# Patient Record
Sex: MALE | Race: WHITE | NOT HISPANIC OR LATINO | Employment: FULL TIME | URBAN - METROPOLITAN AREA
[De-identification: names, ages, dates, MRNs, and addresses within clinical notes are randomized per-mention and may not be internally consistent; named-entity substitution may affect disease eponyms.]

---

## 2017-01-10 DIAGNOSIS — I48.19 PERSISTENT ATRIAL FIBRILLATION: Primary | ICD-10-CM

## 2017-01-10 RX ORDER — FEXOFENADINE HCL AND PSEUDOEPHEDRINE HCI 180; 240 MG/1; MG/1
TABLET, EXTENDED RELEASE ORAL
Status: ON HOLD | COMMUNITY
Start: 2016-11-11 | End: 2017-03-23

## 2017-01-10 NOTE — TELEPHONE ENCOUNTER
----- Message from Romaine Howard MD sent at 1/10/2017 11:35 AM CST -----  Contact: patient wife called  Yes that would be fine. Eliquis 5 mg bid MB  ----- Message -----     From: Charlotte Marie RN     Sent: 1/10/2017  10:36 AM       To: Romaine Howard MD     Pt's wife reports she checked pt's insurance and would like to change pt's Pradaxa. She said you suggested Eliquis. Let me know if this is okay, and I'll send ERX.  ----- Message -----     From: Ale Ponce MA     Sent: 1/10/2017  10:03 AM       To: Charlotte Marie RN    Please call the patient wife at home she need to talk to you about his medication Pradaxa 150 mg she would like to change his medication. Thank you.

## 2017-01-31 ENCOUNTER — OFFICE VISIT (OUTPATIENT)
Dept: OPHTHALMOLOGY | Facility: CLINIC | Age: 67
End: 2017-01-31
Attending: OPHTHALMOLOGY
Payer: MEDICARE

## 2017-01-31 ENCOUNTER — TELEPHONE (OUTPATIENT)
Dept: OPHTHALMOLOGY | Facility: CLINIC | Age: 67
End: 2017-01-31

## 2017-01-31 DIAGNOSIS — Z94.7 STATUS POST CORNEAL TRANSPLANT: ICD-10-CM

## 2017-01-31 DIAGNOSIS — T86.8419 CORNEAL TRANSPLANT FAILURE: ICD-10-CM

## 2017-01-31 DIAGNOSIS — H18.519 FUCHS' CORNEAL DYSTROPHY: Primary | ICD-10-CM

## 2017-01-31 DIAGNOSIS — H25.11 NUCLEAR SCLEROSIS, RIGHT: ICD-10-CM

## 2017-01-31 PROCEDURE — 99999 PR PBB SHADOW E&M-EST. PATIENT-LVL II: CPT | Mod: PBBFAC,,, | Performed by: OPHTHALMOLOGY

## 2017-01-31 PROCEDURE — 92014 COMPRE OPH EXAM EST PT 1/>: CPT | Mod: S$GLB,,, | Performed by: OPHTHALMOLOGY

## 2017-01-31 RX ORDER — MOXIFLOXACIN 5 MG/ML
1 SOLUTION/ DROPS OPHTHALMIC 4 TIMES DAILY
Qty: 3 ML | Refills: 3 | Status: SHIPPED | OUTPATIENT
Start: 2017-01-31 | End: 2017-03-24 | Stop reason: CLARIF

## 2017-01-31 RX ORDER — LIDOCAINE HYDROCHLORIDE 10 MG/ML
1 INJECTION, SOLUTION EPIDURAL; INFILTRATION; INTRACAUDAL; PERINEURAL ONCE
Status: CANCELLED | OUTPATIENT
Start: 2017-01-31 | End: 2017-01-31

## 2017-01-31 RX ORDER — TETRACAINE HYDROCHLORIDE 5 MG/ML
1 SOLUTION OPHTHALMIC
Status: CANCELLED | OUTPATIENT
Start: 2017-01-31

## 2017-01-31 RX ORDER — PREDNISOLONE ACETATE 10 MG/ML
1 SUSPENSION/ DROPS OPHTHALMIC 4 TIMES DAILY
Qty: 10 ML | Refills: 4 | Status: SHIPPED | OUTPATIENT
Start: 2017-01-31 | End: 2017-12-03 | Stop reason: SDUPTHER

## 2017-01-31 RX ORDER — MOXIFLOXACIN 5 MG/ML
1 SOLUTION/ DROPS OPHTHALMIC
Status: CANCELLED | OUTPATIENT
Start: 2017-01-31

## 2017-01-31 NOTE — PROGRESS NOTES
HPI     DLS: 01/26/2016    S/P PC IOL OS 1/11/10 Dr Jay  PKP OS 1997     Pt states his vision has been stable since his last visit.     Eye meds:  FML  BID OS   Timolol  BID OS           Last edited by Nicolette Kinney on 1/31/2017 10:06 AM.         Assessment /Plan     For exam results, see Encounter Report.    Fuchs' corneal dystrophy    Nuclear sclerosis, right    Status post corneal transplant    Corneal transplant failure      PKP OS 20 yrs old and failing. Plan PKP OS      OD with fuchs and NSC, will need phaco DMEK OD soon.(pcboo 22.5)

## 2017-01-31 NOTE — MR AVS SNAPSHOT
[facility]  [address]  [place] [address]  Phone: [phone]  Fax: [phone]                  Pavel Graff   2017 10:00 AM   Office Visit    Description:  Male : 1950   Provider:  Chantell Jay MD   Department:  [facility]           Reason for Visit     Fuch's Dystrophy           Diagnoses this Visit        Comments    Fuchs' corneal dystrophy    -  Primary     Nuclear sclerosis, right         Status post corneal transplant         Corneal transplant failure                To Do List           Goals (5 Years of Data)     None      [facility]     [facility] Care [facility] -  Assistance  Registered nurses in the [facility] Call Center provide clinical advisement, health education, appointment booking, and other advisory services.  Call for this free service at [phone].             Medications           Message regarding Medications     Verify the changes and/or additions to your medication regime listed below are the same as discussed with your clinician today.  If any of these changes or additions are incorrect, please notify your healthcare provider.             Verify that the below list of medications is an accurate representation of the medications you are currently taking.  If none reported, the list may be blank. If incorrect, please contact your healthcare provider. Carry this list with you in case of emergency.           Current Medications     apixaban 5 mg Tab Take 1 tablet (5 mg total) by mouth 2 (two) times daily.    aspirin 81 MG Chew Take 81 mg by mouth once daily.    fexofenadine-pseudoephedrine (ALLEGRA-D 24) 180-240 mg per 24 hr tablet     fish oil-omega-3 fatty acids 300-1,000 mg capsule Take 1 g by mouth once daily.    fluorometholone 0.1% (FML) 0.1 % DrpS     hydrochlorothiazide (HYDRODIURIL) 25 MG tablet TAKE 1 TABLET (25 MG TOTAL) BY MOUTH ONCE DAILY.    losartan (COZAAR) 50 MG tablet Take 1 tablet (50 mg  total) by mouth once daily.    metoprolol succinate (TOPROL-XL) 50 MG 24 hr tablet TAKE 1 TABLET (50 MG TOTAL) BY MOUTH ONCE DAILY.    pantoprazole (PROTONIX) 40 MG tablet Take 1 tablet (40 mg total) by mouth once daily.    pravastatin (PRAVACHOL) 20 MG tablet Take 1 tablet (20 mg total) by mouth once daily.    timolol maleate 0.5% (TIMOPTIC) 0.5 % Drop INSTILL 1 DROP INTO LEFT EYE 2 TIMES A DAY           Clinical Reference Information           Allergies as of 1/31/2017     No Known Allergies      Immunizations Administered on Date of Encounter - 1/31/2017     None

## 2017-01-31 NOTE — TELEPHONE ENCOUNTER
----- Message from Gricelda Abdullahi sent at 1/31/2017 11:47 AM CST -----  Contact: Mrs. Freedmantis   Pt calling to inform that the Moxeza is not covered by her insurance.  She states that the Vigamox is comparable but it is a Tier 4 medication and she would like to know if we can call in a substitute to that medication that is more cost effective.  She can be reached at [phone]

## 2017-02-02 ENCOUNTER — TELEPHONE (OUTPATIENT)
Dept: OPHTHALMOLOGY | Facility: CLINIC | Age: 67
End: 2017-02-02

## 2017-02-02 NOTE — TELEPHONE ENCOUNTER
----- Message from Diana Chua sent at 2/1/2017  2:45 PM CST -----  Contact: Kiana Graff (Spouse)  Pt would like to schedule for his surgery with Dr. Jay on March 23rd, please call [phone]    THANKS!  Spoke with wife. Surgery scheduled 3/23/17 with Dr. Jay. [facility]

## 2017-03-09 ENCOUNTER — TELEPHONE (OUTPATIENT)
Dept: OPHTHALMOLOGY | Facility: CLINIC | Age: 67
End: 2017-03-09

## 2017-03-09 DIAGNOSIS — T86.8419 CORNEAL TRANSPLANT FAILURE: Primary | ICD-10-CM

## 2017-03-15 RX ORDER — FLUOROMETHOLONE 1 MG/ML
SUSPENSION/ DROPS OPHTHALMIC
Qty: 10 ML | Refills: 3 | Status: SHIPPED | OUTPATIENT
Start: 2017-03-15 | End: 2017-04-07 | Stop reason: ALTCHOICE

## 2017-03-21 ENCOUNTER — TELEPHONE (OUTPATIENT)
Dept: OPHTHALMOLOGY | Facility: CLINIC | Age: 67
End: 2017-03-21

## 2017-03-23 ENCOUNTER — ANESTHESIA EVENT (OUTPATIENT)
Dept: SURGERY | Facility: OTHER | Age: 67
End: 2017-03-23
Payer: MEDICARE

## 2017-03-23 ENCOUNTER — ANESTHESIA (OUTPATIENT)
Dept: SURGERY | Facility: OTHER | Age: 67
End: 2017-03-23
Payer: MEDICARE

## 2017-03-23 ENCOUNTER — HOSPITAL ENCOUNTER (OUTPATIENT)
Facility: OTHER | Age: 67
Discharge: HOME | End: 2017-03-23
Attending: OPHTHALMOLOGY | Admitting: OPHTHALMOLOGY
Payer: MEDICARE

## 2017-03-23 VITALS
HEIGHT: 72 IN | SYSTOLIC BLOOD PRESSURE: 125 MMHG | HEART RATE: 69 BPM | TEMPERATURE: 98 F | RESPIRATION RATE: 18 BRPM | WEIGHT: 260 LBS | OXYGEN SATURATION: 93 % | BODY MASS INDEX: 35.21 KG/M2 | DIASTOLIC BLOOD PRESSURE: 85 MMHG

## 2017-03-23 DIAGNOSIS — H18.519 FUCHS' CORNEAL DYSTROPHY: ICD-10-CM

## 2017-03-23 DIAGNOSIS — T86.8419 CORNEAL TRANSPLANT FAILURE: ICD-10-CM

## 2017-03-23 PROCEDURE — 65730 CORNEAL TRANSPLANT: CPT | Mod: LT,,, | Performed by: OPHTHALMOLOGY

## 2017-03-23 PROCEDURE — 36000707: Performed by: OPHTHALMOLOGY

## 2017-03-23 PROCEDURE — 36000706: Performed by: OPHTHALMOLOGY

## 2017-03-23 PROCEDURE — 63600175 PHARM REV CODE 636 W HCPCS: Performed by: OPHTHALMOLOGY

## 2017-03-23 PROCEDURE — 27201423 OPTIME MED/SURG SUP & DEVICES STERILE SUPPLY: Performed by: OPHTHALMOLOGY

## 2017-03-23 PROCEDURE — V2785 CORNEAL TISSUE PROCESSING: HCPCS | Performed by: OPHTHALMOLOGY

## 2017-03-23 PROCEDURE — 37000009 HC ANESTHESIA EA ADD 15 MINS: Performed by: OPHTHALMOLOGY

## 2017-03-23 PROCEDURE — 71000015 HC POSTOP RECOV 1ST HR: Performed by: OPHTHALMOLOGY

## 2017-03-23 PROCEDURE — 37000008 HC ANESTHESIA 1ST 15 MINUTES: Performed by: OPHTHALMOLOGY

## 2017-03-23 PROCEDURE — 25000003 PHARM REV CODE 250: Performed by: NURSE ANESTHETIST, CERTIFIED REGISTERED

## 2017-03-23 PROCEDURE — 25000003 PHARM REV CODE 250: Performed by: OPHTHALMOLOGY

## 2017-03-23 PROCEDURE — 63600175 PHARM REV CODE 636 W HCPCS: Performed by: NURSE ANESTHETIST, CERTIFIED REGISTERED

## 2017-03-23 RX ORDER — HYDROCODONE BITARTRATE AND ACETAMINOPHEN 5; 325 MG/1; MG/1
1 TABLET ORAL EVERY 4 HOURS PRN
Status: DISCONTINUED | OUTPATIENT
Start: 2017-03-23 | End: 2017-03-23 | Stop reason: HOSPADM

## 2017-03-23 RX ORDER — BUPIVACAINE HYDROCHLORIDE 7.5 MG/ML
INJECTION, SOLUTION EPIDURAL; RETROBULBAR
Status: DISCONTINUED | OUTPATIENT
Start: 2017-03-23 | End: 2017-03-23 | Stop reason: HOSPADM

## 2017-03-23 RX ORDER — SODIUM CHLORIDE, SODIUM LACTATE, POTASSIUM CHLORIDE, CALCIUM CHLORIDE 600; 310; 30; 20 MG/100ML; MG/100ML; MG/100ML; MG/100ML
INJECTION, SOLUTION INTRAVENOUS CONTINUOUS PRN
Status: DISCONTINUED | OUTPATIENT
Start: 2017-03-23 | End: 2017-03-23

## 2017-03-23 RX ORDER — MIDAZOLAM HYDROCHLORIDE 1 MG/ML
INJECTION INTRAMUSCULAR; INTRAVENOUS
Status: DISCONTINUED | OUTPATIENT
Start: 2017-03-23 | End: 2017-03-23

## 2017-03-23 RX ORDER — FENTANYL CITRATE 50 UG/ML
INJECTION, SOLUTION INTRAMUSCULAR; INTRAVENOUS
Status: DISCONTINUED | OUTPATIENT
Start: 2017-03-23 | End: 2017-03-23

## 2017-03-23 RX ORDER — LIDOCAINE HYDROCHLORIDE 10 MG/ML
1 INJECTION, SOLUTION EPIDURAL; INFILTRATION; INTRACAUDAL; PERINEURAL ONCE
Status: DISCONTINUED | OUTPATIENT
Start: 2017-03-23 | End: 2017-03-23 | Stop reason: HOSPADM

## 2017-03-23 RX ORDER — MOXIFLOXACIN 5 MG/ML
1 SOLUTION/ DROPS OPHTHALMIC
Status: COMPLETED | OUTPATIENT
Start: 2017-03-23 | End: 2017-03-23

## 2017-03-23 RX ORDER — ACETAMINOPHEN 325 MG/1
650 TABLET ORAL EVERY 4 HOURS PRN
Status: DISCONTINUED | OUTPATIENT
Start: 2017-03-23 | End: 2017-03-23 | Stop reason: HOSPADM

## 2017-03-23 RX ORDER — DEXAMETHASONE SODIUM PHOSPHATE 4 MG/ML
INJECTION, SOLUTION INTRA-ARTICULAR; INTRALESIONAL; INTRAMUSCULAR; INTRAVENOUS; SOFT TISSUE
Status: DISCONTINUED | OUTPATIENT
Start: 2017-03-23 | End: 2017-03-23 | Stop reason: HOSPADM

## 2017-03-23 RX ORDER — LIDOCAINE HYDROCHLORIDE 20 MG/ML
INJECTION, SOLUTION INFILTRATION; PERINEURAL
Status: DISCONTINUED | OUTPATIENT
Start: 2017-03-23 | End: 2017-03-23 | Stop reason: HOSPADM

## 2017-03-23 RX ORDER — CEFAZOLIN SODIUM 1 G/3ML
INJECTION, POWDER, FOR SOLUTION INTRAMUSCULAR; INTRAVENOUS
Status: DISCONTINUED | OUTPATIENT
Start: 2017-03-23 | End: 2017-03-23 | Stop reason: HOSPADM

## 2017-03-23 RX ORDER — TETRACAINE HYDROCHLORIDE 5 MG/ML
1 SOLUTION OPHTHALMIC
Status: COMPLETED | OUTPATIENT
Start: 2017-03-23 | End: 2017-03-23

## 2017-03-23 RX ADMIN — MIDAZOLAM HYDROCHLORIDE 2 MG: 1 INJECTION, SOLUTION INTRAMUSCULAR; INTRAVENOUS at 09:03

## 2017-03-23 RX ADMIN — MOXIFLOXACIN HYDROCHLORIDE 1 DROP: 5 SOLUTION/ DROPS OPHTHALMIC at 09:03

## 2017-03-23 RX ADMIN — SODIUM CHLORIDE, SODIUM LACTATE, POTASSIUM CHLORIDE, AND CALCIUM CHLORIDE: 600; 310; 30; 20 INJECTION, SOLUTION INTRAVENOUS at 09:03

## 2017-03-23 RX ADMIN — TETRACAINE HYDROCHLORIDE 1 DROP: 5 SOLUTION OPHTHALMIC at 09:03

## 2017-03-23 RX ADMIN — MIDAZOLAM HYDROCHLORIDE 1 MG: 1 INJECTION, SOLUTION INTRAMUSCULAR; INTRAVENOUS at 10:03

## 2017-03-23 RX ADMIN — FENTANYL CITRATE 100 MCG: 50 INJECTION, SOLUTION INTRAMUSCULAR; INTRAVENOUS at 09:03

## 2017-03-23 NOTE — PLAN OF CARE
Pavel Graff has met all discharge criteria from Phase II. Vital Signs are stable, ambulating  without difficulty. Discharge instructions given, patient verbalized understanding. Discharged from facility via wheelchair in stable condition.

## 2017-03-23 NOTE — OP NOTE
SURGEON:  Chantell Jay M.D.    PREOPERATIVE DIAGNOSIS:    1) Fuchs Dystrophy  2) Failed Corneal Transplant OS    POSTOPERATIVE DIAGNOSIS:     1) Fuchs Dystrophy  2) Failed Corneal Transplant OS    PROCEDURE:    1. Penetrating keratoplasty, OS eye  2.     ANESTHESIA:  RBB      DATE OF SURGERY:  03/23/2017      GRAFT SIZE:  8.75 mm donor button into a   8.5 mm host trephination      COMPLICATIONS:  None.    BLOOD LOSS: Less than 5 cc    SPECIMENS:   1) Cornea     INDICATIONS FOR PROCEDURE :       After a thorough discussion of risks, benefits and alternatives, including, but not limited to, infection, severe hemorrhage, graft failure, need for repeat transplantation, loss of vision, and loss of the eye,  the patient voices understanding and desires to proceed with corneal transplantation.    PROCEDURE IN DETAIL:    The patient was brought to the operating room in supine position where anesthesia was achieved without complication.  Next, the eye was prepped and draped in standard sterile fashion with 5% Betadine and a lid speculum placed in the eye.  The procedure was begun by marking the center of the cornea and sizers  used to determine the necessary size of the grafts.    Attention was then directed to the side table where a donor punch was used to cut the donor tissue.  Attention was then redirected to the patient's eye where a  trephine and an Carlos PKP cezar blade were used to excise the patient's cornea.  The donor button was then sewn into place with 10-0 nylon using 8 interrupted sutures and a 16 x running.  All remaining viscoelastics were removed from the anterior chamber.  The eye was reformed with BSS and wound integrity verified.  Subconjunctival injections of antibiotic and steroid were administered and a patch placed over the eye. The patient will follow up in the eye clinic tomorrow with Dr. Jay.

## 2017-03-23 NOTE — IP AVS SNAPSHOT
[facility] Location ([facility])  [address] [place] [address]  Phone: [phone]           Patient Discharge Instructions     Our goal is to set you up for success. This packet includes information on your condition, medications, and your home care. It will help you to care for yourself so you don't get sicker and need to go back to the hospital.     Please ask your nurse if you have any questions.        There are many details to remember when preparing to leave the hospital. Here is what you will need to do:    1. Take your medicine. If you are prescribed medications, review your Medication List in the following pages. You may have new medications to  at the pharmacy and others that you'll need to stop taking. Review the instructions for how and when to take your medications. Talk with your doctor or nurses if you are unsure of what to do.     2. Go to your follow-up appointments. Specific follow-up information is listed in the following pages. Your may be contacted by a transition nurse or clinical provider about future appointments. Be sure we have all of the phone numbers to reach you, if needed. Please contact your provider's office if you are unable to make an appointment.     3. Watch for warning signs. Your doctor or nurse will give you detailed warning signs to watch for and when to call for assistance. These instructions may also include educational information about your condition. If you experience any of warning signs to your health, call your doctor.               ** Verify the list of medication(s) below is accurate and up to date. Carry this with you in case of emergency. If your medications have changed, please notify your healthcare provider.             Medication List      CONTINUE taking these medications        Additional Info                      apixaban 5 mg Tab   Quantity:  60 tablet   Refills:  11   Dose:  5 mg   Comments:  Please discontinue Pradaxa     Instructions:  Take 1 tablet (5 mg total) by mouth 2 (two) times daily.     Begin Date    AM    Noon    PM    Bedtime       aspirin 81 MG Chew   Refills:  0   Dose:  81 mg    Instructions:  Take 81 mg by mouth once daily.     Begin Date    AM    Noon    PM    Bedtime       fish oil-omega-3 fatty acids 300-1,000 mg capsule   Refills:  0   Dose:  1 g    Instructions:  Take 1 g by mouth once daily.     Begin Date    AM    Noon    PM    Bedtime       * fluorometholone 0.1% 0.1 % Drps   Commonly known as:  FML   Refills:  0      Begin Date    AM    Noon    PM    Bedtime       * fluorometholone 0.1% 0.1 % Drps   Commonly known as:  FML   Quantity:  10 mL   Refills:  3    Instructions:  PLACE 1 DROP INTO BOTH EYES 3 (THREE) TIMES DAILY.     Begin Date    AM    Noon    PM    Bedtime       hydrochlorothiazide 25 MG tablet   Commonly known as:  HYDRODIURIL   Quantity:  90 tablet   Refills:  3    Instructions:  TAKE 1 TABLET (25 MG TOTAL) BY MOUTH ONCE DAILY.     Begin Date    AM    Noon    PM    Bedtime       losartan 50 MG tablet   Commonly known as:  COZAAR   Quantity:  90 tablet   Refills:  1   Dose:  50 mg    Instructions:  Take 1 tablet (50 mg total) by mouth once daily.     Begin Date    AM    Noon    PM    Bedtime       metoprolol succinate 50 MG 24 hr tablet   Commonly known as:  TOPROL-XL   Quantity:  90 tablet   Refills:  3    Instructions:  TAKE 1 TABLET (50 MG TOTAL) BY MOUTH ONCE DAILY.     Begin Date    AM    Noon    PM    Bedtime       moxifloxacin 0.5 % ophthalmic solution   Commonly known as:  VIGAMOX   Quantity:  3 mL   Refills:  3   Dose:  1 drop    Last time this was given:  1 drop on 3/23/2017  9:14 AM   Instructions:  Place 1 drop into the left eye 4 (four) times daily.     Begin Date    AM    Noon    PM    Bedtime       pantoprazole 40 MG tablet   Commonly known as:  PROTONIX   Quantity:  30 tablet   Refills:  11   Dose:  40 mg    Instructions:  Take 1 tablet (40 mg total) by mouth once daily.     Begin  Date    AM    Noon    PM    Bedtime       pravastatin 20 MG tablet   Commonly known as:  PRAVACHOL   Quantity:  90 tablet   Refills:  3   Dose:  20 mg    Instructions:  Take 1 tablet (20 mg total) by mouth once daily.     Begin Date    AM    Noon    PM    Bedtime       prednisoLONE acetate 1 % Drps   Commonly known as:  PRED FORTE   Quantity:  10 mL   Refills:  4   Dose:  1 drop    Instructions:  Place 1 drop into the left eye 4 (four) times daily.     Begin Date    AM    Noon    PM    Bedtime       timolol maleate 0.5% 0.5 % Drop   Commonly known as:  TIMOPTIC   Quantity:  10 mL   Refills:  4    Instructions:  INSTILL 1 DROP INTO LEFT EYE 2 TIMES A DAY     Begin Date    AM    Noon    PM    Bedtime       * Notice:  This list has 2 medication(s) that are the same as other medications prescribed for you. Read the directions carefully, and ask your doctor or other care provider to review them with you.      ASK your doctor about these medications        Additional Info                      fexofenadine-pseudoephedrine 180-240 mg per 24 hr tablet   Commonly known as:  ALLEGRA-D 24   Refills:  0      Begin Date    AM    Noon    PM    Bedtime                  Please bring to all follow up appointments:    1. A copy of your discharge instructions.  2. All medicines you are currently taking in their original bottles.  3. Identification and insurance card.    Please arrive 15 minutes ahead of scheduled appointment time.    Please call 24 hours in advance if you must reschedule your appointment and/or time.        Your Scheduled Appointments     Mar 27, 2017  8:30 AM CDT   Physical with Joe Peterson MD   [facility] [address] ([place]    [address]  [address]  [facility] [address]   [phone]            Apr 07, 2017  9:20 AM CDT   Established Patient Visit with Loretta Srinivasan NP   [facility] ([facility])    [address]  [facility] [address]    [phone]              Follow-up Information     Follow up with Chantell Jay MD.    Specialty:  Ophthalmology    Contact information:    [address]  [address]  [facility] [address] [phone]          Follow up with Chantell Jay MD. Go in 1 day.    Specialty:  Ophthalmology    Contact information:    [address]  [facility] [id number] [phone]          Discharge Instructions     Future Orders    Diet general     Questions:    Total calories:      Fat restriction, if any:      Protein restriction, if any:      Na restriction, if any:      Fluid restriction:      Additional restrictions:          Discharge Instructions         Anesthesia: Monitored Anesthesia Care (MAC)    Anesthesia Safety  · Have an adult family member or friend drive you home after the procedure.  · For the first 24 hours after your surgery:  ¨ Do not drive or use heavy equipment.  ¨ Do not make important decisions or sign documents.  ¨ Avoid alcohol.  ¨ Have someone stay with you, if possible. They can watch for problems and help keep you safe.          Primary Diagnosis     Your primary diagnosis was:  Not on File      Admission Information     Date & Time Provider Department CSN    3/23/2017  8:36 AM Chantell Jay MD [facility] [id number]      Care Providers     Provider Role Specialty Primary office phone    Chantell Jay MD Attending Provider Ophthalmology [phone]    Chantell Jay MD Surgeon  Ophthalmology [phone]      Your Vitals Were     BP Pulse Temp Resp Height Weight    123/83 (BP Location: Left arm, Patient Position: Lying, BP Method: Automatic) 70 98 °F (36.7 °C) (Oral) 18 6' (1.829 m) 117.9 kg (260 lb)    SpO2 BMI             93% 35.26 kg/m2         Recent Lab Values        6/29/2012 4/7/2015                        5:45 AM  8:15 AM          A1C 5.2 5.0                     Allergies as of 3/23/2017     No Known Allergies      [facility] On [facility]     [facility] On Call Nurse Care Line -  24/7 Assistance  Unless otherwise directed by your provider, please contact [facility], our nurse care line that is available for 24/7 assistance.     Registered nurses in the [facility] Center provide clinical advisement, health education, appointment booking, and other advisory services.  Call for this free service at [phone].        Advance Directives     An advance directive is a document which, in the event you are no longer able to make decisions for yourself, tells your healthcare team what kind of treatment you do or do not want to receive, or who you would like to make those decisions for you.  If you do not currently have an advance directive, [facility] encourages you to create one.  For more information call:  [phone]), [phone] ([phone]),  or log on to [email or web address].        Language Assistance Services     ATTENTION: Language assistance services are available, free of charge. Please call [phone].      ATENCIÓN: Si habla español, tiene a ordaz disposición servicios gratuitos de asistencia lingüística. Llame al [phone].     ALDA Ý: N?u b?n nói Ti?ng Vi?t, có các d?ch v? h? tr? ngôn ng? mi?n phí dành cho b?n. G?i s? [phone].        Eliquis Informaiton            [facility] complies with applicable Federal civil rights laws and does not discriminate on the basis of race, color, national origin, age, disability, or sex.

## 2017-03-23 NOTE — ANESTHESIA POSTPROCEDURE EVALUATION
Anesthesia Post Evaluation    Patient: Pavel Graff    Procedure(s) Performed: Procedure(s) (LRB):  TRANSPLANT-CORNEA/PKP (Left)    Final Anesthesia Type: MAC  Patient location during evaluation: [facility]  Patient participation: Yes- Able to Participate  Level of consciousness: awake and alert  Post-procedure vital signs: reviewed and stable  Pain management: adequate  Airway patency: patent  PONV status at discharge: No PONV  Anesthetic complications: no      Cardiovascular status: blood pressure returned to baseline  Respiratory status: unassisted, spontaneous ventilation and room air  Hydration status: euvolemic  Follow-up not needed.        Visit Vitals    BP (!) 131/93 (BP Location: Right arm, Patient Position: Sitting, BP Method: Automatic)    Pulse 71    Temp 37 °C (98.6 °F) (Oral)    Resp 16    Ht 6' (1.829 m)    Wt 117.9 kg (260 lb)    BMI 35.26 kg/m2       Pain/Mark Score: Pain Assessment Performed: Yes (3/23/2017  9:00 AM)  Presence of Pain: denies (3/23/2017  9:00 AM)

## 2017-03-23 NOTE — DISCHARGE SUMMARY
Outcome: Successful outpatient ophthalmic surgical procedure  Preprinted Instructions given to patient.  Regular diet.  Activity: No restrictions  Meds: see Med Rec  Condition: stable  Follow up: 1 day with Dr Jay  Disposition: Home  Diagnosis: s/p eye surgery

## 2017-03-23 NOTE — ANESTHESIA PREPROCEDURE EVALUATION
03/23/2017  Pavel Graff is a 67 y.o., male.    [facility] Anesthesia Evaluation    I have reviewed the Patient Summary Reports.    I have reviewed the Nursing Notes.   I have reviewed the Medications.     Review of Systems  Anesthesia Hx:  No problems with previous Anesthesia  History of prior surgery of interest to airway management or planning: Previous anesthesia: General 7/16 Ablation with general anesthesia.  Airway issues documented on chart review include mask, easy, GETA, easy direct laryngoscopy , view on direct laryngoscopy Grade II  Denies Family Hx of Anesthesia complications.   Denies Personal Hx of Anesthesia complications.   Social:  Non-Smoker    Hematology/Oncology:  Hematology Normal   Oncology Normal     EENT/Dental:EENT/Dental Normal   Cardiovascular:   Hypertension Dysrhythmias (SR s/p ablation) atrial fibrillation    Pulmonary:   Denies Shortness of breath.    Renal/:  Renal/ Normal     Hepatic/GI:  Hepatic/GI Normal    Musculoskeletal:  Musculoskeletal Normal    Neurological:   TIA,    Endocrine:  Endocrine Normal    Dermatological:  Skin Normal    Psych:  Psychiatric Normal           Physical Exam  General:  Obesity    Airway/Jaw/Neck:  Airway Findings: Mouth Opening: Normal Tongue: Normal  Mallampati: II  Improves to II with phonation.  TM Distance: Normal, at least 6 cm      Dental:  Dental Findings: upper partial dentures        Mental Status:  Mental Status Findings:  Cooperative         Anesthesia Plan  Type of Anesthesia, risks & benefits discussed:  Anesthesia Type:  MAC  Patient's Preference:   Intra-op Monitoring Plan: standard ASA monitors  Intra-op Monitoring Plan Comments:   Post Op Pain Control Plan:   Post Op Pain Control Plan Comments:   Induction:    Beta Blocker:         Informed Consent: Patient understands risks and agrees with Anesthesia plan.  Questions answered.  Anesthesia consent signed with patient.  ASA Score: 3     Day of Surgery Review of History & Physical:    H&P update referred to the surgeon.         Ready For Surgery From Anesthesia Perspective.

## 2017-03-23 NOTE — H&P
Pre-op Eye Surgery H&P    CC: Painless, progressive loss of vision  Present Illness :Corneal edema/opacity  Allergies/Current Meds: see meds  Mental Status: A&O x3  Pertinent Medical History: n/a    Physical Exam  General: NAD  HEENT: Eye white/quiet  Lungs: Adequate respirations  Heart: + pulses  Abdomen: soft  Rectal/GI/: deferred    Impression: Corneal Edema/opacity  Plan:Corneal Transplant

## 2017-03-24 ENCOUNTER — OFFICE VISIT (OUTPATIENT)
Dept: OPHTHALMOLOGY | Facility: CLINIC | Age: 67
End: 2017-03-24
Payer: MEDICARE

## 2017-03-24 ENCOUNTER — TELEPHONE (OUTPATIENT)
Dept: OPHTHALMOLOGY | Facility: CLINIC | Age: 67
End: 2017-03-24

## 2017-03-24 DIAGNOSIS — Z98.890 POST-OPERATIVE STATE: Primary | ICD-10-CM

## 2017-03-24 DIAGNOSIS — T86.8419 CORNEAL TRANSPLANT FAILURE: ICD-10-CM

## 2017-03-24 PROCEDURE — 99024 POSTOP FOLLOW-UP VISIT: CPT | Mod: S$GLB,,, | Performed by: OPHTHALMOLOGY

## 2017-03-24 PROCEDURE — 99999 PR PBB SHADOW E&M-EST. PATIENT-LVL II: CPT | Mod: PBBFAC,,, | Performed by: OPHTHALMOLOGY

## 2017-03-24 RX ORDER — OFLOXACIN 3 MG/ML
1 SOLUTION/ DROPS OPHTHALMIC 4 TIMES DAILY
Refills: 0 | COMMUNITY
Start: 2017-01-31 | End: 2017-04-07 | Stop reason: ALTCHOICE

## 2017-03-24 NOTE — PROGRESS NOTES
HPI     Post-op Evaluation    Additional comments: 1 day check.            Comments   POD 1 Repeat PKP OS 03/23/2017    Pt states doing well, no pain or discomfort OU.   Questions he should continue Timolol?    Patch Removed at 8:52am.            Last edited by Karli Mcwilliams on 3/24/2017  9:11 AM. (History)            Assessment /Plan     For exam results, see Encounter Report.    Post-operative state    Corneal transplant failure      POD1 PKP: Wound stable. Graft with normal edema, folds. Post operative precautions reviewed.

## 2017-03-24 NOTE — TELEPHONE ENCOUNTER
----- Message from Jared Dewitt sent at 3/24/2017 10:44 AM CDT -----  Contact: Pavel Graff [[id number]]  Pt lost drops and needs Ocuflox and prednisolone called into [facility] [phone] if any questions for pt please call [phone],thanks

## 2017-03-24 NOTE — MR AVS SNAPSHOT
[facility] - Ophthalmology  [address]  [facility] [address]  Phone: [phone]  Fax: [phone]                  Pavel Graff   3/24/2017 8:15 AM   Office Visit    Description:  Male : 1950   Provider:  Chantell Jay MD   Department:  [facility]           Reason for Visit     Post-op Evaluation           Diagnoses this Visit        Comments    Post-operative state    -  Primary     Corneal transplant failure                To Do List           Future Appointments        Provider Department Dept Phone    3/27/2017 8:30 AM Joe Peterson MD [place] [address] [phone]    3/31/2017 10:45 AM Chantell Jay MD [facility] - Ophthalmology [phone]    2017 9:20 AM Loretta Srinivasan NP [facility] [phone]      Goals (5 Years of Data)     None      [facility] On Call     [facility] On Call Nurse [facility] -  Assistance  Registered nurses in the [facility] On Call Center provide clinical advisement, health education, appointment booking, and other advisory services.  Call for this free service at [phone].             Medications           Message regarding Medications     Verify the changes and/or additions to your medication regime listed below are the same as discussed with your clinician today.  If any of these changes or additions are incorrect, please notify your healthcare provider.        STOP taking these medications     moxifloxacin (VIGAMOX) 0.5 % ophthalmic solution Place 1 drop into the left eye 4 (four) times daily.           Verify that the below list of medications is an accurate representation of the medications you are currently taking.  If none reported, the list may be blank. If incorrect, please contact your healthcare provider. Carry this list with you in case of emergency.           Current Medications     apixaban 5 mg Tab Take 1 tablet (5 mg total) by mouth 2 (two) times daily.    aspirin 81 MG Chew Take 81 mg by  mouth once daily.    fish oil-omega-3 fatty acids 300-1,000 mg capsule Take 1 g by mouth once daily.    fluorometholone 0.1% (FML) 0.1 % DrpS     fluorometholone 0.1% (FML) 0.1 % DrpS PLACE 1 DROP INTO BOTH EYES 3 (THREE) TIMES DAILY.    hydrochlorothiazide (HYDRODIURIL) 25 MG tablet TAKE 1 TABLET (25 MG TOTAL) BY MOUTH ONCE DAILY.    losartan (COZAAR) 50 MG tablet Take 1 tablet (50 mg total) by mouth once daily.    metoprolol succinate (TOPROL-XL) 50 MG 24 hr tablet TAKE 1 TABLET (50 MG TOTAL) BY MOUTH ONCE DAILY.    ofloxacin (OCUFLOX) 0.3 % ophthalmic solution Place 1 drop into the left eye 4 (four) times daily.    pantoprazole (PROTONIX) 40 MG tablet Take 1 tablet (40 mg total) by mouth once daily.    pravastatin (PRAVACHOL) 20 MG tablet Take 1 tablet (20 mg total) by mouth once daily.    prednisoLONE acetate (PRED FORTE) 1 % DrpS Place 1 drop into the left eye 4 (four) times daily.    timolol maleate 0.5% (TIMOPTIC) 0.5 % Drop INSTILL 1 DROP INTO LEFT EYE 2 TIMES A DAY           Clinical Reference Information           Allergies as of 3/24/2017     No Known Allergies      Immunizations Administered on Date of Encounter - 3/24/2017     None      Language Assistance Services     ATTENTION: Language assistance services are available, free of charge. Please call [phone].      ATENCIÓN: Si habla laith, tiene a ordaz disposición servicios gratuitos de asistencia lingüística. Llame al [phone].     ALDA Ý: N?u b?n nói Ti?ng Vi?t, có các d?ch v? h? tr? ngôn ng? mi?n phí dành cho b?n. G?i s? [phone].         [facility] complies with applicable Federal civil rights laws and does not discriminate on the basis of race, color, national origin, age, disability, or sex.

## 2017-03-24 NOTE — TELEPHONE ENCOUNTER
I spoke to wife.  They have refills on Prednisolone but they do not have refills on Ocuflox.  I then called in Ocuflox qid OS, to [facility] on file.

## 2017-03-27 ENCOUNTER — LAB VISIT (OUTPATIENT)
Dept: LAB | Facility: HOSPITAL | Age: 67
End: 2017-03-27
Attending: INTERNAL MEDICINE
Payer: MEDICARE

## 2017-03-27 ENCOUNTER — OFFICE VISIT (OUTPATIENT)
Dept: INTERNAL MEDICINE | Facility: CLINIC | Age: 67
End: 2017-03-27
Payer: MEDICARE

## 2017-03-27 VITALS
WEIGHT: 257.69 LBS | HEIGHT: 72 IN | BODY MASS INDEX: 34.9 KG/M2 | HEART RATE: 80 BPM | DIASTOLIC BLOOD PRESSURE: 84 MMHG | SYSTOLIC BLOOD PRESSURE: 138 MMHG

## 2017-03-27 DIAGNOSIS — N40.0 BENIGN NON-NODULAR PROSTATIC HYPERPLASIA WITHOUT LOWER URINARY TRACT SYMPTOMS: ICD-10-CM

## 2017-03-27 DIAGNOSIS — G47.33 OBSTRUCTIVE SLEEP APNEA: ICD-10-CM

## 2017-03-27 DIAGNOSIS — Z12.5 ENCOUNTER FOR SCREENING FOR MALIGNANT NEOPLASM OF PROSTATE: ICD-10-CM

## 2017-03-27 DIAGNOSIS — E78.5 HYPERLIPIDEMIA, UNSPECIFIED HYPERLIPIDEMIA TYPE: ICD-10-CM

## 2017-03-27 DIAGNOSIS — I48.19 PERSISTENT ATRIAL FIBRILLATION: ICD-10-CM

## 2017-03-27 DIAGNOSIS — G45.9 TRANSIENT CEREBRAL ISCHEMIA, UNSPECIFIED TYPE: ICD-10-CM

## 2017-03-27 DIAGNOSIS — H18.519 FUCHS' CORNEAL DYSTROPHY: ICD-10-CM

## 2017-03-27 DIAGNOSIS — Z79.01 CURRENT USE OF LONG TERM ANTICOAGULATION: ICD-10-CM

## 2017-03-27 DIAGNOSIS — Z00.00 ANNUAL PHYSICAL EXAM: ICD-10-CM

## 2017-03-27 DIAGNOSIS — I10 ESSENTIAL HYPERTENSION: ICD-10-CM

## 2017-03-27 DIAGNOSIS — Z00.00 ANNUAL PHYSICAL EXAM: Primary | ICD-10-CM

## 2017-03-27 LAB
ALBUMIN SERPL BCP-MCNC: 4.1 G/DL
ALP SERPL-CCNC: 71 U/L
ALT SERPL W/O P-5'-P-CCNC: 25 U/L
ANION GAP SERPL CALC-SCNC: 7 MMOL/L
AST SERPL-CCNC: 28 U/L
BILIRUB SERPL-MCNC: 0.9 MG/DL
BUN SERPL-MCNC: 12 MG/DL
CALCIUM SERPL-MCNC: 10.2 MG/DL
CHLORIDE SERPL-SCNC: 103 MMOL/L
CHOLEST/HDLC SERPL: 4.8 {RATIO}
CO2 SERPL-SCNC: 29 MMOL/L
COMPLEXED PSA SERPL-MCNC: 0.76 NG/ML
CREAT SERPL-MCNC: 1 MG/DL
EST. GFR  (AFRICAN AMERICAN): >60 ML/MIN/1.73 M^2
EST. GFR  (NON AFRICAN AMERICAN): >60 ML/MIN/1.73 M^2
GLUCOSE SERPL-MCNC: 103 MG/DL
HDL/CHOLESTEROL RATIO: 20.8 %
HDLC SERPL-MCNC: 183 MG/DL
HDLC SERPL-MCNC: 38 MG/DL
LDLC SERPL CALC-MCNC: 118.6 MG/DL
NONHDLC SERPL-MCNC: 145 MG/DL
POTASSIUM SERPL-SCNC: 5.2 MMOL/L
PROT SERPL-MCNC: 7.2 G/DL
SODIUM SERPL-SCNC: 139 MMOL/L
TRIGL SERPL-MCNC: 132 MG/DL
TSH SERPL DL<=0.005 MIU/L-ACNC: 1.18 UIU/ML

## 2017-03-27 PROCEDURE — 99397 PER PM REEVAL EST PAT 65+ YR: CPT | Mod: S$GLB,,, | Performed by: INTERNAL MEDICINE

## 2017-03-27 PROCEDURE — 80053 COMPREHEN METABOLIC PANEL: CPT

## 2017-03-27 PROCEDURE — 99499 UNLISTED E&M SERVICE: CPT | Mod: S$GLB,,, | Performed by: INTERNAL MEDICINE

## 2017-03-27 PROCEDURE — 3075F SYST BP GE 130 - 139MM HG: CPT | Mod: S$GLB,,, | Performed by: INTERNAL MEDICINE

## 2017-03-27 PROCEDURE — 84443 ASSAY THYROID STIM HORMONE: CPT

## 2017-03-27 PROCEDURE — 3079F DIAST BP 80-89 MM HG: CPT | Mod: S$GLB,,, | Performed by: INTERNAL MEDICINE

## 2017-03-27 PROCEDURE — 99999 PR PBB SHADOW E&M-EST. PATIENT-LVL III: CPT | Mod: PBBFAC,,, | Performed by: INTERNAL MEDICINE

## 2017-03-27 PROCEDURE — 36415 COLL VENOUS BLD VENIPUNCTURE: CPT | Mod: PO

## 2017-03-27 PROCEDURE — 80061 LIPID PANEL: CPT

## 2017-03-27 PROCEDURE — 84153 ASSAY OF PSA TOTAL: CPT

## 2017-03-27 NOTE — MR AVS SNAPSHOT
[facility] [address]  [address]  [address]  [facility] [address]  Phone: [phone]                  Pavel Graff   3/27/2017 8:30 AM   Office Visit    Description:  Male : 1950   Provider:  Joe Peterson MD   Department:  [facility] [address]           Reason for Visit     Annual Exam           Diagnoses this Visit        Comments    Annual physical exam    -  Primary     Essential hypertension         Hyperlipidemia, unspecified hyperlipidemia type         Persistent atrial fibrillation         Transient cerebral ischemia, unspecified type         Obstructive sleep apnea         Fuchs' corneal dystrophy         Current use of long term anticoagulation         Benign non-nodular prostatic hyperplasia without lower urinary tract symptoms         Encounter for screening for malignant neoplasm of prostate                To Do List           Future Appointments        Provider Department Dept Phone    3/31/2017 10:45 AM Chantell Jay MD [facility] Op[facility] [phone]    2017 9:20 AM Loretta Srinivasan NP [facility] [phone]      Goals (5 Years of Data)     None      [facility]     [facility] On Call Nurse [facility] -  Assistance  Registered nurses in the [facility] On Call Center provide clinical advisement, health education, appointment booking, and other advisory services.  Call for this free service at [phone].             Medications           Message regarding Medications     Verify the changes and/or additions to your medication regime listed below are the same as discussed with your clinician today.  If any of these changes or additions are incorrect, please notify your healthcare provider.             Verify that the below list of medications is an accurate representation of the medications you are currently taking.  If none reported, the list may be blank. If incorrect, please contact your healthcare  provider. Carry this list with you in case of emergency.           Current Medications     apixaban 5 mg Tab Take 1 tablet (5 mg total) by mouth 2 (two) times daily.    aspirin 81 MG Chew Take 81 mg by mouth once daily.    fish oil-omega-3 fatty acids 300-1,000 mg capsule Take 1 g by mouth once daily.    fluorometholone 0.1% (FML) 0.1 % DrpS     fluorometholone 0.1% (FML) 0.1 % DrpS PLACE 1 DROP INTO BOTH EYES 3 (THREE) TIMES DAILY.    hydrochlorothiazide (HYDRODIURIL) 25 MG tablet TAKE 1 TABLET (25 MG TOTAL) BY MOUTH ONCE DAILY.    losartan (COZAAR) 50 MG tablet Take 1 tablet (50 mg total) by mouth once daily.    metoprolol succinate (TOPROL-XL) 50 MG 24 hr tablet TAKE 1 TABLET (50 MG TOTAL) BY MOUTH ONCE DAILY.    ofloxacin (OCUFLOX) 0.3 % ophthalmic solution Place 1 drop into the left eye 4 (four) times daily.    pantoprazole (PROTONIX) 40 MG tablet Take 1 tablet (40 mg total) by mouth once daily.    pravastatin (PRAVACHOL) 20 MG tablet Take 1 tablet (20 mg total) by mouth once daily.    prednisoLONE acetate (PRED FORTE) 1 % DrpS Place 1 drop into the left eye 4 (four) times daily.    timolol maleate 0.5% (TIMOPTIC) 0.5 % Drop INSTILL 1 DROP INTO LEFT EYE 2 TIMES A DAY           Clinical Reference Information           Your Vitals Were     BP Pulse Height Weight BMI    138/84 80 6' (1.829 m) 116.9 kg (257 lb 11.2 oz) 34.95 kg/m2      Blood Pressure          Most Recent Value    BP  138/84      Allergies as of 3/27/2017     No Known Allergies      Immunizations Administered on Date of Encounter - 3/27/2017     Name Date Dose VIS Date Route    Pneumococcal Conjugate - 13 Valent  Incomplete 0.5 mL 11/5/2015 Intramuscular    Pneumococcal Conjugate - 13 Valent  Incomplete 0.5 mL 11/5/2015 Intramuscular      Orders Placed During Today's Visit      Normal Orders This Visit    Pneumococcal Conjugate Vaccine (13 Valent) (IM)     Pneumococcal Conjugate Vaccine (13 Valent) (IM)     Future Labs/Procedures Expected by Expires     Comprehensive metabolic panel  3/27/2017 3/27/2018    Lipid panel  3/27/2017 3/27/2018    PSA, Screening  3/27/2017 3/27/2018    TSH  3/27/2017 3/27/2018      Language Assistance Services     ATTENTION: Language assistance services are available, free of charge. Please call [phone].      ATENCIÓN: Si habla español, tiene a ordaz disposición servicios gratuitos de asistencia lingüística. Llame al [phone].     CHÚ Ý: N?u b?n nói Ti?ng Vi?t, có các d?ch v? h? tr? ngôn ng? mi?n phí dành cho b?n. G?i s? [phone].         [facility] [address] complies with applicable Federal civil rights laws and does not discriminate on the basis of race, color, national origin, age, disability, or sex.

## 2017-03-27 NOTE — PROGRESS NOTES
REASON FOR VISIT:  This is a 67-year-old male who comes in for a regular routine   visit.  Overall in general, he has been feeling well.  In 2016, he   underwent pulmonary vein isolation for treatment of his atrial fibrillation and   has done well.  Since the procedure, he does not feel as tired or as fatigued or   wiped out.  He does have an upcoming appointment with [facility] regarding   his sleep apnea machine.  He has not been using it for the past three months   only because he feels a pressure is still high and the mask is uncomfortable.    He says even his wife can feel the air flow from the high pressure.  Last week   he underwent a corneal transplant regarding his left eye and did well.    PAST MEDICAL HISTORY:  Chronic atrial fibrillation with status post pulmonary vein isolation.  Obstructive sleep apnea with the use of CPAP.  Hyperlipidemia.  Hypertension.  Previous TIA.  Right hip arthroplasty.  Bilateral inguinal hernia repair and a previous separate right inguinal hernia   repair.  Right rotator cuff tear.  Cataracts with corneal transplant.    SOCIAL HISTORY:  Tobacco and alcohol use -- none.  , has three children.    Works at [employer].  No formal exercise routine.    FAMILY HISTORY:  Father is , heart disease, colon cancer.  Mother is   alive, diabetes and hypertension, hip fracture.  He has one brother,   hypertension and heart disease.  Three sisters: one with Crohn's disease and two   sisters with hyperlipidemia.    SCREENING:  Colonoscopy on  revealed a benign polyp.    MEDICATIONS:  Eliquis 5 mg twice a day.  Aspirin 81 mg a day.  Hydrochlorothiazide 25 mg a day.  Losartan 50 mg a day.  Metoprolol succinate 50 mg a day.  Pantoprazole 40 mg a day.  Pravastatin 20 mg a day.  Eye drops outlined in the med sheet.    REVIEW OF SYMPTOMS:  No pains in the chest, palpitations, shortness of breath,   or abdominal pain.  Bowel function is regular.   Urination can be frequent,   nocturia x1-2.  No arthralgias or headaches.    PHYSICAL EXAMINATION:  VITAL SIGNS:  His weight is 257 pounds, pulse rate is 80, blood pressure reading   122/82.  HEENT:  Tympanic membranes normal.  Nasal mucosa is clear.  Oropharynx, no   abnormal findings.  NECK:  No thyromegaly.  LUNGS:  Clear.  HEART:  Regular rate and rhythm.  No murmurs.  ABDOMEN:  Active bowel sounds, soft, nontender.  No hepatosplenomegaly or   abdominal masses.  PULSES:  2+ carotid, 2+ pedal pulses.  No bruits.  EXTREMITIES:  No edema.  LYMPH GLAND:  No palpable adenopathy.  GENITALIA:  No scrotal masses.  I did not feel a hernia.  RECTAL:  Stool is brown, heme-negative.  Prostate is mildly enlarged.    IMPRESSION:  1.  General exam.  2.  Hypertension.  3.  Hyperlipidemia.  4.  Atrial fibrillation treated with pulmonary vein isolation.  5.  Sleep apnea.    PLAN:    Routine labs today.    Regarding use of pantoprazole, he can try substituting that for Zantac 150 mg   twice a day and once a day to see if he has any heartburn or indigestion.  Follow up with his upcoming visits.    The importance of weight loss and physical activity discussed.        /chelsea [id number] review        ROSELINE/CAIT  dd: 03/27/2017 09:13:39 (CDT)  td: 03/27/2017 14:50:47 (CDT)  Doc ID   #[id number]  Job ID #[id number]    CC:

## 2017-03-31 ENCOUNTER — OFFICE VISIT (OUTPATIENT)
Dept: OPHTHALMOLOGY | Facility: CLINIC | Age: 67
End: 2017-03-31
Payer: MEDICARE

## 2017-03-31 DIAGNOSIS — Z94.7 STATUS POST CORNEAL TRANSPLANT: Primary | ICD-10-CM

## 2017-03-31 PROCEDURE — 99999 PR PBB SHADOW E&M-EST. PATIENT-LVL II: CPT | Mod: PBBFAC,,, | Performed by: OPHTHALMOLOGY

## 2017-03-31 PROCEDURE — 99024 POSTOP FOLLOW-UP VISIT: CPT | Mod: S$GLB,,, | Performed by: OPHTHALMOLOGY

## 2017-03-31 NOTE — MR AVS SNAPSHOT
[facility]  [address]  [facility] [address]  Phone: [phone]  Fax: [phone]                  Pavel Graff   3/31/2017 10:45 AM   Office Visit    Description:  Male : 1950   Provider:  Chantell Jay MD   Department:  [facility]           Reason for Visit     Post-op Evaluation           Diagnoses this Visit        Comments    Status post corneal transplant    -  Primary            To Do List           Future Appointments        Provider Department Dept Phone    2017 9:20 AM Loretta Srinivasan NP [facility] [phone]      Goals (5 Years of Data)     None      [facility] On [facility]     [facility] On Call Nurse Care Line -  Assistance  Unless otherwise directed by your provider, please contact [facility], our nurse care line that is available for  assistance.     Registered nurses in the [facility] On Call Center provide: appointment scheduling, clinical advisement, health education, and other advisory services.  Call: [phone] (toll free)               Medications           Message regarding Medications     Verify the changes and/or additions to your medication regime listed below are the same as discussed with your clinician today.  If any of these changes or additions are incorrect, please notify your healthcare provider.             Verify that the below list of medications is an accurate representation of the medications you are currently taking.  If none reported, the list may be blank. If incorrect, please contact your healthcare provider. Carry this list with you in case of emergency.           Current Medications     apixaban 5 mg Tab Take 1 tablet (5 mg total) by mouth 2 (two) times daily.    aspirin 81 MG Chew Take 81 mg by mouth once daily.    fish oil-omega-3 fatty acids 300-1,000 mg capsule Take 1 g by mouth once daily.    fluorometholone 0.1% (FML) 0.1 % DrpS     fluorometholone 0.1% (FML) 0.1 % DrpS PLACE 1 DROP  INTO BOTH EYES 3 (THREE) TIMES DAILY.    hydrochlorothiazide (HYDRODIURIL) 25 MG tablet TAKE 1 TABLET (25 MG TOTAL) BY MOUTH ONCE DAILY.    losartan (COZAAR) 50 MG tablet Take 1 tablet (50 mg total) by mouth once daily.    metoprolol succinate (TOPROL-XL) 50 MG 24 hr tablet TAKE 1 TABLET (50 MG TOTAL) BY MOUTH ONCE DAILY.    ofloxacin (OCUFLOX) 0.3 % ophthalmic solution Place 1 drop into the left eye 4 (four) times daily.    pantoprazole (PROTONIX) 40 MG tablet Take 1 tablet (40 mg total) by mouth once daily.    prednisoLONE acetate (PRED FORTE) 1 % DrpS Place 1 drop into the left eye 4 (four) times daily.    timolol maleate 0.5% (TIMOPTIC) 0.5 % Drop INSTILL 1 DROP INTO LEFT EYE 2 TIMES A DAY    pravastatin (PRAVACHOL) 20 MG tablet Take 1 tablet (20 mg total) by mouth once daily.           Clinical Reference Information           Allergies as of 3/31/2017     No Known Allergies      Immunizations Administered on Date of Encounter - 3/31/2017     None      Language Assistance Services     ATTENTION: Language assistance services are available, free of charge. Please call [phone].      ATENCIÓN: Si abran laith, tiene a ordaz disposición servicios gratuitos de asistencia lingüística. Llame al [phone].     ALDA Ý: N?u b?n nói Ti?ng Vi?t, có các d?ch v? h? tr? ngôn ng? mi?n phí dành cho b?n. G?i s? [phone].         [facility] complies with applicable Federal civil rights laws and does not discriminate on the basis of race, color, national origin, age, disability, or sex.

## 2017-03-31 NOTE — PROGRESS NOTES
HPI     POW 1 Repeat PKP OS 03/23/2017    Pt states no pain but his VA has not cleared as expected.  VA OD  Is   cloudy and gets a double image. Sensitive to touch.     Eye meds:  PF and Ocuflox QID                      Timolol BID                                                  Last edited by Betty Kaur on 3/31/2017 10:59 AM.         Assessment /Plan     For exam results, see Encounter Report.    Status post corneal transplant      PKP stable and clearing with few folds.POW1  Signs and symptoms of graft rejection reviewed.  MARISOL qid

## 2017-04-07 ENCOUNTER — OFFICE VISIT (OUTPATIENT)
Dept: SLEEP MEDICINE | Facility: CLINIC | Age: 67
End: 2017-04-07
Payer: MEDICARE

## 2017-04-07 VITALS
BODY MASS INDEX: 34.43 KG/M2 | SYSTOLIC BLOOD PRESSURE: 121 MMHG | WEIGHT: 254.19 LBS | DIASTOLIC BLOOD PRESSURE: 80 MMHG | HEIGHT: 72 IN | HEART RATE: 52 BPM

## 2017-04-07 DIAGNOSIS — G47.33 OBSTRUCTIVE SLEEP APNEA: Primary | ICD-10-CM

## 2017-04-07 PROCEDURE — 99999 PR PBB SHADOW E&M-EST. PATIENT-LVL III: CPT | Mod: PBBFAC,,, | Performed by: NURSE PRACTITIONER

## 2017-04-07 PROCEDURE — 1160F RVW MEDS BY RX/DR IN RCRD: CPT | Mod: S$GLB,,, | Performed by: NURSE PRACTITIONER

## 2017-04-07 PROCEDURE — 3079F DIAST BP 80-89 MM HG: CPT | Mod: S$GLB,,, | Performed by: NURSE PRACTITIONER

## 2017-04-07 PROCEDURE — 99213 OFFICE O/P EST LOW 20 MIN: CPT | Mod: S$GLB,,, | Performed by: NURSE PRACTITIONER

## 2017-04-07 PROCEDURE — 1159F MED LIST DOCD IN RCRD: CPT | Mod: S$GLB,,, | Performed by: NURSE PRACTITIONER

## 2017-04-07 PROCEDURE — 1157F ADVNC CARE PLAN IN RCRD: CPT | Mod: S$GLB,,, | Performed by: NURSE PRACTITIONER

## 2017-04-07 PROCEDURE — 1126F AMNT PAIN NOTED NONE PRSNT: CPT | Mod: S$GLB,,, | Performed by: NURSE PRACTITIONER

## 2017-04-07 PROCEDURE — 3074F SYST BP LT 130 MM HG: CPT | Mod: S$GLB,,, | Performed by: NURSE PRACTITIONER

## 2017-04-07 NOTE — PROGRESS NOTES
Pavel Graff a 67 y.o. male returns today regarding the management of obstructive sleep apnea.  Annual visit. Continues to use APAP, was adjusted from 10-14cm to 12-20cm after titration study spring '16. Reports wife bothered by air blowing on her but he is not sure if it was from mask leaks or air blowing when he leaves machine to use bathroom. Wants to consider using nasal mask. Feels less tired since having RFA for A Fib. OS corneal transplant recently affecting last month use. Ready to resume cpap use. Previous 8h/night. No snoring with mask on. ESS=5    Interrogation- 30d 0.      +better overall sleeping. Sometimes hard to fall asleep    BASELINE PSG: (251#) AHI was 42.3 with an oxygen aníbal of 83.0%. The supine AHI was 69.0 and the REM AHI was 5.6. The patient did not qualify for a split night study due to an insufficient number of events in the first half of the study   Titration study 4/25/16- Good control of sleep disordered breathing was seen during side position NREM and REM stages of sleep at pressures of 12-17 cm of water. An effective pressure was not determined for supine positio    nterrogation: Good machine condition 90% pressure 10.8-11.4cm. 30d avg 8h/night. 28/30days>4hrs. Heat at 3. Simplus mask L. 0% air leak. Periodic breathing 3-5 (4-6)%. AHI  2.7    REVIEW OF SYSTEMS: Sleep related symptoms as per HPI. 5# loss.  Frequent nocturia, daytime sleepiness, snoring--remains improved with PAP. Denies significant sinus congestion, am headaches, or dyspnea. Otherwise a balance review of 10-systems is negative.       PHYSICAL EXAM:   /80  Pulse (!) 52  Ht 6' (1.829 m)  Wt 115.3 kg (254 lb 3.1 oz)  BMI 34.47 kg/m2  GENERAL: Obese body habitus, well groomed     ASSESSMENT:   1. Obstructive sleep apnea, severe significant, remains adherent with CPAP, having improvement of symptoms.   Medical comorbidities include: obesity, HTN , atrial fibrillation s/p RFA, TIA    PLAN:   1. Continue apap  12-20cm today. Resume excellent adherence.THS DME  New mask ?N10. Have DME fax me 30d and 90d compliance from May- Aug 2016.  Attention to avoid air leaks and proper fit of mask  2. Advised to abstain from driving should he feel sleepy or drowsy.   3. Encouraged weight loss efforts for potential improvement of SIMON and overall health benefits. F/u with PCP/cardiologist re:HTN/AFib as advised.   4. RTC otherwise 12-mos, sooner if needed

## 2017-04-18 RX ORDER — PRAVASTATIN SODIUM 20 MG/1
20 TABLET ORAL DAILY
Qty: 90 TABLET | Refills: 3 | Status: SHIPPED | OUTPATIENT
Start: 2017-04-18 | End: 2018-04-09 | Stop reason: SDUPTHER

## 2017-04-28 ENCOUNTER — OFFICE VISIT (OUTPATIENT)
Dept: OPHTHALMOLOGY | Facility: CLINIC | Age: 67
End: 2017-04-28
Payer: MEDICARE

## 2017-04-28 DIAGNOSIS — Z94.7 STATUS POST CORNEAL TRANSPLANT: ICD-10-CM

## 2017-04-28 DIAGNOSIS — Z98.890 POST-OPERATIVE STATE: Primary | ICD-10-CM

## 2017-04-28 PROCEDURE — 99999 PR PBB SHADOW E&M-EST. PATIENT-LVL II: CPT | Mod: PBBFAC,,, | Performed by: OPHTHALMOLOGY

## 2017-04-28 PROCEDURE — 99024 POSTOP FOLLOW-UP VISIT: CPT | Mod: S$GLB,,, | Performed by: OPHTHALMOLOGY

## 2017-04-28 NOTE — PROGRESS NOTES
HPI     S/P 1 Repeat PKP OS 03/23/2017    Pt states  VA has cleared denies pain.     Eye meds:  PF QID                      Timolol BID                                                  Last edited by Betty Kaur on 4/28/2017 10:30 AM.         Assessment /Plan     For exam results, see Encounter Report.    Post-operative state    Status post corneal transplant      OS PKP stable and clearing with 1+ folds at 1 month. Signs and symptoms of graft rejection reviewed.  PF qid.  Get MRx in 1-2 mos, then plan phaco DMEK OD

## 2017-04-28 NOTE — MR AVS SNAPSHOT
[facility]  [address]  [facility] [address]  Phone: [phone]  Fax: [phone]                  Pavel Graff   2017 10:15 AM   Office Visit    Description:  Male : 1950   Provider:  Chantell Jay MD   Department:  [facility]           Reason for Visit     Post-op Evaluation           Diagnoses this Visit        Comments    Post-operative state    -  Primary     Status post corneal transplant                To Do List           Goals (5 Years of Data)     None      [facility] On Call     [facility] On Call Nurse Care Line -  Assistance  Unless otherwise directed by your provider, please contact [facility], our nurse care line that is available for  assistance.     Registered nurses in the [facility] On Call Center provide: appointment scheduling, clinical advisement, health education, and other advisory services.  Call: [phone] (toll free)               Medications           Message regarding Medications     Verify the changes and/or additions to your medication regime listed below are the same as discussed with your clinician today.  If any of these changes or additions are incorrect, please notify your healthcare provider.             Verify that the below list of medications is an accurate representation of the medications you are currently taking.  If none reported, the list may be blank. If incorrect, please contact your healthcare provider. Carry this list with you in case of emergency.           Current Medications     apixaban 5 mg Tab Take 1 tablet (5 mg total) by mouth 2 (two) times daily.    aspirin 81 MG Chew Take 81 mg by mouth once daily.    fish oil-omega-3 fatty acids 300-1,000 mg capsule Take 1 g by mouth once daily.    hydrochlorothiazide (HYDRODIURIL) 25 MG tablet TAKE 1 TABLET (25 MG TOTAL) BY MOUTH ONCE DAILY.    losartan (COZAAR) 50 MG tablet Take 1 tablet (50 mg total) by mouth once daily.    metoprolol succinate  (TOPROL-XL) 50 MG 24 hr tablet TAKE 1 TABLET (50 MG TOTAL) BY MOUTH ONCE DAILY.    pantoprazole (PROTONIX) 40 MG tablet Take 1 tablet (40 mg total) by mouth once daily.    pravastatin (PRAVACHOL) 20 MG tablet Take 1 tablet (20 mg total) by mouth once daily.    prednisoLONE acetate (PRED FORTE) 1 % DrpS Place 1 drop into the left eye 4 (four) times daily.    timolol maleate 0.5% (TIMOPTIC) 0.5 % Drop INSTILL 1 DROP INTO LEFT EYE 2 TIMES A DAY           Clinical Reference Information           Allergies as of 4/28/2017     No Known Allergies      Immunizations Administered on Date of Encounter - 4/28/2017     None      Language Assistance Services     ATTENTION: Language assistance services are available, free of charge. Please call [phone].      ATENCIÓN: Si abran laith, tiene a ordaz disposición servicios gratuitos de asistencia lingüística. Llame al [phone].     ALDA Ý: N?u b?n nói Ti?ng Vi?t, có các d?ch v? h? tr? ngôn ng? mi?n phí dành cho b?n. G?i s? [phone].         [facility] complies with applicable Federal civil rights laws and does not discriminate on the basis of race, color, national origin, age, disability, or sex.

## 2017-05-25 ENCOUNTER — OFFICE VISIT (OUTPATIENT)
Dept: OPTOMETRY | Facility: CLINIC | Age: 67
End: 2017-05-25
Payer: MEDICARE

## 2017-05-25 DIAGNOSIS — Z94.7 STATUS POST CORNEAL TRANSPLANT: Primary | ICD-10-CM

## 2017-05-25 PROCEDURE — 99999 PR PBB SHADOW E&M-EST. PATIENT-LVL II: CPT | Mod: PBBFAC,,, | Performed by: OPTOMETRIST

## 2017-05-25 PROCEDURE — 99024 POSTOP FOLLOW-UP VISIT: CPT | Mod: S$GLB,,, | Performed by: OPTOMETRIST

## 2017-05-25 NOTE — PROGRESS NOTES
HPI     Post-op Evaluation    Additional comments: 2 month PKP OS/MR ck           Comments   Last eye exam was 4/28/17 with Dr. Jay.  Patient here for glasses rx until has cataract sx OD. Vision is good since   K transplant OS.    Timolol BID OS  PF QID OS  PKP OS 1997 and 3/23/17  Phaco OS 1/11/10 Dr Jay         Last edited by Monse Rodriguez on 5/25/2017  8:16 AM. (History)        ROS     Negative for: Constitutional, Gastrointestinal, Neurological, Skin,   Genitourinary, Musculoskeletal, HENT, Endocrine, Cardiovascular, Eyes,   Respiratory, Psychiatric, Allergic/Imm, Heme/Lymph    Last edited by Fabiana Thomas, OD on 5/25/2017  9:03 AM. (History)        Assessment /Plan     For exam results, see Encounter Report.    Status post corneal transplant            1.  Distance rx given.  Continue scheduled follow-up with Dr. Jay.

## 2017-06-09 ENCOUNTER — OFFICE VISIT (OUTPATIENT)
Dept: OPHTHALMOLOGY | Facility: CLINIC | Age: 67
End: 2017-06-09
Payer: MEDICARE

## 2017-06-09 DIAGNOSIS — H25.13 NUCLEAR SCLEROSIS, BILATERAL: ICD-10-CM

## 2017-06-09 DIAGNOSIS — H18.519 FUCHS' CORNEAL DYSTROPHY: Primary | ICD-10-CM

## 2017-06-09 DIAGNOSIS — Z94.7 STATUS POST CORNEAL TRANSPLANT: ICD-10-CM

## 2017-06-09 PROCEDURE — 92014 COMPRE OPH EXAM EST PT 1/>: CPT | Mod: 24,S$GLB,, | Performed by: OPHTHALMOLOGY

## 2017-06-09 PROCEDURE — 99999 PR PBB SHADOW E&M-EST. PATIENT-LVL II: CPT | Mod: PBBFAC,,, | Performed by: OPHTHALMOLOGY

## 2017-06-09 PROCEDURE — 92136 OPHTHALMIC BIOMETRY: CPT | Mod: 26,RT,S$GLB, | Performed by: OPHTHALMOLOGY

## 2017-06-09 RX ORDER — MOXIFLOXACIN 5 MG/ML
1 SOLUTION/ DROPS OPHTHALMIC
Status: CANCELLED | OUTPATIENT
Start: 2017-06-09

## 2017-06-09 RX ORDER — LIDOCAINE HYDROCHLORIDE 10 MG/ML
1 INJECTION, SOLUTION EPIDURAL; INFILTRATION; INTRACAUDAL; PERINEURAL ONCE
Status: CANCELLED | OUTPATIENT
Start: 2017-06-09 | End: 2017-06-09

## 2017-06-09 RX ORDER — PHENYLEPHRINE HYDROCHLORIDE 25 MG/ML
1 SOLUTION/ DROPS OPHTHALMIC
Status: CANCELLED | OUTPATIENT
Start: 2017-06-09

## 2017-06-09 RX ORDER — TROPICAMIDE 10 MG/ML
1 SOLUTION/ DROPS OPHTHALMIC
Status: CANCELLED | OUTPATIENT
Start: 2017-06-09

## 2017-06-09 RX ORDER — TETRACAINE HYDROCHLORIDE 5 MG/ML
1 SOLUTION OPHTHALMIC
Status: CANCELLED | OUTPATIENT
Start: 2017-06-09

## 2017-06-09 NOTE — PROGRESS NOTES
HPI     PKP OS 1997 and 3/23/17   Phaco OS 1/11/10 Dr Jay  Repeat PKP OS 03/23/2017    Patient states he is here to see about cataract surgery and a corneal   transplant in the right eye. Dr. Thomas was going to give patient glasses   but thought it may  Be better to see Dr. Jay first due to so much   astigmatism.    Eye meds:  Timolol BID OS   PF QID OS       Last edited by Nicolette Kinney on 6/9/2017  8:25 AM. (History)            Assessment /Plan     For exam results, see Encounter Report.    Fuchs' corneal dystrophy    Nuclear sclerosis, bilateral  -     IOL Master - OU - Both Eyes    Status post corneal transplant      2 sutures removed OS.    Plan Phaco DMEK OD  Clinically significant corneal edema is present, which affects vision and activities of daily living. Concurrent visually significant nuclear sclerosis also present. Risks, benefits, and alternatives to surgery were discussed and patient voices understanding.    Phaco/DMEK right eye, IOL: pcboo 22.5 (has 4D cyl native cornea)

## 2017-06-19 DIAGNOSIS — I48.91 ATRIAL FIBRILLATION, UNSPECIFIED TYPE: Primary | ICD-10-CM

## 2017-06-20 ENCOUNTER — HOSPITAL ENCOUNTER (OUTPATIENT)
Dept: CARDIOLOGY | Facility: CLINIC | Age: 67
Discharge: HOME | End: 2017-06-20
Payer: MEDICARE

## 2017-06-20 ENCOUNTER — OFFICE VISIT (OUTPATIENT)
Dept: ELECTROPHYSIOLOGY | Facility: CLINIC | Age: 67
End: 2017-06-20
Payer: MEDICARE

## 2017-06-20 VITALS
WEIGHT: 252.44 LBS | SYSTOLIC BLOOD PRESSURE: 140 MMHG | HEIGHT: 72 IN | HEART RATE: 80 BPM | DIASTOLIC BLOOD PRESSURE: 76 MMHG | BODY MASS INDEX: 34.19 KG/M2

## 2017-06-20 DIAGNOSIS — G45.9 TRANSIENT CEREBRAL ISCHEMIA, UNSPECIFIED TYPE: ICD-10-CM

## 2017-06-20 DIAGNOSIS — G47.33 OBSTRUCTIVE SLEEP APNEA: ICD-10-CM

## 2017-06-20 DIAGNOSIS — I48.91 ATRIAL FIBRILLATION, UNSPECIFIED TYPE: ICD-10-CM

## 2017-06-20 DIAGNOSIS — I48.19 PERSISTENT ATRIAL FIBRILLATION: Primary | ICD-10-CM

## 2017-06-20 DIAGNOSIS — I10 ESSENTIAL HYPERTENSION: ICD-10-CM

## 2017-06-20 PROCEDURE — 1157F ADVNC CARE PLAN IN RCRD: CPT | Mod: S$GLB,,, | Performed by: INTERNAL MEDICINE

## 2017-06-20 PROCEDURE — 99499 UNLISTED E&M SERVICE: CPT | Mod: S$GLB,,, | Performed by: INTERNAL MEDICINE

## 2017-06-20 PROCEDURE — 99999 PR PBB SHADOW E&M-EST. PATIENT-LVL III: CPT | Mod: PBBFAC,,, | Performed by: INTERNAL MEDICINE

## 2017-06-20 PROCEDURE — 1159F MED LIST DOCD IN RCRD: CPT | Mod: S$GLB,,, | Performed by: INTERNAL MEDICINE

## 2017-06-20 PROCEDURE — 99214 OFFICE O/P EST MOD 30 MIN: CPT | Mod: S$GLB,,, | Performed by: INTERNAL MEDICINE

## 2017-06-20 PROCEDURE — 1126F AMNT PAIN NOTED NONE PRSNT: CPT | Mod: S$GLB,,, | Performed by: INTERNAL MEDICINE

## 2017-06-20 PROCEDURE — 93000 ELECTROCARDIOGRAM COMPLETE: CPT | Mod: S$GLB,,, | Performed by: INTERNAL MEDICINE

## 2017-06-20 NOTE — PROGRESS NOTES
Subjective:    Patient ID:  Pavel Graff is a 67 y.o. male who presents for follow-up of Atrial Fibrillation      67 year old male with history of HTN, TIA, SIMON, HLD and persistent AF since July 2012.  DCCV was attempted with early recurrence of AF.  He was rate controlled and did not note significant symptoms at the time - so a rate control strategy was pursued.  He notes though, that ever since he has been in AF - he has been much more easily fatigued and tired, not himself.  He denies overt palpitation or SOB.  He denies chest pain.    Mr. Graff underwent a PVI (07/21/16) with successful pulmonary vein antral isolation. Since the procedure, Mr. Graff reports feeling well with only two 3-second episodes of palpitations; he denies further recurrence.  denies chest pain, SOB/BLANCO, dizziness, or syncope. Mr. Graff has noted improvement in his energy level since the procedure.     12/16: Continues improved symptoms. 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 Remains on pradaxa and metoprolol.     Interval history: No recurrence of AF since PVI. Feels well. No active complaints.     2D echo 4/16    1 - Eccentric hypertrophy.     2 - Normal left ventricular systolic function (EF 60-65%).     3 - Right ventricular enlargement with normal systolic function.     4 - Biatrial enlargement.     5 - Mild aortic regurgitation.     6 - Mild mitral regurgitation.     7 - Mild tricuspid regurgitation.     8 - The estimated PA systolic pressure is 33 mmHg.     9 - Mildly elevated central venous pressure.     PET stress 5/16  1. There is no evidence of a discrete hemodynamically significant coronary stenosis.   2. Although no clinically significant stress defect is seen, there is resting flow heterogeneity that improves after Dipyridamole. These results suggest mild endothelial dysfunction due to elevated cholesterol, high blood pressure and diabetes without clinically significant,      localized perfusion  defects.   3. Resting wall motion is physiologic. Stress wall motion is physiologic.   4. LV function is normal at rest and stress.  (normal is >= 51%)  5. The ventricular volumes are normal at rest and stress.   6. The extracardiac distribution of radioactivity is normal.   7. There was no previous study available to compare.    Past Medical History:  No date: *Atrial fibrillation  No date: Anticoagulant long-term use  No date: Atrial fibrillation  No date: Hyperlipidemia  No date: Hypertension  No date: Sleep apnea  No date: Stroke      Comment: TIA  No date: TIA (transient ischemic attack)    Past Surgical History:  1/11/10: CATARACT EXTRACTION W/  INTRAOCULAR LENS IMPLA* Left  1997: CORNEAL TRANSPLANT Left      Comment: PKP OS  03/23/2017: CORNEAL TRANSPLANT Left  No date: HERNIA REPAIR  No date: HIP ARTHROPLASTY  No date: ROTATOR CUFF REPAIR    Social History    Marital status:              Spouse name:                       Years of education:                 Number of children: 3             Occupational History  Occupation          Employer            Comment                                   [employer]*     Social History Main Topics    Smoking status: Never Smoker                                                                   Smokeless tobacco: Not on file                       Alcohol use: No              Drug use: No              Sexual activity: Yes               Partners with: Female    Other Topics            Concern    None on file    Social History Narrative    None on file    Review of patient's family history indicates:    Diabetes                       Mother                    Heart disease                  Mother                    Hyperlipidemia                 Mother                    Heart disease                  Father                    Colon cancer                   Father                      Comment: colon    Heart disease                  Brother                    Crohn's disease                Sister                    Hyperlipidemia                 Sister                    Hypertension                   Sister                    Hyperlipidemia                 Sister                    Sudden death                   Neg Hx                    Amblyopia                      Neg Hx                    Blindness                      Neg Hx                    Cancer                         Neg Hx                    Cataracts                      Neg Hx                    Glaucoma                       Neg Hx                    Macular degeneration           Neg Hx                    Retinal detachment             Neg Hx                    Strabismus                     Neg Hx                    Stroke                         Neg Hx                    Thyroid disease                Neg Hx                          Review of Systems   Constitution: Negative for decreased appetite, diaphoresis, weakness and malaise/fatigue.   HENT: Negative for congestion, headaches and nosebleeds.    Eyes: Negative for blurred vision, double vision and pain.   Cardiovascular: Negative for chest pain, claudication, cyanosis, dyspnea on exertion, irregular heartbeat, leg swelling, near-syncope, orthopnea, palpitations, paroxysmal nocturnal dyspnea and syncope.   Respiratory: Negative for cough, hemoptysis, shortness of breath, sputum production and wheezing.    Endocrine: Negative for cold intolerance and heat intolerance.   Hematologic/Lymphatic: Negative for bleeding problem. Does not bruise/bleed easily.   Skin: Negative.  Negative for dry skin and flushing.   Musculoskeletal: Negative.  Negative for arthritis and falls.   Gastrointestinal: Negative for abdominal pain, hematemesis, hematochezia, nausea and vomiting.   Genitourinary: Negative for dysuria and flank pain.   Neurological: Negative for dizziness and light-headedness.   Psychiatric/Behavioral: Negative for altered mental status. The  patient is not nervous/anxious.         Objective:    Physical Exam   Constitutional: He is oriented to person, place, and time. He appears well-developed and well-nourished.   HENT:   Head: Normocephalic and atraumatic.   Eyes: Conjunctivae and EOM are normal. Pupils are equal, round, and reactive to light.   Neck: Normal range of motion. Neck supple. No JVD present. No thyromegaly present.   Cardiovascular: Normal rate, regular rhythm, normal heart sounds and intact distal pulses.    No murmur heard.  Pulmonary/Chest: Effort normal and breath sounds normal. No respiratory distress. He has no wheezes.   Abdominal: Soft. Bowel sounds are normal. He exhibits no distension. There is no tenderness.   Musculoskeletal: Normal range of motion. He exhibits no edema.   Neurological: He is alert and oriented to person, place, and time. No cranial nerve deficit.   Skin: Skin is warm and dry. No rash noted. No erythema.   Psychiatric: He has a normal mood and affect. His behavior is normal. Judgment and thought content normal.   Vitals reviewed.    ECG: NSR nl ND, QRS, QTc        Assessment:       1. Persistent atrial fibrillation    2. Essential hypertension    3. Obstructive sleep apnea    4. Transient cerebral ischemia, unspecified type         Plan:       67 yoM persistent AF s/p PVI here for long term follow up. No recurrence, no AAD therapy. Continues on apixaban for TIA history. RTC 1y or as needed

## 2017-06-29 RX ORDER — PANTOPRAZOLE SODIUM 40 MG/1
40 TABLET, DELAYED RELEASE ORAL DAILY
Qty: 30 TABLET | Refills: 11 | Status: SHIPPED | OUTPATIENT
Start: 2017-06-29 | End: 2018-02-27

## 2017-07-24 RX ORDER — LOSARTAN POTASSIUM 50 MG/1
50 TABLET ORAL DAILY
Qty: 90 TABLET | Refills: 1 | Status: SHIPPED | OUTPATIENT
Start: 2017-07-24 | End: 2018-01-05 | Stop reason: SDUPTHER

## 2017-09-07 ENCOUNTER — TELEPHONE (OUTPATIENT)
Dept: INTERNAL MEDICINE | Facility: CLINIC | Age: 67
End: 2017-09-07

## 2017-09-07 NOTE — TELEPHONE ENCOUNTER
MD Talita Ruiz MA             [facility]     Patient place on  5 month hold list.  Prior labs schedule mail out to home address on file.

## 2017-10-16 RX ORDER — METOPROLOL SUCCINATE 50 MG/1
TABLET, EXTENDED RELEASE ORAL
Qty: 90 TABLET | Refills: 3 | Status: SHIPPED | OUTPATIENT
Start: 2017-10-16 | End: 2018-10-31 | Stop reason: SDUPTHER

## 2017-10-16 RX ORDER — HYDROCHLOROTHIAZIDE 25 MG/1
TABLET ORAL
Qty: 90 TABLET | Refills: 3 | Status: SHIPPED | OUTPATIENT
Start: 2017-10-16 | End: 2018-11-13 | Stop reason: SDUPTHER

## 2017-10-17 ENCOUNTER — TELEPHONE (OUTPATIENT)
Dept: OPHTHALMOLOGY | Facility: CLINIC | Age: 67
End: 2017-10-17

## 2017-10-17 DIAGNOSIS — H18.519 FUCHS' ENDOTHELIAL DYSTROPHY: ICD-10-CM

## 2017-10-17 DIAGNOSIS — H25.11 NUCLEAR SCLEROTIC CATARACT OF RIGHT EYE: Primary | ICD-10-CM

## 2017-12-04 ENCOUNTER — TELEPHONE (OUTPATIENT)
Dept: OPHTHALMOLOGY | Facility: CLINIC | Age: 67
End: 2017-12-04

## 2017-12-08 RX ORDER — PREDNISOLONE ACETATE 10 MG/ML
1 SUSPENSION/ DROPS OPHTHALMIC 4 TIMES DAILY
Qty: 10 ML | Refills: 4 | Status: SHIPPED | OUTPATIENT
Start: 2017-12-08 | End: 2018-12-29 | Stop reason: SDUPTHER

## 2017-12-14 ENCOUNTER — ANESTHESIA EVENT (OUTPATIENT)
Dept: SURGERY | Facility: OTHER | Age: 67
End: 2017-12-14
Payer: MEDICARE

## 2017-12-14 ENCOUNTER — ANESTHESIA (OUTPATIENT)
Dept: SURGERY | Facility: OTHER | Age: 67
End: 2017-12-14
Payer: MEDICARE

## 2017-12-14 ENCOUNTER — HOSPITAL ENCOUNTER (OUTPATIENT)
Facility: OTHER | Age: 67
Discharge: HOME | End: 2017-12-14
Attending: OPHTHALMOLOGY | Admitting: OPHTHALMOLOGY
Payer: MEDICARE

## 2017-12-14 ENCOUNTER — SURGERY (OUTPATIENT)
Age: 67
End: 2017-12-14

## 2017-12-14 VITALS
HEART RATE: 82 BPM | TEMPERATURE: 98 F | OXYGEN SATURATION: 98 % | BODY MASS INDEX: 33.18 KG/M2 | DIASTOLIC BLOOD PRESSURE: 84 MMHG | RESPIRATION RATE: 16 BRPM | WEIGHT: 245 LBS | SYSTOLIC BLOOD PRESSURE: 151 MMHG | HEIGHT: 72 IN

## 2017-12-14 DIAGNOSIS — H18.519 FUCHS' ENDOTHELIAL DYSTROPHY: Primary | ICD-10-CM

## 2017-12-14 PROCEDURE — S0020 INJECTION, BUPIVICAINE HYDRO: HCPCS | Performed by: OPHTHALMOLOGY

## 2017-12-14 PROCEDURE — V2785 CORNEAL TISSUE PROCESSING: HCPCS | Performed by: OPHTHALMOLOGY

## 2017-12-14 PROCEDURE — 63600175 PHARM REV CODE 636 W HCPCS: Performed by: NURSE ANESTHETIST, CERTIFIED REGISTERED

## 2017-12-14 PROCEDURE — 63600175 PHARM REV CODE 636 W HCPCS: Performed by: OPHTHALMOLOGY

## 2017-12-14 PROCEDURE — 37000009 HC ANESTHESIA EA ADD 15 MINS: Performed by: OPHTHALMOLOGY

## 2017-12-14 PROCEDURE — 65757 PREP CORNEAL ENDO ALLOGRAFT: CPT | Mod: ,,, | Performed by: OPHTHALMOLOGY

## 2017-12-14 PROCEDURE — 37000008 HC ANESTHESIA 1ST 15 MINUTES: Performed by: OPHTHALMOLOGY

## 2017-12-14 PROCEDURE — V2632 POST CHMBR INTRAOCULAR LENS: HCPCS | Performed by: OPHTHALMOLOGY

## 2017-12-14 PROCEDURE — 71000016 HC POSTOP RECOV ADDL HR: Performed by: OPHTHALMOLOGY

## 2017-12-14 PROCEDURE — 71000015 HC POSTOP RECOV 1ST HR: Performed by: OPHTHALMOLOGY

## 2017-12-14 PROCEDURE — 65756 CORNEAL TRNSPL ENDOTHELIAL: CPT | Mod: RT,,, | Performed by: OPHTHALMOLOGY

## 2017-12-14 PROCEDURE — 36000706: Performed by: OPHTHALMOLOGY

## 2017-12-14 PROCEDURE — 36000707: Performed by: OPHTHALMOLOGY

## 2017-12-14 PROCEDURE — 25000003 PHARM REV CODE 250: Performed by: NURSE ANESTHETIST, CERTIFIED REGISTERED

## 2017-12-14 PROCEDURE — 25000003 PHARM REV CODE 250: Performed by: OPHTHALMOLOGY

## 2017-12-14 PROCEDURE — A4216 STERILE WATER/SALINE, 10 ML: HCPCS | Performed by: NURSE ANESTHETIST, CERTIFIED REGISTERED

## 2017-12-14 PROCEDURE — 66984 XCAPSL CTRC RMVL W/O ECP: CPT | Mod: 51,RT,, | Performed by: OPHTHALMOLOGY

## 2017-12-14 DEVICE — LENS IOL ITEC PRELOAD 22.5D: Type: IMPLANTABLE DEVICE | Site: EYE | Status: FUNCTIONAL

## 2017-12-14 RX ORDER — PHENYLEPHRINE HYDROCHLORIDE 25 MG/ML
1 SOLUTION/ DROPS OPHTHALMIC
Status: DISCONTINUED | OUTPATIENT
Start: 2017-12-14 | End: 2017-12-14 | Stop reason: HOSPADM

## 2017-12-14 RX ORDER — LIDOCAINE HYDROCHLORIDE 20 MG/ML
INJECTION, SOLUTION INFILTRATION; PERINEURAL
Status: DISCONTINUED | OUTPATIENT
Start: 2017-12-14 | End: 2017-12-14 | Stop reason: HOSPADM

## 2017-12-14 RX ORDER — TROPICAMIDE 10 MG/ML
1 SOLUTION/ DROPS OPHTHALMIC
Status: DISCONTINUED | OUTPATIENT
Start: 2017-12-14 | End: 2017-12-14 | Stop reason: HOSPADM

## 2017-12-14 RX ORDER — SODIUM CHLORIDE 0.9 % (FLUSH) 0.9 %
SYRINGE (ML) INJECTION
Status: DISCONTINUED | OUTPATIENT
Start: 2017-12-14 | End: 2017-12-14

## 2017-12-14 RX ORDER — DEXAMETHASONE SODIUM PHOSPHATE 4 MG/ML
INJECTION, SOLUTION INTRA-ARTICULAR; INTRALESIONAL; INTRAMUSCULAR; INTRAVENOUS; SOFT TISSUE
Status: DISCONTINUED | OUTPATIENT
Start: 2017-12-14 | End: 2017-12-14 | Stop reason: HOSPADM

## 2017-12-14 RX ORDER — MOXIFLOXACIN 5 MG/ML
1 SOLUTION/ DROPS OPHTHALMIC
Status: DISCONTINUED | OUTPATIENT
Start: 2017-12-14 | End: 2017-12-14 | Stop reason: HOSPADM

## 2017-12-14 RX ORDER — MIDAZOLAM HYDROCHLORIDE 1 MG/ML
INJECTION INTRAMUSCULAR; INTRAVENOUS
Status: DISCONTINUED | OUTPATIENT
Start: 2017-12-14 | End: 2017-12-14

## 2017-12-14 RX ORDER — LIDOCAINE HYDROCHLORIDE 10 MG/ML
1 INJECTION, SOLUTION EPIDURAL; INFILTRATION; INTRACAUDAL; PERINEURAL ONCE
Status: DISCONTINUED | OUTPATIENT
Start: 2017-12-14 | End: 2017-12-14 | Stop reason: HOSPADM

## 2017-12-14 RX ORDER — BUPIVACAINE HYDROCHLORIDE 7.5 MG/ML
INJECTION, SOLUTION EPIDURAL; RETROBULBAR
Status: DISCONTINUED | OUTPATIENT
Start: 2017-12-14 | End: 2017-12-14 | Stop reason: HOSPADM

## 2017-12-14 RX ORDER — CEFAZOLIN SODIUM 1 G/3ML
INJECTION, POWDER, FOR SOLUTION INTRAMUSCULAR; INTRAVENOUS
Status: DISCONTINUED | OUTPATIENT
Start: 2017-12-14 | End: 2017-12-14 | Stop reason: HOSPADM

## 2017-12-14 RX ORDER — HYDROCODONE BITARTRATE AND ACETAMINOPHEN 5; 325 MG/1; MG/1
1 TABLET ORAL EVERY 4 HOURS PRN
Status: DISCONTINUED | OUTPATIENT
Start: 2017-12-14 | End: 2017-12-14 | Stop reason: HOSPADM

## 2017-12-14 RX ORDER — TETRACAINE HYDROCHLORIDE 5 MG/ML
1 SOLUTION OPHTHALMIC
Status: DISCONTINUED | OUTPATIENT
Start: 2017-12-14 | End: 2017-12-14 | Stop reason: HOSPADM

## 2017-12-14 RX ORDER — ACETAMINOPHEN 325 MG/1
650 TABLET ORAL EVERY 4 HOURS PRN
Status: DISCONTINUED | OUTPATIENT
Start: 2017-12-14 | End: 2017-12-14 | Stop reason: HOSPADM

## 2017-12-14 RX ORDER — FENTANYL CITRATE 50 UG/ML
INJECTION, SOLUTION INTRAMUSCULAR; INTRAVENOUS
Status: DISCONTINUED | OUTPATIENT
Start: 2017-12-14 | End: 2017-12-14

## 2017-12-14 RX ADMIN — MOXIFLOXACIN HYDROCHLORIDE 1 DROP: 5 SOLUTION/ DROPS OPHTHALMIC at 09:12

## 2017-12-14 RX ADMIN — MIDAZOLAM HYDROCHLORIDE 2 MG: 1 INJECTION, SOLUTION INTRAMUSCULAR; INTRAVENOUS at 10:12

## 2017-12-14 RX ADMIN — PHENYLEPHRINE HYDROCHLORIDE 1 DROP: 25 SOLUTION/ DROPS OPHTHALMIC at 09:12

## 2017-12-14 RX ADMIN — TROPICAMIDE 1 DROP: 10 SOLUTION/ DROPS OPHTHALMIC at 09:12

## 2017-12-14 RX ADMIN — LIDOCAINE HYDROCHLORIDE 3 ML: 20 INJECTION, SOLUTION INFILTRATION; PERINEURAL at 10:12

## 2017-12-14 RX ADMIN — BALANCED SALT SOLUTION ENRICHED WITH BICARBONATE, DEXTROSE, AND GLUTATHIONE 515 ML: KIT at 10:12

## 2017-12-14 RX ADMIN — BUPIVACAINE HYDROCHLORIDE 3 ML: 7.5 INJECTION, SOLUTION EPIDURAL; RETROBULBAR at 10:12

## 2017-12-14 RX ADMIN — ACETYLCHOLINE CHLORIDE 1 ML: KIT at 10:12

## 2017-12-14 RX ADMIN — TETRACAINE HYDROCHLORIDE 1 DROP: 5 SOLUTION OPHTHALMIC at 09:12

## 2017-12-14 RX ADMIN — CEFAZOLIN 25 MG: 330 INJECTION, POWDER, FOR SOLUTION INTRAMUSCULAR; INTRAVENOUS at 10:12

## 2017-12-14 RX ADMIN — SODIUM CHONDROITIN SULFATE / SODIUM HYALURONATE 2 ML: 0.55-0.5 INJECTION INTRAOCULAR at 11:12

## 2017-12-14 RX ADMIN — FENTANYL CITRATE 100 MCG: 50 INJECTION, SOLUTION INTRAMUSCULAR; INTRAVENOUS at 10:12

## 2017-12-14 RX ADMIN — TRYPAN BLUE 2 ML: 0.3 INJECTION, SOLUTION INTRAOCULAR; OPHTHALMIC at 11:12

## 2017-12-14 RX ADMIN — SODIUM CHLORIDE 10 ML: 9 INJECTION, SOLUTION INTRAMUSCULAR; INTRAVENOUS; SUBCUTANEOUS at 10:12

## 2017-12-14 RX ADMIN — DEXAMETHASONE SODIUM PHOSPHATE 2 MG: 4 INJECTION, SOLUTION INTRAMUSCULAR; INTRAVENOUS at 11:12

## 2017-12-14 NOTE — OP NOTE
SURGEON:  Chantell Jay M.D.    PREOPERATIVE DIAGNOSES:  Fuchs endothelial corneal dystrophy  Nuclear Sclerotic Cataract    POSTOPERATIVE DIAGNOSES:    Fuchs endothelial corneal dystrophy  Nuclear Sclerotic Cataract    PROCEDURES PERFORMED:  1) Descement's Membrane Endothelial Keratoplasty  right eye (DMEK) (26566)  2) Phaco/IOL right eye (86069)    Date of Procedure 12/14/2017    ANESTHESIA:  MAC with retrobulbar block.    GRAFT SIZE:  8.0 mm    IMPLANT: pcboo 22.5    COMPLICATIONS:  None.    INDICATIONS:    The patient has a history poor vision secondary to corneal edema.  After a thorough discussion of the risks, benefits and alternatives to corneal transplantation using the DMEK technique, the patient voices understanding of the risks and benefits and wishes to proceed with surgery.    PROCEDURE IN DETAIL:    The patient was brought to the operating room in the supine position, where the eye was prepped and draped in standard sterile fashion, after having received a retrobulbar block consisting of a 50/50 mixture of lidocaine and bupivacaine under conscious sedation.  The procedure was begun by the creation of a paracentesis incision, through which viscoelastic was used to fill the anterior chamber. Next, a 3mm temporal limbal tunnel incision was constructed and a cystotome and Utrata forceps used to fashion a continuous curvilinear capsulorrhexis.  Hydrodissection was carried out using the Rodriguez hydrodissection cannula and the nucleus was found to be mobile.  Phacoemulsification of the nucleus was carried out using a quick chop technique, and all remaining epinuclear and cortical material was removed.  The eye was then reformed with Viscoelastic and the  intraocular lens was implanted into the capsular bag.       An 8 mm nadia was made in the center of the cornea and then reverse Jose M used to score and remove Descemet's membrane.  An inferior surgical PI was created with 30G needle and all remaining  viscoelastics were removed from the eye.    Attention was then directed to the side table, where the previously stripped donor tissue was trephined with a [place] trephine, and stained with trypan blue. A modified injector system was used to implant this descemet's membrane tissue into the anterior chamber, and a 10-0 nylon suture placed in the wound. The graft was unfurled with a tapping technique, and 20% SF6 gas was used to fill the AC to the diameter of graft. The anterior chamber was reformed with BSS to a normal pressure.  The patient will remain supine in the postoperative recovery area for one hour to allow better adhesion of the graft to the posterior aspect of the cornea.  The patient  will be seen tomorrow in the eye clinic.

## 2017-12-14 NOTE — ANESTHESIA POSTPROCEDURE EVALUATION
Anesthesia Post Evaluation    Patient: Pavel Graff    Procedure(s) Performed: Procedure(s) (LRB):  PHACOEMULSIFICATION-ASPIRATION-CATARACT (Right)  INSERTION-INTRAOCULAR LENS (IOL) (Right)  TRANSPLANT-CORNEA/DMEK (Right)    Final Anesthesia Type: MAC  Patient location during evaluation: [facility]  Patient participation: Yes- Able to Participate  Level of consciousness: awake and alert  Post-procedure vital signs: reviewed and stable (B/P-161/89, HR-81, RR-14, O2%-97)  Pain management: adequate  Airway patency: patent  PONV status at discharge: No PONV  Anesthetic complications: no      Cardiovascular status: hemodynamically stable  Respiratory status: unassisted, spontaneous ventilation and room air  Hydration status: euvolemic  Follow-up not needed.        Visit Vitals  BP (!) 151/96   Pulse 75   Ht 6' (1.829 m)   Wt 111.1 kg (245 lb)   BMI 33.23 kg/m²       Pain/Mark Score: Pain Assessment Performed: Yes (12/14/2017  9:20 AM)  Presence of Pain: denies (12/14/2017  9:20 AM)

## 2017-12-14 NOTE — ANESTHESIA PREPROCEDURE EVALUATION
12/14/2017  Pavel Graff is a 67 y.o., male.    Pre-op Assessment    I have reviewed the Patient Summary Reports.     I have reviewed the Nursing Notes.   I have reviewed the Medications.     Review of Systems  Anesthesia Hx:  No problems with previous Anesthesia  History of prior surgery of interest to airway management or planning: Previous anesthesia: General 7/16 Ablation with general anesthesia.  Airway issues documented on chart review include mask, easy, GETA, easy direct laryngoscopy , view on direct laryngoscopy Grade II  Denies Family Hx of Anesthesia complications.   Denies Personal Hx of Anesthesia complications.   Social:  Non-Smoker    Hematology/Oncology:  Hematology Normal   Oncology Normal     EENT/Dental:EENT/Dental Normal   Cardiovascular:   Hypertension Dysrhythmias (SR s/p ablation) atrial fibrillation Successful ablation 2016   Pulmonary:   Denies Shortness of breath.    Renal/:  Renal/ Normal     Hepatic/GI:  Hepatic/GI Normal    Musculoskeletal:  Musculoskeletal Normal    Neurological:   TIA, CVA, no residual symptoms    Endocrine:  Endocrine Normal    Dermatological:  Skin Normal    Psych:  Psychiatric Normal           Physical Exam  General:  Obesity    Airway/Jaw/Neck:  Airway Findings: Mouth Opening: Normal Tongue: Normal  Mallampati: II  Improves to II with phonation.  TM Distance: Normal, at least 6 cm      Dental:  Dental Findings: upper partial dentures        Mental Status:  Mental Status Findings:  Cooperative         Anesthesia Plan  Type of Anesthesia, risks & benefits discussed:  Anesthesia Type:  MAC  Patient's Preference:   Intra-op Monitoring Plan: standard ASA monitors  Intra-op Monitoring Plan Comments:   Post Op Pain Control Plan:   Post Op Pain Control Plan Comments:   Induction:    Beta Blocker:         Informed Consent: Patient understands risks and agrees  with Anesthesia plan.  Questions answered. Anesthesia consent signed with patient.  ASA Score: 3     Day of Surgery Review of History & Physical:    H&P update referred to the surgeon.         Ready For Surgery From Anesthesia Perspective.

## 2017-12-14 NOTE — DISCHARGE INSTRUCTIONS
Chantell Jay MD  [facility]  Department of Ophthalmology    DSEK CORNEAL TRANSPLANT Post-Operative Instructions:  Remain lying on your back , facing the ceiling for the first 48 hours after surgery, except for necessary breaks such as eating meals and restroom breaks.  AVOID watching TV or any sudden jerking movements of your head  If you experience pain or severe headache behind the eye with nausea, call Dr. Jay or the on-call doctor IMMEDIATELY @ [phone].    Plan to see Dr. Jay tomorrow at the eye clinic:   [address] [facility],10th floor     STARTING Tomorrow following your post-operative appointment with Dr. Jay:   Place 1 (one) drop of each of the medications into the operative eye as directed, wait 2 (two) minutes between drops.   Remove the shield covering from your eye temporarily to instill the drops.     SHAKE BOTTLES WELL BEFORE USE:    OCUFLOX/ VIGAMOX:       4 (four) times a day  FOR 1 (one) WEEK.                PRED ACETATE:        4 (four) times a day for 1 (one)  month                  Precautions:  DO NOT rub your eye.  Remain on your back for the next 24 hours.   Do NOT exert yourself ( no heavy lifting, running, or swimming)  for 1 (One) Month.  You may shower, but do not allow water into your eye for 2 (two) weeks.  Wear protective sunglasses during the day and a shield at night for 1(one) week.      Anesthesia: Monitored Anesthesia Care (MAC)    Anesthesia Safety  · Have an adult family member or friend drive you home after the procedure.  · For the first 24 hours after your surgery:  ¨ Do not drive or use heavy equipment.  ¨ Do not make important decisions or sign documents.  ¨ Avoid alcohol.  ¨ Have someone stay with you, if possible. They can watch for problems and help keep you safe.

## 2017-12-15 ENCOUNTER — OFFICE VISIT (OUTPATIENT)
Dept: OPHTHALMOLOGY | Facility: CLINIC | Age: 67
End: 2017-12-15
Payer: MEDICARE

## 2017-12-15 DIAGNOSIS — Z98.890 POST-OPERATIVE STATE: Primary | ICD-10-CM

## 2017-12-15 DIAGNOSIS — Z94.7 STATUS POST CORNEAL TRANSPLANT: ICD-10-CM

## 2017-12-15 DIAGNOSIS — H25.11 NUCLEAR SCLEROTIC CATARACT OF RIGHT EYE: ICD-10-CM

## 2017-12-15 PROCEDURE — 99999 PR PBB SHADOW E&M-EST. PATIENT-LVL II: CPT | Mod: PBBFAC,,, | Performed by: OPHTHALMOLOGY

## 2017-12-15 PROCEDURE — 99024 POSTOP FOLLOW-UP VISIT: CPT | Mod: S$GLB,,, | Performed by: OPHTHALMOLOGY

## 2017-12-15 NOTE — PROGRESS NOTES
HPI     POD 1 Phaco/ DMEK OD December 14, 2017    Patient presented to clinic with OD patched.  Patched removed at 9:29 AM.    Patch removal tolerated well.    No eye meds at this time; but patient has medications and is ready to   start regimen      Last edited by Nicolette Kinney on 12/15/2017  9:29 AM. (History)            Assessment /Plan     For exam results, see Encounter Report.    Post-operative state    Nuclear sclerotic cataract of right eye    Status post corneal transplant      DMEK POD1: DMEK Graft attached. 2+ MCE. Wound stable. 70% bubble with good PI.  Supine 4-5hrs daily till Monday.

## 2017-12-21 ENCOUNTER — OFFICE VISIT (OUTPATIENT)
Dept: OPHTHALMOLOGY | Facility: CLINIC | Age: 67
End: 2017-12-21
Payer: MEDICARE

## 2017-12-21 DIAGNOSIS — Z98.890 POST-OPERATIVE STATE: Primary | ICD-10-CM

## 2017-12-21 DIAGNOSIS — Z94.7 STATUS POST CORNEAL TRANSPLANT: ICD-10-CM

## 2017-12-21 DIAGNOSIS — H25.11 NUCLEAR SCLEROTIC CATARACT OF RIGHT EYE: ICD-10-CM

## 2017-12-21 PROCEDURE — 99999 PR PBB SHADOW E&M-EST. PATIENT-LVL II: CPT | Mod: PBBFAC,,, | Performed by: OPHTHALMOLOGY

## 2017-12-21 PROCEDURE — 99024 POSTOP FOLLOW-UP VISIT: CPT | Mod: S$GLB,,, | Performed by: OPHTHALMOLOGY

## 2017-12-21 NOTE — PROGRESS NOTES
HPI     POW 1 Phaco/ DMEK OD 12/14/2017    Patient states he is doing much better since his last visit.     Eye meds:  PF QID OD  Vigamox QID OD       Last edited by Nicolette Kinney on 12/21/2017  8:45 AM. (History)            Assessment /Plan     For exam results, see Encounter Report.    Post-operative state    Nuclear sclerotic cataract of right eye    Status post corneal transplant      DMEK graft attached and clear. Signs and symptoms of graft rejection reviewed.  Inferior graft tear, but otherwise fully attached.

## 2018-01-05 RX ORDER — LOSARTAN POTASSIUM 50 MG/1
50 TABLET ORAL DAILY
Qty: 90 TABLET | Refills: 1 | Status: SHIPPED | OUTPATIENT
Start: 2018-01-05 | End: 2018-07-03 | Stop reason: SDUPTHER

## 2018-01-11 DIAGNOSIS — I48.19 PERSISTENT ATRIAL FIBRILLATION: ICD-10-CM

## 2018-01-26 ENCOUNTER — OFFICE VISIT (OUTPATIENT)
Dept: OPHTHALMOLOGY | Facility: CLINIC | Age: 68
End: 2018-01-26
Payer: MEDICARE

## 2018-01-26 DIAGNOSIS — Z94.7 STATUS POST CORNEAL TRANSPLANT: ICD-10-CM

## 2018-01-26 DIAGNOSIS — Z98.890 POST-OPERATIVE STATE: Primary | ICD-10-CM

## 2018-01-26 PROCEDURE — 99024 POSTOP FOLLOW-UP VISIT: CPT | Mod: S$GLB,,, | Performed by: OPHTHALMOLOGY

## 2018-01-26 PROCEDURE — 99999 PR PBB SHADOW E&M-EST. PATIENT-LVL II: CPT | Mod: PBBFAC,,, | Performed by: OPHTHALMOLOGY

## 2018-01-26 NOTE — PROGRESS NOTES
HPI     Post-op Evaluation    Additional comments: Right Eye            Comments   S/p Phaco/ DMEK OD 12/14/2017  S/p Repeat PKP OS 03/23/2017    Pt states doing well, questions if he should start Timolol OD?    Pred qid OD  Pred bid OS  Timolol bid OS        Last edited by Karli Mcwilliams on 1/26/2018  2:00 PM. (History)            Assessment /Plan     For exam results, see Encounter Report.    Post-operative state    Status post corneal transplant      OD DMEK graft attached and clear. Signs and symptoms of graft rejection reviewed. 1 mos po  OS PKP stable and clear. Signs and symptoms of graft rejection reviewed.    Continue current treatment/medications

## 2018-02-07 ENCOUNTER — LAB VISIT (OUTPATIENT)
Dept: LAB | Facility: HOSPITAL | Age: 68
End: 2018-02-07
Attending: INTERNAL MEDICINE
Payer: MEDICARE

## 2018-02-07 DIAGNOSIS — I48.19 PERSISTENT ATRIAL FIBRILLATION: ICD-10-CM

## 2018-02-07 DIAGNOSIS — Z00.00 ANNUAL PHYSICAL EXAM: ICD-10-CM

## 2018-02-07 DIAGNOSIS — G45.9 TRANSIENT CEREBRAL ISCHEMIA, UNSPECIFIED TYPE: ICD-10-CM

## 2018-02-07 DIAGNOSIS — I10 ESSENTIAL HYPERTENSION: ICD-10-CM

## 2018-02-07 DIAGNOSIS — E78.5 HYPERLIPIDEMIA, UNSPECIFIED HYPERLIPIDEMIA TYPE: ICD-10-CM

## 2018-02-07 DIAGNOSIS — N40.0 BENIGN NON-NODULAR PROSTATIC HYPERPLASIA WITHOUT LOWER URINARY TRACT SYMPTOMS: ICD-10-CM

## 2018-02-07 DIAGNOSIS — Z12.5 ENCOUNTER FOR SCREENING FOR MALIGNANT NEOPLASM OF PROSTATE: ICD-10-CM

## 2018-02-07 DIAGNOSIS — G47.33 OBSTRUCTIVE SLEEP APNEA: ICD-10-CM

## 2018-02-07 LAB
ALBUMIN SERPL BCP-MCNC: 4 G/DL
ALP SERPL-CCNC: 83 U/L
ALT SERPL W/O P-5'-P-CCNC: 24 U/L
ANION GAP SERPL CALC-SCNC: 8 MMOL/L
AST SERPL-CCNC: 27 U/L
BASOPHILS # BLD AUTO: 0.04 K/UL
BASOPHILS NFR BLD: 0.7 %
BILIRUB SERPL-MCNC: 1.5 MG/DL
BUN SERPL-MCNC: 16 MG/DL
CALCIUM SERPL-MCNC: 10.1 MG/DL
CHLORIDE SERPL-SCNC: 104 MMOL/L
CHOLEST SERPL-MCNC: 179 MG/DL
CHOLEST/HDLC SERPL: 4.6 {RATIO}
CO2 SERPL-SCNC: 26 MMOL/L
COMPLEXED PSA SERPL-MCNC: 1 NG/ML
CREAT SERPL-MCNC: 1 MG/DL
DIFFERENTIAL METHOD: ABNORMAL
EOSINOPHIL # BLD AUTO: 0.1 K/UL
EOSINOPHIL NFR BLD: 1.5 %
ERYTHROCYTE [DISTWIDTH] IN BLOOD BY AUTOMATED COUNT: 12.2 %
EST. GFR  (AFRICAN AMERICAN): >60 ML/MIN/1.73 M^2
EST. GFR  (NON AFRICAN AMERICAN): >60 ML/MIN/1.73 M^2
GLUCOSE SERPL-MCNC: 104 MG/DL
HCT VFR BLD AUTO: 44.7 %
HDLC SERPL-MCNC: 39 MG/DL
HDLC SERPL: 21.8 %
HGB BLD-MCNC: 16 G/DL
LDLC SERPL CALC-MCNC: 122.8 MG/DL
LYMPHOCYTES # BLD AUTO: 0.8 K/UL
LYMPHOCYTES NFR BLD: 13.9 %
MCH RBC QN AUTO: 31.7 PG
MCHC RBC AUTO-ENTMCNC: 35.8 G/DL
MCV RBC AUTO: 89 FL
MONOCYTES # BLD AUTO: 0.7 K/UL
MONOCYTES NFR BLD: 11.2 %
NEUTROPHILS # BLD AUTO: 4.3 K/UL
NEUTROPHILS NFR BLD: 71.7 %
NONHDLC SERPL-MCNC: 140 MG/DL
NRBC BLD-RTO: 0 /100 WBC
PLATELET # BLD AUTO: 294 K/UL
PMV BLD AUTO: 10.6 FL
POTASSIUM SERPL-SCNC: 4.5 MMOL/L
PROT SERPL-MCNC: 7.3 G/DL
RBC # BLD AUTO: 5.05 M/UL
SODIUM SERPL-SCNC: 138 MMOL/L
TRIGL SERPL-MCNC: 86 MG/DL
TSH SERPL DL<=0.005 MIU/L-ACNC: 1.21 UIU/ML
WBC # BLD AUTO: 5.96 K/UL

## 2018-02-07 PROCEDURE — 80053 COMPREHEN METABOLIC PANEL: CPT

## 2018-02-07 PROCEDURE — 80061 LIPID PANEL: CPT

## 2018-02-07 PROCEDURE — 36415 COLL VENOUS BLD VENIPUNCTURE: CPT | Mod: PO

## 2018-02-07 PROCEDURE — 85025 COMPLETE CBC W/AUTO DIFF WBC: CPT

## 2018-02-07 PROCEDURE — 84443 ASSAY THYROID STIM HORMONE: CPT

## 2018-02-07 PROCEDURE — 84153 ASSAY OF PSA TOTAL: CPT

## 2018-02-09 ENCOUNTER — OFFICE VISIT (OUTPATIENT)
Dept: INTERNAL MEDICINE | Facility: CLINIC | Age: 68
End: 2018-02-09
Payer: MEDICARE

## 2018-02-09 ENCOUNTER — TELEPHONE (OUTPATIENT)
Dept: ELECTROPHYSIOLOGY | Facility: CLINIC | Age: 68
End: 2018-02-09

## 2018-02-09 VITALS
HEIGHT: 72 IN | BODY MASS INDEX: 33.53 KG/M2 | HEART RATE: 60 BPM | WEIGHT: 247.56 LBS | SYSTOLIC BLOOD PRESSURE: 136 MMHG | DIASTOLIC BLOOD PRESSURE: 80 MMHG

## 2018-02-09 DIAGNOSIS — Z00.00 ANNUAL PHYSICAL EXAM: Primary | ICD-10-CM

## 2018-02-09 DIAGNOSIS — I48.19 PERSISTENT ATRIAL FIBRILLATION: ICD-10-CM

## 2018-02-09 DIAGNOSIS — I10 ESSENTIAL HYPERTENSION: ICD-10-CM

## 2018-02-09 DIAGNOSIS — E78.5 HYPERLIPIDEMIA, UNSPECIFIED HYPERLIPIDEMIA TYPE: ICD-10-CM

## 2018-02-09 DIAGNOSIS — G45.9 TRANSIENT CEREBRAL ISCHEMIA, UNSPECIFIED TYPE: ICD-10-CM

## 2018-02-09 PROCEDURE — 99499 UNLISTED E&M SERVICE: CPT | Mod: S$GLB,,, | Performed by: INTERNAL MEDICINE

## 2018-02-09 PROCEDURE — 93010 ELECTROCARDIOGRAM REPORT: CPT | Mod: S$GLB,,, | Performed by: INTERNAL MEDICINE

## 2018-02-09 PROCEDURE — 99213 OFFICE O/P EST LOW 20 MIN: CPT | Mod: PO | Performed by: INTERNAL MEDICINE

## 2018-02-09 PROCEDURE — 99999 PR PBB SHADOW E&M-EST. PATIENT-LVL III: CPT | Mod: PBBFAC,,, | Performed by: INTERNAL MEDICINE

## 2018-02-09 PROCEDURE — 99397 PER PM REEVAL EST PAT 65+ YR: CPT | Mod: S$GLB,,, | Performed by: INTERNAL MEDICINE

## 2018-02-09 PROCEDURE — 93005 ELECTROCARDIOGRAM TRACING: CPT | Mod: S$GLB,,, | Performed by: INTERNAL MEDICINE

## 2018-02-09 NOTE — TELEPHONE ENCOUNTER
----- Message from Sharon Noguera sent at 2/9/2018 10:08 AM CST -----  Contact: patient  Please call pt at [phone] ask for Ovidio. Patient has a irregular heart beat this morning with no symptoms but PCP advised him to call his cardiologist. Dr Howard    Thank you

## 2018-02-09 NOTE — PROGRESS NOTES
REASON FOR VISIT:  This is a 68-year-old male who comes in for his annual   routine visit.  Overall in general, he has felt well.    PAST MEDICAL, SURGICAL & FAMILY HISTORY:  Outlined below.& FAMILY HISTORY:    Outlined below.    CURRENT MEDICATIONS:  Eliquis 5 mg twice a day.  Aspirin 81 mg a day.  Hydrochlorothiazide 25 mg a day.  Losartan 50 mg a day.  Metoprolol XL 50 mg a day.  Pantoprazole 40 mg a day.  Pravastatin 20 mg a day.  Eye drop.    RECENT LABS:  Chemistry, CBC, TSH, PSA normal.  Total cholesterol is 179 with   .    REVIEW OF SYMPTOMS:  No chest pain, palpitations, shortness of breath, or   abdominal pain.  Regular bowel function, no difficulty urinating.  Nocturia x1   or 2, but he hydrates a lot more in the evening.  No arthralgias or headaches.    Also in the interim, he has had bilateral cataract extraction with corneal   transplant.    PHYSICAL EXAMINATION:  VITAL SIGNS:  His weight is 247, pulse 60 and regular, blood pressure 136/80.  HEENT:  Tympanic membranes normal.  Nasal mucosa is clear.  Oropharynx, no   abnormal findings.  NECK:  No thyromegaly.  No masses.  LUNGS:  Clear breath sounds.  HEART:  Irregular.  No murmurs.  ABDOMEN:  Active bowel sounds, soft, nontender.  No hepatosplenomegaly or   abdominal masses.  PULSES:  2+ carotid, 2+ pedal pulses.  EXTREMITIES:  No edema.  LYMPH GLAND:  No palpable adenopathy.  RECTAL:  Stool is brown, heme negative.  Prostate is mildly enlarged.    Electrocardiogram in the office did reveal atrial fibrillation with incomplete   bundle-branch block, back in June it was a normal sinus rhythm.    IMPRESSION:  1.  General examination.  2.  Persistent atrial fibrillation.  3.  Hypertension.  4.  Hyperlipidemia.  5.  History of transient ischemic attack.    PLAN:  Refer back to Cardiology.  He is going to find out if he has had a   pneumococcal vaccine.  Continue with attention to proper diet and physical   activity.      KELVIN  dd: 02/09/2018  09:10:03 (CST)  td: 02/10/2018 00:07:15 (YULIET)  Doc ID   #[id number]  Job ID #[id number]    CC:

## 2018-02-09 NOTE — TELEPHONE ENCOUNTER
Pt saw PCP this am and was fond to be in AF upon exam. EKG was done to verify. Dr. Howard notified, and pt given appt on 2/27/18.

## 2018-02-23 ENCOUNTER — OFFICE VISIT (OUTPATIENT)
Dept: OPTOMETRY | Facility: CLINIC | Age: 68
End: 2018-02-23
Payer: MEDICARE

## 2018-02-23 DIAGNOSIS — Z94.7 STATUS POST CORNEAL TRANSPLANT: Primary | ICD-10-CM

## 2018-02-23 DIAGNOSIS — Z98.41 STATUS POST RIGHT CATARACT EXTRACTION: ICD-10-CM

## 2018-02-23 PROCEDURE — 99024 POSTOP FOLLOW-UP VISIT: CPT | Mod: S$GLB,,, | Performed by: OPTOMETRIST

## 2018-02-23 PROCEDURE — 99999 PR PBB SHADOW E&M-EST. PATIENT-LVL II: CPT | Mod: PBBFAC,,, | Performed by: OPTOMETRIST

## 2018-02-23 RX ORDER — OFLOXACIN 3 MG/ML
SOLUTION/ DROPS OPHTHALMIC
COMMUNITY
Start: 2017-12-04 | End: 2018-03-21 | Stop reason: ALTCHOICE

## 2018-02-23 NOTE — PROGRESS NOTES
HPI     Patient's last eye exam was: 1/26/18 W/ Dr. Jay    Pt here for 1 month post op for  DMEK and S/P PCIOL OD 12/14/2017. States   he is doing well since his sx. Patient denies flashes, floaters, pain and   double vision. C/O occasional  white mucus OD. Would like a glasses rx   today. No other ocular complaints     Pt Gtts:  Pred qid OD  Pred bid OS  Timolol bid OS      Pt Hx:   S/p Phaco/ DMEK OD 12/14/2017  S/p Repeat PKP OS 03/23/2017    Last edited by Terri Lind, PCT on 2/23/2018  8:31 AM.   (History)            Assessment /Plan     For exam results, see Encounter Report.    Status post corneal transplant  -     OCT- Retina    Status post right cataract extraction  -     OCT- Retina            1-2.  Pt doing well.  Bifocal rx given.  Eye health normal OU.  Follow-up with Dr. Jay 2 months.

## 2018-02-27 ENCOUNTER — HOSPITAL ENCOUNTER (OUTPATIENT)
Dept: CARDIOLOGY | Facility: CLINIC | Age: 68
Discharge: HOME | End: 2018-02-27
Payer: MEDICARE

## 2018-02-27 ENCOUNTER — OFFICE VISIT (OUTPATIENT)
Dept: ELECTROPHYSIOLOGY | Facility: CLINIC | Age: 68
End: 2018-02-27
Payer: MEDICARE

## 2018-02-27 VITALS
SYSTOLIC BLOOD PRESSURE: 138 MMHG | HEIGHT: 72 IN | WEIGHT: 246.25 LBS | BODY MASS INDEX: 33.35 KG/M2 | DIASTOLIC BLOOD PRESSURE: 80 MMHG | HEART RATE: 76 BPM

## 2018-02-27 DIAGNOSIS — G47.33 OBSTRUCTIVE SLEEP APNEA: ICD-10-CM

## 2018-02-27 DIAGNOSIS — Z98.890 STATUS POST CATHETER ABLATION OF ATRIAL FIBRILLATION: ICD-10-CM

## 2018-02-27 DIAGNOSIS — I10 ESSENTIAL HYPERTENSION: ICD-10-CM

## 2018-02-27 DIAGNOSIS — I48.19 PERSISTENT ATRIAL FIBRILLATION: Primary | ICD-10-CM

## 2018-02-27 DIAGNOSIS — I48.91 ATRIAL FIBRILLATION, UNSPECIFIED TYPE: ICD-10-CM

## 2018-02-27 DIAGNOSIS — G45.9 TRANSIENT CEREBRAL ISCHEMIA, UNSPECIFIED TYPE: ICD-10-CM

## 2018-02-27 PROCEDURE — 99214 OFFICE O/P EST MOD 30 MIN: CPT | Mod: S$GLB,,, | Performed by: INTERNAL MEDICINE

## 2018-02-27 PROCEDURE — 3008F BODY MASS INDEX DOCD: CPT | Mod: S$GLB,,, | Performed by: INTERNAL MEDICINE

## 2018-02-27 PROCEDURE — 1159F MED LIST DOCD IN RCRD: CPT | Mod: S$GLB,,, | Performed by: INTERNAL MEDICINE

## 2018-02-27 PROCEDURE — 93000 ELECTROCARDIOGRAM COMPLETE: CPT | Mod: S$GLB,,, | Performed by: INTERNAL MEDICINE

## 2018-02-27 PROCEDURE — 99499 UNLISTED E&M SERVICE: CPT | Mod: S$GLB,,, | Performed by: INTERNAL MEDICINE

## 2018-02-27 PROCEDURE — 1126F AMNT PAIN NOTED NONE PRSNT: CPT | Mod: S$GLB,,, | Performed by: INTERNAL MEDICINE

## 2018-02-27 PROCEDURE — 99999 PR PBB SHADOW E&M-EST. PATIENT-LVL III: CPT | Mod: PBBFAC,,, | Performed by: INTERNAL MEDICINE

## 2018-02-27 NOTE — PROGRESS NOTES
Subjective:    Patient ID:  Pavel Graff is a 68 y.o. male who presents for follow-up of Atrial Fibrillation      68 year old male with history of HTN, TIA, SIMON, HLD and persistent AF since July 2012.  DCCV was attempted with early recurrence of AF.  He was rate controlled and did not note significant symptoms at the time - so a rate control strategy was pursued.  He notes though, that ever since he has been in AF - he has been much more easily fatigued and tired, not himself.  He denies overt palpitation or SOB.  He denies chest pain.    Mr. Graff underwent a PVI (07/21/16) with successful pulmonary vein antral isolation. Since the procedure, Mr. Graff reports feeling well with only two 3-second episodes of palpitations; he denies further recurrence.  denies chest pain, SOB/BLANCO, dizziness, or syncope. Mr. Graff has noted improvement in his energy level since the procedure.     12/16: Continues improved symptoms. 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 Remains on pradaxa and metoprolol.     6/17: No recurrence of AF since PVI. Feels well. No active complaints.     Interval history: AF recurrence without symptoms. Remains on apixaban. The patient denies interval chest pain, sob, palpitations, syncope, near syncope.     2D echo 4/16    1 - Eccentric hypertrophy.     2 - Normal left ventricular systolic function (EF 60-65%).     3 - Right ventricular enlargement with normal systolic function.     4 - Biatrial enlargement.     5 - Mild aortic regurgitation.     6 - Mild mitral regurgitation.     7 - Mild tricuspid regurgitation.     8 - The estimated PA systolic pressure is 33 mmHg.     9 - Mildly elevated central venous pressure.     PET stress 5/16  1. There is no evidence of a discrete hemodynamically significant coronary stenosis.   2. Although no clinically significant stress defect is seen, there is resting flow heterogeneity that improves after Dipyridamole. These results suggest mild  endothelial dysfunction due to elevated cholesterol, high blood pressure and diabetes without clinically significant,      localized perfusion defects.   3. Resting wall motion is physiologic. Stress wall motion is physiologic.   4. LV function is normal at rest and stress.  (normal is >= 51%)  5. The ventricular volumes are normal at rest and stress.   6. The extracardiac distribution of radioactivity is normal.   7. There was no previous study available to compare.    Past Medical History:  No date: *Atrial fibrillation  No date: Anticoagulant long-term use  No date: Atrial fibrillation  No date: Hyperlipidemia  No date: Hypertension  No date: Sleep apnea  No date: Stroke      Comment: TIA  No date: TIA (transient ischemic attack)    Past Surgical History:  1/11/10: CATARACT EXTRACTION W/  INTRAOCULAR LENS IMPLA* Left  12/14/2017: CATARACT EXTRACTION W/  INTRAOCULAR LENS IMPLA* Right      Comment: [facility]/ Dr. Jay  1997: CORNEAL TRANSPLANT Left      Comment: PKP OS  03/23/2017: CORNEAL TRANSPLANT Left  12/14/2017: CORNEAL TRANSPLANT Right      Comment: [facility]/ [facility]; Dr. Jay  No date: HERNIA REPAIR  No date: HIP ARTHROPLASTY  No date: ROTATOR CUFF REPAIR    Social History    Marital status:              Spouse name:                       Years of education:                 Number of children: 3             Occupational History  Occupation          Employer            Comment                                   [employer]*     Social History Main Topics    Smoking status: Never Smoker                                                                Smokeless tobacco: Never Used                        Alcohol use: No              Drug use: No              Sexual activity: Yes               Partners with: Female    Other Topics            Concern    None on file    Social History Narrative    None on file    Review of patient's family history indicates:    Diabetes                       Mother                     Heart disease                  Mother                    Hyperlipidemia                 Mother                    Heart disease                  Father                    Colon cancer                   Father                      Comment: colon    Heart disease                  Brother                   Crohn's disease                Sister                    Hyperlipidemia                 Sister                    Hypertension                   Sister                    Hyperlipidemia                 Sister                    Sudden death                   Neg Hx                    Amblyopia                      Neg Hx                    Blindness                      Neg Hx                    Cancer                         Neg Hx                    Cataracts                      Neg Hx                    Glaucoma                       Neg Hx                    Macular degeneration           Neg Hx                    Retinal detachment             Neg Hx                    Strabismus                     Neg Hx                    Stroke                         Neg Hx                    Thyroid disease                Neg Hx                          Review of Systems   Constitution: Negative for decreased appetite, diaphoresis, weakness and malaise/fatigue.   HENT: Negative for congestion and nosebleeds.    Eyes: Negative for blurred vision, double vision and pain.   Cardiovascular: Negative for chest pain, claudication, cyanosis, dyspnea on exertion, irregular heartbeat, leg swelling, near-syncope, orthopnea, palpitations, paroxysmal nocturnal dyspnea and syncope.   Respiratory: Negative for cough, hemoptysis, shortness of breath, sputum production and wheezing.    Endocrine: Negative for cold intolerance and heat intolerance.   Hematologic/Lymphatic: Negative for bleeding problem. Does not bruise/bleed easily.   Skin: Negative.  Negative for dry skin and flushing.   Musculoskeletal: Negative.  Negative for  arthritis and falls.   Gastrointestinal: Negative for abdominal pain, hematemesis, hematochezia, nausea and vomiting.   Genitourinary: Negative for dysuria and flank pain.   Neurological: Negative for dizziness, headaches and light-headedness.   Psychiatric/Behavioral: Negative for altered mental status. The patient is not nervous/anxious.         Objective:    Physical Exam   Constitutional: He is oriented to person, place, and time. He appears well-developed and well-nourished. No distress.   HENT:   Head: Normocephalic and atraumatic.   Eyes: Conjunctivae and EOM are normal. Pupils are equal, round, and reactive to light.   Neck: Normal range of motion. Neck supple. No JVD present. No thyromegaly present.   Cardiovascular: Normal rate, normal heart sounds and intact distal pulses.  An irregularly irregular rhythm present.   No murmur heard.  Pulmonary/Chest: Effort normal and breath sounds normal. No respiratory distress. He has no wheezes.   Abdominal: Soft. Bowel sounds are normal. He exhibits no distension. There is no tenderness.   Musculoskeletal: Normal range of motion. He exhibits no edema.   Neurological: He is alert and oriented to person, place, and time. No cranial nerve deficit.   Skin: Skin is warm and dry. No rash noted. He is not diaphoretic. No erythema.   Psychiatric: He has a normal mood and affect. His behavior is normal. Judgment and thought content normal.   Vitals reviewed.    ECG: AF with controlled V rate, nl QRS        Assessment:       1. Persistent atrial fibrillation    2. Obstructive sleep apnea    3. Status post catheter ablation of atrial fibrillation    4. Transient cerebral ischemia, unspecified type    5. Essential hypertension         Plan:       68 yoM persistent AF, SIMON, HTN here for AF management. He has asymptomatic recurrence of AF that is rate controlled. Will plan on a rate control strategy with metoprolol 50 mg qd and CVA prophylaxis with apixaban 5 mg bid. RTC 6m

## 2018-03-21 ENCOUNTER — OFFICE VISIT (OUTPATIENT)
Dept: OPHTHALMOLOGY | Facility: CLINIC | Age: 68
End: 2018-03-21
Payer: MEDICARE

## 2018-03-21 DIAGNOSIS — Z94.7 STATUS POST CORNEAL TRANSPLANT: ICD-10-CM

## 2018-03-21 DIAGNOSIS — H18.519 FUCHS' ENDOTHELIAL DYSTROPHY: Primary | ICD-10-CM

## 2018-03-21 PROCEDURE — 99999 PR PBB SHADOW E&M-EST. PATIENT-LVL II: CPT | Mod: PBBFAC,,, | Performed by: OPHTHALMOLOGY

## 2018-03-21 PROCEDURE — 92014 COMPRE OPH EXAM EST PT 1/>: CPT | Mod: S$GLB,,, | Performed by: OPHTHALMOLOGY

## 2018-03-21 NOTE — PROGRESS NOTES
HPI     Eye Problem    Additional comments: Recheck s/p Corneal Transplant OU.            Comments   S/p [facility]/ [facility]ME[facility] OD 12/14/2017   S/p Repeat PKP OS 03/23/2017    Pt here for recheck s/p DMEK OD/PKP OS.  Pt states he is doing well, no   complaints.  Had Mrx done with Dr Thomas recently and is very pleased with   outcome  Pred qid OD  Pred bid OS  Timolol bid OS       Last edited by Karli Mcwilliams on 3/21/2018 10:33 AM. (History)            Assessment /Plan     For exam results, see Encounter Report.    Fuchs' endothelial dystrophy    Status post corneal transplant      DMEK graft attached and clear 4 mos. Signs and symptoms of graft rejection reviewed.  1 year PKP stable and clear. Signs and symptoms of graft rejection reviewed.  PF bid, TImolol BID OS

## 2018-04-13 RX ORDER — PRAVASTATIN SODIUM 20 MG/1
20 TABLET ORAL DAILY
Qty: 90 TABLET | Refills: 3 | Status: SHIPPED | OUTPATIENT
Start: 2018-04-13 | End: 2019-04-10 | Stop reason: SDUPTHER

## 2018-04-23 ENCOUNTER — PES CALL (OUTPATIENT)
Dept: ADMINISTRATIVE | Facility: CLINIC | Age: 68
End: 2018-04-23

## 2018-07-03 RX ORDER — LOSARTAN POTASSIUM 50 MG/1
50 TABLET ORAL DAILY
Qty: 90 TABLET | Refills: 1 | Status: SHIPPED | OUTPATIENT
Start: 2018-07-03 | End: 2019-01-05 | Stop reason: SDUPTHER

## 2018-07-13 ENCOUNTER — PES CALL (OUTPATIENT)
Dept: ADMINISTRATIVE | Facility: CLINIC | Age: 68
End: 2018-07-13

## 2018-07-24 ENCOUNTER — OFFICE VISIT (OUTPATIENT)
Dept: OPHTHALMOLOGY | Facility: CLINIC | Age: 68
End: 2018-07-24
Attending: OPHTHALMOLOGY
Payer: MEDICARE

## 2018-07-24 DIAGNOSIS — Z94.7 STATUS POST CORNEAL TRANSPLANT: ICD-10-CM

## 2018-07-24 DIAGNOSIS — H18.519 FUCHS' ENDOTHELIAL DYSTROPHY: Primary | ICD-10-CM

## 2018-07-24 PROCEDURE — 99999 PR PBB SHADOW E&M-EST. PATIENT-LVL II: CPT | Mod: PBBFAC,,, | Performed by: OPHTHALMOLOGY

## 2018-07-24 PROCEDURE — 99024 POSTOP FOLLOW-UP VISIT: CPT | Mod: S$GLB,,, | Performed by: OPHTHALMOLOGY

## 2018-08-02 ENCOUNTER — TELEPHONE (OUTPATIENT)
Dept: OPHTHALMOLOGY | Facility: CLINIC | Age: 68
End: 2018-08-02

## 2018-08-07 ENCOUNTER — OFFICE VISIT (OUTPATIENT)
Dept: ELECTROPHYSIOLOGY | Facility: CLINIC | Age: 68
End: 2018-08-07
Payer: MEDICARE

## 2018-08-07 ENCOUNTER — HOSPITAL ENCOUNTER (OUTPATIENT)
Dept: CARDIOLOGY | Facility: CLINIC | Age: 68
Discharge: HOME | End: 2018-08-07
Payer: MEDICARE

## 2018-08-07 VITALS
HEIGHT: 72 IN | HEART RATE: 93 BPM | DIASTOLIC BLOOD PRESSURE: 68 MMHG | BODY MASS INDEX: 33.74 KG/M2 | WEIGHT: 249.13 LBS | SYSTOLIC BLOOD PRESSURE: 110 MMHG

## 2018-08-07 DIAGNOSIS — I48.19 PERSISTENT ATRIAL FIBRILLATION: ICD-10-CM

## 2018-08-07 DIAGNOSIS — I48.19 PERSISTENT ATRIAL FIBRILLATION: Primary | ICD-10-CM

## 2018-08-07 DIAGNOSIS — Z98.890 STATUS POST CATHETER ABLATION OF ATRIAL FIBRILLATION: ICD-10-CM

## 2018-08-07 DIAGNOSIS — G45.9 TRANSIENT CEREBRAL ISCHEMIA, UNSPECIFIED TYPE: ICD-10-CM

## 2018-08-07 PROCEDURE — 93000 ELECTROCARDIOGRAM COMPLETE: CPT | Mod: S$GLB,,, | Performed by: INTERNAL MEDICINE

## 2018-08-07 PROCEDURE — 99214 OFFICE O/P EST MOD 30 MIN: CPT | Mod: S$GLB,,, | Performed by: INTERNAL MEDICINE

## 2018-08-07 PROCEDURE — 3078F DIAST BP <80 MM HG: CPT | Mod: CPTII,S$GLB,, | Performed by: INTERNAL MEDICINE

## 2018-08-07 PROCEDURE — 99999 PR PBB SHADOW E&M-EST. PATIENT-LVL III: CPT | Mod: PBBFAC,,, | Performed by: INTERNAL MEDICINE

## 2018-08-07 PROCEDURE — 3074F SYST BP LT 130 MM HG: CPT | Mod: CPTII,S$GLB,, | Performed by: INTERNAL MEDICINE

## 2018-08-07 NOTE — PROGRESS NOTES
Subjective:    Patient ID:  Pavel Graff is a 68 y.o. male who presents for evaluation of Atrial Fibrillation      68 year old male with history of HTN, TIA, SIMON, HLD and persistent AF since July 2012.  DCCV was attempted with early recurrence of AF.  He was rate controlled and did not note significant symptoms at the time - so a rate control strategy was pursued.  He notes though, that ever since he has been in AF - he has been much more easily fatigued and tired, not himself.  He denies overt palpitation or SOB.  He denies chest pain.    Mr. Graff underwent a PVI (07/21/16) with successful pulmonary vein antral isolation. Since the procedure, Mr. Graff reports feeling well with only two 3-second episodes of palpitations; he denies further recurrence.  denies chest pain, SOB/BLANCO, dizziness, or syncope. Mr. Graff has noted improvement in his energy level since the procedure.     12/16: Continues improved symptoms. 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 Remains on pradaxa and metoprolol.     6/17: No recurrence of AF since PVI. Feels well. No active complaints.     2/18: AF recurrence without symptoms. Remains on apixaban. The patient denies interval chest pain, sob, palpitations, syncope, near syncope.     Interval history: Stable symptoms with slightly more fatigue. He asks about repeat ablation at the end of the season.     2D echo 4/16    1 - Eccentric hypertrophy.     2 - Normal left ventricular systolic function (EF 60-65%).     3 - Right ventricular enlargement with normal systolic function.     4 - Biatrial enlargement.     5 - Mild aortic regurgitation.     6 - Mild mitral regurgitation.     7 - Mild tricuspid regurgitation.     8 - The estimated PA systolic pressure is 33 mmHg.     9 - Mildly elevated central venous pressure.     PET stress 5/16  1. There is no evidence of a discrete hemodynamically significant coronary stenosis.   2. Although no clinically significant stress  defect is seen, there is resting flow heterogeneity that improves after Dipyridamole. These results suggest mild endothelial dysfunction due to elevated cholesterol, high blood pressure and diabetes without clinically significant,      localized perfusion defects.   3. Resting wall motion is physiologic. Stress wall motion is physiologic.   4. LV function is normal at rest and stress.  (normal is >= 51%)  5. The ventricular volumes are normal at rest and stress.   6. The extracardiac distribution of radioactivity is normal.   7. There was no previous study available to compare.    Past Medical History:  No date: *Atrial fibrillation  No date: Anticoagulant long-term use  No date: Atrial fibrillation  No date: Hyperlipidemia  No date: Hypertension  No date: Sleep apnea  No date: Stroke      Comment: TIA  No date: TIA (transient ischemic attack)    Past Surgical History:  1/11/10: CATARACT EXTRACTION W/  INTRAOCULAR LENS IMPLA* Left  12/14/2017: CATARACT EXTRACTION W/  INTRAOCULAR LENS IMPLA* Right      Comment: [facility]/ Dr. Jay  1997: CORNEAL TRANSPLANT Left      Comment: PKP OS  03/23/2017: CORNEAL TRANSPLANT Left  12/14/2017: CORNEAL TRANSPLANT Right      Comment: [facility]ME[facility]/ [facility]; Dr. Jay  No date: HERNIA REPAIR  No date: HIP ARTHROPLASTY  No date: ROTATOR CUFF REPAIR    Social History    Marital status:              Spouse name:                       Years of education:                 Number of children: 3             Occupational History  Occupation          Employer            Comment                                   [employer]*     Social History Main Topics    Smoking status: Never Smoker                                                                Smokeless tobacco: Never Used                        Alcohol use: No              Drug use: No              Sexual activity: Yes               Partners with: Female    Other Topics            Concern    None on file    Social History Narrative    None  on file        Review of patient's family history indicates:  Problem: Diabetes      Relation: Mother       Age of Onset: (Not Specified)   Problem: Heart disease      Relation: Mother       Age of Onset: (Not Specified)   Problem: Hyperlipidemia      Relation: Mother       Age of Onset: (Not Specified)   Problem: Heart disease      Relation: Father       Age of Onset: (Not Specified)   Problem: Colon cancer      Relation: Father       Age of Onset: (Not Specified)       Comment: colon  Problem: Heart disease      Relation: Brother       Age of Onset: (Not Specified)   Problem: Crohn's disease      Relation: Sister       Age of Onset: (Not Specified)   Problem: Hyperlipidemia      Relation: Sister       Age of Onset: (Not Specified)   Problem: Hypertension      Relation: Sister       Age of Onset: (Not Specified)   Problem: Hyperlipidemia      Relation: Sister       Age of Onset: (Not Specified)   Problem: Sudden death      Relation: Neg Hx       Age of Onset: (Not Specified)   Problem: Amblyopia      Relation: Neg Hx       Age of Onset: (Not Specified)   Problem: Blindness      Relation: Neg Hx       Age of Onset: (Not Specified)   Problem: Cancer      Relation: Neg Hx       Age of Onset: (Not Specified)   Problem: Cataracts      Relation: Neg Hx       Age of Onset: (Not Specified)   Problem: Glaucoma      Relation: Neg Hx       Age of Onset: (Not Specified)   Problem: Macular degeneration      Relation: Neg Hx       Age of Onset: (Not Specified)   Problem: Retinal detachment      Relation: Neg Hx       Age of Onset: (Not Specified)   Problem: Strabismus      Relation: Neg Hx       Age of Onset: (Not Specified)   Problem: Stroke      Relation: Neg Hx       Age of Onset: (Not Specified)   Problem: Thyroid disease      Relation: Neg Hx       Age of Onset: (Not Specified)           Review of Systems   Constitution: Positive for malaise/fatigue. Negative for decreased appetite, diaphoresis and weakness.   HENT:  Negative for congestion and nosebleeds.    Eyes: Negative for blurred vision, double vision and pain.   Cardiovascular: Negative for chest pain, claudication, cyanosis, dyspnea on exertion, irregular heartbeat, leg swelling, near-syncope, orthopnea, palpitations, paroxysmal nocturnal dyspnea and syncope.   Respiratory: Negative for cough, hemoptysis, shortness of breath, sputum production and wheezing.    Endocrine: Negative for cold intolerance and heat intolerance.   Hematologic/Lymphatic: Negative for bleeding problem. Does not bruise/bleed easily.   Skin: Negative.  Negative for dry skin and flushing.   Musculoskeletal: Negative.  Negative for arthritis and falls.   Gastrointestinal: Negative for abdominal pain, hematemesis, hematochezia, nausea and vomiting.   Genitourinary: Negative for dysuria and flank pain.   Neurological: Negative for dizziness, headaches and light-headedness.   Psychiatric/Behavioral: Negative for altered mental status. The patient is not nervous/anxious.         Objective:    Physical Exam   Constitutional: He is oriented to person, place, and time. He appears well-developed and well-nourished. No distress.   HENT:   Head: Normocephalic and atraumatic.   Eyes: Conjunctivae and EOM are normal. Pupils are equal, round, and reactive to light.   Neck: Normal range of motion. Neck supple. No JVD present. No thyromegaly present.   Cardiovascular: Normal rate, normal heart sounds and intact distal pulses.  An irregularly irregular rhythm present.   No murmur heard.  Pulmonary/Chest: Effort normal and breath sounds normal. No respiratory distress. He has no wheezes.   Abdominal: Soft. Bowel sounds are normal. He exhibits no distension. There is no tenderness.   Musculoskeletal: Normal range of motion. He exhibits no edema.   Neurological: He is alert and oriented to person, place, and time. No cranial nerve deficit.   Skin: Skin is warm and dry. No rash noted. He is not diaphoretic. No erythema.    Psychiatric: He has a normal mood and affect. His behavior is normal. Judgment and thought content normal.   Vitals reviewed.    ECG: AF with controlled V rate        Assessment:       1. Persistent atrial fibrillation    2. Status post catheter ablation of atrial fibrillation    3. Transient cerebral ischemia, unspecified type         Plan:       68 yoM persistent AF here for follow up. Recurrent AF with mild symptoms. He asks about repeat ablation. He wishes to discuss a repeat procedure mid December. Will have him return in  to formalize these plans.

## 2018-08-08 DIAGNOSIS — I48.19 PERSISTENT ATRIAL FIBRILLATION: Primary | ICD-10-CM

## 2018-08-15 ENCOUNTER — TELEPHONE (OUTPATIENT)
Dept: ELECTROPHYSIOLOGY | Facility: CLINIC | Age: 68
End: 2018-08-15

## 2018-08-15 NOTE — TELEPHONE ENCOUNTER
----- Message from Romaine Howard MD sent at 8/15/2018  8:13 AM CDT -----  Contact: Patient's wife   RF PVI  Anesthesia  KAM prior  MAME  Hold OAC the morning of the procedure    MB  ----- Message -----  From: Betty Dewitt RN  Sent: 8/13/2018   4:39 PM  To: Romaine Howard MD    Patient's wife thinks she has convinced him to have ablation prior to December. Can you please send me case details, or do you need to see him again to formalize plans?  ----- Message -----  From: Vicki Schneider MA  Sent: 8/13/2018   4:26 PM  To: Betty Dewitt RN        ----- Message -----  From: Morenita Farias  Sent: 8/13/2018   4:20 PM  To: Lee Gavin Staff    The Pt's wife is calling to schedule his ablation. Please call her back @ [phone]. Thanks, Morenita

## 2018-08-17 ENCOUNTER — TELEPHONE (OUTPATIENT)
Dept: ELECTROPHYSIOLOGY | Facility: CLINIC | Age: 68
End: 2018-08-17

## 2018-08-17 DIAGNOSIS — I48.19 PERSISTENT ATRIAL FIBRILLATION: Primary | ICD-10-CM

## 2018-09-04 ENCOUNTER — OFFICE VISIT (OUTPATIENT)
Dept: INTERNAL MEDICINE | Facility: CLINIC | Age: 68
End: 2018-09-04
Payer: MEDICARE

## 2018-09-04 ENCOUNTER — PATIENT MESSAGE (OUTPATIENT)
Dept: INTERNAL MEDICINE | Facility: CLINIC | Age: 68
End: 2018-09-04

## 2018-09-04 VITALS
HEIGHT: 72 IN | WEIGHT: 247.56 LBS | BODY MASS INDEX: 33.53 KG/M2 | SYSTOLIC BLOOD PRESSURE: 112 MMHG | HEART RATE: 96 BPM | DIASTOLIC BLOOD PRESSURE: 68 MMHG

## 2018-09-04 DIAGNOSIS — H18.519 FUCHS' CORNEAL DYSTROPHY: ICD-10-CM

## 2018-09-04 DIAGNOSIS — Z86.010 PERSONAL HISTORY OF COLONIC POLYPS: ICD-10-CM

## 2018-09-04 DIAGNOSIS — Z86.73 HISTORY OF TIA (TRANSIENT ISCHEMIC ATTACK): ICD-10-CM

## 2018-09-04 DIAGNOSIS — T86.8419 CORNEAL TRANSPLANT FAILURE: ICD-10-CM

## 2018-09-04 DIAGNOSIS — N40.0 BENIGN PROSTATIC HYPERPLASIA WITHOUT LOWER URINARY TRACT SYMPTOMS: ICD-10-CM

## 2018-09-04 DIAGNOSIS — G47.33 OBSTRUCTIVE SLEEP APNEA: ICD-10-CM

## 2018-09-04 DIAGNOSIS — Z79.899 ON AMIODARONE THERAPY: ICD-10-CM

## 2018-09-04 DIAGNOSIS — Z12.11 COLON CANCER SCREENING: ICD-10-CM

## 2018-09-04 DIAGNOSIS — Z98.890 STATUS POST CATHETER ABLATION OF ATRIAL FIBRILLATION: ICD-10-CM

## 2018-09-04 DIAGNOSIS — R06.09 DOE (DYSPNEA ON EXERTION): ICD-10-CM

## 2018-09-04 DIAGNOSIS — I10 ESSENTIAL HYPERTENSION: ICD-10-CM

## 2018-09-04 DIAGNOSIS — Z79.01 CURRENT USE OF LONG TERM ANTICOAGULATION: ICD-10-CM

## 2018-09-04 DIAGNOSIS — K40.91 UNILATERAL RECURRENT INGUINAL HERNIA WITHOUT OBSTRUCTION OR GANGRENE: ICD-10-CM

## 2018-09-04 DIAGNOSIS — Z00.00 ENCOUNTER FOR PREVENTIVE HEALTH EXAMINATION: Primary | ICD-10-CM

## 2018-09-04 DIAGNOSIS — Z11.59 NEED FOR HEPATITIS C SCREENING TEST: ICD-10-CM

## 2018-09-04 DIAGNOSIS — I47.29 NSVT (NONSUSTAINED VENTRICULAR TACHYCARDIA): ICD-10-CM

## 2018-09-04 DIAGNOSIS — I48.19 PERSISTENT ATRIAL FIBRILLATION: ICD-10-CM

## 2018-09-04 DIAGNOSIS — E78.5 HYPERLIPIDEMIA, UNSPECIFIED HYPERLIPIDEMIA TYPE: ICD-10-CM

## 2018-09-04 PROCEDURE — 99213 OFFICE O/P EST LOW 20 MIN: CPT | Mod: PBBFAC,PO | Performed by: NURSE PRACTITIONER

## 2018-09-04 PROCEDURE — 3074F SYST BP LT 130 MM HG: CPT | Mod: CPTII,,, | Performed by: NURSE PRACTITIONER

## 2018-09-04 PROCEDURE — 99999 PR PBB SHADOW E&M-EST. PATIENT-LVL III: CPT | Mod: PBBFAC,,, | Performed by: NURSE PRACTITIONER

## 2018-09-04 PROCEDURE — 3078F DIAST BP <80 MM HG: CPT | Mod: CPTII,,, | Performed by: NURSE PRACTITIONER

## 2018-09-04 PROCEDURE — G0439 PPPS, SUBSEQ VISIT: HCPCS | Mod: ,,, | Performed by: NURSE PRACTITIONER

## 2018-09-04 NOTE — TELEPHONE ENCOUNTER
Mr Zhao,  Thank you for coming to your health assessment visit today. After reviewing your record, it appears that you are due to have a colonoscopy. Your last colonoscopy was 9/2013. You were advised to repeat in 5 years due to polyps. I have ordered this test for you. Please schedule when it is appropriate. You may call [phone] to schedule.  Take care.  Tabitha

## 2018-09-04 NOTE — PROGRESS NOTES
Pavel Graff presented for a  Medicare AWV and comprehensive Health Risk Assessment today. The following components were reviewed and updated:    · Medical history  · Family History  · Social history  · Allergies and Current Medications  · Health Risk Assessment  · Health Maintenance  · Care Team     ** See Completed Assessments for Annual Wellness Visit within the encounter summary.**       The following assessments were completed:  · Living Situation  · CAGE  · Depression Screening  · Timed Get Up and Go  · Whisper Test  · Cognitive Function Screening  ·   ·   · Nutrition Screening  · ADL Screening  · PAQ Screening    Vitals:    09/04/18 0805   BP: 112/68   Weight: 112.3 kg (247 lb 9.2 oz)   Height: 6' (1.829 m)     Body mass index is 33.58 kg/m².  Physical Exam   Constitutional: He is oriented to person, place, and time. He appears well-developed and well-nourished.   HENT:   Head: Normocephalic and atraumatic.   Nose: Nose normal.   Eyes: Conjunctivae and EOM are normal.   Cardiovascular: Normal rate, normal heart sounds and intact distal pulses. A regularly irregular rhythm present.   Pulmonary/Chest: Effort normal and breath sounds normal.   Abdominal: Bowel sounds are normal.   Musculoskeletal: Normal range of motion.   Neurological: He is alert and oriented to person, place, and time.   Skin: Skin is warm and dry.   Psychiatric: He has a normal mood and affect. His behavior is normal. Judgment and thought content normal.   Nursing note and vitals reviewed.        Diagnoses and health risks identified today and associated recommendations/orders:    1. Encounter for preventive health examination  Assessment performed. Health maintenance updated. Chart review completed.    2. Persistent atrial fibrillation  Chronic. Continue current regimen as instructed by Cardiology.    3. On amiodarone therapy  Chronic. Continue current regimen as instructed by Cardiology.    4. NSVT (nonsustained ventricular  tachycardia)  Chronic. Continue current regimen as instructed by Cardiology.    5. Status post catheter ablation of atrial fibrillation  Chronic. Continue current regimen as instructed by Cardiology.    6. Hyperlipidemia, unspecified hyperlipidemia type  Chronic. Stable on current regimen. Followed by PCP.    7. Essential hypertension  Chronic. Stable on current regimen. Followed by PCP.    8. Benign prostatic hyperplasia without lower urinary tract symptoms  Chronic. Stable. Followed by PCP.  Seen by Urology in 2012.    9. Current use of long term anticoagulation  Chronic. Continue current regimen as instructed by Cardiology.     10. Unilateral recurrent inguinal hernia without obstruction or gangrene  Stable. Followed by PCP.    11. Personal history of colonic polyps  Chronic. Stable.  Last colonoscopy 9/9/2013.  Repeat due in 5 years.    12. BLANCO (dyspnea on exertion)  Chronic. Continue current regimen as instructed by Cardiology.     13. Obstructive sleep apnea  Stable. Followed by PCP.    14. Corneal transplant failure  Chronic. Continue current regimen. Followed by Ophthalmology.    15. Fuchs' corneal dystrophy  Chronic. Continue current regimen. Followed by Ophthalmology.    16. History of TIA (transient ischemic attack)  Chronic. Stable on current regimen. Followed by PCP.    17. Need for Hepatitis C screening test  Ordered and linked to lab work previously scheduled.    18. Colon cancer screening  Colonoscopy ordered. Previous history of polyps. Last colonoscopy 2013.      Provided Pavel with a 5-10 year written screening schedule and personal prevention plan. Recommendations were developed using the USPSTF age appropriate recommendations. Education, counseling, and referrals were provided as needed. After Visit Summary printed and given to patient which includes a list of additional screenings\tests needed.    Follow-up for follow up with Primary Care Provider as instructed, ;sooner if problems, HRA in 1  year.    ROSMERY LundyP

## 2018-09-04 NOTE — PATIENT INSTRUCTIONS
Counseling and Referral of Other Preventative  (Italic type indicates deductible and co-insurance are waived)    Patient Name: Pavel Graff  Today's Date: 9/4/2018    Health Maintenance       Date Due Completion Date    Hepatitis C Screening 1950 ---    Zoster Vaccine 01/13/2010 ---    Pneumococcal (65+) (1 of 2 - PCV13) 01/13/2015 ---    Influenza Vaccine 08/01/2018 9/29/2014    Colonoscopy 09/09/2018 9/9/2013    PROSTATE-SPECIFIC ANTIGEN 02/07/2019 2/7/2018    Lipid Panel 02/07/2019 2/7/2018    High Dose Statin 08/07/2019 8/7/2018    TETANUS VACCINE 09/29/2024 9/29/2014        No orders of the defined types were placed in this encounter.    The following information is provided to all patients.  This information is to help you find resources for any of the problems found today that may be affecting your health:                Living healthy guide: www.[facility].[place].[facility]      Understanding Diabetes: www.diabetes.org      Eating healthy: www.cdc.gov/healthyweight      CDC home safety checklist: www.cdc.gov/steadi/patient.html      Agency on Aging: www.goea.[place]      Alcoholics anonymous (AA): www.aa.org      Physical Activity: www.ja.nih.gov/nm6iqvx      Tobacco use: www.quitwithusla.org

## 2018-09-12 RX ORDER — TIMOLOL MALEATE 5 MG/ML
SOLUTION/ DROPS OPHTHALMIC
Qty: 15 ML | Refills: 1 | Status: SHIPPED | OUTPATIENT
Start: 2018-09-12 | End: 2019-11-02 | Stop reason: SDUPTHER

## 2018-09-14 NOTE — PROGRESS NOTES
ABLATION EDUCATION CHECKLIST    PRE-PROCEDURE TESTIN/25/18 @ 8 AM  Pre-Procedure labs have been ordered for you at:  [facility]   · Be sure to arrive at your scheduled time. YOU DO NOT HAVE TO FAST FOR THIS LAB WORK!    DAY OF PROCEDURE:    2018 @ 5:30 AM  Report to Cardiology Waiting Room on 3rd floor of the Hospital    · Do not eat or drink anything after: 12 mn on the night before your procedure    Medications:   · HOLD Eliquis on morning of procedure only  · You may take your other usual morning medications with a sip of water      Directions to the Cardiology Waiting Room  If you park in the Parking Garage:  Take elevators to the 2nd floor  Walk up ramp and turn right by Gold Elevators  Take elevator to the 3rd floor  Upon exiting the elevator, turn away from the clinic areas  Walk long adame around to front of hospital to area with windows overlooking [facility]  Check in at Reception Desk  OR  If family is dropping you off:  Have them drop you off at the front of the Hospital  (Near the ER, where all the flags are hung).  Take the E elevators to the 3rd floor.  Check in at the Reception Desk in the waiting room.    · You will be spending the night after your procedure.  · You will need someone to drive you home the day after your procedure.  · Your pain during your procedure will be managed by the anesthesia team.     Any need to reschedule or cancel procedures, or any questions regarding your procedures should be addressed directly with the Arrhythmia Department Nurses at the following phone number: [phone]

## 2018-09-25 ENCOUNTER — LAB VISIT (OUTPATIENT)
Dept: LAB | Facility: HOSPITAL | Age: 68
End: 2018-09-25
Attending: INTERNAL MEDICINE
Payer: MEDICARE

## 2018-09-25 DIAGNOSIS — Z11.59 NEED FOR HEPATITIS C SCREENING TEST: ICD-10-CM

## 2018-09-25 DIAGNOSIS — I48.19 PERSISTENT ATRIAL FIBRILLATION: ICD-10-CM

## 2018-09-25 LAB
ANION GAP SERPL CALC-SCNC: 8 MMOL/L
APTT BLDCRRT: 24.9 SEC
BASOPHILS # BLD AUTO: 0.04 K/UL
BASOPHILS NFR BLD: 0.8 %
BUN SERPL-MCNC: 15 MG/DL
CALCIUM SERPL-MCNC: 10 MG/DL
CHLORIDE SERPL-SCNC: 103 MMOL/L
CO2 SERPL-SCNC: 28 MMOL/L
CREAT SERPL-MCNC: 1.1 MG/DL
DIFFERENTIAL METHOD: ABNORMAL
EOSINOPHIL # BLD AUTO: 0.1 K/UL
EOSINOPHIL NFR BLD: 2.1 %
ERYTHROCYTE [DISTWIDTH] IN BLOOD BY AUTOMATED COUNT: 12.1 %
EST. GFR  (AFRICAN AMERICAN): >60 ML/MIN/1.73 M^2
EST. GFR  (NON AFRICAN AMERICAN): >60 ML/MIN/1.73 M^2
GLUCOSE SERPL-MCNC: 101 MG/DL
HCT VFR BLD AUTO: 42.4 %
HGB BLD-MCNC: 14.6 G/DL
IMM GRANULOCYTES # BLD AUTO: 0.03 K/UL
IMM GRANULOCYTES NFR BLD AUTO: 0.6 %
INR PPP: 1
LYMPHOCYTES # BLD AUTO: 0.9 K/UL
LYMPHOCYTES NFR BLD: 17.5 %
MCH RBC QN AUTO: 32.2 PG
MCHC RBC AUTO-ENTMCNC: 34.4 G/DL
MCV RBC AUTO: 94 FL
MONOCYTES # BLD AUTO: 0.6 K/UL
MONOCYTES NFR BLD: 11.6 %
NEUTROPHILS # BLD AUTO: 3.5 K/UL
NEUTROPHILS NFR BLD: 67.4 %
NRBC BLD-RTO: 0 /100 WBC
PLATELET # BLD AUTO: 251 K/UL
PMV BLD AUTO: 10.7 FL
POTASSIUM SERPL-SCNC: 3.7 MMOL/L
PROTHROMBIN TIME: 10.7 SEC
RBC # BLD AUTO: 4.53 M/UL
SODIUM SERPL-SCNC: 139 MMOL/L
WBC # BLD AUTO: 5.25 K/UL

## 2018-09-25 PROCEDURE — 80048 BASIC METABOLIC PNL TOTAL CA: CPT

## 2018-09-25 PROCEDURE — 85730 THROMBOPLASTIN TIME PARTIAL: CPT

## 2018-09-25 PROCEDURE — 36415 COLL VENOUS BLD VENIPUNCTURE: CPT | Mod: PO

## 2018-09-25 PROCEDURE — 85025 COMPLETE CBC W/AUTO DIFF WBC: CPT

## 2018-09-25 PROCEDURE — 85610 PROTHROMBIN TIME: CPT

## 2018-09-25 PROCEDURE — 86803 HEPATITIS C AB TEST: CPT

## 2018-09-26 LAB — HCV AB SERPL QL IA: NEGATIVE

## 2018-09-27 ENCOUNTER — ANESTHESIA EVENT (OUTPATIENT)
Dept: MEDSURG UNIT | Facility: HOSPITAL | Age: 68
End: 2018-09-27
Payer: MEDICARE

## 2018-09-27 ENCOUNTER — TELEPHONE (OUTPATIENT)
Dept: ELECTROPHYSIOLOGY | Facility: CLINIC | Age: 68
End: 2018-09-27

## 2018-09-27 NOTE — TELEPHONE ENCOUNTER
Spoke to patient's wife.      CONFIRMED procedure arrival time of 5:30am, 3rd floor SSCU  Reiterated instructions including:  -Directions to check in desk  -NPO after midnight night prior to procedure  -High importance of HOLDING Eliquis in am        Pt verbalizes understanding of above and appreciates call.

## 2018-09-28 ENCOUNTER — HOSPITAL ENCOUNTER (OUTPATIENT)
Facility: HOSPITAL | Age: 68
Discharge: HOME | End: 2018-09-29
Attending: INTERNAL MEDICINE | Admitting: INTERNAL MEDICINE
Payer: MEDICARE

## 2018-09-28 ENCOUNTER — HOSPITAL ENCOUNTER (OUTPATIENT)
Dept: CARDIOLOGY | Facility: CLINIC | Age: 68
Discharge: HOME | End: 2018-09-28
Attending: INTERNAL MEDICINE | Admitting: INTERNAL MEDICINE
Payer: MEDICARE

## 2018-09-28 ENCOUNTER — ANESTHESIA (OUTPATIENT)
Dept: MEDSURG UNIT | Facility: HOSPITAL | Age: 68
End: 2018-09-28
Payer: MEDICARE

## 2018-09-28 DIAGNOSIS — I48.91 ATRIAL FIBRILLATION: ICD-10-CM

## 2018-09-28 DIAGNOSIS — I48.19 PERSISTENT ATRIAL FIBRILLATION: Primary | ICD-10-CM

## 2018-09-28 DIAGNOSIS — I10 ESSENTIAL (PRIMARY) HYPERTENSION: ICD-10-CM

## 2018-09-28 LAB
ABO + RH BLD: NORMAL
AORTIC VALVE REGURGITATION: NORMAL
BLD GP AB SCN CELLS X3 SERPL QL: NORMAL
MITRAL VALVE MOBILITY: NORMAL
MITRAL VALVE REGURGITATION: NORMAL
POC ACTIVATED CLOTTING TIME K: 263 SEC (ref 74–137)
POC ACTIVATED CLOTTING TIME K: 301 SEC (ref 74–137)
POC ACTIVATED CLOTTING TIME K: 329 SEC (ref 74–137)
POC ACTIVATED CLOTTING TIME K: 340 SEC (ref 74–137)
POC ACTIVATED CLOTTING TIME K: 345 SEC (ref 74–137)
POC ACTIVATED CLOTTING TIME K: 345 SEC (ref 74–137)
POC ACTIVATED CLOTTING TIME K: 400 SEC (ref 74–137)
POC ACTIVATED CLOTTING TIME K: 400 SEC (ref 74–137)
SAMPLE: ABNORMAL
TRICUSPID VALVE REGURGITATION: NORMAL

## 2018-09-28 PROCEDURE — C1732 CATH, EP, DIAG/ABL, 3D/VECT: HCPCS

## 2018-09-28 PROCEDURE — 86901 BLOOD TYPING SEROLOGIC RH(D): CPT

## 2018-09-28 PROCEDURE — 93662 INTRACARDIAC ECG (ICE): CPT | Mod: 26,,, | Performed by: INTERNAL MEDICINE

## 2018-09-28 PROCEDURE — 63600175 PHARM REV CODE 636 W HCPCS: Performed by: ANESTHESIOLOGY

## 2018-09-28 PROCEDURE — 27201037 HC PRESSURE MONITORING SET UP

## 2018-09-28 PROCEDURE — C8925 2D TEE W OR W/O FOL W/CON,IN: HCPCS

## 2018-09-28 PROCEDURE — 93355 ECHO TRANSESOPHAGEAL (TEE): CPT | Mod: ,,, | Performed by: INTERNAL MEDICINE

## 2018-09-28 PROCEDURE — 93656 COMPRE EP EVAL ABLTJ ATR FIB: CPT | Mod: ,,, | Performed by: INTERNAL MEDICINE

## 2018-09-28 PROCEDURE — 25000003 PHARM REV CODE 250: Performed by: NURSE PRACTITIONER

## 2018-09-28 PROCEDURE — 63600175 PHARM REV CODE 636 W HCPCS: Performed by: NURSE ANESTHETIST, CERTIFIED REGISTERED

## 2018-09-28 PROCEDURE — 37000009 HC ANESTHESIA EA ADD 15 MINS: Performed by: INTERNAL MEDICINE

## 2018-09-28 PROCEDURE — 25000242 PHARM REV CODE 250 ALT 637 W/ HCPCS: Performed by: NURSE ANESTHETIST, CERTIFIED REGISTERED

## 2018-09-28 PROCEDURE — 93010 ELECTROCARDIOGRAM REPORT: CPT | Mod: ,,, | Performed by: INTERNAL MEDICINE

## 2018-09-28 PROCEDURE — 93005 ELECTROCARDIOGRAM TRACING: CPT | Mod: 59

## 2018-09-28 PROCEDURE — C1731 CATH, EP, 20 OR MORE ELEC: HCPCS

## 2018-09-28 PROCEDURE — 63600175 PHARM REV CODE 636 W HCPCS

## 2018-09-28 PROCEDURE — 25500020 PHARM REV CODE 255

## 2018-09-28 PROCEDURE — 25000003 PHARM REV CODE 250

## 2018-09-28 PROCEDURE — 37000008 HC ANESTHESIA 1ST 15 MINUTES: Performed by: INTERNAL MEDICINE

## 2018-09-28 PROCEDURE — 25000003 PHARM REV CODE 250: Performed by: INTERNAL MEDICINE

## 2018-09-28 PROCEDURE — 25000003 PHARM REV CODE 250: Performed by: NURSE ANESTHETIST, CERTIFIED REGISTERED

## 2018-09-28 PROCEDURE — D9220A PRA ANESTHESIA: Mod: ,,, | Performed by: ANESTHESIOLOGY

## 2018-09-28 PROCEDURE — 36620 INSERTION CATHETER ARTERY: CPT | Mod: 59,,, | Performed by: ANESTHESIOLOGY

## 2018-09-28 PROCEDURE — 93613 INTRACARDIAC EPHYS 3D MAPG: CPT | Mod: ,,, | Performed by: INTERNAL MEDICINE

## 2018-09-28 RX ORDER — KETAMINE HCL IN 0.9 % NACL 50 MG/5 ML
SYRINGE (ML) INTRAVENOUS
Status: DISCONTINUED | OUTPATIENT
Start: 2018-09-28 | End: 2018-09-28

## 2018-09-28 RX ORDER — FENTANYL CITRATE 50 UG/ML
INJECTION, SOLUTION INTRAMUSCULAR; INTRAVENOUS
Status: DISCONTINUED | OUTPATIENT
Start: 2018-09-28 | End: 2018-09-28

## 2018-09-28 RX ORDER — HYDROCHLOROTHIAZIDE 25 MG/1
25 TABLET ORAL DAILY
Status: DISCONTINUED | OUTPATIENT
Start: 2018-09-28 | End: 2018-09-29 | Stop reason: HOSPADM

## 2018-09-28 RX ORDER — PROPOFOL 10 MG/ML
VIAL (ML) INTRAVENOUS
Status: DISCONTINUED | OUTPATIENT
Start: 2018-09-28 | End: 2018-09-28

## 2018-09-28 RX ORDER — PANTOPRAZOLE SODIUM 40 MG/1
40 TABLET, DELAYED RELEASE ORAL DAILY
Qty: 60 TABLET | Refills: 0 | Status: SHIPPED | OUTPATIENT
Start: 2018-09-29 | End: 2019-02-12 | Stop reason: CLARIF

## 2018-09-28 RX ORDER — PROTAMINE SULFATE 10 MG/ML
INJECTION, SOLUTION INTRAVENOUS
Status: DISCONTINUED | OUTPATIENT
Start: 2018-09-28 | End: 2018-09-28

## 2018-09-28 RX ORDER — METOPROLOL SUCCINATE 50 MG/1
50 TABLET, EXTENDED RELEASE ORAL DAILY
Status: DISCONTINUED | OUTPATIENT
Start: 2018-09-28 | End: 2018-09-29 | Stop reason: HOSPADM

## 2018-09-28 RX ORDER — FENTANYL CITRATE 50 UG/ML
25 INJECTION, SOLUTION INTRAMUSCULAR; INTRAVENOUS EVERY 5 MIN PRN
Status: DISCONTINUED | OUTPATIENT
Start: 2018-09-28 | End: 2018-09-29 | Stop reason: HOSPADM

## 2018-09-28 RX ORDER — PREDNISOLONE ACETATE 10 MG/ML
1 SUSPENSION/ DROPS OPHTHALMIC 4 TIMES DAILY PRN
Status: DISCONTINUED | OUTPATIENT
Start: 2018-09-28 | End: 2018-09-29 | Stop reason: HOSPADM

## 2018-09-28 RX ORDER — IBUPROFEN 400 MG/1
400 TABLET ORAL EVERY 6 HOURS PRN
Status: DISCONTINUED | OUTPATIENT
Start: 2018-09-28 | End: 2018-09-29 | Stop reason: HOSPADM

## 2018-09-28 RX ORDER — EPHEDRINE SULFATE 50 MG/ML
INJECTION, SOLUTION INTRAVENOUS
Status: DISCONTINUED | OUTPATIENT
Start: 2018-09-28 | End: 2018-09-28

## 2018-09-28 RX ORDER — ALBUTEROL SULFATE 90 UG/1
AEROSOL, METERED RESPIRATORY (INHALATION)
Status: DISCONTINUED | OUTPATIENT
Start: 2018-09-28 | End: 2018-09-28

## 2018-09-28 RX ORDER — HEPARIN SODIUM 1000 [USP'U]/ML
INJECTION, SOLUTION INTRAVENOUS; SUBCUTANEOUS
Status: DISCONTINUED | OUTPATIENT
Start: 2018-09-28 | End: 2018-09-28

## 2018-09-28 RX ORDER — MIDAZOLAM HYDROCHLORIDE 1 MG/ML
INJECTION, SOLUTION INTRAMUSCULAR; INTRAVENOUS
Status: DISCONTINUED | OUTPATIENT
Start: 2018-09-28 | End: 2018-09-28

## 2018-09-28 RX ORDER — FUROSEMIDE 10 MG/ML
INJECTION INTRAMUSCULAR; INTRAVENOUS
Status: DISCONTINUED | OUTPATIENT
Start: 2018-09-28 | End: 2018-09-28

## 2018-09-28 RX ORDER — NAPROXEN SODIUM 220 MG/1
81 TABLET, FILM COATED ORAL DAILY
Status: DISCONTINUED | OUTPATIENT
Start: 2018-09-28 | End: 2018-09-29 | Stop reason: HOSPADM

## 2018-09-28 RX ORDER — TIMOLOL MALEATE 5 MG/ML
1 SOLUTION/ DROPS OPHTHALMIC DAILY PRN
Status: DISCONTINUED | OUTPATIENT
Start: 2018-09-28 | End: 2018-09-29 | Stop reason: HOSPADM

## 2018-09-28 RX ORDER — ACETAMINOPHEN 325 MG/1
650 TABLET ORAL EVERY 6 HOURS PRN
Status: DISCONTINUED | OUTPATIENT
Start: 2018-09-28 | End: 2018-09-29 | Stop reason: HOSPADM

## 2018-09-28 RX ORDER — SUCCINYLCHOLINE CHLORIDE 20 MG/ML
INJECTION INTRAMUSCULAR; INTRAVENOUS
Status: DISCONTINUED | OUTPATIENT
Start: 2018-09-28 | End: 2018-09-28

## 2018-09-28 RX ORDER — IBUPROFEN 400 MG/1
400 TABLET ORAL EVERY 6 HOURS PRN
Qty: 10 TABLET | Refills: 0 | Status: SHIPPED | OUTPATIENT
Start: 2018-09-28 | End: 2019-05-21

## 2018-09-28 RX ORDER — ROCURONIUM BROMIDE 10 MG/ML
INJECTION, SOLUTION INTRAVENOUS
Status: DISCONTINUED | OUTPATIENT
Start: 2018-09-28 | End: 2018-09-28

## 2018-09-28 RX ORDER — PANTOPRAZOLE SODIUM 40 MG/1
40 TABLET, DELAYED RELEASE ORAL DAILY
Status: DISCONTINUED | OUTPATIENT
Start: 2018-09-28 | End: 2018-09-29 | Stop reason: HOSPADM

## 2018-09-28 RX ORDER — SODIUM CHLORIDE 9 MG/ML
INJECTION, SOLUTION INTRAVENOUS CONTINUOUS
Status: ACTIVE | OUTPATIENT
Start: 2018-09-28

## 2018-09-28 RX ORDER — PHENYLEPHRINE HYDROCHLORIDE 10 MG/ML
INJECTION INTRAVENOUS
Status: DISCONTINUED | OUTPATIENT
Start: 2018-09-28 | End: 2018-09-28

## 2018-09-28 RX ORDER — DIPHENHYDRAMINE HYDROCHLORIDE 50 MG/ML
25 INJECTION INTRAMUSCULAR; INTRAVENOUS EVERY 6 HOURS PRN
Status: DISCONTINUED | OUTPATIENT
Start: 2018-09-28 | End: 2018-09-29 | Stop reason: HOSPADM

## 2018-09-28 RX ADMIN — ROCURONIUM BROMIDE 5 MG: 10 INJECTION, SOLUTION INTRAVENOUS at 07:09

## 2018-09-28 RX ADMIN — PHENYLEPHRINE HYDROCHLORIDE 0.25 MCG/KG/MIN: 10 INJECTION INTRAVENOUS at 08:09

## 2018-09-28 RX ADMIN — ALBUTEROL SULFATE 2 PUFF: 90 AEROSOL, METERED RESPIRATORY (INHALATION) at 11:09

## 2018-09-28 RX ADMIN — SODIUM CHLORIDE, SODIUM GLUCONATE, SODIUM ACETATE, POTASSIUM CHLORIDE, MAGNESIUM CHLORIDE, SODIUM PHOSPHATE, DIBASIC, AND POTASSIUM PHOSPHATE: .53; .5; .37; .037; .03; .012; .00082 INJECTION, SOLUTION INTRAVENOUS at 11:09

## 2018-09-28 RX ADMIN — SUCCINYLCHOLINE CHLORIDE 120 MG: 20 INJECTION, SOLUTION INTRAMUSCULAR; INTRAVENOUS at 07:09

## 2018-09-28 RX ADMIN — FENTANYL CITRATE 100 MCG: 50 INJECTION, SOLUTION INTRAMUSCULAR; INTRAVENOUS at 08:09

## 2018-09-28 RX ADMIN — PROPOFOL 100 MG: 10 INJECTION, EMULSION INTRAVENOUS at 07:09

## 2018-09-28 RX ADMIN — PROPOFOL 50 MG: 10 INJECTION, EMULSION INTRAVENOUS at 10:09

## 2018-09-28 RX ADMIN — EPHEDRINE SULFATE 10 MG: 50 INJECTION, SOLUTION INTRAMUSCULAR; INTRAVENOUS; SUBCUTANEOUS at 08:09

## 2018-09-28 RX ADMIN — PROPOFOL 50 MG: 10 INJECTION, EMULSION INTRAVENOUS at 08:09

## 2018-09-28 RX ADMIN — SODIUM CHLORIDE, SODIUM GLUCONATE, SODIUM ACETATE, POTASSIUM CHLORIDE, MAGNESIUM CHLORIDE, SODIUM PHOSPHATE, DIBASIC, AND POTASSIUM PHOSPHATE: .53; .5; .37; .037; .03; .012; .00082 INJECTION, SOLUTION INTRAVENOUS at 07:09

## 2018-09-28 RX ADMIN — HEPARIN SODIUM 1000 UNITS: 1000 INJECTION INTRAVENOUS; SUBCUTANEOUS at 11:09

## 2018-09-28 RX ADMIN — Medication 30 MG: at 07:09

## 2018-09-28 RX ADMIN — HEPARIN SODIUM 1000 UNITS: 1000 INJECTION INTRAVENOUS; SUBCUTANEOUS at 10:09

## 2018-09-28 RX ADMIN — EPHEDRINE SULFATE 10 MG: 50 INJECTION, SOLUTION INTRAMUSCULAR; INTRAVENOUS; SUBCUTANEOUS at 07:09

## 2018-09-28 RX ADMIN — FENTANYL CITRATE 25 MCG: 50 INJECTION INTRAMUSCULAR; INTRAVENOUS at 12:09

## 2018-09-28 RX ADMIN — Medication 20 MG: at 08:09

## 2018-09-28 RX ADMIN — PHENYLEPHRINE HYDROCHLORIDE 200 MCG: 10 INJECTION INTRAVENOUS at 09:09

## 2018-09-28 RX ADMIN — APIXABAN 5 MG: 5 TABLET, FILM COATED ORAL at 05:09

## 2018-09-28 RX ADMIN — PHENYLEPHRINE HYDROCHLORIDE 200 MCG: 10 INJECTION INTRAVENOUS at 08:09

## 2018-09-28 RX ADMIN — PANTOPRAZOLE SODIUM 40 MG: 40 TABLET, DELAYED RELEASE ORAL at 02:09

## 2018-09-28 RX ADMIN — HEPARIN SODIUM 8000 UNITS: 1000 INJECTION INTRAVENOUS; SUBCUTANEOUS at 09:09

## 2018-09-28 RX ADMIN — HEPARIN SODIUM 3000 UNITS: 1000 INJECTION INTRAVENOUS; SUBCUTANEOUS at 10:09

## 2018-09-28 RX ADMIN — PROTAMINE SULFATE 20 MG: 10 INJECTION, SOLUTION INTRAVENOUS at 11:09

## 2018-09-28 RX ADMIN — MIDAZOLAM 2 MG: 1 INJECTION INTRAMUSCULAR; INTRAVENOUS at 07:09

## 2018-09-28 RX ADMIN — FENTANYL CITRATE 100 MCG: 50 INJECTION, SOLUTION INTRAMUSCULAR; INTRAVENOUS at 07:09

## 2018-09-28 RX ADMIN — FUROSEMIDE 40 MG: 10 INJECTION, SOLUTION INTRAMUSCULAR; INTRAVENOUS at 11:09

## 2018-09-28 RX ADMIN — HEPARIN SODIUM 4000 UNITS: 1000 INJECTION INTRAVENOUS; SUBCUTANEOUS at 10:09

## 2018-09-28 RX ADMIN — SODIUM CHLORIDE: 0.9 INJECTION, SOLUTION INTRAVENOUS at 07:09

## 2018-09-28 RX ADMIN — HEPARIN SODIUM 2000 UNITS: 1000 INJECTION INTRAVENOUS; SUBCUTANEOUS at 09:09

## 2018-09-28 RX ADMIN — HEPARIN SODIUM 15000 UNITS: 1000 INJECTION INTRAVENOUS; SUBCUTANEOUS at 09:09

## 2018-09-28 RX ADMIN — PHENYLEPHRINE HYDROCHLORIDE 100 MCG: 10 INJECTION INTRAVENOUS at 07:09

## 2018-09-28 NOTE — H&P
Subjective:    Patient ID:  Pavel Graff is a 68 y.o. male who presents for evaluation of No chief complaint on file.      68 year old male with history of HTN, TIA, SIMON, HLD and persistent AF since July 2012.  DCCV was attempted with early recurrence of AF. He has been in AF - he has been much more easily fatigued and tired, not himself.  He denies overt palpitation or SOB.  He denies chest pain.    -PVI (07/21/16) with successful pulmonary vein antral isolation. -reports feeling well with only two 3-second episodes of palpitations;   -8/18: AF recurrence with fatigue as symptoms. Remains on apixaban.slightly more fatigue. He asks about repeat ablation at the end of the season.     ===========================================  2D echo 4/16    1 - Eccentric hypertrophy.     2 - Normal left ventricular systolic function (EF 60-65%).     3 - Right ventricular enlargement with normal systolic function.      8 - The estimated PA systolic pressure is 33 mmHg.        PET stress 5/16  1. There is no evidence of a discrete hemodynamically significant coronary stenosis.   2. Although no clinically significant stress defect is seen, there is resting flow heterogeneity that improves after Dipyridamole. These results suggest mild endothelial dysfunction due to elevated cholesterol, high blood pressure and diabetes without clinically significant,      localized perfusion defects.   3. Resting wall motion is physiologic. Stress wall motion is physiologic.   4. LV function is normal at rest and stress.  (normal is >= 51%)      Past Medical History:  No date: *Atrial fibrillation  No date: Anticoagulant long-term use  No date: Atrial fibrillation  No date: Hyperlipidemia  No date: Hypertension  No date: Sleep apnea  No date: Stroke    Comment: TIA      Past Surgical History:  1/11/10: CATARACT EXTRACTION W/  INTRAOCULAR LENS IMPLA* Left  12/14/2017: CATARACT EXTRACTION W/  INTRAOCULAR LENS IMPLA* Right      Comment: SHIRLEY/   [facility]  1997: CORNEAL TRANSPLANT Left      Comment: PKP OS  03/23/2017: CORNEAL TRANSPLANT Left  12/14/2017: CORNEAL TRANSPLANT Right      Comment: [facility]/ [employer]; Dr. Jay      Social History    Marital status:              Spouse name:                       Years of education:                 Number of children: 3             Social History Main Topics    Smoking status: Never Smoker                                                                        Review of Systems   Constitution: Positive for malaise/fatigue. Negative for decreased appetite, diaphoresis and weakness.   HENT: Negative for congestion and nosebleeds.    Eyes: Negative for blurred vision, double vision and pain.   Cardiovascular: Negative for chest pain, claudication, cyanosis, dyspnea on exertion, irregular heartbeat, leg swelling, near-syncope, orthopnea, palpitations, paroxysmal nocturnal dyspnea and syncope.   Respiratory: Negative for cough, hemoptysis, shortness of breath, sputum production and wheezing.    Endocrine: Negative for cold intolerance and heat intolerance.   Hematologic/Lymphatic: Negative for bleeding problem. Does not bruise/bleed easily.   Skin: Negative.  Negative for dry skin and flushing.   Musculoskeletal: Negative.  Negative for arthritis and falls.   Gastrointestinal: Negative for abdominal pain, hematemesis, hematochezia, nausea and vomiting.   Genitourinary: Negative for dysuria and flank pain.   Neurological: Negative for dizziness, headaches and light-headedness.   Psychiatric/Behavioral: Negative for altered mental status. The patient is not nervous/anxious.         Objective:    Physical Exam   Constitutional: He is oriented to person, place, and time. He appears well-developed and well-nourished. No distress.   HENT:   Head: Normocephalic and atraumatic.   Eyes: Conjunctivae and EOM are normal. Pupils are equal, round, and reactive to light.   Neck: Normal range of motion. Neck supple. No JVD present. No  thyromegaly present.   Cardiovascular: Normal rate, normal heart sounds and intact distal pulses. An irregularly irregular rhythm present.   No murmur heard.  Pulmonary/Chest: Effort normal and breath sounds normal. No respiratory distress. He has no wheezes.   Abdominal: Soft. Bowel sounds are normal. He exhibits no distension. There is no tenderness.   Musculoskeletal: Normal range of motion. He exhibits no edema.   Neurological: He is alert and oriented to person, place, and time. No cranial nerve deficit.   Skin: Skin is warm and dry. No rash noted. He is not diaphoretic. No erythema.   Psychiatric: He has a normal mood and affect. His behavior is normal. Judgment and thought content normal.   Vitals reviewed.    ECG: AF with controlled V rate        Assessment:       1. Persistent atrial fibrillation    2. Atrial fibrillation    3. Atrial fibrillation         Plan:       68 yoM persistent AF here for redo PVI because of worsening fatigue:  We discussed the procedural details, risks and benefits of ablation with the patient and family.     In addition to the procedural details, we also discussed that while unlikely, catheter ablation is associated with several potential complications including, but not limited to, infection, bleeding , vascular complications, AE fistula, phrenic nerve injury,  myocardial infarction, stroke, pericardial effusion/tamponade and, rarely, death.   Patient appeared to understand the whole discussion and verbalized that all questions were answered to satisfaction. After deliberating over the options, explanation, and risks/ benefits of the ablation procedure, patient agreed to proceed with the procedure.

## 2018-09-28 NOTE — CONSULTS
[facility]  Cardiology  Consult Note    Patient Name: Pavel Graff  MRN: [id number]  Admission Date: 2018  Hospital Length of Stay: 0 days  Code Status: No Order   Attending Provider: Romaine Howard MD   Consulting Provider: Elisabeth Collins MD  Primary Care Physician: Joe Peterson MD  Principal Problem:<principal problem not specified>    Patient information was obtained from patient, past medical records and ER records.     Consults    HPI:     TRANSESOPHAGEAL ECHOCARDIOGRAPHY   PRE-PROCEDURE NOTE    2018    HPI:     Pavel Graff is a 68 y.o. man with PMHx of HTN, prior TIA, SIMON, HLD, Persistent Afib with a CHADS-VASC score of 4 on Eliquis here for repeat PVI after PVI was done on 16. His Afib recurred in  and will undergo ablation with Dr. Howard later today. Notably the patient had held his eliquis as instructed. KAM requested to r/o KIRSTEN thrombus prior to PVI.      Dysphagia or odynophagia:  No  Liver Disease, esophageal disease, or known varices:  No  Upper GI Bleeding: No  Snoring:  Yes  Sleep Apnea:  Yes  Prior neck surgery or radiation:  No  History of anesthetic difficulties:  No  Family history of anesthetic difficulties:  No  Last oral intake:  12 hours ago  Able to move neck in all directions:  Yes    Mallampati: 3  ASA: 3    Imaging:  KAM on 16:    1 - Biatrial enlargement.     2 - Left atrial appendage is single lobed with spontaneous echo contrast with no visualized thrombus.     3 - Normal left ventricular systolic function (EF 60-65%).     4 - Trivial aortic regurgitation.     5 - Mild mitral regurgitation.     6 - Mildly depressed right ventricular function .     7 - Moderate to severe tricuspid regurgitation.     [id number]  Afib today      Past Medical History:   Diagnosis Date    *Atrial fibrillation     Anticoagulant long-term use     Atrial fibrillation     Hyperlipidemia     Hypertension     Sleep apnea     Stroke      TIA    TIA (transient ischemic attack)        Past Surgical History:   Procedure Laterality Date    ABLATION N/A 7/21/2016    Performed by [facility] CATH LAB    CARDIOVERSION N/A 6/14/2016    Performed by [facility] at [facility]ATH LAB    CATARACT EXTRACTION W/  INTRAOCULAR LENS IMPLANT Left 1/11/10    CATARACT EXTRACTION W/  INTRAOCULAR LENS IMPLANT Right 12/14/2017    [facility]/ Dr. Jay    COLONOSCOPY N/A 9/9/2013    Performed by Mauro Dye MD at [facility] ENDO (4TH FLR)    CORNEAL TRANSPLANT Left 1997    PKP OS    CORNEAL TRANSPLANT Left 03/23/2017    CORNEAL TRANSPLANT Right 12/14/2017    [facility]/ [employer]; Dr. Jay    HERNIA REPAIR      HIP ARTHROPLASTY      INSERTION-INTRAOCULAR LENS (IOL) Right 12/14/2017    [facility] at [facility] OR    PHACOEMULSIFICATION-ASPIRATION-CATARACT Right 12/14/2017    Performed by [facility]ell [facility] at [facility] OR    ROTATOR CUFF REPAIR      TRANSESOPHAGEAL ECHOCARDIOGRAM (KAM) N/A 7/20/2016    Performed by Tameka Jay MD at [facility] CATH LAB    TRANSESOPHAGEAL ECHOCARDIOGRAM (KAM) N/A 6/14/2016    Performed by Denzel Farmer MD at [facility] CATH LAB    TRANSESOPHAGEAL ECHOCARDIOGRAM (KAM) N/A 6/14/2016    Performed by [facility] Howard MD at [facility] CATH LAB    TRANSPLANT-CORNEA/[facility] Right 12/14/2017    Performed by [facility]ell [facility] at [facility] OR    TRANSPLANT-CORNEA/PKP Left 3/23/2017    Performed by [facility]ell Jay [facility] at [facility] OR       Review of patient's allergies indicates:  No Known Allergies    No current facility-administered medications on file prior to encounter.      Current Outpatient Medications on File Prior to Encounter   Medication Sig    apixaban 5 mg Tab Take 1 tablet (5 mg total) by mouth 2 (two) times daily.    aspirin 81 MG Chew Take 81 mg by mouth once daily.    fish oil-omega-3 fatty acids 300-1,000 mg capsule Take 1 g by mouth once daily.    hydrochlorothiazide (HYDRODIURIL) 25 MG tablet TAKE 1 TABLET (25 MG TOTAL) BY  MOUTH ONCE DAILY.    losartan (COZAAR) 50 MG tablet TAKE 1 TABLET (50 MG TOTAL) BY MOUTH ONCE DAILY. (Patient taking differently: Take 50 mg by mouth every evening. )    metoprolol succinate (TOPROL-XL) 50 MG 24 hr tablet TAKE 1 TABLET (50 MG TOTAL) BY MOUTH ONCE DAILY.    pravastatin (PRAVACHOL) 20 MG tablet TAKE 1 TABLET (20 MG TOTAL) BY MOUTH ONCE DAILY.    prednisoLONE acetate (PRED FORTE) 1 % DrpS PLACE 1 DROP INTO THE LEFT EYE 4 (FOUR) TIMES DAILY.    timolol maleate 0.5% (TIMOPTIC) 0.5 % Drop INSTILL 1 DROP INTO LEFT EYE 2 TIMES A DAY     Family History     Problem Relation (Age of Onset)    Colon cancer Father    Crohn's disease Sister    Diabetes Mother    Heart disease Mother, Father, Brother    Hyperlipidemia Mother, Sister, Sister    Hypertension Sister        Tobacco Use    Smoking status: Never Smoker    Smokeless tobacco: Never Used   Substance and Sexual Activity    Alcohol use: No    Drug use: No    Sexual activity: Yes     Partners: Female     Review of Systems   All other systems reviewed and are negative.    Objective:     Vital Signs (Most Recent):  Temp: 97.5 °F (36.4 °C) (09/28/18 0600)  Pulse: 69 (09/28/18 0600)  Resp: 20 (09/28/18 0600)  BP: (!) 135/92 (09/28/18 0612)  SpO2: 97 % (09/28/18 0600) Vital Signs (24h Range):  Temp:  [97.5 °F (36.4 °C)] 97.5 °F (36.4 °C)  Pulse:  [69] 69  Resp:  [20] 20  SpO2:  [97 %] 97 %  BP: (135)/(89-92) 135/92     Weight: 111.1 kg (245 lb)  Body mass index is 33.23 kg/m².    SpO2: 97 %  O2 Device (Oxygen Therapy): room air    No intake or output data in the 24 hours ending 09/28/18 0638    Lines/Drains/Airways     Arterial Line                 Arterial Line 07/21/16 0751 Right Radial 798 days          Peripheral Intravenous Line                 Peripheral IV - Single Lumen 09/28/18 0622 Right Antecubital less than 1 day         Peripheral IV - Single Lumen 09/28/18 0624 Left Hand less than 1 day                Physical Exam   Constitutional: He is  oriented to person, place, and time. He appears well-developed and well-nourished.   HENT:   Head: Normocephalic and atraumatic.   Eyes: EOM are normal. Pupils are equal, round, and reactive to light.   Neck: Normal range of motion. Neck supple.   Cardiovascular: Normal rate and normal heart sounds.   Afib   Pulmonary/Chest: Effort normal and breath sounds normal.   Abdominal: Soft. Bowel sounds are normal.   Musculoskeletal: Normal range of motion. He exhibits no edema.   Neurological: He is alert and oriented to person, place, and time. He has normal reflexes.   Skin: Skin is warm and dry.   Vitals reviewed.      Significant Labs: All pertinent lab results from the last 24 hours have been reviewed.    Significant Imaging: Echocardiogram:   2D echo with color flow doppler:   Results for orders placed or performed during the hospital encounter of 04/08/16   2D echo with color flow doppler   Result Value Ref Range    EF 60 55 - 65    Mitral Valve Regurgitation MILD     Diastolic Dysfunction No     Aortic Valve Regurgitation MILD     Est. PA Systolic Pressure 33     Mitral Valve Mobility NORMAL     Tricuspid Valve Regurgitation MILD     and X-Ray: CXR: X-Ray Chest 1 View (CXR): No results found for this visit on 09/28/18.    Assessment and Plan:     Persistent atrial fibrillation      PLAN:  1. KAM for evaluation of KIRSTEN for r/o thrombus prior to redo - PVI    -The risks, benefits & alternatives of the procedure were explained to the patient.    -The risks of transesophageal echo include but are not limited to:  Dental trauma, esophageal trauma/perforation, bleeding, laryngospasm/brochospasm, aspiration, sore throat/hoarseness, & dislodgement of the endotracheal tube/nasogastric tube (where applicable).    -The risks of moderate sedation include hypotension, respiratory depression, arrhythmias, bronchospasm, & death.    -Informed consent was obtained & the patient is agreeable to proceed with the procedure.    I will  discuss with the attending physician. Attending addendum is to follow.     Further recommendations per attending addendum    Elisabeth Collins MD  PGY-5 ([phone])  Cardiology Fellow                VTE Risk Mitigation (From admission, onward)    None          Elisabeth Collins MD  Cardiology   [facility]

## 2018-09-28 NOTE — BRIEF OP NOTE
Patient is s/p successful redo PVI with posterior wall box ablation + CTI ablation:  Patient will need the following:   Tolerated procedure well. No acute complication noted.  Post op care per protocol.  Will monitor in recovery on tele overnight  Motrin , PPI , lasix prn   Cont metoprolol  Resume apixiban 4 hrs post hemostasis.

## 2018-09-28 NOTE — NURSING
Removed sutures from right groin.  No bleeding or hematoma noted.  Covered with gauze and tegaderm.  Sutures removed from left groin.  Bleeding noted.  Manual pressure applied for 30 minutes.  Gauze and tegaderm applied.  Will continue to monitor for bleeding.

## 2018-09-28 NOTE — ANESTHESIA PROCEDURE NOTES
Arterial    Diagnosis: a fib    Patient location during procedure: done in OR  Procedure start time: 9/28/2018 8:32 AM  Procedure end time: 9/28/2018 8:33 AM  Staffing  Anesthesiologist: Mati Tirado MD  Performed: anesthesiologist   Anesthesiologist was present at the time of the procedure.  Preanesthetic Checklist  Completed: patient identified, site marked, surgical consent, pre-op evaluation, timeout performed, IV checked, risks and benefits discussed, monitors and equipment checked and anesthesia consent givenArterial  Skin Prep: chlorhexidine gluconate  Local Infiltration: none  Orientation: left  Location: radial  Catheter Size: 20 G  Catheter placement by Anatomical landmarks. Heme positive aspiration all ports.Insertion Attempts: 1  Assessment  Dressing: secured with tape and tegaderm

## 2018-09-28 NOTE — TRANSFER OF CARE
Anesthesia Transfer of Care Note    Patient: Pavel Graff    Procedure(s) Performed: Procedure(s) (LRB):  ABLATION (N/A)  ECHOCARDIOGRAM,TRANSESOPHAGEAL (N/A)    Patient location: Telemetry/Step Down Unit    Anesthesia Type: general    Transport from OR: Transported from OR on 6-10 L/min O2 by face mask with adequate spontaneous ventilation    Post pain: adequate analgesia    Post assessment: no apparent anesthetic complications    Post vital signs: stable    Level of consciousness: awake    Nausea/Vomiting: no nausea/vomiting    Complications: none    Transfer of care protocol was followed      Last vitals:   Visit Vitals  BP (!) 135/92 (BP Location: Left arm, Patient Position: Lying)   Pulse 69   Temp 36.4 °C (97.5 °F) (Oral)   Resp 20   Ht 6' (1.829 m)   Wt 111.1 kg (245 lb)   SpO2 97%   BMI 33.23 kg/m²

## 2018-09-28 NOTE — ANESTHESIA POSTPROCEDURE EVALUATION
Anesthesia Post Evaluation    Patient: Pavel Graff    Procedure(s) Performed: Procedure(s) (LRB):  ABLATION (N/A)  ECHOCARDIOGRAM,TRANSESOPHAGEAL (N/A)    Final Anesthesia Type: general  Patient location during evaluation: telemetry step down  Patient participation: Yes- Able to Participate  Level of consciousness: awake and alert  Post-procedure vital signs: reviewed and stable  Pain management: adequate  Airway patency: patent  PONV status at discharge: No PONV  Anesthetic complications: no      Cardiovascular status: hemodynamically stable  Respiratory status: unassisted  Hydration status: euvolemic  Follow-up not needed.        Visit Vitals  /76   Pulse 69   Temp 36.4 °C (97.5 °F) (Oral)   Resp 18   Ht 6' (1.829 m)   Wt 111.1 kg (245 lb)   SpO2 96%   BMI 33.23 kg/m²       Pain/Mark Score: Pain Assessment Performed: Yes (9/28/2018  3:00 PM)  Presence of Pain: denies (9/28/2018  3:00 PM)  Pain Rating Prior to Med Admin: 0 (9/28/2018 12:15 PM)  Mark Score: 10 (9/28/2018  1:32 PM)

## 2018-09-28 NOTE — NURSING TRANSFER
Nursing Transfer Note      9/28/2018     Transfer To: 316    Transfer via stretcher    Transfer with cardiac monitoring    Transported by richard    Medicines sent: none    Chart send with patient: Yes    Notified: nurse and family    Patient reassessed at: see epic (date, time)    Upon arrival to floor:to room no complaints no distress noted.

## 2018-09-28 NOTE — PLAN OF CARE
Problem: Patient Care Overview  Goal: Plan of Care Review  Outcome: Ongoing (interventions implemented as appropriate)  Pt oriented room and bedcontrols.  Pt alert and oriented x 4.  Post procedure protocol reviewed with patient.  Pt states understanding.  Bilateral groin sites remain CDI, no bleeding or hematoma noted.  Will cont to monitor.

## 2018-09-28 NOTE — NURSING TRANSFER
Nursing Transfer Note      9/28/2018     Transfer To: 315    Transfer via stretcher    Transfer with cardiac monitoring    Transported by nurse    Medicines sent: none    Chart send with patient: Yes    Notified: spouse    Patient reassessed at: 1410    Upon arrival to floor: cardiac monitor applied, patient oriented to room, call bell in reach and bed in lowest position

## 2018-09-28 NOTE — ASSESSMENT & PLAN NOTE
PLAN:  1. KAM for evaluation of KIRSTEN for r/o thrombus prior to redo - PVI    -The risks, benefits & alternatives of the procedure were explained to the patient.    -The risks of transesophageal echo include but are not limited to:  Dental trauma, esophageal trauma/perforation, bleeding, laryngospasm/brochospasm, aspiration, sore throat/hoarseness, & dislodgement of the endotracheal tube/nasogastric tube (where applicable).    -The risks of moderate sedation include hypotension, respiratory depression, arrhythmias, bronchospasm, & death.    -Informed consent was obtained & the patient is agreeable to proceed with the procedure.    I will discuss with the attending physician. Attending addendum is to follow.     Further recommendations per attending addendum    Elisabeth Collins MD  PGY-5 [phone])  Cardiology Fellow

## 2018-09-28 NOTE — HPI
TRANSESOPHAGEAL ECHOCARDIOGRAPHY   PRE-PROCEDURE NOTE    2018    HPI:     Pavel Graff is a 68 y.o. man with PMHx of HTN, prior TIA, SIMON, HLD, Persistent Afib with a CHADS-VASC score of 4 on Eliquis here for repeat PVI after PVI was done on 16. His Afib recurred in  and will undergo ablation with Dr. Howard later today. Notably the patient had held his eliquis as instructed. KAM requested to r/o KIRSTEN thrombus prior to PVI.      Dysphagia or odynophagia:  No  Liver Disease, esophageal disease, or known varices:  No  Upper GI Bleeding: No  Snoring:  Yes  Sleep Apnea:  Yes  Prior neck surgery or radiation:  No  History of anesthetic difficulties:  No  Family history of anesthetic difficulties:  No  Last oral intake:  12 hours ago  Able to move neck in all directions:  Yes    Mallampati: 3  ASA: 3    Imaging:  KAM on 16:    1 - Biatrial enlargement.     2 - Left atrial appendage is single lobed with spontaneous echo contrast with no visualized thrombus.     3 - Normal left ventricular systolic function (EF 60-65%).     4 - Trivial aortic regurgitation.     5 - Mild mitral regurgitation.     6 - Mildly depressed right ventricular function .     7 - Moderate to severe tricuspid regurgitation.     [id number]  Afib today          Assessment & Plan:

## 2018-09-28 NOTE — PROGRESS NOTES
Patient admitted to recovery see [facility] for complete assessment pacu bcg's maintained safety measures verified patient instructed on pain scale and patient verbalized understanding.  Called for ekg and it was done and placed in patient's chart. Patient sedated and waking from sedation called dr. Tirado and dr. Gonzalez to bedside to evaulate due to some slight slurred on some words will continue to monitor. Called  For patient's wife went to voice mail and then called waiting room and they were not there.

## 2018-09-28 NOTE — SUBJECTIVE & OBJECTIVE
Past Medical History:   Diagnosis Date    *Atrial fibrillation     Anticoagulant long-term use     Atrial fibrillation     Hyperlipidemia     Hypertension     Sleep apnea     Stroke     TIA    TIA (transient ischemic attack)        Past Surgical History:   Procedure Laterality Date    ABLATION N/A 7/21/2016    Performed by Romaine Howard MD at [facility] CATH LAB    CARDIOVERSION N/A 6/14/2016    Performed by Romaine Howard MD at [facility] CATH LAB    CATARACT EXTRACTION W/  INTRAOCULAR LENS IMPLANT Left 1/11/10    CATARACT EXTRACTION W/  INTRAOCULAR LENS IMPLANT Right 12/14/2017    DMEK/ Dr. Jay    COLONOSCOPY N/A 9/9/2013    Performed by Mauro Dye MD at [facility] ENDO (4TH FLR)    CORNEAL TRANSPLANT Left 1997    PKP OS    CORNEAL TRANSPLANT Left 03/23/2017    CORNEAL TRANSPLANT Right 12/14/2017    DMEK/ [facility]; Dr. Jay    HERNIA REPAIR      HIP ARTHROPLASTY      INSERTION-INTRAOCULAR LENS (IOL) Right 12/14/2017    Performed by [facility]ell [facility] at [facility] OR    PHACOEMULSIFICATION-ASPIRATION-CATARACT Right 12/14/2017    Performed by [facility]ell [facility] at [facility] OR    ROTATOR CUFF REPAIR      TRANSESOPHAGEAL ECHOCARDIOGRAM (KAM) N/A 7/20/2016    Performed by Tameka Jay MD at [facility] CATH LAB    TRANSESOPHAGEAL ECHOCARDIOGRAM (KAM) N/A 6/14/2016    Performed by Denzel Farmer MD at [facility] CATH LAB    TRANSESOPHAGEAL ECHOCARDIOGRAM (KAM) N/A 6/14/2016    Performed by Romaine Howard MD at [facility] CATH LAB    TRANSPLANT-CORNEA/DMEK Right 12/14/2017    Performed by [facility]ell Jay MD at [facility] OR    TRANSPLANT-CORNEA/PKP Left 3/23/2017    Performed by Chantell Jay MD at [facility] OR       Review of patient's allergies indicates:  No Known Allergies    No current facility-administered medications on file prior to encounter.      Current Outpatient Medications on File Prior to Encounter   Medication Sig    apixaban 5 mg Tab Take 1 tablet (5 mg total) by mouth 2 (two) times daily.    aspirin 81 MG Chew Take  81 mg by mouth once daily.    fish oil-omega-3 fatty acids 300-1,000 mg capsule Take 1 g by mouth once daily.    hydrochlorothiazide (HYDRODIURIL) 25 MG tablet TAKE 1 TABLET (25 MG TOTAL) BY MOUTH ONCE DAILY.    losartan (COZAAR) 50 MG tablet TAKE 1 TABLET (50 MG TOTAL) BY MOUTH ONCE DAILY. (Patient taking differently: Take 50 mg by mouth every evening. )    metoprolol succinate (TOPROL-XL) 50 MG 24 hr tablet TAKE 1 TABLET (50 MG TOTAL) BY MOUTH ONCE DAILY.    pravastatin (PRAVACHOL) 20 MG tablet TAKE 1 TABLET (20 MG TOTAL) BY MOUTH ONCE DAILY.    prednisoLONE acetate (PRED FORTE) 1 % DrpS PLACE 1 DROP INTO THE LEFT EYE 4 (FOUR) TIMES DAILY.    timolol maleate 0.5% (TIMOPTIC) 0.5 % Drop INSTILL 1 DROP INTO LEFT EYE 2 TIMES A DAY     Family History     Problem Relation (Age of Onset)    Colon cancer Father    Crohn's disease Sister    Diabetes Mother    Heart disease Mother, Father, Brother    Hyperlipidemia Mother, Sister, Sister    Hypertension Sister        Tobacco Use    Smoking status: Never Smoker    Smokeless tobacco: Never Used   Substance and Sexual Activity    Alcohol use: No    Drug use: No    Sexual activity: Yes     Partners: Female     Review of Systems   All other systems reviewed and are negative.    Objective:     Vital Signs (Most Recent):  Temp: 97.5 °F (36.4 °C) (09/28/18 0600)  Pulse: 69 (09/28/18 0600)  Resp: 20 (09/28/18 0600)  BP: (!) 135/92 (09/28/18 0612)  SpO2: 97 % (09/28/18 0600) Vital Signs (24h Range):  Temp:  [97.5 °F (36.4 °C)] 97.5 °F (36.4 °C)  Pulse:  [69] 69  Resp:  [20] 20  SpO2:  [97 %] 97 %  BP: (135)/(89-92) 135/92     Weight: 111.1 kg (245 lb)  Body mass index is 33.23 kg/m².    SpO2: 97 %  O2 Device (Oxygen Therapy): room air    No intake or output data in the 24 hours ending 09/28/18 0638    Lines/Drains/Airways     Arterial Line                 Arterial Line 07/21/16 0751 Right Radial 798 days          Peripheral Intravenous Line                 Peripheral IV -  Single Lumen 09/28/18 0622 Right Antecubital less than 1 day         Peripheral IV - Single Lumen 09/28/18 0624 Left Hand less than 1 day                Physical Exam   Constitutional: He is oriented to person, place, and time. He appears well-developed and well-nourished.   HENT:   Head: Normocephalic and atraumatic.   Eyes: EOM are normal. Pupils are equal, round, and reactive to light.   Neck: Normal range of motion. Neck supple.   Cardiovascular: Normal rate and normal heart sounds.   Afib   Pulmonary/Chest: Effort normal and breath sounds normal.   Abdominal: Soft. Bowel sounds are normal.   Musculoskeletal: Normal range of motion. He exhibits no edema.   Neurological: He is alert and oriented to person, place, and time. He has normal reflexes.   Skin: Skin is warm and dry.   Vitals reviewed.      Significant Labs: All pertinent lab results from the last 24 hours have been reviewed.    Significant Imaging: Echocardiogram:   2D echo with color flow doppler:   Results for orders placed or performed during the hospital encounter of 04/08/16   2D echo with color flow doppler   Result Value Ref Range    EF 60 55 - 65    Mitral Valve Regurgitation MILD     Diastolic Dysfunction No     Aortic Valve Regurgitation MILD     Est. PA Systolic Pressure 33     Mitral Valve Mobility NORMAL     Tricuspid Valve Regurgitation MILD     and X-Ray: CXR: X-Ray Chest 1 View (CXR): No results found for this visit on 09/28/18.

## 2018-09-28 NOTE — ANESTHESIA PREPROCEDURE EVALUATION
09/28/2018  Pavel Graff is a 68 y.o., male.  Patient Active Problem List   Diagnosis    Hypertension    Hyperlipidemia    Transient cerebral ischemia    History of TIA (transient ischemic attack)    Benign prostatic hyperplasia    Inguinal hernia recurrent unilateral    Personal history of colonic polyps    Fuchs' corneal dystrophy    Keratoconus, unspecified    Obstructive sleep apnea    BLANCO (dyspnea on exertion)    Fatigue    NSVT (nonsustained ventricular tachycardia)    Persistent atrial fibrillation    Status post catheter ablation of atrial fibrillation    On amiodarone therapy    Current use of long term anticoagulation    Fuchs' endothelial dystrophy    Corneal transplant failure    Nuclear cataract         Anesthesia Evaluation         Review of Systems      Physical Exam  General:  Well nourished    Airway/Jaw/Neck:  Airway Findings: Mouth Opening: Normal Tongue: Normal  General Airway Assessment: Adult  Mallampati: II  Improves to II with phonation.  TM Distance: Normal, at least 6 cm      Dental:  Dental Findings: In tact   Chest/Lungs:  Chest/Lungs Findings: Clear to auscultation     Heart/Vascular:  Heart Findings: Rate: Normal  Rhythm: Irregularly Irregular  Sounds: Normal        Mental Status:  Mental Status Findings:  Cooperative, Alert and Oriented         Anesthesia Plan  Type of Anesthesia, risks & benefits discussed:  Anesthesia Type:  general  Patient's Preference: General  Intra-op Monitoring Plan: standard ASA monitors and arterial line  Intra-op Monitoring Plan Comments: Standard ASA monitors.   Post Op Pain Control Plan: per primary service following discharge from PACU  Post Op Pain Control Plan Comments: Per primary service.     Induction:   IV  Beta Blocker:  Patient is not currently on a Beta-Blocker (No further documentation required).       Informed  Consent: Patient understands risks and agrees with Anesthesia plan.  Questions answered. Anesthesia consent signed with patient.  ASA Score: 3     Day of Surgery Review of History & Physical:    H&P update referred to the surgeon.     Anesthesia Plan Notes: Chart reviewed, patient interviewed and examined.  The plan for general anesthesia was explained.  Questions were answered and the consent was signed.  Vasile PANTOJA         Ready For Surgery From Anesthesia Perspective.

## 2018-09-29 VITALS
HEART RATE: 84 BPM | BODY MASS INDEX: 33.18 KG/M2 | TEMPERATURE: 98 F | SYSTOLIC BLOOD PRESSURE: 169 MMHG | RESPIRATION RATE: 18 BRPM | DIASTOLIC BLOOD PRESSURE: 85 MMHG | WEIGHT: 245 LBS | HEIGHT: 72 IN | OXYGEN SATURATION: 95 %

## 2018-09-29 PROCEDURE — 25000003 PHARM REV CODE 250: Performed by: INTERNAL MEDICINE

## 2018-09-29 RX ADMIN — METOPROLOL SUCCINATE 50 MG: 50 TABLET, EXTENDED RELEASE ORAL at 07:09

## 2018-09-29 RX ADMIN — ASPIRIN 81 MG CHEWABLE TABLET 81 MG: 81 TABLET CHEWABLE at 07:09

## 2018-09-29 RX ADMIN — ACETAMINOPHEN 650 MG: 325 TABLET ORAL at 07:09

## 2018-09-29 RX ADMIN — PANTOPRAZOLE SODIUM 40 MG: 40 TABLET, DELAYED RELEASE ORAL at 07:09

## 2018-09-29 RX ADMIN — HYDROCHLOROTHIAZIDE 25 MG: 25 TABLET ORAL at 07:09

## 2018-09-29 RX ADMIN — APIXABAN 5 MG: 5 TABLET, FILM COATED ORAL at 07:09

## 2018-09-29 NOTE — HOSPITAL COURSE
He underwent successful PVI and CTI ablations without complication. See op report for details. Monitored overnight without issue. Groin bilaterally without hematoma, c/d/i. Remains in sinus rhythm this am. Discharged in good condition. Restarted on AC overnight. RTC 6 weeks.

## 2018-09-29 NOTE — PROGRESS NOTES
Patient with bleeding and small hematoma to left groin after walking post bedrest protocol. Manual pressure held for 15 minutes. Bedrest for additional 2 hours. Will monitor

## 2018-09-29 NOTE — DISCHARGE SUMMARY
[facility]  Cardiac Electrophysiology  Discharge Summary      Patient Name: Pavel Graff  MRN: [id number]  Admission Date: 9/28/2018  Hospital Length of Stay: 0 days  Discharge Date and Time:  09/29/2018 12:32 PM  Attending Physician: No att. providers found    Discharging Provider: Bradly Gonzalez MD  Primary Care Physician: Joe Peterson MD    HPI:   68 year old male with history of HTN, TIA, SIMON, HLD persistent AF since July 2012, s/p PVI 7/2016.  DCCV was attempted with early recurrence of AF. He has been in AF and continued to be syptomatic. He presents today for repeat PVI for symptomatic AF.          Procedure(s) (LRB):  ABLATION (N/A)  ECHOCARDIOGRAM,TRANSESOPHAGEAL (N/A)     Indwelling Lines/Drains at time of discharge:  Lines/Drains/Airways          None          Hospital Course:  He underwent successful PVI and CTI ablations without complication. See op report for details. Monitored overnight without issue. Groin bilaterally without hematoma, c/d/i. Remains in sinus rhythm this am. Discharged in good condition. Restarted on AC overnight. RTC 6 weeks.    Consults:     Significant Diagnostic Studies: Labs: BMP: No results for input(s): GLU, NA, K, CL, CO2, BUN, CREATININE, CALCIUM, MG in the last 48 hours., CMP No results for input(s): NA, K, CL, CO2, GLU, BUN, CREATININE, CALCIUM, PROT, ALBUMIN, BILITOT, ALKPHOS, AST, ALT, ANIONGAP, ESTGFRAFRICA, EGFRNONAA in the last 48 hours. and CBC No results for input(s): WBC, HGB, HCT, PLT in the last 48 hours.    Pending Diagnostic Studies:     None          Final Active Diagnoses:    Diagnosis Date Noted POA    Persistent atrial fibrillation [I48.1] 06/14/2016 Yes      Problems Resolved During this Admission:     No new Assessment & Plan notes have been filed under this hospital service since the last note was generated.  Service: Arrhythmia      Discharged Condition: good    Disposition: Home or Self Care    Follow Up:  Follow-up  Information     Rmoaine Howard MD In 6 weeks.    Specialties:  Electrophysiology, Cardiovascular Disease  Why:  post redo Afib  Contact information:  [address]  [facility] [address]  [phone]                 Patient Instructions:   No discharge procedures on file.  Medications:  Reconciled Home Medications:      Medication List      START taking these medications    ibuprofen 400 MG tablet  Commonly known as:  ADVIL,MOTRIN  Take 1 tablet (400 mg total) by mouth every 6 (six) hours as needed.     pantoprazole 40 MG tablet  Commonly known as:  PROTONIX  Take 1 tablet (40 mg total) by mouth once daily.        CHANGE how you take these medications    losartan 50 MG tablet  Commonly known as:  COZAAR  TAKE 1 TABLET (50 MG TOTAL) BY MOUTH ONCE DAILY.  What changed:  when to take this        CONTINUE taking these medications    apixaban 5 mg Tab  Commonly known as:  ELIQUIS  Take 1 tablet (5 mg total) by mouth 2 (two) times daily.     aspirin 81 MG Chew  Take 81 mg by mouth once daily.     fish oil-omega-3 fatty acids 300-1,000 mg capsule  Take 1 g by mouth once daily.     hydroCHLOROthiazide 25 MG tablet  Commonly known as:  HYDRODIURIL  TAKE 1 TABLET (25 MG TOTAL) BY MOUTH ONCE DAILY.     metoprolol succinate 50 MG 24 hr tablet  Commonly known as:  TOPROL-XL  TAKE 1 TABLET (50 MG TOTAL) BY MOUTH ONCE DAILY.     pravastatin 20 MG tablet  Commonly known as:  PRAVACHOL  TAKE 1 TABLET (20 MG TOTAL) BY MOUTH ONCE DAILY.     prednisoLONE acetate 1 % Drps  Commonly known as:  PRED FORTE  PLACE 1 DROP INTO THE LEFT EYE 4 (FOUR) TIMES DAILY.     timolol maleate 0.5% 0.5 % Drop  Commonly known as:  TIMOPTIC  INSTILL 1 DROP INTO LEFT EYE 2 TIMES A DAY            Time spent on the discharge of patient: 10 minutes    Bradly Gonzalez MD  Cardiac Electrophysiology  [facility]

## 2018-09-29 NOTE — PROGRESS NOTES
ambulsted patient in hallway without any complications. Villela catheter removed. Patient without complaints. surinder gono with dry gauze/tegaderm dressings intact. Vss. Will monitor

## 2018-09-29 NOTE — HPI
68 year old male with history of HTN, TIA, SIMON, HLD persistent AF since July 2012, s/p PVI 7/2016.  DCCV was attempted with early recurrence of AF. He has been in AF and continued to be syptomatic. He presents today for repeat PVI for symptomatic AF.

## 2018-10-01 LAB
POC ACTIVATED CLOTTING TIME K: 131 SEC (ref 74–137)
POC ACTIVATED CLOTTING TIME K: 368 SEC (ref 74–137)
SAMPLE: ABNORMAL
SAMPLE: NORMAL

## 2018-10-12 ENCOUNTER — OFFICE VISIT (OUTPATIENT)
Dept: INTERNAL MEDICINE | Facility: CLINIC | Age: 68
End: 2018-10-12
Payer: MEDICARE

## 2018-10-12 VITALS
OXYGEN SATURATION: 98 % | HEIGHT: 73 IN | TEMPERATURE: 99 F | BODY MASS INDEX: 32.2 KG/M2 | DIASTOLIC BLOOD PRESSURE: 79 MMHG | WEIGHT: 243 LBS | SYSTOLIC BLOOD PRESSURE: 129 MMHG | HEART RATE: 80 BPM

## 2018-10-12 DIAGNOSIS — R05.9 COUGH: ICD-10-CM

## 2018-10-12 DIAGNOSIS — N39.0 URINARY TRACT INFECTION WITHOUT HEMATURIA, SITE UNSPECIFIED: Primary | ICD-10-CM

## 2018-10-12 LAB
BILIRUB UR QL STRIP: NEGATIVE
CLARITY UR REFRACT.AUTO: CLEAR
COLOR UR AUTO: YELLOW
GLUCOSE UR QL STRIP: ABNORMAL
HGB UR QL STRIP: ABNORMAL
KETONES UR QL STRIP: NEGATIVE
LEUKOCYTE ESTERASE UR QL STRIP: ABNORMAL
MICROSCOPIC COMMENT: ABNORMAL
NITRITE UR QL STRIP: NEGATIVE
PH UR STRIP: 6 [PH] (ref 5–8)
PROT UR QL STRIP: NEGATIVE
RBC #/AREA URNS AUTO: 2 /HPF (ref 0–4)
SP GR UR STRIP: 1.01 (ref 1–1.03)
SQUAMOUS #/AREA URNS AUTO: 0 /HPF
URN SPEC COLLECT METH UR: ABNORMAL
UROBILINOGEN UR STRIP-ACNC: NEGATIVE EU/DL
WBC #/AREA URNS AUTO: 24 /HPF (ref 0–5)

## 2018-10-12 PROCEDURE — 1101F PT FALLS ASSESS-DOCD LE1/YR: CPT | Mod: CPTII,,, | Performed by: INTERNAL MEDICINE

## 2018-10-12 PROCEDURE — 81001 URINALYSIS AUTO W/SCOPE: CPT

## 2018-10-12 PROCEDURE — 3078F DIAST BP <80 MM HG: CPT | Mod: CPTII,,, | Performed by: INTERNAL MEDICINE

## 2018-10-12 PROCEDURE — 3074F SYST BP LT 130 MM HG: CPT | Mod: CPTII,,, | Performed by: INTERNAL MEDICINE

## 2018-10-12 PROCEDURE — 99214 OFFICE O/P EST MOD 30 MIN: CPT | Mod: S$PBB,,, | Performed by: INTERNAL MEDICINE

## 2018-10-12 PROCEDURE — 87186 SC STD MICRODIL/AGAR DIL: CPT

## 2018-10-12 PROCEDURE — 87077 CULTURE AEROBIC IDENTIFY: CPT

## 2018-10-12 PROCEDURE — 87088 URINE BACTERIA CULTURE: CPT

## 2018-10-12 PROCEDURE — 99999 PR PBB SHADOW E&M-EST. PATIENT-LVL III: CPT | Mod: PBBFAC,,, | Performed by: INTERNAL MEDICINE

## 2018-10-12 PROCEDURE — 99213 OFFICE O/P EST LOW 20 MIN: CPT | Mod: PBBFAC,PO | Performed by: INTERNAL MEDICINE

## 2018-10-12 PROCEDURE — 87086 URINE CULTURE/COLONY COUNT: CPT

## 2018-10-12 RX ORDER — SULFAMETHOXAZOLE AND TRIMETHOPRIM 800; 160 MG/1; MG/1
1 TABLET ORAL 2 TIMES DAILY
Qty: 28 TABLET | Refills: 0 | Status: SHIPPED | OUTPATIENT
Start: 2018-10-12 | End: 2018-10-26

## 2018-10-12 NOTE — PROGRESS NOTES
REASON FOR VISIT:  This is a 68-year-old male.  For the past days, he has been   having dysuria and he is also felt warm, has had a fever of 101.5.  Three days   ago, he was complaining of frontal headache with congestion feeling in his head   and face that has actually resolved.  He never had a sore throat or ear pain.    He was not blowing out any phlegm or mucus.  There has been no chest pain,   shortness of breath or abdominal pain.    Recent circumstances, he underwent another cardiac ablation on September 28 for   recurrent atrial fibrillation.  He did have a catheter in and also had a   transesophageal echo.    REVIEW OF SYMPTOMS:  His wife also feels that he has been having somewhat of a   cough after having that transesophageal echo.    PAST MEDICAL HISTORY:  Hypertension.  Atrial fibrillation.  Hyperlipidemia.  Transient ischemic attack.  Gastroesophageal reflux disease.    MEDICATIONS:  List per MedCard, which includes that of pantoprazole.    PHYSICAL EXAMINATION:  VITAL SIGNS:  His weight is 243 pounds, pulse 72, regular; blood pressure   122/70.  LUNGS:  Clear, but does cause a cough.  No wheezes or rales.   HEENT:  Oropharynx, no lesions.  HEART:  Regular rate and rhythm.  No murmurs.  ABDOMEN:  Active bowel sounds, soft.  He is nontender.    A urine dip reveals 1+ blood and 2+ leukocytes.    IMPRESSION:  1.  Urinary tract infection with possible prostatitis.  2.  Cough, possibly related to LPR.  3.  Atrial fibrillation, currently in sinus rhythm.    PLAN:  We will put on Bactrim DS to take twice a day for the next two weeks.    Also, during this time take over-the-counter Zantac at bedtime.  We will send a   urine off for analysis.          /chelsea [id number] review        JAM/CAIT  dd: 10/12/2018 12:04:53 (CDT)  td: 10/13/2018 00:10:38 (CDT)  Doc ID   #[id number]  Job ID #[id number]    CC:

## 2018-10-15 LAB — BACTERIA UR CULT: NORMAL

## 2018-10-29 ENCOUNTER — TELEPHONE (OUTPATIENT)
Dept: ELECTROPHYSIOLOGY | Facility: CLINIC | Age: 68
End: 2018-10-29

## 2018-10-29 DIAGNOSIS — I48.91 ATRIAL FIBRILLATION, UNSPECIFIED TYPE: Primary | ICD-10-CM

## 2018-10-31 RX ORDER — METOPROLOL SUCCINATE 50 MG/1
TABLET, EXTENDED RELEASE ORAL
Qty: 90 TABLET | Refills: 3 | Status: SHIPPED | OUTPATIENT
Start: 2018-10-31 | End: 2019-05-21

## 2018-11-06 ENCOUNTER — HOSPITAL ENCOUNTER (OUTPATIENT)
Dept: CARDIOLOGY | Facility: CLINIC | Age: 68
Discharge: HOME | End: 2018-11-06
Payer: MEDICARE

## 2018-11-06 ENCOUNTER — OFFICE VISIT (OUTPATIENT)
Dept: ELECTROPHYSIOLOGY | Facility: CLINIC | Age: 68
End: 2018-11-06
Payer: MEDICARE

## 2018-11-06 VITALS
DIASTOLIC BLOOD PRESSURE: 80 MMHG | HEIGHT: 72 IN | HEART RATE: 75 BPM | BODY MASS INDEX: 32.85 KG/M2 | SYSTOLIC BLOOD PRESSURE: 120 MMHG | WEIGHT: 242.5 LBS

## 2018-11-06 DIAGNOSIS — I10 ESSENTIAL HYPERTENSION: ICD-10-CM

## 2018-11-06 DIAGNOSIS — I48.91 ATRIAL FIBRILLATION, UNSPECIFIED TYPE: ICD-10-CM

## 2018-11-06 DIAGNOSIS — I48.19 PERSISTENT ATRIAL FIBRILLATION: ICD-10-CM

## 2018-11-06 DIAGNOSIS — Z98.890 STATUS POST CATHETER ABLATION OF ATRIAL FIBRILLATION: Primary | ICD-10-CM

## 2018-11-06 PROCEDURE — 1101F PT FALLS ASSESS-DOCD LE1/YR: CPT | Mod: CPTII,S$GLB,, | Performed by: INTERNAL MEDICINE

## 2018-11-06 PROCEDURE — 93010 ELECTROCARDIOGRAM REPORT: CPT | Mod: S$GLB,,, | Performed by: INTERNAL MEDICINE

## 2018-11-06 PROCEDURE — 3079F DIAST BP 80-89 MM HG: CPT | Mod: CPTII,S$GLB,, | Performed by: INTERNAL MEDICINE

## 2018-11-06 PROCEDURE — 99214 OFFICE O/P EST MOD 30 MIN: CPT | Mod: S$GLB,,, | Performed by: INTERNAL MEDICINE

## 2018-11-06 PROCEDURE — 93005 ELECTROCARDIOGRAM TRACING: CPT | Mod: S$GLB,,, | Performed by: INTERNAL MEDICINE

## 2018-11-06 PROCEDURE — 3074F SYST BP LT 130 MM HG: CPT | Mod: CPTII,S$GLB,, | Performed by: INTERNAL MEDICINE

## 2018-11-06 PROCEDURE — 99999 PR PBB SHADOW E&M-EST. PATIENT-LVL III: CPT | Mod: PBBFAC,,, | Performed by: INTERNAL MEDICINE

## 2018-11-06 NOTE — PROGRESS NOTES
Subjective:    Patient ID:  Pavel Graff is a 68 y.o. male who presents for follow-up of Atrial Fibrillation      68 year old male with history of HTN, TIA, SIMON, HLD and persistent AF since July 2012.  DCCV was attempted with early recurrence of AF.  He was rate controlled and did not note significant symptoms at the time - so a rate control strategy was pursued.  He notes though, that ever since he has been in AF - he has been much more easily fatigued and tired, not himself.  He denies overt palpitation or SOB.  He denies chest pain.    Mr. Graff underwent a PVI (07/21/16) with successful pulmonary vein antral isolation. Since the procedure, Mr. Graff reports feeling well with only two 3-second episodes of palpitations; he denies further recurrence.  denies chest pain, SOB/BLANCO, dizziness, or syncope. Mr. Graff has noted improvement in his energy level since the procedure.     12/16: Continues improved symptoms. 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 Remains on pradaxa and metoprolol.     6/17: No recurrence of AF since PVI. Feels well. No active complaints.     2/18: AF recurrence without symptoms. Remains on apixaban. The patient denies interval chest pain, sob, palpitations, syncope, near syncope.     8/18: Stable symptoms with slightly more fatigue. He asks about repeat ablation at the end of the season.     Interval history: Re-do PVI 9/28/18 with touch up PVI, posterior wall isolation and CTI line. Feels some what fatigued, no post procedure complications.     2D echo 4/16    1 - Eccentric hypertrophy.     2 - Normal left ventricular systolic function (EF 60-65%).     3 - Right ventricular enlargement with normal systolic function.     4 - Biatrial enlargement.     5 - Mild aortic regurgitation.     6 - Mild mitral regurgitation.     7 - Mild tricuspid regurgitation.     8 - The estimated PA systolic pressure is 33 mmHg.     9 - Mildly elevated central venous pressure.     PET  stress 5/16  1. There is no evidence of a discrete hemodynamically significant coronary stenosis.   2. Although no clinically significant stress defect is seen, there is resting flow heterogeneity that improves after Dipyridamole. These results suggest mild endothelial dysfunction due to elevated cholesterol, high blood pressure and diabetes without clinically significant,      localized perfusion defects.   3. Resting wall motion is physiologic. Stress wall motion is physiologic.   4. LV function is normal at rest and stress.  (normal is >= 51%)  5. The ventricular volumes are normal at rest and stress.   6. The extracardiac distribution of radioactivity is normal.   7. There was no previous study available to compare.    Past Medical History:  No date: *Atrial fibrillation  No date: Anticoagulant long-term use  No date: Atrial fibrillation  No date: Hyperlipidemia  No date: Hypertension  No date: Sleep apnea  No date: Stroke      Comment:  TIA  No date: TIA (transient ischemic attack)    Past Surgical History:  9/28/2018: ABLATION; N/A      Comment:  Procedure: ABLATION;  Surgeon: Romaine Howard MD;                 Location: [facility] CATH LAB;  Service: Cardiology;                 Laterality: N/A;  AF, KAM, PVI, RFA, MAME, Gen, MB, 3 Prep  9/28/2018: ABLATION; N/A      Comment:  Performed by Romaine Howard MD at [facility] CATH LAB  7/21/2016: ABLATION; N/A      Comment:  Performed by Romaine Howard MD at [facility] CATH LAB  6/14/2016: CARDIOVERSION; N/A      Comment:  Performed by Romaine Howard MD at [facility] CATH LAB  1/11/10: CATARACT EXTRACTION W/  INTRAOCULAR LENS IMPLANT; Left  12/14/2017: CATARACT EXTRACTION W/  INTRAOCULAR LENS IMPLANT; Right      Comment:  [facility]/ Dr. Jay  9/9/2013: COLONOSCOPY; N/A      Comment:  Performed by Mauro Dye MD at [facility] ENDO (4TH FLR)  1997: CORNEAL TRANSPLANT; Left      Comment:  PKP OS  03/23/2017: CORNEAL TRANSPLANT; Left  12/14/2017: CORNEAL TRANSPLANT; Right      Comment:   DMEK/ [place]; Dr. Jay  9/28/2018: ECHOCARDIOGRAM,TRANSESOPHAGEAL; N/A      Comment:  Performed by Romaine Howard MD at [facility] CATH LAB  No date: HERNIA REPAIR  No date: HIP ARTHROPLASTY  12/14/2017: INSERTION-INTRAOCULAR LENS (IOL); Right      Comment:  Performed by Chantell Jay MD at [facility] OR  12/14/2017: PHACOEMULSIFICATION-ASPIRATION-CATARACT; Right      Comment:  Performed by Chantell Jay MD at [facility] OR  No date: ROTATOR CUFF REPAIR  7/20/2016: TRANSESOPHAGEAL ECHOCARDIOGRAM (KAM); N/A      Comment:  Performed by Tameka Jay MD at [facility] CATH LAB  6/14/2016: TRANSESOPHAGEAL ECHOCARDIOGRAM (KAM); N/A      Comment:  Performed by Denzel Farmer MD at [facility] CATH LAB  6/14/2016: TRANSESOPHAGEAL ECHOCARDIOGRAM (KAM); N/A      Comment:  Performed by Romaine Howard MD at [facility] CATH LAB  12/14/2017: TRANSPLANT-CORNEA/DMEK; Right      Comment:  Performed by Chantell Jay MD at [facility] OR  3/23/2017: TRANSPLANT-CORNEA/PKP; Left      Comment:  Performed by Chantell Jay MD at [facility] OR    Social History    Socioeconomic History      Marital status:       Spouse name: Not on file      Number of children: 3      Years of education: Not on file      Highest education level: Not on file    Social Needs      Financial resource strain: Not on file      Food insecurity - worry: Not on file      Food insecurity - inability: Not on file      Transportation needs - medical: Not on file      Transportation needs - non-medical: Not on file    Occupational History        Employer: [employer]    Tobacco Use      Smoking status: Never Smoker      Smokeless tobacco: Never Used    Substance and Sexual Activity      Alcohol use: No      Drug use: No      Sexual activity: Yes        Partners: Female    Other Topics      Concerns:        Not on file    Social History Narrative      Not on file      Review of patient's family history indicates:  Problem: Diabetes      Relation: Mother          Age of Onset:  (Not Specified)  Problem: Heart disease      Relation: Mother          Age of Onset: (Not Specified)  Problem: Hyperlipidemia      Relation: Mother          Age of Onset: (Not Specified)  Problem: Heart disease      Relation: Father          Age of Onset: (Not Specified)  Problem: Colon cancer      Relation: Father          Age of Onset: (Not Specified)          Comment: colon  Problem: Heart disease      Relation: Brother          Age of Onset: (Not Specified)  Problem: Crohn's disease      Relation: Sister          Age of Onset: (Not Specified)  Problem: Hyperlipidemia      Relation: Sister          Age of Onset: (Not Specified)  Problem: Hypertension      Relation: Sister          Age of Onset: (Not Specified)  Problem: Hyperlipidemia      Relation: Sister          Age of Onset: (Not Specified)  Problem: Sudden death      Relation: Neg Hx          Age of Onset: (Not Specified)  Problem: Amblyopia      Relation: Neg Hx          Age of Onset: (Not Specified)  Problem: Blindness      Relation: Neg Hx          Age of Onset: (Not Specified)  Problem: Cancer      Relation: Neg Hx          Age of Onset: (Not Specified)  Problem: Cataracts      Relation: Neg Hx          Age of Onset: (Not Specified)  Problem: Glaucoma      Relation: Neg Hx          Age of Onset: (Not Specified)  Problem: Macular degeneration      Relation: Neg Hx          Age of Onset: (Not Specified)  Problem: Retinal detachment      Relation: Neg Hx          Age of Onset: (Not Specified)  Problem: Strabismus      Relation: Neg Hx          Age of Onset: (Not Specified)  Problem: Stroke      Relation: Neg Hx          Age of Onset: (Not Specified)  Problem: Thyroid disease      Relation: Neg Hx          Age of Onset: (Not Specified)            Review of Systems   Constitution: Negative for decreased appetite, diaphoresis, weakness and malaise/fatigue.   HENT: Negative for congestion and nosebleeds.    Eyes: Negative for blurred vision, double vision and  pain.   Cardiovascular: Negative for chest pain, claudication, cyanosis, dyspnea on exertion, irregular heartbeat, leg swelling, near-syncope, orthopnea, palpitations, paroxysmal nocturnal dyspnea and syncope.   Respiratory: Negative for cough, hemoptysis, shortness of breath, sputum production and wheezing.    Endocrine: Negative for cold intolerance and heat intolerance.   Hematologic/Lymphatic: Negative for bleeding problem. Does not bruise/bleed easily.   Skin: Negative.  Negative for dry skin and flushing.   Musculoskeletal: Negative.  Negative for arthritis and falls.   Gastrointestinal: Negative for abdominal pain, hematemesis, hematochezia, nausea and vomiting.   Genitourinary: Negative for dysuria and flank pain.   Neurological: Negative for dizziness, headaches and light-headedness.   Psychiatric/Behavioral: Negative for altered mental status. The patient is not nervous/anxious.         Objective:    Physical Exam   Constitutional: He is oriented to person, place, and time. He appears well-developed and well-nourished. No distress.   HENT:   Head: Normocephalic and atraumatic.   Eyes: Conjunctivae and EOM are normal. Pupils are equal, round, and reactive to light.   Neck: Normal range of motion. Neck supple. No JVD present. No thyromegaly present.   Cardiovascular: Normal rate, regular rhythm, normal heart sounds and intact distal pulses.   No murmur heard.  Pulmonary/Chest: Effort normal and breath sounds normal. No respiratory distress. He has no wheezes.   Abdominal: Soft. Bowel sounds are normal. He exhibits no distension. There is no tenderness.   Musculoskeletal: Normal range of motion. He exhibits no edema.   Neurological: He is alert and oriented to person, place, and time. No cranial nerve deficit.   Skin: Skin is warm and dry. No rash noted. He is not diaphoretic. No erythema.   Psychiatric: He has a normal mood and affect. His behavior is normal. Judgment and thought content normal.   Vitals  reviewed.    ECG: NSR nl RI, QRS, QTc        Assessment:       1. Status post catheter ablation of atrial fibrillation    2. Persistent atrial fibrillation    3. Essential hypertension         Plan:       68 yoM persistent AF s/p re-do PVI here for follow up. He has maintained NSR by ECG and by symptoms. Will drop metoprolol to 25 mg qd due to residual fatigue. RTC 6m

## 2018-11-13 RX ORDER — HYDROCHLOROTHIAZIDE 25 MG/1
TABLET ORAL
Qty: 90 TABLET | Refills: 3 | Status: SHIPPED | OUTPATIENT
Start: 2018-11-13 | End: 2019-11-17 | Stop reason: SDUPTHER

## 2018-11-15 DIAGNOSIS — I48.19 PERSISTENT ATRIAL FIBRILLATION: ICD-10-CM

## 2018-12-30 RX ORDER — PREDNISOLONE ACETATE 10 MG/ML
SUSPENSION/ DROPS OPHTHALMIC
Qty: 5 ML | Refills: 4 | Status: SHIPPED | OUTPATIENT
Start: 2018-12-30 | End: 2019-06-22 | Stop reason: SDUPTHER

## 2019-01-05 RX ORDER — LOSARTAN POTASSIUM 50 MG/1
TABLET ORAL
Qty: 90 TABLET | Refills: 1 | Status: SHIPPED | OUTPATIENT
Start: 2019-01-05 | End: 2019-07-06 | Stop reason: SDUPTHER

## 2019-02-12 ENCOUNTER — OFFICE VISIT (OUTPATIENT)
Dept: INTERNAL MEDICINE | Facility: CLINIC | Age: 69
End: 2019-02-12
Payer: MEDICARE

## 2019-02-12 ENCOUNTER — LAB VISIT (OUTPATIENT)
Dept: LAB | Facility: HOSPITAL | Age: 69
End: 2019-02-12
Attending: INTERNAL MEDICINE
Payer: MEDICARE

## 2019-02-12 VITALS
SYSTOLIC BLOOD PRESSURE: 126 MMHG | OXYGEN SATURATION: 99 % | BODY MASS INDEX: 33.05 KG/M2 | DIASTOLIC BLOOD PRESSURE: 76 MMHG | WEIGHT: 244 LBS | HEIGHT: 72 IN | HEART RATE: 80 BPM

## 2019-02-12 DIAGNOSIS — G47.33 OBSTRUCTIVE SLEEP APNEA: ICD-10-CM

## 2019-02-12 DIAGNOSIS — E78.5 HYPERLIPIDEMIA, UNSPECIFIED HYPERLIPIDEMIA TYPE: ICD-10-CM

## 2019-02-12 DIAGNOSIS — Z12.5 ENCOUNTER FOR SCREENING FOR MALIGNANT NEOPLASM OF PROSTATE: ICD-10-CM

## 2019-02-12 DIAGNOSIS — I10 ESSENTIAL HYPERTENSION: ICD-10-CM

## 2019-02-12 DIAGNOSIS — Z00.00 ANNUAL PHYSICAL EXAM: ICD-10-CM

## 2019-02-12 DIAGNOSIS — Z80.0 FAMILY HISTORY OF COLON CANCER IN FATHER: ICD-10-CM

## 2019-02-12 DIAGNOSIS — I48.19 PERSISTENT ATRIAL FIBRILLATION: ICD-10-CM

## 2019-02-12 DIAGNOSIS — Z12.11 SCREENING FOR COLON CANCER: ICD-10-CM

## 2019-02-12 DIAGNOSIS — Z00.00 ANNUAL PHYSICAL EXAM: Primary | ICD-10-CM

## 2019-02-12 LAB
ALBUMIN SERPL BCP-MCNC: 4.2 G/DL
ALP SERPL-CCNC: 80 U/L
ALT SERPL W/O P-5'-P-CCNC: 21 U/L
ANION GAP SERPL CALC-SCNC: 7 MMOL/L
AST SERPL-CCNC: 26 U/L
BASOPHILS # BLD AUTO: 0.04 K/UL
BASOPHILS NFR BLD: 0.7 %
BILIRUB SERPL-MCNC: 1 MG/DL
BUN SERPL-MCNC: 10 MG/DL
CALCIUM SERPL-MCNC: 9.8 MG/DL
CHLORIDE SERPL-SCNC: 101 MMOL/L
CHOLEST SERPL-MCNC: 166 MG/DL
CHOLEST/HDLC SERPL: 4.5 {RATIO}
CO2 SERPL-SCNC: 27 MMOL/L
COMPLEXED PSA SERPL-MCNC: 1 NG/ML
CREAT SERPL-MCNC: 0.9 MG/DL
DIFFERENTIAL METHOD: ABNORMAL
EOSINOPHIL # BLD AUTO: 0.1 K/UL
EOSINOPHIL NFR BLD: 2.3 %
ERYTHROCYTE [DISTWIDTH] IN BLOOD BY AUTOMATED COUNT: 11.8 %
EST. GFR  (AFRICAN AMERICAN): >60 ML/MIN/1.73 M^2
EST. GFR  (NON AFRICAN AMERICAN): >60 ML/MIN/1.73 M^2
GLUCOSE SERPL-MCNC: 107 MG/DL
HCT VFR BLD AUTO: 41.7 %
HDLC SERPL-MCNC: 37 MG/DL
HDLC SERPL: 22.3 %
HGB BLD-MCNC: 14.7 G/DL
IMM GRANULOCYTES # BLD AUTO: 0.07 K/UL
IMM GRANULOCYTES NFR BLD AUTO: 1.3 %
LDLC SERPL CALC-MCNC: 104 MG/DL
LYMPHOCYTES # BLD AUTO: 0.8 K/UL
LYMPHOCYTES NFR BLD: 15 %
MCH RBC QN AUTO: 32 PG
MCHC RBC AUTO-ENTMCNC: 35.3 G/DL
MCV RBC AUTO: 91 FL
MONOCYTES # BLD AUTO: 0.6 K/UL
MONOCYTES NFR BLD: 10.1 %
NEUTROPHILS # BLD AUTO: 3.9 K/UL
NEUTROPHILS NFR BLD: 70.6 %
NONHDLC SERPL-MCNC: 129 MG/DL
NRBC BLD-RTO: 0 /100 WBC
PLATELET # BLD AUTO: 287 K/UL
PMV BLD AUTO: 10.1 FL
POTASSIUM SERPL-SCNC: 4 MMOL/L
PROT SERPL-MCNC: 7.1 G/DL
RBC # BLD AUTO: 4.6 M/UL
SODIUM SERPL-SCNC: 135 MMOL/L
TRIGL SERPL-MCNC: 125 MG/DL
TSH SERPL DL<=0.005 MIU/L-ACNC: 1.34 UIU/ML
WBC # BLD AUTO: 5.55 K/UL

## 2019-02-12 PROCEDURE — 36415 COLL VENOUS BLD VENIPUNCTURE: CPT | Mod: HCNC,PO

## 2019-02-12 PROCEDURE — 3074F PR MOST RECENT SYSTOLIC BLOOD PRESSURE < 130 MM HG: ICD-10-PCS | Mod: HCNC,CPTII,S$GLB, | Performed by: INTERNAL MEDICINE

## 2019-02-12 PROCEDURE — 80053 COMPREHEN METABOLIC PANEL: CPT | Mod: HCNC

## 2019-02-12 PROCEDURE — 3074F SYST BP LT 130 MM HG: CPT | Mod: HCNC,CPTII,S$GLB, | Performed by: INTERNAL MEDICINE

## 2019-02-12 PROCEDURE — 80061 LIPID PANEL: CPT | Mod: HCNC

## 2019-02-12 PROCEDURE — 99397 PR PREVENTIVE VISIT,EST,65 & OVER: ICD-10-PCS | Mod: HCNC,S$GLB,, | Performed by: INTERNAL MEDICINE

## 2019-02-12 PROCEDURE — 85025 COMPLETE CBC W/AUTO DIFF WBC: CPT | Mod: HCNC

## 2019-02-12 PROCEDURE — 99499 RISK ADDL DX/OHS AUDIT: ICD-10-PCS | Mod: S$GLB,,, | Performed by: INTERNAL MEDICINE

## 2019-02-12 PROCEDURE — 3078F PR MOST RECENT DIASTOLIC BLOOD PRESSURE < 80 MM HG: ICD-10-PCS | Mod: HCNC,CPTII,S$GLB, | Performed by: INTERNAL MEDICINE

## 2019-02-12 PROCEDURE — 84443 ASSAY THYROID STIM HORMONE: CPT | Mod: HCNC

## 2019-02-12 PROCEDURE — 99397 PER PM REEVAL EST PAT 65+ YR: CPT | Mod: HCNC,S$GLB,, | Performed by: INTERNAL MEDICINE

## 2019-02-12 PROCEDURE — 99999 PR PBB SHADOW E&M-EST. PATIENT-LVL IV: CPT | Mod: PBBFAC,HCNC,, | Performed by: INTERNAL MEDICINE

## 2019-02-12 PROCEDURE — 99999 PR PBB SHADOW E&M-EST. PATIENT-LVL IV: ICD-10-PCS | Mod: PBBFAC,HCNC,, | Performed by: INTERNAL MEDICINE

## 2019-02-12 PROCEDURE — 84153 ASSAY OF PSA TOTAL: CPT | Mod: HCNC

## 2019-02-12 PROCEDURE — 99499 UNLISTED E&M SERVICE: CPT | Mod: S$GLB,,, | Performed by: INTERNAL MEDICINE

## 2019-02-12 PROCEDURE — 3078F DIAST BP <80 MM HG: CPT | Mod: HCNC,CPTII,S$GLB, | Performed by: INTERNAL MEDICINE

## 2019-02-12 NOTE — PROGRESS NOTES
PAST MEDICAL HISTORY:  Persistent atrial fibrillation with previous direct cardioversion and status post pulmonary vein isolation twice  Obstructive sleep apnea with the use of CPAP.  Hyperlipidemia.  Hypertension.  Previous TIA.  Right hip arthroplasty.  Bilateral inguinal hernia repair Right rotator cuff tear.  Cataract extraction with corneal transplant.     SOCIAL HISTORY:  Tobacco and alcohol use -- none.  , has three children.    Works at [employer].  No formal exercise routine.     FAMILY HISTORY:  Father is , heart disease, colon cancer.  Mother is   alive, diabetes and hypertension, hip fracture.  He has one brother,   hypertension and heart disease.  Three sisters: one with Crohn's disease and two   sisters with hyperlipidemia.     SCREENING:  Colonoscopy on 2013 revealed a benign polyp.     MEDICATIONS:  Eliquis 5 mg twice a day.  Aspirin 81 mg a day.  Hydrochlorothiazide 25 mg a day.  Losartan 50 mg a day.  Metoprolol succinate 20 mg a day.  Pravastatin 20 mg a day.  Eye drops outlined in the med sheet.            REASON FOR VISIT:  This is a 69-year-old male who is coming in for an annual   routine visit.  Overall in general, he has felt well.    In November, he underwent a redo pulmonary vein isolation and performed well.    He can tell that his energy level is much improved.    MEDICAL HISTORY AND MEDICATIONS:  Outlined above.    REVIEW OF SYSTEMS:  He endorses no chest pain, palpitation, shortness of breath,   or abdominal pain.  The patient's bowel function is regular.  No difficulty   urinating.  Nocturia x1.  However, most of his hydration is in the evening.    Presently, no arthralgias, headaches, and heartburn.    CURRENT MEDICINES:  He is not taking pantoprazole and does not feel that he   needs it.    PHYSICAL EXAMINATION:  VITAL SIGNS:  Weight 224 pounds, pulse rate 76, and blood pressure 128/72.  HEENT:  Tympanic membranes normal.  Nasal mucosa is clear.   Oropharynx, no   abnormal findings.  NECK:  No thyromegaly.  LUNGS:  Clear.  HEART:  Regular rate and rhythm.  No murmurs.  ABDOMEN:  Active bowel sounds, soft, nontender.  No hepatosplenomegaly or   abdominal masses.  PULSES:  2+ carotid pulses, 2+ pedal pulses.  EXTREMITIES:  No edema.  LYMPH GLAND:  No palpable adenopathy.  RECTAL:  Stool is brown.  Prostate minimally enlarged.    IMPRESSION:  1. General exam.  2. Hypertension.  3. Hyperlipidemia.  4. Persistent atrial fibrillation.  5. Obstructive sleep apnea.    PLAN:  Routine labs including PSA.  He also needs to be set up for screening   colonoscopy due to family history.  Last colonoscopy in September 2013 revealed   a benign polyp.  Physical activity was discussed and also discussed with him   about pursuing the pneumococcal pneumonia vaccine.        /chelsea [id number] review        JAM/HN  dd: 02/12/2019 09:00:30 (CST)  td: 02/12/2019 22:38:24 (CST)  Doc ID   #[id number]  Job ID #[id number]    CC:

## 2019-02-19 ENCOUNTER — TELEPHONE (OUTPATIENT)
Dept: ENDOSCOPY | Facility: HOSPITAL | Age: 69
End: 2019-02-19

## 2019-02-19 NOTE — TELEPHONE ENCOUNTER
Pt needs to be scheduled for a colonoscopy.  Pt is currently taking Eliquis.  Per Endoscopy protocol it should be held x 2 days prior.  Ok to hold?  Please advise.  Thanks

## 2019-02-20 ENCOUNTER — TELEPHONE (OUTPATIENT)
Dept: ENDOSCOPY | Facility: HOSPITAL | Age: 69
End: 2019-02-20

## 2019-02-20 DIAGNOSIS — Z12.11 SPECIAL SCREENING FOR MALIGNANT NEOPLASMS, COLON: Primary | ICD-10-CM

## 2019-02-20 RX ORDER — POLYETHYLENE GLYCOL 3350, SODIUM SULFATE ANHYDROUS, SODIUM BICARBONATE, SODIUM CHLORIDE, POTASSIUM CHLORIDE 236; 22.74; 6.74; 5.86; 2.97 G/4L; G/4L; G/4L; G/4L; G/4L
4 POWDER, FOR SOLUTION ORAL ONCE
Qty: 4000 ML | Refills: 0 | Status: SHIPPED | OUTPATIENT
Start: 2019-02-20 | End: 2019-02-20

## 2019-02-20 NOTE — TELEPHONE ENCOUNTER
Romaine Howard MD   to Me           2/19/19 5:19 PM    Yes fine with me MB              2/19/19 1:15 PM   You routed this conversation to Romaine Howard MD    Me          2/19/19 1:14 PM   Note      Pt needs to be scheduled for a colonoscopy.  Pt is currently taking Eliquis.  Per Endoscopy protocol it should be held x 2 days prior.  Ok to hold?  Please advise.  Thanks

## 2019-03-26 ENCOUNTER — ANESTHESIA EVENT (OUTPATIENT)
Dept: ENDOSCOPY | Facility: HOSPITAL | Age: 69
End: 2019-03-26
Payer: MEDICARE

## 2019-03-26 ENCOUNTER — ANESTHESIA (OUTPATIENT)
Dept: ENDOSCOPY | Facility: HOSPITAL | Age: 69
End: 2019-03-26
Payer: MEDICARE

## 2019-03-26 ENCOUNTER — HOSPITAL ENCOUNTER (OUTPATIENT)
Facility: HOSPITAL | Age: 69
Discharge: HOME | End: 2019-03-26
Attending: INTERNAL MEDICINE | Admitting: INTERNAL MEDICINE
Payer: MEDICARE

## 2019-03-26 VITALS
WEIGHT: 245 LBS | HEART RATE: 73 BPM | RESPIRATION RATE: 16 BRPM | DIASTOLIC BLOOD PRESSURE: 67 MMHG | BODY MASS INDEX: 33.18 KG/M2 | HEIGHT: 72 IN | OXYGEN SATURATION: 94 % | TEMPERATURE: 98 F | SYSTOLIC BLOOD PRESSURE: 116 MMHG

## 2019-03-26 DIAGNOSIS — Z12.11 COLON CANCER SCREENING: ICD-10-CM

## 2019-03-26 DIAGNOSIS — Z12.11 SPECIAL SCREENING FOR MALIGNANT NEOPLASMS, COLON: Primary | ICD-10-CM

## 2019-03-26 PROCEDURE — E9220 PRA ENDO ANESTHESIA: HCPCS | Mod: HCNC,,, | Performed by: NURSE ANESTHETIST, CERTIFIED REGISTERED

## 2019-03-26 PROCEDURE — 37000009 HC ANESTHESIA EA ADD 15 MINS: Performed by: INTERNAL MEDICINE

## 2019-03-26 PROCEDURE — 63600175 PHARM REV CODE 636 W HCPCS: Mod: HCNC | Performed by: NURSE ANESTHETIST, CERTIFIED REGISTERED

## 2019-03-26 PROCEDURE — G0105 COLORECTAL SCRN; HI RISK IND: ICD-10-PCS | Mod: ,,, | Performed by: INTERNAL MEDICINE

## 2019-03-26 PROCEDURE — E9220 PRA ENDO ANESTHESIA: ICD-10-PCS | Mod: HCNC,,, | Performed by: NURSE ANESTHETIST, CERTIFIED REGISTERED

## 2019-03-26 PROCEDURE — 25000003 PHARM REV CODE 250: Mod: HCNC | Performed by: INTERNAL MEDICINE

## 2019-03-26 PROCEDURE — 37000008 HC ANESTHESIA 1ST 15 MINUTES: Performed by: INTERNAL MEDICINE

## 2019-03-26 PROCEDURE — G0105 COLORECTAL SCRN; HI RISK IND: HCPCS | Performed by: INTERNAL MEDICINE

## 2019-03-26 PROCEDURE — G0105 COLORECTAL SCRN; HI RISK IND: HCPCS | Mod: ,,, | Performed by: INTERNAL MEDICINE

## 2019-03-26 RX ORDER — SODIUM CHLORIDE 9 MG/ML
INJECTION, SOLUTION INTRAVENOUS CONTINUOUS
Status: DISCONTINUED | OUTPATIENT
Start: 2019-03-26 | End: 2019-03-26 | Stop reason: HOSPADM

## 2019-03-26 RX ORDER — PROPOFOL 10 MG/ML
VIAL (ML) INTRAVENOUS CONTINUOUS PRN
Status: DISCONTINUED | OUTPATIENT
Start: 2019-03-26 | End: 2019-03-26

## 2019-03-26 RX ORDER — PROPOFOL 10 MG/ML
VIAL (ML) INTRAVENOUS
Status: DISCONTINUED | OUTPATIENT
Start: 2019-03-26 | End: 2019-03-26

## 2019-03-26 RX ORDER — LIDOCAINE HCL/PF 100 MG/5ML
SYRINGE (ML) INTRAVENOUS
Status: DISCONTINUED | OUTPATIENT
Start: 2019-03-26 | End: 2019-03-26

## 2019-03-26 RX ORDER — SODIUM CHLORIDE 0.9 % (FLUSH) 0.9 %
3 SYRINGE (ML) INJECTION
Status: DISCONTINUED | OUTPATIENT
Start: 2019-03-26 | End: 2019-03-26 | Stop reason: HOSPADM

## 2019-03-26 RX ADMIN — SODIUM CHLORIDE: 0.9 INJECTION, SOLUTION INTRAVENOUS at 07:03

## 2019-03-26 RX ADMIN — PROPOFOL 150 MCG/KG/MIN: 10 INJECTION, EMULSION INTRAVENOUS at 08:03

## 2019-03-26 RX ADMIN — LIDOCAINE HYDROCHLORIDE 50 MG: 20 INJECTION, SOLUTION INTRAVENOUS at 08:03

## 2019-03-26 RX ADMIN — PROPOFOL 60 MG: 10 INJECTION, EMULSION INTRAVENOUS at 08:03

## 2019-03-26 NOTE — [employer] PATIENT INSTRUCTIONS
Discharge Summary/Instructions after an Endoscopic Procedure  Patient Name: Pavel Graff  Patient MRN: [id number]  Patient YOB: 1950 Tuesday, March 26, 2019  Mauro Dye MD  RESTRICTIONS:  During your procedure today, you received medications for sedation.  These   medications may affect your judgment, balance and coordination.  Therefore,   for 24 hours, you have the following restrictions:   - DO NOT drive a car, operate machinery, make legal/financial decisions,   sign important papers or drink alcohol.    ACTIVITY:  Today: no heavy lifting, straining or running due to procedural   sedation/anesthesia.  The following day: return to full activity including work.  DIET:  Eat and drink normally unless instructed otherwise.     TREATMENT FOR COMMON SIDE EFFECTS:  - Mild abdominal pain, nausea, belching, bloating or excessive gas:  rest,   eat lightly and use a heating pad.  - Sore Throat: treat with throat lozenges and/or gargle with warm salt   water.  - Because air was used during the procedure, expelling large amounts of air   from your rectum or belching is normal.  - If a bowel prep was taken, you may not have a bowel movement for 1-3 days.    This is normal.  SYMPTOMS TO WATCH FOR AND REPORT TO YOUR PHYSICIAN:  1. Abdominal pain or bloating, other than gas cramps.  2. Chest pain.  3. Back pain.  4. Signs of infection such as: chills or fever occurring within 24 hours   after the procedure.  5. Rectal bleeding, which would show as bright red, maroon, or black stools.   (A tablespoon of blood from the rectum is not serious, especially if   hemorrhoids are present.)  6. Vomiting.  7. Weakness or dizziness.  GO DIRECTLY TO THE NEAREST EMERGENCY ROOM IF YOU HAVE ANY OF THE FOLLOWING:      Difficulty breathing              Chills and/or fever over 101 F   Persistent vomiting and/or vomiting blood   Severe abdominal pain   Severe chest pain   Black, tarry stools   Bleeding- more than one tablespoon   Any  other symptom or condition that you feel may need urgent attention  Your doctor recommends these additional instructions:  If any biopsies were taken, your doctors clinic will contact you in 1 to 2   weeks with any results.  - Patient has a contact number available for emergencies.  The signs and   symptoms of potential delayed complications were discussed with the   patient.  Return to normal activities tomorrow.  Written discharge   instructions were provided to the patient.   - Discharge patient to home.   - Resume Eliquis today.  - Repeat colonoscopy in 5 years for surveillance.   - The findings and recommendations were discussed with the designated   responsible adult.   For questions, problems or results please call your physician - Mauro Dye MD at Work:  [phone].  [facility], EMERGENCY ROOM PHONE NUMBER: [phone]  IF A COMPLICATION OR EMERGENCY SITUATION ARISES AND YOU ARE UNABLE TO REACH   YOUR PHYSICIAN - GO DIRECTLY TO THE EMERGENCY ROOM.  Mauro Dye MD  3/26/2019 8:40:54 AM  This report has been verified and signed electronically.  [employer]

## 2019-03-26 NOTE — DISCHARGE INSTRUCTIONS
Colonoscopy     A camera attached to a flexible tube with a viewing lens is used to take video pictures.     Colonoscopy is a test to view the inside of your lower digestive tract (colon and rectum). Sometimes it can show the last part of the small intestine (ileum). During the test, small pieces of tissue may be removed for testing. This is called a biopsy. Small growths, such as polyps, may also be removed.   Why is colonoscopy done?  The test is done to help look for colon cancer. And it can help find the source of abdominal pain, bleeding, and changes in bowel habits. It may be needed once a year, depending on factors such as your:  · Age  · Health history  · Family health history  · Symptoms  · Results from any prior colonoscopy  Risks and possible complications  These include:  · Bleeding               · A puncture or tear in the colon   · Risks of anesthesia  · A cancer lesion not being seen  Getting ready   To prepare for the test:  · Talk with your healthcare provider about the risks of the test (see below). Also ask your healthcare provider about alternatives to the test.  · Tell your healthcare provider about any medicines you take. Also tell him or her about any health conditions you may have.  · Make sure your rectum and colon are empty for the test. Follow the diet and bowel prep instructions exactly. If you dont, the test may need to be rescheduled.  · Plan for a friend or family member to drive you home after the test.     Colonoscopy provides an inside view of the entire colon.     You may discuss the results with your doctor right away or at a future visit.  During the test   The test is usually done in the hospital on an outpatient basis. This means you go home the same day. The procedure takes about 30 minutes. During that time:  · You are given relaxing (sedating) medicine through an IV line. You may be drowsy, or fully asleep.  · The healthcare provider will first give you a physical exam to  check for anal and rectal problems.  · Then the anus is lubricated and the scope inserted.  · If you are awake, you may have a feeling similar to needing to have a bowel movement. You may also feel pressure as air is pumped into the colon. Its OK to pass gas during the procedure.  · Biopsy, polyp removal, or other treatments may be done during the test.  After the test   You may have gas right after the test. It can help to try to pass it to help prevent later bloating. Your healthcare provider may discuss the results with you right away. Or you may need to schedule a follow-up visit to talk about the results. After the test, you can go back to your normal eating and other activities. You may be tired from the sedation and need to rest for a few hours.  Date Last Reviewed: 11/1/2016 © 2000-2017 [facility], [employer]. [address] [place] [address], [place] [address]. All rights reserved. This information is not intended as a substitute for professional medical care. Always follow your healthcare professional's instructions.

## 2019-03-26 NOTE — TRANSFER OF CARE
Anesthesia Transfer of Care Note    Patient: Pavel Graff    Procedure(s) Performed: Procedure(s) (LRB):  COLONOSCOPY (N/A)    Patient location: GI    Anesthesia Type: general    Transport from OR: Transported from OR on room air with adequate spontaneous ventilation    Post pain: adequate analgesia    Post assessment: no apparent anesthetic complications and tolerated procedure well    Post vital signs: stable    Level of consciousness: sedated and responds to stimulation    Nausea/Vomiting: no nausea/vomiting    Complications: none    Transfer of care protocol was followed      Last vitals:   Visit Vitals  BP (!) 101/58   Pulse 71   Temp 36.4 °C (97.5 °F)   Resp 16   Ht 6' (1.829 m)   Wt 111.1 kg (245 lb)   SpO2 95%   BMI 33.23 kg/m²

## 2019-03-26 NOTE — H&P
Short Stay Endoscopy History and Physical    PCP - Joe Peterson MD    Procedure - Colonoscopy  ASA - per anesthesia  Mallampati - per anesthesia  History of Anesthesia problems - no  Family history Anesthesia problems - no   Plan of anesthesia - General    HPI:  This is a 69 y.o. male here for evaluation of : asymptomatic screening exam and personal history of colon polyps    Prior colonoscopy (2013) with 3mm rectal polyp, (2007) with pedunculated polyp [hyperplastic].    Feels well without any complaints. No abdominal pain, n/v/d. No melena/hematochezia. No changes in bowel habits. No weight loss. No nsaids. On ASA and eliquis.    ROS:  Constitutional: No fevers, chills, No weight loss  CV: No chest pain  Pulm: No cough, No shortness of breath  GI: see HPI  Derm: No rash    Medical History:  has a past medical history of *Atrial fibrillation, Anticoagulant long-term use, Atrial fibrillation, Hyperlipidemia, Hypertension, Sleep apnea, Stroke, and TIA (transient ischemic attack).    Surgical History:  has a past surgical history that includes Hip Arthroplasty; Rotator cuff repair; Hernia repair; Corneal transplant (Left, 1997); Corneal transplant (Left, 03/23/2017); Corneal transplant (Right, 12/14/2017); Cataract extraction w/  intraocular lens implant (Left, 1/11/10); Cataract extraction w/  intraocular lens implant (Right, 12/14/2017); and Ablation (N/A, 9/28/2018).    Family History: family history includes Colon cancer in his father; Crohn's disease in his sister; Diabetes in his mother; Heart disease in his brother, father, and mother; Hyperlipidemia in his mother, sister, and sister; Hypertension in his sister.. Otherwise no colon cancer, inflammatory bowel disease, or GI malignancies.    Social History:  reports that he has never smoked. He has never used smokeless tobacco. He reports that he does not drink alcohol or use drugs.    Review of patient's allergies indicates:  No Known  Allergies    Medications:   No medications prior to admission.         Physical Exam:    Vital Signs: There were no vitals filed for this visit.    General Appearance: Well appearing in no acute distress  Eyes:    No scleral icterus  ENT: Neck supple, Lips, mucosa, and tongue normal; teeth and gums normal  Lungs: CTA bilaterally  Heart:  S1, S2 normal, no murmurs heard  Abdomen: Soft, non tender, non distended with positive bowel sounds. No hepatosplenomegaly, ascites, or mass.  Extremities: 2+ pulses, no clubbing, cyanosis or edema  Skin: No rash      Labs:  Lab Results   Component Value Date    WBC 5.55 02/12/2019    HGB 14.7 02/12/2019    HCT 41.7 02/12/2019     02/12/2019    CHOL 166 02/12/2019    TRIG 125 02/12/2019    HDL 37 (L) 02/12/2019    ALT 21 02/12/2019    AST 26 02/12/2019     (L) 02/12/2019    K 4.0 02/12/2019     02/12/2019    CREATININE 0.9 02/12/2019    BUN 10 02/12/2019    CO2 27 02/12/2019    TSH 1.338 02/12/2019    PSA 1.0 02/12/2019    INR 1.0 09/25/2018    HGBA1C 5.0 04/07/2015       I have explained the risks and benefits of endoscopy procedures to the patient including but not limited to bleeding, perforation, infection, and death.      Srinivasan Holguin MD

## 2019-03-26 NOTE — ANESTHESIA RELEASE NOTE
Anesthesia Release from PACU Note    Patient: Pavel Graff    Procedure(s) Performed: Procedure(s) (LRB):  COLONOSCOPY (N/A)    Anesthesia type: general    Post pain: Adequate analgesia    Post assessment: no apparent anesthetic complications    Last Vitals:   Visit Vitals  /67   Pulse 73   Temp 36.4 °C (97.5 °F)   Resp 16   Ht 6' (1.829 m)   Wt 111.1 kg (245 lb)   SpO2 (!) 94%   BMI 33.23 kg/m²       Post vital signs: stable    Level of consciousness: awake, alert  and oriented    Nausea/Vomiting: no nausea/no vomiting    Complications: none    Airway Patency: patent    Respiratory: unassisted    Cardiovascular: stable and blood pressure at baseline    Hydration: euvolemic

## 2019-03-26 NOTE — ANESTHESIA POSTPROCEDURE EVALUATION
Anesthesia Post Evaluation    Patient: Pavel Graff    Procedure(s) Performed: Procedure(s) (LRB):  COLONOSCOPY (N/A)    Final Anesthesia Type: general  Patient location during evaluation: PACU  Patient participation: Yes- Able to Participate  Level of consciousness: awake and alert  Post-procedure vital signs: reviewed and stable  Pain management: adequate  Airway patency: patent  PONV status at discharge: No PONV  Anesthetic complications: no      Cardiovascular status: blood pressure returned to baseline  Respiratory status: unassisted  Hydration status: euvolemic  Follow-up not needed.          Vitals Value Taken Time   /67 3/26/2019  9:19 AM   Temp 36.4 °C (97.5 °F) 3/26/2019  8:46 AM   Pulse 73 3/26/2019  9:19 AM   Resp 16 3/26/2019  9:19 AM   SpO2 94 % 3/26/2019  9:19 AM         No case tracking events are documented in the log.      Pain/Mark Score: Mark Score: 10 (3/26/2019  9:19 AM)

## 2019-03-26 NOTE — ANESTHESIA PREPROCEDURE EVALUATION
03/26/2019  Pavel Graff is a 69 y.o., male.  Patient Active Problem List   Diagnosis    Hypertension    Hyperlipidemia    Transient cerebral ischemia    History of TIA (transient ischemic attack)    Benign prostatic hyperplasia    Inguinal hernia recurrent unilateral    Personal history of colonic polyps    Fuchs' corneal dystrophy    Keratoconus, unspecified    Obstructive sleep apnea    BLANCO (dyspnea on exertion)    Fatigue    NSVT (nonsustained ventricular tachycardia)    Persistent atrial fibrillation    Status post catheter ablation of atrial fibrillation    On amiodarone therapy    Current use of long term anticoagulation    Fuchs' endothelial dystrophy    Corneal transplant failure    Nuclear cataract         Pre-op Assessment         Review of Systems  Cardiovascular:   Hypertension Dysrhythmias atrial fibrillation CONCLUSIONS     1 - Left atrial appendage is single lobed with spontaneous echo contrast with no visualized thrombus.     2 - Biatrial enlargement.     3 - Normal left ventricular systolic function.     4 - Trivial aortic regurgitation.     5 - Trivial mitral regurgitation.     6 - Trivial to mild tricuspid regurgitation.     7 - Right lower pulmonary vein not visualized.     8 - Left lower pulmonary vein not visualized.   Pulmonary:   Sleep Apnea Does not use CPAP   Neurological:   TIA,        Physical Exam  General:  Well nourished    Airway/Jaw/Neck:  Airway Findings: Mouth Opening: Normal Tongue: Normal  General Airway Assessment: Adult  Mallampati: II  Improves to II with phonation.  TM Distance: Normal, at least 6 cm      Dental:  Dental Findings: In tact   Chest/Lungs:  Chest/Lungs Findings: Clear to auscultation     Heart/Vascular:  Heart Findings: Rate: Normal  Rhythm: Irregularly Irregular  Sounds: Normal        Mental Status:  Mental Status Findings:   Cooperative, Alert and Oriented         Anesthesia Plan  Type of Anesthesia, risks & benefits discussed:  Anesthesia Type:  general  Patient's Preference: General  Intra-op Monitoring Plan: standard ASA monitors  Intra-op Monitoring Plan Comments: Standard ASA monitors.   Post Op Pain Control Plan: per primary service following discharge from PACU  Post Op Pain Control Plan Comments: Per primary service.     Induction:   IV  Beta Blocker:  Patient is not currently on a Beta-Blocker (No further documentation required).       Informed Consent: Patient understands risks and agrees with Anesthesia plan.  Questions answered. Anesthesia consent signed with patient.  ASA Score: 3     Day of Surgery Review of History & Physical:    H&P update referred to the surgeon.         Ready For Surgery From Anesthesia Perspective.

## 2019-04-02 ENCOUNTER — TELEPHONE (OUTPATIENT)
Dept: ENDOSCOPY | Facility: HOSPITAL | Age: 69
End: 2019-04-02

## 2019-04-10 ENCOUNTER — PATIENT MESSAGE (OUTPATIENT)
Dept: INTERNAL MEDICINE | Facility: CLINIC | Age: 69
End: 2019-04-10

## 2019-04-10 DIAGNOSIS — I10 ESSENTIAL HYPERTENSION: ICD-10-CM

## 2019-04-10 DIAGNOSIS — I48.19 PERSISTENT ATRIAL FIBRILLATION: ICD-10-CM

## 2019-04-10 DIAGNOSIS — Z00.00 ANNUAL PHYSICAL EXAM: Primary | ICD-10-CM

## 2019-04-10 DIAGNOSIS — Z12.5 ENCOUNTER FOR SCREENING FOR MALIGNANT NEOPLASM OF PROSTATE: ICD-10-CM

## 2019-04-10 DIAGNOSIS — E78.5 HYPERLIPIDEMIA, UNSPECIFIED HYPERLIPIDEMIA TYPE: ICD-10-CM

## 2019-04-10 RX ORDER — PRAVASTATIN SODIUM 20 MG/1
20 TABLET ORAL DAILY
Qty: 90 TABLET | Refills: 3 | Status: SHIPPED | OUTPATIENT
Start: 2019-04-10 | End: 2020-04-24

## 2019-04-11 ENCOUNTER — PES CALL (OUTPATIENT)
Dept: ADMINISTRATIVE | Facility: CLINIC | Age: 69
End: 2019-04-11

## 2019-05-21 ENCOUNTER — OFFICE VISIT (OUTPATIENT)
Dept: ELECTROPHYSIOLOGY | Facility: CLINIC | Age: 69
End: 2019-05-21
Payer: MEDICARE

## 2019-05-21 ENCOUNTER — HOSPITAL ENCOUNTER (OUTPATIENT)
Dept: CARDIOLOGY | Facility: CLINIC | Age: 69
Discharge: HOME | End: 2019-05-21
Payer: MEDICARE

## 2019-05-21 VITALS
DIASTOLIC BLOOD PRESSURE: 82 MMHG | WEIGHT: 250.69 LBS | SYSTOLIC BLOOD PRESSURE: 134 MMHG | HEIGHT: 72 IN | BODY MASS INDEX: 33.95 KG/M2 | HEART RATE: 75 BPM

## 2019-05-21 DIAGNOSIS — I48.91 ATRIAL FIBRILLATION, UNSPECIFIED TYPE: ICD-10-CM

## 2019-05-21 DIAGNOSIS — G45.9 TRANSIENT CEREBRAL ISCHEMIA, UNSPECIFIED TYPE: ICD-10-CM

## 2019-05-21 DIAGNOSIS — Z98.890 STATUS POST CATHETER ABLATION OF ATRIAL FIBRILLATION: ICD-10-CM

## 2019-05-21 DIAGNOSIS — I48.19 PERSISTENT ATRIAL FIBRILLATION: Primary | ICD-10-CM

## 2019-05-21 DIAGNOSIS — G47.33 OBSTRUCTIVE SLEEP APNEA: ICD-10-CM

## 2019-05-21 PROCEDURE — 99999 PR PBB SHADOW E&M-EST. PATIENT-LVL III: ICD-10-PCS | Mod: PBBFAC,HCNC,, | Performed by: INTERNAL MEDICINE

## 2019-05-21 PROCEDURE — 3075F PR MOST RECENT SYSTOLIC BLOOD PRESS GE 130-139MM HG: ICD-10-PCS | Mod: HCNC,CPTII,S$GLB, | Performed by: INTERNAL MEDICINE

## 2019-05-21 PROCEDURE — 93005 ELECTROCARDIOGRAM TRACING: CPT | Mod: HCNC,S$GLB,, | Performed by: INTERNAL MEDICINE

## 2019-05-21 PROCEDURE — 3079F DIAST BP 80-89 MM HG: CPT | Mod: HCNC,CPTII,S$GLB, | Performed by: INTERNAL MEDICINE

## 2019-05-21 PROCEDURE — 3075F SYST BP GE 130 - 139MM HG: CPT | Mod: HCNC,CPTII,S$GLB, | Performed by: INTERNAL MEDICINE

## 2019-05-21 PROCEDURE — 93010 ELECTROCARDIOGRAM REPORT: CPT | Mod: HCNC,S$GLB,, | Performed by: INTERNAL MEDICINE

## 2019-05-21 PROCEDURE — 93010 RHYTHM STRIP: ICD-10-PCS | Mod: HCNC,S$GLB,, | Performed by: INTERNAL MEDICINE

## 2019-05-21 PROCEDURE — 99214 PR OFFICE/OUTPT VISIT, EST, LEVL IV, 30-39 MIN: ICD-10-PCS | Mod: HCNC,S$GLB,, | Performed by: INTERNAL MEDICINE

## 2019-05-21 PROCEDURE — 99999 PR PBB SHADOW E&M-EST. PATIENT-LVL III: CPT | Mod: PBBFAC,HCNC,, | Performed by: INTERNAL MEDICINE

## 2019-05-21 PROCEDURE — 93005 RHYTHM STRIP: ICD-10-PCS | Mod: HCNC,S$GLB,, | Performed by: INTERNAL MEDICINE

## 2019-05-21 PROCEDURE — 1101F PR PT FALLS ASSESS DOC 0-1 FALLS W/OUT INJ PAST YR: ICD-10-PCS | Mod: HCNC,CPTII,S$GLB, | Performed by: INTERNAL MEDICINE

## 2019-05-21 PROCEDURE — 99214 OFFICE O/P EST MOD 30 MIN: CPT | Mod: HCNC,S$GLB,, | Performed by: INTERNAL MEDICINE

## 2019-05-21 PROCEDURE — 3079F PR MOST RECENT DIASTOLIC BLOOD PRESSURE 80-89 MM HG: ICD-10-PCS | Mod: HCNC,CPTII,S$GLB, | Performed by: INTERNAL MEDICINE

## 2019-05-21 PROCEDURE — 1101F PT FALLS ASSESS-DOCD LE1/YR: CPT | Mod: HCNC,CPTII,S$GLB, | Performed by: INTERNAL MEDICINE

## 2019-05-21 RX ORDER — METOPROLOL SUCCINATE 25 MG/1
25 TABLET, EXTENDED RELEASE ORAL DAILY
Qty: 90 TABLET | Refills: 3 | Status: SHIPPED | OUTPATIENT
Start: 2019-05-21 | End: 2020-05-04

## 2019-05-21 NOTE — PROGRESS NOTES
Subjective:    Patient ID:  Pavel Graff is a 69 y.o. male who presents for follow-up of Atrial Fibrillation      69 year old male with history of HTN, TIA, SIMON, HLD and persistent AF since July 2012.  DCCV was attempted with early recurrence of AF.  He was rate controlled and did not note significant symptoms at the time - so a rate control strategy was pursued.  He notes though, that ever since he has been in AF - he has been much more easily fatigued and tired, not himself.  He denies overt palpitation or SOB.  He denies chest pain.    Mr. Graff underwent a PVI (07/21/16) with successful pulmonary vein antral isolation. Since the procedure, Mr. Graff reports feeling well with only two 3-second episodes of palpitations; he denies further recurrence.  denies chest pain, SOB/BLANCO, dizziness, or syncope. Mr. Graff has noted improvement in his energy level since the procedure.     12/16: Continues improved symptoms. 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 Remains on pradaxa and metoprolol.     6/17: No recurrence of AF since PVI. Feels well. No active complaints.     2/18: AF recurrence without symptoms. Remains on apixaban. The patient denies interval chest pain, sob, palpitations, syncope, near syncope.     8/18: Stable symptoms with slightly more fatigue. He asks about repeat ablation at the end of the season.     11/18: Re-do PVI 9/28/18 with touch up PVI, posterior wall isolation and CTI line. Feels some what fatigued, no post procedure complications.     Interval history: No recurrence since PVI 9/18. Symptoms improved, stable.     2D echo 4/16    1 - Eccentric hypertrophy.     2 - Normal left ventricular systolic function (EF 60-65%).     3 - Right ventricular enlargement with normal systolic function.     4 - Biatrial enlargement.     5 - Mild aortic regurgitation.     6 - Mild mitral regurgitation.     7 - Mild tricuspid regurgitation.     8 - The estimated PA systolic pressure is 33  mmHg.     9 - Mildly elevated central venous pressure.     PET stress 5/16  1. There is no evidence of a discrete hemodynamically significant coronary stenosis.   2. Although no clinically significant stress defect is seen, there is resting flow heterogeneity that improves after Dipyridamole. These results suggest mild endothelial dysfunction due to elevated cholesterol, high blood pressure and diabetes without clinically significant,      localized perfusion defects.   3. Resting wall motion is physiologic. Stress wall motion is physiologic.   4. LV function is normal at rest and stress.  (normal is >= 51%)  5. The ventricular volumes are normal at rest and stress.   6. The extracardiac distribution of radioactivity is normal.   7. There was no previous study available to compare.    Past Medical History:  No date: *Atrial fibrillation  No date: Anticoagulant long-term use  No date: Atrial fibrillation  No date: Hyperlipidemia  No date: Hypertension  No date: Sleep apnea  No date: Stroke      Comment:  TIA  No date: TIA (transient ischemic attack)    Past Surgical History:  9/28/2018: ABLATION; N/A      Comment:  Performed by Romaine Howard MD at [facility] CATH LAB  7/21/2016: ABLATION; N/A      Comment:  Performed by Romaine Howard MD at [facility] CATH LAB  6/14/2016: CARDIOVERSION; N/A      Comment:  Performed by Romaine Howard MD at [facility] CATH LAB  1/11/10: CATARACT EXTRACTION W/  INTRAOCULAR LENS IMPLANT; Left  12/14/2017: CATARACT EXTRACTION W/  INTRAOCULAR LENS IMPLANT; Right      Comment:  DMEK/ Dr. Jay  3/26/2019: COLONOSCOPY; N/A      Comment:  Performed by Mauro Dye MD at [facility] ENDO (4TH FLR)  9/9/2013: COLONOSCOPY; N/A      Comment:  Performed by Mauro Dye MD at [facility] ENDO (4TH FLR)  1997: CORNEAL TRANSPLANT; Left      Comment:  PKP OS  03/23/2017: CORNEAL TRANSPLANT; Left  12/14/2017: CORNEAL TRANSPLANT; Right      Comment:  DMEK/ [place]; Dr. Jay  9/28/2018: ECHOCARDIOGRAM,TRANSESOPHAGEAL; N/A       Comment:  Performed by Romaine Howard MD at [facility] CATH LAB  No date: HERNIA REPAIR  No date: HIP ARTHROPLASTY  12/14/2017: INSERTION-INTRAOCULAR LENS (IOL); Right      Comment:  Performed by Chantell Jay MD at [facility] [place] OR  12/14/2017: PHACOEMULSIFICATION-ASPIRATION-CATARACT; Right      Comment:  Performed by Chantell Jay MD at [facility] [place] OR  No date: ROTATOR CUFF REPAIR  7/20/2016: TRANSESOPHAGEAL ECHOCARDIOGRAM (KAM); N/A      Comment:  Performed by Tameka Jay MD at [facility] CATH LAB  6/14/2016: TRANSESOPHAGEAL ECHOCARDIOGRAM (KAM); N/A      Comment:  Performed by Denzel Farmer MD at [facility] CATH LAB  6/14/2016: TRANSESOPHAGEAL ECHOCARDIOGRAM (KAM); N/A      Comment:  Performed by Romaine Howard MD at [facility] CATH LAB  12/14/2017: TRANSPLANT-CORNEA/DMEK; Right      Comment:  Performed by Chantell Jay MD at [facility] [place] OR  3/23/2017: TRANSPLANT-CORNEA/PKP; Left      Comment:  Performed by Chantell Jay MD at [facility] [place] OR    Social History    Socioeconomic History      Marital status:       Spouse name: Not on file      Number of children: 3      Years of education: Not on file      Highest education level: Not on file    Occupational History        Employer: [employer]    Social Needs      Financial resource strain: Not on file      Food insecurity:        Worry: Not on file        Inability: Not on file      Transportation needs:        Medical: Not on file        Non-medical: Not on file    Tobacco Use      Smoking status: Never Smoker      Smokeless tobacco: Never Used    Substance and Sexual Activity      Alcohol use: No      Drug use: No      Sexual activity: Yes        Partners: Female    Lifestyle      Physical activity:        Days per week: Not on file        Minutes per session: Not on file      Stress: Not on file    Relationships      Social connections:        Talks on phone: Not on file        Gets together: Not on file        Attends Spiritism service: Not on file        Active member  of club or organization: Not on file        Attends meetings of clubs or organizations: Not on file        Relationship status: Not on file    Other Topics      Concerns:        Not on file    Social History Narrative      Not on file    Review of patient's family history indicates:  Problem: Diabetes      Relation: Mother          Age of Onset: (Not Specified)  Problem: Heart disease      Relation: Mother          Age of Onset: (Not Specified)  Problem: Hyperlipidemia      Relation: Mother          Age of Onset: (Not Specified)  Problem: Heart disease      Relation: Father          Age of Onset: (Not Specified)  Problem: Colon cancer      Relation: Father          Age of Onset: (Not Specified)          Comment: colon  Problem: Cancer      Relation: Father          Age of Onset: (Not Specified)  Problem: Heart disease      Relation: Brother          Age of Onset: (Not Specified)  Problem: Crohn's disease      Relation: Sister          Age of Onset: (Not Specified)  Problem: Hyperlipidemia      Relation: Sister          Age of Onset: (Not Specified)  Problem: Hypertension      Relation: Sister          Age of Onset: (Not Specified)  Problem: Hyperlipidemia      Relation: Sister          Age of Onset: (Not Specified)  Problem: Sudden death      Relation: Neg Hx          Age of Onset: (Not Specified)  Problem: Amblyopia      Relation: Neg Hx          Age of Onset: (Not Specified)  Problem: Blindness      Relation: Neg Hx          Age of Onset: (Not Specified)  Problem: Cataracts      Relation: Neg Hx          Age of Onset: (Not Specified)  Problem: Glaucoma      Relation: Neg Hx          Age of Onset: (Not Specified)  Problem: Macular degeneration      Relation: Neg Hx          Age of Onset: (Not Specified)  Problem: Retinal detachment      Relation: Neg Hx          Age of Onset: (Not Specified)  Problem: Strabismus      Relation: Neg Hx          Age of Onset: (Not Specified)  Problem: Stroke      Relation: Neg Hx           Age of Onset: (Not Specified)  Problem: Thyroid disease      Relation: Neg Hx          Age of Onset: (Not Specified)        Review of Systems   Constitution: Negative for decreased appetite, diaphoresis and malaise/fatigue.   HENT: Negative for congestion and nosebleeds.    Eyes: Negative for blurred vision, double vision and pain.   Cardiovascular: Negative for chest pain, claudication, cyanosis, dyspnea on exertion, irregular heartbeat, leg swelling, near-syncope, orthopnea, palpitations, paroxysmal nocturnal dyspnea and syncope.   Respiratory: Negative for cough, hemoptysis, shortness of breath, sputum production and wheezing.    Endocrine: Negative for cold intolerance and heat intolerance.   Hematologic/Lymphatic: Negative for bleeding problem. Does not bruise/bleed easily.   Skin: Negative.  Negative for dry skin and flushing.   Musculoskeletal: Negative.  Negative for arthritis and falls.   Gastrointestinal: Negative for abdominal pain, hematemesis, hematochezia, nausea and vomiting.   Genitourinary: Negative for dysuria and flank pain.   Neurological: Negative for dizziness, headaches, light-headedness and weakness.   Psychiatric/Behavioral: Negative for altered mental status. The patient is not nervous/anxious.         Objective:    Physical Exam   Constitutional: He is oriented to person, place, and time. He appears well-developed and well-nourished. No distress.   HENT:   Head: Normocephalic and atraumatic.   Eyes: Pupils are equal, round, and reactive to light. Conjunctivae and EOM are normal.   Neck: Normal range of motion. Neck supple. No JVD present. No thyromegaly present.   Cardiovascular: Normal rate, regular rhythm, normal heart sounds and intact distal pulses.   No murmur heard.  Pulmonary/Chest: Effort normal and breath sounds normal. No respiratory distress. He has no wheezes.   Abdominal: Soft. Bowel sounds are normal. He exhibits no distension. There is no tenderness.   Musculoskeletal:  Normal range of motion. He exhibits no edema.   Neurological: He is alert and oriented to person, place, and time. No cranial nerve deficit.   Skin: Skin is warm and dry. No rash noted. He is not diaphoretic. No erythema.   Psychiatric: He has a normal mood and affect. His behavior is normal. Judgment and thought content normal.   Vitals reviewed.    ECG: NSR nl MT, QRS, QTc        Assessment:       1. Persistent atrial fibrillation    2. Status post catheter ablation of atrial fibrillation    3. Transient cerebral ischemia, unspecified type    4. Obstructive sleep apnea         Plan:       69 yoM persistent AF s/p PVI here for long term follow up. He has maintained NSR by symptoms as well as by ECG. Continue current therapy. RTC 1y or as needed

## 2019-06-10 ENCOUNTER — PES CALL (OUTPATIENT)
Dept: ADMINISTRATIVE | Facility: CLINIC | Age: 69
End: 2019-06-10

## 2019-06-24 RX ORDER — PREDNISOLONE ACETATE 10 MG/ML
SUSPENSION/ DROPS OPHTHALMIC
Qty: 5 ML | Refills: 4 | Status: SHIPPED | OUTPATIENT
Start: 2019-06-24 | End: 2019-11-15 | Stop reason: SDUPTHER

## 2019-07-06 RX ORDER — LOSARTAN POTASSIUM 50 MG/1
TABLET ORAL
Qty: 90 TABLET | Refills: 1 | Status: SHIPPED | OUTPATIENT
Start: 2019-07-06 | End: 2019-12-18 | Stop reason: SDUPTHER

## 2019-09-19 ENCOUNTER — PES CALL (OUTPATIENT)
Dept: ADMINISTRATIVE | Facility: CLINIC | Age: 69
End: 2019-09-19

## 2019-10-30 DIAGNOSIS — I48.19 PERSISTENT ATRIAL FIBRILLATION: ICD-10-CM

## 2019-11-06 RX ORDER — TIMOLOL MALEATE 5 MG/ML
SOLUTION/ DROPS OPHTHALMIC
Qty: 15 ML | Refills: 1 | Status: SHIPPED | OUTPATIENT
Start: 2019-11-06 | End: 2020-05-12

## 2019-11-15 RX ORDER — PREDNISOLONE ACETATE 10 MG/ML
SUSPENSION/ DROPS OPHTHALMIC
Qty: 5 ML | Refills: 4 | Status: SHIPPED | OUTPATIENT
Start: 2019-11-15 | End: 2020-05-10 | Stop reason: SDUPTHER

## 2019-11-17 RX ORDER — HYDROCHLOROTHIAZIDE 25 MG/1
TABLET ORAL
Qty: 90 TABLET | Refills: 3 | Status: SHIPPED | OUTPATIENT
Start: 2019-11-17 | End: 2020-10-26

## 2019-12-18 RX ORDER — LOSARTAN POTASSIUM 50 MG/1
TABLET ORAL
Qty: 90 TABLET | Refills: 1 | Status: SHIPPED | OUTPATIENT
Start: 2019-12-18 | End: 2020-06-10 | Stop reason: SDUPTHER

## 2020-01-28 ENCOUNTER — PATIENT OUTREACH (OUTPATIENT)
Dept: ADMINISTRATIVE | Facility: HOSPITAL | Age: 70
End: 2020-01-28

## 2020-02-10 ENCOUNTER — PES CALL (OUTPATIENT)
Dept: ADMINISTRATIVE | Facility: CLINIC | Age: 70
End: 2020-02-10

## 2020-02-12 NOTE — PROGRESS NOTES
PAST MEDICAL HISTORY:  Persistent atrial fibrillation with previous direct cardioversion and status post pulmonary vein isolation twice  Obstructive sleep apnea with the use of CPAP.  Hyperlipidemia.  Hypertension.  Previous TIA.  Right hip arthroplasty.  Bilateral inguinal hernia repair Right rotator cuff tear.  Cataract extraction with corneal transplant.     SOCIAL HISTORY:  Tobacco and alcohol use -- none.  , has three children.    Works at [employer].  No formal exercise routine.     FAMILY HISTORY:  Father is , heart disease, colon cancer.  Mother is   alive, diabetes and hypertension, hip fracture.  He has one brother,   hypertension and heart disease.  Three sisters: one with Crohn's disease and two   sisters with hyperlipidemia.     SCREENING:  Colonoscopy on 2013 revealed a benign polyp.     MEDICATIONS:  Eliquis 5 mg twice a day.  Aspirin 81 mg a day.  Hydrochlorothiazide 25 mg a day.  Losartan 50 mg a day.  Metoprolol succinate 20 mg a day.  Pravastatin 20 mg a day.  Eye drops outlined in the med sheet.      70-year-old male comes in for any routine visit    Time he notes that he has gained more belly fat and his midsection and because of such is concerned about insulin resistance     in the past his blood glucose was elevated  Also feels as sleep is not great  He has obstructive sleep apnea but does not use a CPAP  He actually uses for few years in did not find that it helped out anyway  Time he may have trouble going to sleep stand sleep throughout the night and has nocturia x4  He states his best sleep is for a.m. to 6:00 a.m.  He admits that he sleeps with his mouth open and has been told of snoring      Systems review  No chest pain shortness of breath abdominal pain  Regular bowel function  Urination is frequent and has nocturia x4  No arthralgia headaches indigestion heartburn    Examination  Weight 250  Pulse 80  Blood pressure 1   HEENT exam no abnormal  findings  Lungs clear breath sounds  Heart regular rate and rhythm  Abdominal exam bowel sounds soft nontender no hepatosplenomegaly  Pulses 2+ carotid pedal pulses no bruits  Extremities no edema  Digital rectal exam  stool brown  prostate is mildly enlarged  Skin multiple skin tags in his axillary region that he pointed out    lipoma on the back  Ft    both feet has a high arch and curled up toes    Impression  General exam  Hypertension  Hyperlipidemia  Hyperglycemia  Persistent atrial fibrillation with previous procedures noted above  Sleep disturbance with some obstructive sleep apnea  BPH with nocturia frequency  Skin tags    Plan  Routine labs including insulin level  Referral to Sleep Medicine, dermatology, podiatry

## 2020-02-13 ENCOUNTER — OFFICE VISIT (OUTPATIENT)
Dept: INTERNAL MEDICINE | Facility: CLINIC | Age: 70
End: 2020-02-13
Payer: MEDICARE

## 2020-02-13 VITALS
HEART RATE: 87 BPM | SYSTOLIC BLOOD PRESSURE: 130 MMHG | BODY MASS INDEX: 34.54 KG/M2 | DIASTOLIC BLOOD PRESSURE: 78 MMHG | OXYGEN SATURATION: 98 % | WEIGHT: 255 LBS | HEIGHT: 72 IN

## 2020-02-13 DIAGNOSIS — L91.8 SKIN TAGS, MULTIPLE ACQUIRED: ICD-10-CM

## 2020-02-13 DIAGNOSIS — Z00.00 ANNUAL PHYSICAL EXAM: Primary | ICD-10-CM

## 2020-02-13 DIAGNOSIS — I10 ESSENTIAL HYPERTENSION: ICD-10-CM

## 2020-02-13 DIAGNOSIS — G47.33 OBSTRUCTIVE SLEEP APNEA: ICD-10-CM

## 2020-02-13 DIAGNOSIS — M21.961 FOOT DEFORMITY, BILATERAL: ICD-10-CM

## 2020-02-13 DIAGNOSIS — M21.962 FOOT DEFORMITY, BILATERAL: ICD-10-CM

## 2020-02-13 DIAGNOSIS — R73.9 HYPERGLYCEMIA: ICD-10-CM

## 2020-02-13 DIAGNOSIS — I48.19 PERSISTENT ATRIAL FIBRILLATION: ICD-10-CM

## 2020-02-13 DIAGNOSIS — Z12.5 ENCOUNTER FOR SCREENING FOR MALIGNANT NEOPLASM OF PROSTATE: ICD-10-CM

## 2020-02-13 DIAGNOSIS — E78.5 HYPERLIPIDEMIA, UNSPECIFIED HYPERLIPIDEMIA TYPE: ICD-10-CM

## 2020-02-13 PROCEDURE — 99397 PER PM REEVAL EST PAT 65+ YR: CPT | Mod: HCNC,S$GLB,, | Performed by: INTERNAL MEDICINE

## 2020-02-13 PROCEDURE — 99499 UNLISTED E&M SERVICE: CPT | Mod: S$GLB,,, | Performed by: INTERNAL MEDICINE

## 2020-02-13 PROCEDURE — 3078F DIAST BP <80 MM HG: CPT | Mod: HCNC,CPTII,S$GLB, | Performed by: INTERNAL MEDICINE

## 2020-02-13 PROCEDURE — 99397 PR PREVENTIVE VISIT,EST,65 & OVER: ICD-10-PCS | Mod: HCNC,S$GLB,, | Performed by: INTERNAL MEDICINE

## 2020-02-13 PROCEDURE — 99999 PR PBB SHADOW E&M-EST. PATIENT-LVL IV: ICD-10-PCS | Mod: PBBFAC,HCNC,, | Performed by: INTERNAL MEDICINE

## 2020-02-13 PROCEDURE — 99999 PR PBB SHADOW E&M-EST. PATIENT-LVL IV: CPT | Mod: PBBFAC,HCNC,, | Performed by: INTERNAL MEDICINE

## 2020-02-13 PROCEDURE — 3075F SYST BP GE 130 - 139MM HG: CPT | Mod: HCNC,CPTII,S$GLB, | Performed by: INTERNAL MEDICINE

## 2020-02-13 PROCEDURE — 3078F PR MOST RECENT DIASTOLIC BLOOD PRESSURE < 80 MM HG: ICD-10-PCS | Mod: HCNC,CPTII,S$GLB, | Performed by: INTERNAL MEDICINE

## 2020-02-13 PROCEDURE — 99499 RISK ADDL DX/OHS AUDIT: ICD-10-PCS | Mod: S$GLB,,, | Performed by: INTERNAL MEDICINE

## 2020-02-13 PROCEDURE — 3075F PR MOST RECENT SYSTOLIC BLOOD PRESS GE 130-139MM HG: ICD-10-PCS | Mod: HCNC,CPTII,S$GLB, | Performed by: INTERNAL MEDICINE

## 2020-02-13 RX ORDER — TAMSULOSIN HYDROCHLORIDE 0.4 MG/1
0.4 CAPSULE ORAL DAILY
Qty: 30 CAPSULE | Refills: 5 | Status: SHIPPED | OUTPATIENT
Start: 2020-02-13 | End: 2020-07-23

## 2020-02-14 ENCOUNTER — LAB VISIT (OUTPATIENT)
Dept: LAB | Facility: HOSPITAL | Age: 70
End: 2020-02-14
Attending: INTERNAL MEDICINE
Payer: MEDICARE

## 2020-02-14 DIAGNOSIS — I48.19 PERSISTENT ATRIAL FIBRILLATION: ICD-10-CM

## 2020-02-14 DIAGNOSIS — M21.962 FOOT DEFORMITY, BILATERAL: ICD-10-CM

## 2020-02-14 DIAGNOSIS — M21.961 FOOT DEFORMITY, BILATERAL: ICD-10-CM

## 2020-02-14 DIAGNOSIS — E78.5 HYPERLIPIDEMIA, UNSPECIFIED HYPERLIPIDEMIA TYPE: ICD-10-CM

## 2020-02-14 DIAGNOSIS — I10 ESSENTIAL HYPERTENSION: ICD-10-CM

## 2020-02-14 DIAGNOSIS — R73.9 HYPERGLYCEMIA: ICD-10-CM

## 2020-02-14 DIAGNOSIS — G47.33 OBSTRUCTIVE SLEEP APNEA: ICD-10-CM

## 2020-02-14 DIAGNOSIS — Z00.00 ANNUAL PHYSICAL EXAM: ICD-10-CM

## 2020-02-14 DIAGNOSIS — Z12.5 ENCOUNTER FOR SCREENING FOR MALIGNANT NEOPLASM OF PROSTATE: ICD-10-CM

## 2020-02-14 DIAGNOSIS — L91.8 SKIN TAGS, MULTIPLE ACQUIRED: ICD-10-CM

## 2020-02-14 LAB
ALBUMIN SERPL BCP-MCNC: 4.4 G/DL (ref 3.5–5.2)
ALP SERPL-CCNC: 80 U/L (ref 55–135)
ALT SERPL W/O P-5'-P-CCNC: 23 U/L (ref 10–44)
ANION GAP SERPL CALC-SCNC: 9 MMOL/L (ref 8–16)
AST SERPL-CCNC: 26 U/L (ref 10–40)
BASOPHILS # BLD AUTO: 0.04 K/UL (ref 0–0.2)
BASOPHILS NFR BLD: 0.8 % (ref 0–1.9)
BILIRUB SERPL-MCNC: 1 MG/DL (ref 0.1–1)
BUN SERPL-MCNC: 11 MG/DL (ref 8–23)
CALCIUM SERPL-MCNC: 10.2 MG/DL (ref 8.7–10.5)
CHLORIDE SERPL-SCNC: 102 MMOL/L (ref 95–110)
CHOLEST SERPL-MCNC: 180 MG/DL (ref 120–199)
CHOLEST/HDLC SERPL: 4.6 {RATIO} (ref 2–5)
CO2 SERPL-SCNC: 28 MMOL/L (ref 23–29)
COMPLEXED PSA SERPL-MCNC: 1.5 NG/ML (ref 0–4)
CREAT SERPL-MCNC: 1 MG/DL (ref 0.5–1.4)
DIFFERENTIAL METHOD: ABNORMAL
EOSINOPHIL # BLD AUTO: 0.2 K/UL (ref 0–0.5)
EOSINOPHIL NFR BLD: 4.1 % (ref 0–8)
ERYTHROCYTE [DISTWIDTH] IN BLOOD BY AUTOMATED COUNT: 12 % (ref 11.5–14.5)
EST. GFR  (AFRICAN AMERICAN): >60 ML/MIN/1.73 M^2
EST. GFR  (NON AFRICAN AMERICAN): >60 ML/MIN/1.73 M^2
ESTIMATED AVG GLUCOSE: 105 MG/DL (ref 68–131)
GLUCOSE SERPL-MCNC: 108 MG/DL (ref 70–110)
HBA1C MFR BLD HPLC: 5.3 % (ref 4–5.6)
HCT VFR BLD AUTO: 43.5 % (ref 40–54)
HDLC SERPL-MCNC: 39 MG/DL (ref 40–75)
HDLC SERPL: 21.7 % (ref 20–50)
HGB BLD-MCNC: 14.7 G/DL (ref 14–18)
IMM GRANULOCYTES # BLD AUTO: 0.05 K/UL (ref 0–0.04)
IMM GRANULOCYTES NFR BLD AUTO: 1 % (ref 0–0.5)
INSULIN COLLECTION INTERVAL: NORMAL
INSULIN SERPL-ACNC: 21.6 UU/ML
LDLC SERPL CALC-MCNC: 109.8 MG/DL (ref 63–159)
LYMPHOCYTES # BLD AUTO: 0.7 K/UL (ref 1–4.8)
LYMPHOCYTES NFR BLD: 14 % (ref 18–48)
MCH RBC QN AUTO: 32.1 PG (ref 27–31)
MCHC RBC AUTO-ENTMCNC: 33.8 G/DL (ref 32–36)
MCV RBC AUTO: 95 FL (ref 82–98)
MONOCYTES # BLD AUTO: 0.5 K/UL (ref 0.3–1)
MONOCYTES NFR BLD: 10.3 % (ref 4–15)
NEUTROPHILS # BLD AUTO: 3.6 K/UL (ref 1.8–7.7)
NEUTROPHILS NFR BLD: 69.8 % (ref 38–73)
NONHDLC SERPL-MCNC: 141 MG/DL
NRBC BLD-RTO: 0 /100 WBC
PLATELET # BLD AUTO: 274 K/UL (ref 150–350)
PMV BLD AUTO: 9.9 FL (ref 9.2–12.9)
POTASSIUM SERPL-SCNC: 4.2 MMOL/L (ref 3.5–5.1)
PROT SERPL-MCNC: 7.2 G/DL (ref 6–8.4)
RBC # BLD AUTO: 4.58 M/UL (ref 4.6–6.2)
SODIUM SERPL-SCNC: 139 MMOL/L (ref 136–145)
TRIGL SERPL-MCNC: 156 MG/DL (ref 30–150)
TSH SERPL DL<=0.005 MIU/L-ACNC: 1.48 UIU/ML (ref 0.4–4)
WBC # BLD AUTO: 5.16 K/UL (ref 3.9–12.7)

## 2020-02-14 PROCEDURE — 83525 ASSAY OF INSULIN: CPT | Mod: HCNC

## 2020-02-14 PROCEDURE — 84443 ASSAY THYROID STIM HORMONE: CPT | Mod: HCNC

## 2020-02-14 PROCEDURE — 85025 COMPLETE CBC W/AUTO DIFF WBC: CPT | Mod: HCNC

## 2020-02-14 PROCEDURE — 80061 LIPID PANEL: CPT | Mod: HCNC

## 2020-02-14 PROCEDURE — 80053 COMPREHEN METABOLIC PANEL: CPT | Mod: HCNC

## 2020-02-14 PROCEDURE — 84153 ASSAY OF PSA TOTAL: CPT | Mod: HCNC

## 2020-02-14 PROCEDURE — 83036 HEMOGLOBIN GLYCOSYLATED A1C: CPT | Mod: HCNC

## 2020-02-15 ENCOUNTER — PATIENT MESSAGE (OUTPATIENT)
Dept: INTERNAL MEDICINE | Facility: CLINIC | Age: 70
End: 2020-02-15

## 2020-02-15 NOTE — PROGRESS NOTES
Overall the labs look fine    The insulin level was on the upper end of normal, therefore there may be a degree of insulin resistant     but the hemoglobin A1c and blood glucose was normal, thus do not feel that he has pre diabetes      I feel that he needs to follow up with sleep medicine regarding uncorrected sleep apnea  In talking with his wife it appears that the main issue is that he has never had a mask that created a good seal and air always leak on his eye  Sleep clinic referral was put in

## 2020-04-03 ENCOUNTER — TELEPHONE (OUTPATIENT)
Dept: PODIATRY | Facility: CLINIC | Age: 70
End: 2020-04-03

## 2020-04-24 RX ORDER — PRAVASTATIN SODIUM 20 MG/1
TABLET ORAL
Qty: 90 TABLET | Refills: 3 | Status: SHIPPED | OUTPATIENT
Start: 2020-04-24 | End: 2022-03-07

## 2020-05-04 DIAGNOSIS — I48.19 PERSISTENT ATRIAL FIBRILLATION: ICD-10-CM

## 2020-05-04 RX ORDER — METOPROLOL SUCCINATE 25 MG/1
TABLET, EXTENDED RELEASE ORAL
Qty: 90 TABLET | Refills: 3 | Status: SHIPPED | OUTPATIENT
Start: 2020-05-04 | End: 2021-06-07 | Stop reason: SDUPTHER

## 2020-05-10 DIAGNOSIS — Z94.7 STATUS POST CORNEAL TRANSPLANT: Primary | ICD-10-CM

## 2020-05-10 RX ORDER — PREDNISOLONE ACETATE 10 MG/ML
1 SUSPENSION/ DROPS OPHTHALMIC 2 TIMES DAILY
Qty: 10 ML | Refills: 4 | Status: SHIPPED | OUTPATIENT
Start: 2020-05-10 | End: 2020-05-15 | Stop reason: SDUPTHER

## 2020-05-10 NOTE — PROGRESS NOTES
Received patient phone call regarding need for refills of pred-forte. I reviewed last note from Dr. Jay which states patient to be on PF BID OU, but patient states he is currently using it once daily in both eyes. Refill sent to patient's preferred pharmacy.    Spenser Pickett MD  [employer] [facility] PGY-2

## 2020-05-11 ENCOUNTER — LAB VISIT (OUTPATIENT)
Dept: PRIMARY CARE CLINIC | Facility: CLINIC | Age: 70
End: 2020-05-11
Payer: MEDICARE

## 2020-05-11 ENCOUNTER — RESEARCH ENCOUNTER (OUTPATIENT)
Dept: RESEARCH | Facility: HOSPITAL | Age: 70
End: 2020-05-11

## 2020-05-11 DIAGNOSIS — Z00.6 RESEARCH STUDY PATIENT: Primary | ICD-10-CM

## 2020-05-11 PROCEDURE — 86769 SARS-COV-2 COVID-19 ANTIBODY: CPT | Mod: HCNC

## 2020-05-11 PROCEDURE — U0002 COVID-19 LAB TEST NON-CDC: HCPCS | Mod: HCNC

## 2020-05-11 NOTE — PROGRESS NOTES
Date of Consent: may 11, 2020    Sponsor: [facility]    Study Title/IRB Number: Observational study of Sars-CoV2 Immunoglobulin G (IgG) seroprevalence among the [facility] population over time 2020.163  Principle Investigator: Ángela Cevallos, PhD    Did the patient need translation services? No   name: N/A    Prior to the Informed Consent (IC) being signed, or any study protocol required data collection, testing, procedure, or intervention being performed, the following was done and/or discussed:   Patient was given a paper copy of the IC for review    Patient was given study FAQ   Purpose of the study and qualifications to participate    Study design and tests or procedures done at this visit   Confidentiality and HIPAA Authorization for Release of Medical Records for the research trial/ subject's rights/research related injury   Risk, Benefits, Alternative Treatments, Compensation and Costs   Participation in the research trial is voluntary and patient may withdraw at anytime   Contact information for study related questions    Patient verbalizes understanding of the above: Yes  Contact information for PI and IRB given to patient: Yes  Patient able to adequately summarize: the purpose of the study, the risks associated with the study, and all procedures, testing, and follow-ups associated with the study: Yes    The consent was discussed verbally with the patient and all questions were answered satisfactorily. Patient gave verbal consent for the Seroprevalence research study with an IRB approval date of 05/08/2020.      The Consent, Consent Witness and name of Clinical Research Coordinator consenting was captured and documented in REDCap.    All Inclusion and Exclusion Criteria reviewed, subject meets all Inclusion criteria and does not meet any Exclusion Criteria at this time.     Patient Eligibility was confirmed.    Patient responded to survey questions.    The following biospecimen  collection procedures were collected:    -Nasopharyngeal Swab Collection  -Blood collection

## 2020-05-12 LAB
SARS-COV-2 IGG SERPLBLD QL IA.RAPID: POSITIVE
SARS-COV-2 RNA RESP QL NAA+PROBE: NOT DETECTED

## 2020-05-12 RX ORDER — TIMOLOL MALEATE 5 MG/ML
SOLUTION/ DROPS OPHTHALMIC
Qty: 15 ML | Refills: 1 | Status: SHIPPED | OUTPATIENT
Start: 2020-05-12 | End: 2022-05-04

## 2020-05-15 DIAGNOSIS — Z94.7 STATUS POST CORNEAL TRANSPLANT: ICD-10-CM

## 2020-05-15 RX ORDER — PREDNISOLONE ACETATE 10 MG/ML
1 SUSPENSION/ DROPS OPHTHALMIC 4 TIMES DAILY
Qty: 5 ML | Refills: 4 | Status: SHIPPED | OUTPATIENT
Start: 2020-05-15 | End: 2021-12-21

## 2020-06-23 ENCOUNTER — PATIENT OUTREACH (OUTPATIENT)
Dept: ADMINISTRATIVE | Facility: OTHER | Age: 70
End: 2020-06-23

## 2020-06-25 ENCOUNTER — OFFICE VISIT (OUTPATIENT)
Dept: DERMATOLOGY | Facility: CLINIC | Age: 70
End: 2020-06-25
Payer: MEDICARE

## 2020-06-25 DIAGNOSIS — L73.8 SEBACEOUS HYPERPLASIA: ICD-10-CM

## 2020-06-25 DIAGNOSIS — L82.1 SEBORRHEIC KERATOSIS: ICD-10-CM

## 2020-06-25 DIAGNOSIS — D17.1 LIPOMA OF TORSO: ICD-10-CM

## 2020-06-25 DIAGNOSIS — L81.6 POIKILODERMA: ICD-10-CM

## 2020-06-25 DIAGNOSIS — L91.8 SKIN TAGS, MULTIPLE ACQUIRED: ICD-10-CM

## 2020-06-25 DIAGNOSIS — L57.0 ACTINIC KERATOSIS: Primary | ICD-10-CM

## 2020-06-25 DIAGNOSIS — D18.01 CHERRY ANGIOMA: ICD-10-CM

## 2020-06-25 PROCEDURE — 17000 PR DESTRUCTION(LASER SURGERY,CRYOSURGERY,CHEMOSURGERY),PREMALIGNANT LESIONS,FIRST LESION: ICD-10-PCS | Mod: HCNC,S$GLB,, | Performed by: PHYSICIAN ASSISTANT

## 2020-06-25 PROCEDURE — 1159F MED LIST DOCD IN RCRD: CPT | Mod: HCNC,S$GLB,, | Performed by: PHYSICIAN ASSISTANT

## 2020-06-25 PROCEDURE — 1101F PT FALLS ASSESS-DOCD LE1/YR: CPT | Mod: HCNC,CPTII,S$GLB, | Performed by: PHYSICIAN ASSISTANT

## 2020-06-25 PROCEDURE — 99999 PR PBB SHADOW E&M-EST. PATIENT-LVL III: CPT | Mod: PBBFAC,HCNC,, | Performed by: PHYSICIAN ASSISTANT

## 2020-06-25 PROCEDURE — 1101F PR PT FALLS ASSESS DOC 0-1 FALLS W/OUT INJ PAST YR: ICD-10-PCS | Mod: HCNC,CPTII,S$GLB, | Performed by: PHYSICIAN ASSISTANT

## 2020-06-25 PROCEDURE — 17000 DESTRUCT PREMALG LESION: CPT | Mod: HCNC,S$GLB,, | Performed by: PHYSICIAN ASSISTANT

## 2020-06-25 PROCEDURE — 1159F PR MEDICATION LIST DOCUMENTED IN MEDICAL RECORD: ICD-10-PCS | Mod: HCNC,S$GLB,, | Performed by: PHYSICIAN ASSISTANT

## 2020-06-25 PROCEDURE — 99999 PR PBB SHADOW E&M-EST. PATIENT-LVL III: ICD-10-PCS | Mod: PBBFAC,HCNC,, | Performed by: PHYSICIAN ASSISTANT

## 2020-06-25 PROCEDURE — 99202 PR OFFICE/OUTPT VISIT, NEW, LEVL II, 15-29 MIN: ICD-10-PCS | Mod: 25,HCNC,S$GLB, | Performed by: PHYSICIAN ASSISTANT

## 2020-06-25 PROCEDURE — 1126F AMNT PAIN NOTED NONE PRSNT: CPT | Mod: HCNC,S$GLB,, | Performed by: PHYSICIAN ASSISTANT

## 2020-06-25 PROCEDURE — 99202 OFFICE O/P NEW SF 15 MIN: CPT | Mod: 25,HCNC,S$GLB, | Performed by: PHYSICIAN ASSISTANT

## 2020-06-25 PROCEDURE — 1126F PR PAIN SEVERITY QUANTIFIED, NO PAIN PRESENT: ICD-10-PCS | Mod: HCNC,S$GLB,, | Performed by: PHYSICIAN ASSISTANT

## 2020-06-25 NOTE — PROGRESS NOTES
Subjective:       Patient ID:  Pavel Graff is a 70 y.o. male who presents for   Chief Complaint   Patient presents with    Lesion     nose    Skin Tags     right and left flank     Pt denies hx of mm or nmsc. Has not had a skin check in yrs.    Lesion - Initial  Affected locations: nose  Duration: unsure - maybe a yr.  Signs / symptoms: rough and scabs  Aggravated by: nothing  Relieving factors/Treatments tried: nothing    Skin Tags - Initial  Affected locations: right axilla and left axilla  Duration: yrs.  Signs / symptoms: asymptomatic  Aggravated by: nothing  Relieving factors/Treatments tried: nothing        Review of Systems   Constitutional: Negative for fever and chills.   Skin: Positive for wears hat (usually - ). Negative for daily sunscreen use and activity-related sunscreen use.   Hematologic/Lymphatic: Bruises/bleeds easily (on Eliquis).        Objective:    Physical Exam   Constitutional: He appears well-developed and well-nourished. No distress.   Neurological: He is alert and oriented to person, place, and time. He is not disoriented.   Psychiatric: He has a normal mood and affect.   Skin:   Areas Examined (abnormalities noted in diagram):   Head / Face Inspection Performed  Neck Inspection Performed  Chest / Axilla Inspection Performed  RUE Inspected  LUE Inspection Performed                       Diagram Legend     Erythematous scaling macule/papule c/w actinic keratosis       Vascular papule c/w angioma      Pigmented verrucoid papule/plaque c/w seborrheic keratosis      Yellow umbilicated papule c/w sebaceous hyperplasia      Irregularly shaped tan macule c/w lentigo     1-2 mm smooth white papules consistent with Milia      Movable subcutaneous cyst with punctum c/w epidermal inclusion cyst      Subcutaneous movable cyst c/w pilar cyst      Firm pink to brown papule c/w dermatofibroma      Pedunculated fleshy papule(s) c/w skin tag(s)      Evenly pigmented macule c/w  junctional nevus     Mildly variegated pigmented, slightly irregular-bordered macule c/w mildly atypical nevus      Flesh colored to evenly pigmented papule c/w intradermal nevus       Pink pearly papule/plaque c/w basal cell carcinoma      Erythematous hyperkeratotic cursted plaque c/w SCC      Surgical scar with no sign of skin cancer recurrence      Open and closed comedones      Inflammatory papules and pustules      Verrucoid papule consistent consistent with wart     Erythematous eczematous patches and plaques     Dystrophic onycholytic nail with subungual debris c/w onychomycosis     Umbilicated papule    Erythematous-base heme-crusted tan verrucoid plaque consistent with inflamed seborrheic keratosis     Erythematous Silvery Scaling Plaque c/w Psoriasis     See annotation      Assessment / Plan:      Actinic keratosis  Cryosurgery Procedure Note    Verbal consent from the patient is obtained including, but not limited to, risk of hypopigmentation/hyperpigmentation, scar, recurrence of lesion. The patient is aware of the precancerous quality and need for treatment of these lesions. Liquid nitrogen cryosurgery is applied to the 1 actinic keratoses, as detailed in the physical exam, to produce a freeze injury. The patient is aware that blisters may form and is instructed on wound care with gentle cleansing and use of vaseline ointment to keep moist until healed. The patient is supplied a handout on cryosurgery and is instructed to call if lesions do not completely resolve.    Encouraged pt to wear sunscreen and hat everyday. Samples of EltaMD given.    Skin tags, multiple acquired  Reassurance given to patient. No treatment is necessary.   Treatment of benign, asymptomatic lesions may be considered cosmetic.    Seborrheic keratosis  These are benign inherited growths without a malignant potential. Reassurance given to patient. No treatment is necessary.     Sebaceous hyperplasia  This is a common condition  representing benign enlargement of the sebaceous lobule. It typically occurs in adulthood. Reassurance given to patient.     Diamond angioma  This is a benign vascular lesion. Reassurance given. No treatment required.     Lipoma of torso  Reassurance given to patient. No treatment is necessary. Discussed excision by general surgery - pt declines at this time.    Poikiloderma  This is a common finding on necks, chests and sometimes backs and shoulders after chronic sun exposure. Aggressive sun protection is recommended.          Follow up in about 1 year (around 6/25/2021) for UBSE.

## 2020-06-25 NOTE — PATIENT INSTRUCTIONS

## 2020-08-24 ENCOUNTER — PES CALL (OUTPATIENT)
Dept: ADMINISTRATIVE | Facility: CLINIC | Age: 70
End: 2020-08-24

## 2020-09-29 ENCOUNTER — PATIENT MESSAGE (OUTPATIENT)
Dept: OTHER | Facility: OTHER | Age: 70
End: 2020-09-29

## 2020-10-01 DIAGNOSIS — I48.19 PERSISTENT ATRIAL FIBRILLATION: ICD-10-CM

## 2020-10-01 NOTE — TELEPHONE ENCOUNTER
----- Message from Ale Ponce MA sent at 10/1/2020 11:16 AM CDT -----  The patient need refill on his Eliquis 5 mg please sent it to [facility]  pharmacy at [phone]. Thank you.

## 2020-11-05 ENCOUNTER — PES CALL (OUTPATIENT)
Dept: ADMINISTRATIVE | Facility: CLINIC | Age: 70
End: 2020-11-05

## 2020-12-11 ENCOUNTER — PATIENT MESSAGE (OUTPATIENT)
Dept: OTHER | Facility: OTHER | Age: 70
End: 2020-12-11

## 2021-01-10 ENCOUNTER — IMMUNIZATION (OUTPATIENT)
Dept: INTERNAL MEDICINE | Facility: CLINIC | Age: 71
End: 2021-01-10
Payer: MEDICARE

## 2021-01-10 DIAGNOSIS — Z23 NEED FOR VACCINATION: ICD-10-CM

## 2021-01-10 PROCEDURE — 91300 COVID-19, MRNA, LNP-S, PF, 30 MCG/0.3 ML DOSE VACCINE: CPT | Mod: PBBFAC | Performed by: INTERNAL MEDICINE

## 2021-01-31 ENCOUNTER — IMMUNIZATION (OUTPATIENT)
Dept: INTERNAL MEDICINE | Facility: CLINIC | Age: 71
End: 2021-01-31
Payer: MEDICARE

## 2021-01-31 DIAGNOSIS — Z23 NEED FOR VACCINATION: Primary | ICD-10-CM

## 2021-01-31 PROCEDURE — 91300 PR SARS-COV- 2 COVID-19 VACCINE, NO PRSV, 30MCG/0.3ML, IM: CPT | Mod: PBBFAC | Performed by: INTERNAL MEDICINE

## 2021-01-31 PROCEDURE — 0002A PR IMMUNIZ ADMIN, SARS-COV-2 COVID-19 VACC, 30MCG/0.3ML, 2ND DOSE: CPT | Mod: PBBFAC | Performed by: INTERNAL MEDICINE

## 2021-01-31 RX ADMIN — RNA INGREDIENT BNT-162B2 0.3 ML: 0.23 INJECTION, SUSPENSION INTRAMUSCULAR at 08:01

## 2021-03-15 ENCOUNTER — PATIENT OUTREACH (OUTPATIENT)
Dept: ADMINISTRATIVE | Facility: HOSPITAL | Age: 71
End: 2021-03-15

## 2021-03-15 DIAGNOSIS — E78.5 HYPERLIPIDEMIA, UNSPECIFIED HYPERLIPIDEMIA TYPE: Primary | ICD-10-CM

## 2021-03-16 ENCOUNTER — OFFICE VISIT (OUTPATIENT)
Dept: INTERNAL MEDICINE | Facility: CLINIC | Age: 71
End: 2021-03-16
Payer: MEDICARE

## 2021-03-16 ENCOUNTER — LAB VISIT (OUTPATIENT)
Dept: LAB | Facility: HOSPITAL | Age: 71
End: 2021-03-16
Attending: INTERNAL MEDICINE
Payer: MEDICARE

## 2021-03-16 VITALS
WEIGHT: 254 LBS | BODY MASS INDEX: 34.4 KG/M2 | HEIGHT: 72 IN | HEART RATE: 76 BPM | OXYGEN SATURATION: 98 % | SYSTOLIC BLOOD PRESSURE: 128 MMHG | DIASTOLIC BLOOD PRESSURE: 78 MMHG

## 2021-03-16 DIAGNOSIS — I10 ESSENTIAL HYPERTENSION: ICD-10-CM

## 2021-03-16 DIAGNOSIS — R35.1 BENIGN PROSTATIC HYPERPLASIA WITH NOCTURIA: ICD-10-CM

## 2021-03-16 DIAGNOSIS — I48.19 PERSISTENT ATRIAL FIBRILLATION: ICD-10-CM

## 2021-03-16 DIAGNOSIS — R73.9 HYPERGLYCEMIA: ICD-10-CM

## 2021-03-16 DIAGNOSIS — Z00.00 ANNUAL PHYSICAL EXAM: Primary | ICD-10-CM

## 2021-03-16 DIAGNOSIS — Z12.5 ENCOUNTER FOR SCREENING FOR MALIGNANT NEOPLASM OF PROSTATE: ICD-10-CM

## 2021-03-16 DIAGNOSIS — G47.33 OBSTRUCTIVE SLEEP APNEA: ICD-10-CM

## 2021-03-16 DIAGNOSIS — N40.1 BENIGN PROSTATIC HYPERPLASIA WITH NOCTURIA: ICD-10-CM

## 2021-03-16 DIAGNOSIS — E78.5 HYPERLIPIDEMIA, UNSPECIFIED HYPERLIPIDEMIA TYPE: ICD-10-CM

## 2021-03-16 DIAGNOSIS — Z00.00 ANNUAL PHYSICAL EXAM: ICD-10-CM

## 2021-03-16 LAB
BASOPHILS # BLD AUTO: 0.03 K/UL (ref 0–0.2)
BASOPHILS NFR BLD: 0.5 % (ref 0–1.9)
DIFFERENTIAL METHOD: ABNORMAL
EOSINOPHIL # BLD AUTO: 0.2 K/UL (ref 0–0.5)
EOSINOPHIL NFR BLD: 2.7 % (ref 0–8)
ERYTHROCYTE [DISTWIDTH] IN BLOOD BY AUTOMATED COUNT: 12.2 % (ref 11.5–14.5)
HCT VFR BLD AUTO: 42 % (ref 40–54)
HGB BLD-MCNC: 14.9 G/DL (ref 14–18)
IMM GRANULOCYTES # BLD AUTO: 0.05 K/UL (ref 0–0.04)
IMM GRANULOCYTES NFR BLD AUTO: 0.8 % (ref 0–0.5)
LYMPHOCYTES # BLD AUTO: 0.8 K/UL (ref 1–4.8)
LYMPHOCYTES NFR BLD: 13.1 % (ref 18–48)
MCH RBC QN AUTO: 32.2 PG (ref 27–31)
MCHC RBC AUTO-ENTMCNC: 35.5 G/DL (ref 32–36)
MCV RBC AUTO: 91 FL (ref 82–98)
MONOCYTES # BLD AUTO: 0.7 K/UL (ref 0.3–1)
MONOCYTES NFR BLD: 11.5 % (ref 4–15)
NEUTROPHILS # BLD AUTO: 4.6 K/UL (ref 1.8–7.7)
NEUTROPHILS NFR BLD: 71.4 % (ref 38–73)
NRBC BLD-RTO: 0 /100 WBC
PLATELET # BLD AUTO: 288 K/UL (ref 150–350)
PMV BLD AUTO: 9.5 FL (ref 9.2–12.9)
RBC # BLD AUTO: 4.63 M/UL (ref 4.6–6.2)
WBC # BLD AUTO: 6.36 K/UL (ref 3.9–12.7)

## 2021-03-16 PROCEDURE — 1157F PR ADVANCE CARE PLAN OR EQUIV PRESENT IN MEDICAL RECORD: ICD-10-PCS | Mod: S$GLB,,, | Performed by: INTERNAL MEDICINE

## 2021-03-16 PROCEDURE — 99397 PR PREVENTIVE VISIT,EST,65 & OVER: ICD-10-PCS | Mod: S$GLB,,, | Performed by: INTERNAL MEDICINE

## 2021-03-16 PROCEDURE — 3078F DIAST BP <80 MM HG: CPT | Mod: CPTII,S$GLB,, | Performed by: INTERNAL MEDICINE

## 2021-03-16 PROCEDURE — 1101F PR PT FALLS ASSESS DOC 0-1 FALLS W/OUT INJ PAST YR: ICD-10-PCS | Mod: CPTII,S$GLB,, | Performed by: INTERNAL MEDICINE

## 2021-03-16 PROCEDURE — 99499 UNLISTED E&M SERVICE: CPT | Mod: S$GLB,,, | Performed by: INTERNAL MEDICINE

## 2021-03-16 PROCEDURE — 99397 PER PM REEVAL EST PAT 65+ YR: CPT | Mod: S$GLB,,, | Performed by: INTERNAL MEDICINE

## 2021-03-16 PROCEDURE — 3074F PR MOST RECENT SYSTOLIC BLOOD PRESSURE < 130 MM HG: ICD-10-PCS | Mod: CPTII,S$GLB,, | Performed by: INTERNAL MEDICINE

## 2021-03-16 PROCEDURE — 80061 LIPID PANEL: CPT | Performed by: INTERNAL MEDICINE

## 2021-03-16 PROCEDURE — 3078F PR MOST RECENT DIASTOLIC BLOOD PRESSURE < 80 MM HG: ICD-10-PCS | Mod: CPTII,S$GLB,, | Performed by: INTERNAL MEDICINE

## 2021-03-16 PROCEDURE — 1126F AMNT PAIN NOTED NONE PRSNT: CPT | Mod: S$GLB,,, | Performed by: INTERNAL MEDICINE

## 2021-03-16 PROCEDURE — 1157F ADVNC CARE PLAN IN RCRD: CPT | Mod: S$GLB,,, | Performed by: INTERNAL MEDICINE

## 2021-03-16 PROCEDURE — 84153 ASSAY OF PSA TOTAL: CPT | Performed by: INTERNAL MEDICINE

## 2021-03-16 PROCEDURE — 80053 COMPREHEN METABOLIC PANEL: CPT | Performed by: INTERNAL MEDICINE

## 2021-03-16 PROCEDURE — 36415 COLL VENOUS BLD VENIPUNCTURE: CPT | Performed by: INTERNAL MEDICINE

## 2021-03-16 PROCEDURE — 99999 PR PBB SHADOW E&M-EST. PATIENT-LVL III: ICD-10-PCS | Mod: PBBFAC,,, | Performed by: INTERNAL MEDICINE

## 2021-03-16 PROCEDURE — 3288F FALL RISK ASSESSMENT DOCD: CPT | Mod: CPTII,S$GLB,, | Performed by: INTERNAL MEDICINE

## 2021-03-16 PROCEDURE — 1126F PR PAIN SEVERITY QUANTIFIED, NO PAIN PRESENT: ICD-10-PCS | Mod: S$GLB,,, | Performed by: INTERNAL MEDICINE

## 2021-03-16 PROCEDURE — 1101F PT FALLS ASSESS-DOCD LE1/YR: CPT | Mod: CPTII,S$GLB,, | Performed by: INTERNAL MEDICINE

## 2021-03-16 PROCEDURE — 99999 PR PBB SHADOW E&M-EST. PATIENT-LVL III: CPT | Mod: PBBFAC,,, | Performed by: INTERNAL MEDICINE

## 2021-03-16 PROCEDURE — 99499 RISK ADDL DX/OHS AUDIT: ICD-10-PCS | Mod: S$GLB,,, | Performed by: INTERNAL MEDICINE

## 2021-03-16 PROCEDURE — 3074F SYST BP LT 130 MM HG: CPT | Mod: CPTII,S$GLB,, | Performed by: INTERNAL MEDICINE

## 2021-03-16 PROCEDURE — 84443 ASSAY THYROID STIM HORMONE: CPT | Performed by: INTERNAL MEDICINE

## 2021-03-16 PROCEDURE — 3008F PR BODY MASS INDEX (BMI) DOCUMENTED: ICD-10-PCS | Mod: CPTII,S$GLB,, | Performed by: INTERNAL MEDICINE

## 2021-03-16 PROCEDURE — 3008F BODY MASS INDEX DOCD: CPT | Mod: CPTII,S$GLB,, | Performed by: INTERNAL MEDICINE

## 2021-03-16 PROCEDURE — 83036 HEMOGLOBIN GLYCOSYLATED A1C: CPT | Performed by: INTERNAL MEDICINE

## 2021-03-16 PROCEDURE — 3288F PR FALLS RISK ASSESSMENT DOCUMENTED: ICD-10-PCS | Mod: CPTII,S$GLB,, | Performed by: INTERNAL MEDICINE

## 2021-03-16 PROCEDURE — 85025 COMPLETE CBC W/AUTO DIFF WBC: CPT | Performed by: INTERNAL MEDICINE

## 2021-03-17 ENCOUNTER — PES CALL (OUTPATIENT)
Dept: ADMINISTRATIVE | Facility: CLINIC | Age: 71
End: 2021-03-17

## 2021-03-17 LAB
ALBUMIN SERPL BCP-MCNC: 4.2 G/DL (ref 3.5–5.2)
ALP SERPL-CCNC: 82 U/L (ref 55–135)
ALT SERPL W/O P-5'-P-CCNC: 19 U/L (ref 10–44)
ANION GAP SERPL CALC-SCNC: 14 MMOL/L (ref 8–16)
AST SERPL-CCNC: 27 U/L (ref 10–40)
BILIRUB SERPL-MCNC: 1.3 MG/DL (ref 0.1–1)
BUN SERPL-MCNC: 12 MG/DL (ref 8–23)
CALCIUM SERPL-MCNC: 9.4 MG/DL (ref 8.7–10.5)
CHLORIDE SERPL-SCNC: 104 MMOL/L (ref 95–110)
CHOLEST SERPL-MCNC: 176 MG/DL (ref 120–199)
CHOLEST/HDLC SERPL: 5.2 {RATIO} (ref 2–5)
CO2 SERPL-SCNC: 19 MMOL/L (ref 23–29)
COMPLEXED PSA SERPL-MCNC: 1.4 NG/ML (ref 0–4)
CREAT SERPL-MCNC: 0.9 MG/DL (ref 0.5–1.4)
EST. GFR  (AFRICAN AMERICAN): >60 ML/MIN/1.73 M^2
EST. GFR  (NON AFRICAN AMERICAN): >60 ML/MIN/1.73 M^2
ESTIMATED AVG GLUCOSE: 100 MG/DL (ref 68–131)
GLUCOSE SERPL-MCNC: 98 MG/DL (ref 70–110)
HBA1C MFR BLD: 5.1 % (ref 4–5.6)
HDLC SERPL-MCNC: 34 MG/DL (ref 40–75)
HDLC SERPL: 19.3 % (ref 20–50)
LDLC SERPL CALC-MCNC: 110.6 MG/DL (ref 63–159)
NONHDLC SERPL-MCNC: 142 MG/DL
POTASSIUM SERPL-SCNC: 3.8 MMOL/L (ref 3.5–5.1)
PROT SERPL-MCNC: 7 G/DL (ref 6–8.4)
SODIUM SERPL-SCNC: 137 MMOL/L (ref 136–145)
TRIGL SERPL-MCNC: 157 MG/DL (ref 30–150)
TSH SERPL DL<=0.005 MIU/L-ACNC: 1.41 UIU/ML (ref 0.4–4)

## 2021-03-18 ENCOUNTER — PATIENT MESSAGE (OUTPATIENT)
Dept: RESEARCH | Facility: HOSPITAL | Age: 71
End: 2021-03-18

## 2021-03-26 ENCOUNTER — PATIENT MESSAGE (OUTPATIENT)
Dept: RESEARCH | Facility: HOSPITAL | Age: 71
End: 2021-03-26

## 2021-04-28 ENCOUNTER — PES CALL (OUTPATIENT)
Dept: ADMINISTRATIVE | Facility: CLINIC | Age: 71
End: 2021-04-28

## 2021-05-05 ENCOUNTER — TELEPHONE (OUTPATIENT)
Dept: DERMATOLOGY | Facility: CLINIC | Age: 71
End: 2021-05-05

## 2021-06-03 ENCOUNTER — PES CALL (OUTPATIENT)
Dept: ADMINISTRATIVE | Facility: CLINIC | Age: 71
End: 2021-06-03

## 2021-06-07 ENCOUNTER — TELEPHONE (OUTPATIENT)
Dept: OPHTHALMOLOGY | Facility: CLINIC | Age: 71
End: 2021-06-07

## 2021-06-07 DIAGNOSIS — I48.19 PERSISTENT ATRIAL FIBRILLATION: ICD-10-CM

## 2021-06-08 RX ORDER — METOPROLOL SUCCINATE 25 MG/1
25 TABLET, EXTENDED RELEASE ORAL DAILY
Qty: 90 TABLET | Refills: 3 | Status: SHIPPED | OUTPATIENT
Start: 2021-06-08 | End: 2022-05-29

## 2021-06-11 ENCOUNTER — TELEPHONE (OUTPATIENT)
Dept: OPHTHALMOLOGY | Facility: CLINIC | Age: 71
End: 2021-06-11

## 2021-06-14 ENCOUNTER — TELEPHONE (OUTPATIENT)
Dept: OPHTHALMOLOGY | Facility: CLINIC | Age: 71
End: 2021-06-14

## 2021-06-24 ENCOUNTER — PATIENT OUTREACH (OUTPATIENT)
Dept: ADMINISTRATIVE | Facility: OTHER | Age: 71
End: 2021-06-24

## 2021-06-25 ENCOUNTER — OFFICE VISIT (OUTPATIENT)
Dept: DERMATOLOGY | Facility: CLINIC | Age: 71
End: 2021-06-25
Payer: MEDICARE

## 2021-06-25 DIAGNOSIS — L73.8 SEBACEOUS HYPERPLASIA: ICD-10-CM

## 2021-06-25 DIAGNOSIS — D18.01 CHERRY ANGIOMA: ICD-10-CM

## 2021-06-25 DIAGNOSIS — Z12.83 SCREENING FOR SKIN CANCER: ICD-10-CM

## 2021-06-25 DIAGNOSIS — L91.8 ACROCHORDON: ICD-10-CM

## 2021-06-25 DIAGNOSIS — L81.4 LENTIGINES: ICD-10-CM

## 2021-06-25 DIAGNOSIS — D22.9 MULTIPLE BENIGN NEVI: Primary | ICD-10-CM

## 2021-06-25 DIAGNOSIS — D48.5 NEOPLASM OF UNCERTAIN BEHAVIOR OF SKIN: ICD-10-CM

## 2021-06-25 DIAGNOSIS — L82.1 SK (SEBORRHEIC KERATOSIS): ICD-10-CM

## 2021-06-25 PROCEDURE — 11102 TANGNTL BX SKIN SINGLE LES: CPT | Mod: S$GLB,,, | Performed by: DERMATOLOGY

## 2021-06-25 PROCEDURE — 99213 PR OFFICE/OUTPT VISIT, EST, LEVL III, 20-29 MIN: ICD-10-PCS | Mod: 25,S$GLB,, | Performed by: DERMATOLOGY

## 2021-06-25 PROCEDURE — 88305 TISSUE EXAM BY PATHOLOGIST: CPT | Performed by: PATHOLOGY

## 2021-06-25 PROCEDURE — 3288F PR FALLS RISK ASSESSMENT DOCUMENTED: ICD-10-PCS | Mod: CPTII,S$GLB,, | Performed by: DERMATOLOGY

## 2021-06-25 PROCEDURE — 1157F ADVNC CARE PLAN IN RCRD: CPT | Mod: S$GLB,,, | Performed by: DERMATOLOGY

## 2021-06-25 PROCEDURE — 88305 TISSUE EXAM BY PATHOLOGIST: ICD-10-PCS | Mod: 26,,, | Performed by: PATHOLOGY

## 2021-06-25 PROCEDURE — 99999 PR PBB SHADOW E&M-EST. PATIENT-LVL III: CPT | Mod: PBBFAC,,, | Performed by: DERMATOLOGY

## 2021-06-25 PROCEDURE — 99213 OFFICE O/P EST LOW 20 MIN: CPT | Mod: 25,S$GLB,, | Performed by: DERMATOLOGY

## 2021-06-25 PROCEDURE — 1157F PR ADVANCE CARE PLAN OR EQUIV PRESENT IN MEDICAL RECORD: ICD-10-PCS | Mod: S$GLB,,, | Performed by: DERMATOLOGY

## 2021-06-25 PROCEDURE — 1159F MED LIST DOCD IN RCRD: CPT | Mod: S$GLB,,, | Performed by: DERMATOLOGY

## 2021-06-25 PROCEDURE — 1126F AMNT PAIN NOTED NONE PRSNT: CPT | Mod: S$GLB,,, | Performed by: DERMATOLOGY

## 2021-06-25 PROCEDURE — 11102 PR TANGENTIAL BIOPSY, SKIN, SINGLE LESION: ICD-10-PCS | Mod: S$GLB,,, | Performed by: DERMATOLOGY

## 2021-06-25 PROCEDURE — 1101F PT FALLS ASSESS-DOCD LE1/YR: CPT | Mod: CPTII,S$GLB,, | Performed by: DERMATOLOGY

## 2021-06-25 PROCEDURE — 99999 PR PBB SHADOW E&M-EST. PATIENT-LVL III: ICD-10-PCS | Mod: PBBFAC,,, | Performed by: DERMATOLOGY

## 2021-06-25 PROCEDURE — 88305 TISSUE EXAM BY PATHOLOGIST: CPT | Mod: 26,,, | Performed by: PATHOLOGY

## 2021-06-25 PROCEDURE — 1126F PR PAIN SEVERITY QUANTIFIED, NO PAIN PRESENT: ICD-10-PCS | Mod: S$GLB,,, | Performed by: DERMATOLOGY

## 2021-06-25 PROCEDURE — 1101F PR PT FALLS ASSESS DOC 0-1 FALLS W/OUT INJ PAST YR: ICD-10-PCS | Mod: CPTII,S$GLB,, | Performed by: DERMATOLOGY

## 2021-06-25 PROCEDURE — 3288F FALL RISK ASSESSMENT DOCD: CPT | Mod: CPTII,S$GLB,, | Performed by: DERMATOLOGY

## 2021-06-25 PROCEDURE — 1159F PR MEDICATION LIST DOCUMENTED IN MEDICAL RECORD: ICD-10-PCS | Mod: S$GLB,,, | Performed by: DERMATOLOGY

## 2021-06-30 LAB
FINAL PATHOLOGIC DIAGNOSIS: NORMAL
GROSS: NORMAL
Lab: NORMAL
MICROSCOPIC EXAM: NORMAL

## 2021-07-14 ENCOUNTER — PROCEDURE VISIT (OUTPATIENT)
Dept: DERMATOLOGY | Facility: CLINIC | Age: 71
End: 2021-07-14
Payer: MEDICARE

## 2021-07-14 VITALS
HEIGHT: 72 IN | HEART RATE: 73 BPM | WEIGHT: 254 LBS | BODY MASS INDEX: 34.4 KG/M2 | SYSTOLIC BLOOD PRESSURE: 155 MMHG | DIASTOLIC BLOOD PRESSURE: 92 MMHG

## 2021-07-14 DIAGNOSIS — C44.329 SQUAMOUS CELL CARCINOMA OF FOREHEAD: Primary | ICD-10-CM

## 2021-07-14 PROCEDURE — 17311: ICD-10-PCS | Mod: 51,S$GLB,, | Performed by: DERMATOLOGY

## 2021-07-14 PROCEDURE — 99499 NO LOS: ICD-10-PCS | Mod: S$GLB,,, | Performed by: DERMATOLOGY

## 2021-07-14 PROCEDURE — 13131 CMPLX RPR F/C/C/M/N/AX/G/H/F: CPT | Mod: S$GLB,,, | Performed by: DERMATOLOGY

## 2021-07-14 PROCEDURE — 13131 PR RECMPL WND HEAD,FAC,HAND 1.1-2.5 CM: ICD-10-PCS | Mod: S$GLB,,, | Performed by: DERMATOLOGY

## 2021-07-14 PROCEDURE — 99499 UNLISTED E&M SERVICE: CPT | Mod: S$GLB,,, | Performed by: DERMATOLOGY

## 2021-07-14 PROCEDURE — 17311 MOHS 1 STAGE H/N/HF/G: CPT | Mod: 51,S$GLB,, | Performed by: DERMATOLOGY

## 2021-07-21 ENCOUNTER — OFFICE VISIT (OUTPATIENT)
Dept: DERMATOLOGY | Facility: CLINIC | Age: 71
End: 2021-07-21
Payer: MEDICARE

## 2021-07-21 VITALS — WEIGHT: 254 LBS | BODY MASS INDEX: 34.45 KG/M2

## 2021-07-21 DIAGNOSIS — Z09 POSTOP CHECK: Primary | ICD-10-CM

## 2021-07-21 PROCEDURE — 1157F ADVNC CARE PLAN IN RCRD: CPT | Mod: CPTII,S$GLB,, | Performed by: DERMATOLOGY

## 2021-07-21 PROCEDURE — 1157F PR ADVANCE CARE PLAN OR EQUIV PRESENT IN MEDICAL RECORD: ICD-10-PCS | Mod: CPTII,S$GLB,, | Performed by: DERMATOLOGY

## 2021-07-21 PROCEDURE — 99024 PR POST-OP FOLLOW-UP VISIT: ICD-10-PCS | Mod: S$GLB,,, | Performed by: DERMATOLOGY

## 2021-07-21 PROCEDURE — 3008F BODY MASS INDEX DOCD: CPT | Mod: CPTII,S$GLB,, | Performed by: DERMATOLOGY

## 2021-07-21 PROCEDURE — 3008F PR BODY MASS INDEX (BMI) DOCUMENTED: ICD-10-PCS | Mod: CPTII,S$GLB,, | Performed by: DERMATOLOGY

## 2021-07-21 PROCEDURE — 1126F PR PAIN SEVERITY QUANTIFIED, NO PAIN PRESENT: ICD-10-PCS | Mod: CPTII,S$GLB,, | Performed by: DERMATOLOGY

## 2021-07-21 PROCEDURE — 99999 PR PBB SHADOW E&M-EST. PATIENT-LVL I: ICD-10-PCS | Mod: PBBFAC,,, | Performed by: DERMATOLOGY

## 2021-07-21 PROCEDURE — 1126F AMNT PAIN NOTED NONE PRSNT: CPT | Mod: CPTII,S$GLB,, | Performed by: DERMATOLOGY

## 2021-07-21 PROCEDURE — 99999 PR PBB SHADOW E&M-EST. PATIENT-LVL I: CPT | Mod: PBBFAC,,, | Performed by: DERMATOLOGY

## 2021-07-21 PROCEDURE — 99024 POSTOP FOLLOW-UP VISIT: CPT | Mod: S$GLB,,, | Performed by: DERMATOLOGY

## 2021-09-03 ENCOUNTER — HOSPITAL ENCOUNTER (EMERGENCY)
Facility: HOSPITAL | Age: 71
Discharge: HOME | End: 2021-09-03
Attending: EMERGENCY MEDICINE
Payer: MEDICARE

## 2021-09-03 VITALS
DIASTOLIC BLOOD PRESSURE: 79 MMHG | OXYGEN SATURATION: 95 % | TEMPERATURE: 98 F | HEART RATE: 75 BPM | RESPIRATION RATE: 18 BRPM | SYSTOLIC BLOOD PRESSURE: 160 MMHG

## 2021-09-03 DIAGNOSIS — S01.01XA LACERATION OF SCALP, INITIAL ENCOUNTER: Primary | ICD-10-CM

## 2021-09-03 DIAGNOSIS — S09.90XA INJURY OF HEAD, INITIAL ENCOUNTER: ICD-10-CM

## 2021-09-03 PROCEDURE — 90471 IMMUNIZATION ADMIN: CPT | Performed by: EMERGENCY MEDICINE

## 2021-09-03 PROCEDURE — 99284 EMERGENCY DEPT VISIT MOD MDM: CPT | Mod: ,,, | Performed by: EMERGENCY MEDICINE

## 2021-09-03 PROCEDURE — 99284 PR EMERGENCY DEPT VISIT,LEVEL IV: ICD-10-PCS | Mod: ,,, | Performed by: EMERGENCY MEDICINE

## 2021-09-03 PROCEDURE — 90715 TDAP VACCINE 7 YRS/> IM: CPT | Performed by: EMERGENCY MEDICINE

## 2021-09-03 PROCEDURE — 99285 EMERGENCY DEPT VISIT HI MDM: CPT

## 2021-09-03 PROCEDURE — 63600175 PHARM REV CODE 636 W HCPCS: Performed by: EMERGENCY MEDICINE

## 2021-09-03 RX ADMIN — CLOSTRIDIUM TETANI TOXOID ANTIGEN (FORMALDEHYDE INACTIVATED), CORYNEBACTERIUM DIPHTHERIAE TOXOID ANTIGEN (FORMALDEHYDE INACTIVATED), BORDETELLA PERTUSSIS TOXOID ANTIGEN (GLUTARALDEHYDE INACTIVATED), BORDETELLA PERTUSSIS FILAMENTOUS HEMAGGLUTININ ANTIGEN (FORMALDEHYDE INACTIVATED), BORDETELLA PERTUSSIS PERTACTIN ANTIGEN, AND BORDETELLA PERTUSSIS FIMBRIAE 2/3 ANTIGEN 0.5 ML: 5; 2; 2.5; 5; 3; 5 INJECTION, SUSPENSION INTRAMUSCULAR at 09:09

## 2022-01-11 ENCOUNTER — TELEPHONE (OUTPATIENT)
Dept: OPHTHALMOLOGY | Facility: CLINIC | Age: 72
End: 2022-01-11
Payer: MEDICARE

## 2022-01-11 NOTE — TELEPHONE ENCOUNTER
----- Message from Gricelda Abdullahi sent at 1/11/2022  3:00 PM CST -----  Regarding: Letter for DMV stating condition  Pt calling because he is having issues getting his license and was informed to notify us so that we can assist with doing so.  He needs a letter stating his condition to provide to them.  He  says he has two days to get this done and not sure if we need to see him or not. He can be reached at [phone]

## 2022-01-11 NOTE — TELEPHONE ENCOUNTER
Called patient and made an appt for 22 @ 4pm.  Told pt that we will send a message to Dr Thomas's office for dilated eye exam.  His DL does  on 22.

## 2022-01-12 ENCOUNTER — OFFICE VISIT (OUTPATIENT)
Dept: OPTOMETRY | Facility: CLINIC | Age: 72
End: 2022-01-12
Payer: MEDICARE

## 2022-01-12 DIAGNOSIS — H04.129 DRY EYE: Primary | ICD-10-CM

## 2022-01-12 DIAGNOSIS — H52.203 MYOPIA OF BOTH EYES WITH ASTIGMATISM AND PRESBYOPIA: ICD-10-CM

## 2022-01-12 DIAGNOSIS — H52.4 MYOPIA OF BOTH EYES WITH ASTIGMATISM AND PRESBYOPIA: ICD-10-CM

## 2022-01-12 DIAGNOSIS — Z94.7 CORNEAL TRANSPLANT STATUS: ICD-10-CM

## 2022-01-12 DIAGNOSIS — H52.13 MYOPIA OF BOTH EYES WITH ASTIGMATISM AND PRESBYOPIA: ICD-10-CM

## 2022-01-12 PROCEDURE — 1159F MED LIST DOCD IN RCRD: CPT | Mod: HCNC,CPTII,S$GLB,

## 2022-01-12 PROCEDURE — 99999 PR PBB SHADOW E&M-EST. PATIENT-LVL I: CPT | Mod: PBBFAC,HCNC,,

## 2022-01-12 PROCEDURE — 92004 COMPRE OPH EXAM NEW PT 1/>: CPT | Mod: HCNC,S$GLB,,

## 2022-01-12 PROCEDURE — 92015 PR REFRACTION: ICD-10-PCS | Mod: HCNC,S$GLB,,

## 2022-01-12 PROCEDURE — 99999 PR PBB SHADOW E&M-EST. PATIENT-LVL I: ICD-10-PCS | Mod: PBBFAC,HCNC,,

## 2022-01-12 PROCEDURE — 1157F PR ADVANCE CARE PLAN OR EQUIV PRESENT IN MEDICAL RECORD: ICD-10-PCS | Mod: HCNC,CPTII,S$GLB,

## 2022-01-12 PROCEDURE — 1159F PR MEDICATION LIST DOCUMENTED IN MEDICAL RECORD: ICD-10-PCS | Mod: HCNC,CPTII,S$GLB,

## 2022-01-12 PROCEDURE — 1157F ADVNC CARE PLAN IN RCRD: CPT | Mod: HCNC,CPTII,S$GLB,

## 2022-01-12 PROCEDURE — 92015 DETERMINE REFRACTIVE STATE: CPT | Mod: HCNC,S$GLB,,

## 2022-01-12 PROCEDURE — 92004 PR EYE EXAM, NEW PATIENT,COMPREHESV: ICD-10-PCS | Mod: HCNC,S$GLB,,

## 2022-01-12 PROCEDURE — 1160F PR REVIEW ALL MEDS BY PRESCRIBER/CLIN PHARMACIST DOCUMENTED: ICD-10-PCS | Mod: HCNC,CPTII,S$GLB,

## 2022-01-12 PROCEDURE — 1160F RVW MEDS BY RX/DR IN RCRD: CPT | Mod: HCNC,CPTII,S$GLB,

## 2022-01-12 NOTE — PROGRESS NOTES
HPI     Noticing a ghosting/shadowing to his vision.Onset since his DMEK OD.   Patient is concerned that he will not pass the vision portion of the   driving test. Patient has a pair of glasses but his depth perception was   off while wearing them. Has also previously worn CL but he could never   tell if they were in or not.     Last edited by Hossein Gomez, OD on 1/12/2022 11:06 AM. (History)            Assessment /Plan     For exam results, see Encounter Report.    Dry eye  -Start warm compresses BID for 10-15 min, Systane Complete QID OU and Systane nighttime vikki QPM OU  -Patient educated to turn fan away from his face at night  -RTC sooner if symptoms persist or condition worsens    Corneal transplant status  -Patient is still taking pred BID OU and timolol BID OS  -Scheduled to see Dr. Jay next week    Myopia of both eyes with astigmatism and presbyopia  Glasses Prescription (1/12/2022)        Sphere Cylinder Axis    Right -1.00 +3.25 150    Left -6.00 +5.50 148    Type: SVL-Distance    Expiration Date: 1/13/2023    Pupillary Distance: 72      Glasses Prescription (1/12/2022)  #2       Sphere Cylinder Axis    Right +1.50 +3.25 150    Left -3.50 +5.50 148    Type: SVL-Near    Expiration Date: 1/13/2023    Pupillary Distance: 72        - Spectacle Rx given, discussed different options for glasses. RTC 1 year routine eye exam.

## 2022-01-18 ENCOUNTER — OFFICE VISIT (OUTPATIENT)
Dept: OPHTHALMOLOGY | Facility: CLINIC | Age: 72
End: 2022-01-18
Attending: OPHTHALMOLOGY
Payer: MEDICARE

## 2022-01-18 DIAGNOSIS — Z94.7 STATUS POST CORNEAL TRANSPLANT: ICD-10-CM

## 2022-01-18 DIAGNOSIS — H18.513 FUCHS' CORNEAL DYSTROPHY OF BOTH EYES: Primary | ICD-10-CM

## 2022-01-18 PROCEDURE — 99999 PR PBB SHADOW E&M-EST. PATIENT-LVL III: CPT | Mod: PBBFAC,HCNC,, | Performed by: OPHTHALMOLOGY

## 2022-01-18 PROCEDURE — 1157F ADVNC CARE PLAN IN RCRD: CPT | Mod: HCNC,CPTII,S$GLB, | Performed by: OPHTHALMOLOGY

## 2022-01-18 PROCEDURE — 1159F PR MEDICATION LIST DOCUMENTED IN MEDICAL RECORD: ICD-10-PCS | Mod: HCNC,CPTII,S$GLB, | Performed by: OPHTHALMOLOGY

## 2022-01-18 PROCEDURE — 1159F MED LIST DOCD IN RCRD: CPT | Mod: HCNC,CPTII,S$GLB, | Performed by: OPHTHALMOLOGY

## 2022-01-18 PROCEDURE — 1126F AMNT PAIN NOTED NONE PRSNT: CPT | Mod: HCNC,CPTII,S$GLB, | Performed by: OPHTHALMOLOGY

## 2022-01-18 PROCEDURE — 1157F PR ADVANCE CARE PLAN OR EQUIV PRESENT IN MEDICAL RECORD: ICD-10-PCS | Mod: HCNC,CPTII,S$GLB, | Performed by: OPHTHALMOLOGY

## 2022-01-18 PROCEDURE — 3288F PR FALLS RISK ASSESSMENT DOCUMENTED: ICD-10-PCS | Mod: HCNC,CPTII,S$GLB, | Performed by: OPHTHALMOLOGY

## 2022-01-18 PROCEDURE — 1101F PT FALLS ASSESS-DOCD LE1/YR: CPT | Mod: HCNC,CPTII,S$GLB, | Performed by: OPHTHALMOLOGY

## 2022-01-18 PROCEDURE — 3288F FALL RISK ASSESSMENT DOCD: CPT | Mod: HCNC,CPTII,S$GLB, | Performed by: OPHTHALMOLOGY

## 2022-01-18 PROCEDURE — 1101F PR PT FALLS ASSESS DOC 0-1 FALLS W/OUT INJ PAST YR: ICD-10-PCS | Mod: HCNC,CPTII,S$GLB, | Performed by: OPHTHALMOLOGY

## 2022-01-18 PROCEDURE — 1126F PR PAIN SEVERITY QUANTIFIED, NO PAIN PRESENT: ICD-10-PCS | Mod: HCNC,CPTII,S$GLB, | Performed by: OPHTHALMOLOGY

## 2022-01-18 PROCEDURE — 1160F PR REVIEW ALL MEDS BY PRESCRIBER/CLIN PHARMACIST DOCUMENTED: ICD-10-PCS | Mod: HCNC,CPTII,S$GLB, | Performed by: OPHTHALMOLOGY

## 2022-01-18 PROCEDURE — 1160F RVW MEDS BY RX/DR IN RCRD: CPT | Mod: HCNC,CPTII,S$GLB, | Performed by: OPHTHALMOLOGY

## 2022-01-18 PROCEDURE — 92014 PR EYE EXAM, EST PATIENT,COMPREHESV: ICD-10-PCS | Mod: HCNC,S$GLB,, | Performed by: OPHTHALMOLOGY

## 2022-01-18 PROCEDURE — 92014 COMPRE OPH EXAM EST PT 1/>: CPT | Mod: HCNC,S$GLB,, | Performed by: OPHTHALMOLOGY

## 2022-01-18 PROCEDURE — 99999 PR PBB SHADOW E&M-EST. PATIENT-LVL III: ICD-10-PCS | Mod: PBBFAC,HCNC,, | Performed by: OPHTHALMOLOGY

## 2022-01-18 NOTE — PROGRESS NOTES
HPI     Pt is here today for Corneal Transplant f/u.  He states that his vision has been doing well since his last visit.    Eye Meds:  Timolol QD OU  Pred BID OU        Last edited by Rehan Cabral on 1/18/2022  4:23 PM. (History)            Assessment /Plan     For exam results, see Encounter Report.    Fuchs' corneal dystrophy of both eyes    Status post corneal transplant      OD DSEK graft attached and clear. Signs and symptoms of graft rejection reviewed.  OS PKP stable and clear. Signs and symptoms of graft rejection reviewed.  IOP good  Excellent vision with MRx...

## 2022-01-31 ENCOUNTER — OFFICE VISIT (OUTPATIENT)
Dept: OPTOMETRY | Facility: CLINIC | Age: 72
End: 2022-01-31
Payer: MEDICARE

## 2022-01-31 DIAGNOSIS — H04.129 DRY EYE: Primary | ICD-10-CM

## 2022-01-31 DIAGNOSIS — Z94.7 CORNEAL TRANSPLANT STATUS: ICD-10-CM

## 2022-01-31 PROCEDURE — 1101F PR PT FALLS ASSESS DOC 0-1 FALLS W/OUT INJ PAST YR: ICD-10-PCS | Mod: HCNC,CPTII,S$GLB, | Performed by: OPTOMETRIST

## 2022-01-31 PROCEDURE — 1160F RVW MEDS BY RX/DR IN RCRD: CPT | Mod: HCNC,CPTII,S$GLB, | Performed by: OPTOMETRIST

## 2022-01-31 PROCEDURE — 1160F PR REVIEW ALL MEDS BY PRESCRIBER/CLIN PHARMACIST DOCUMENTED: ICD-10-PCS | Mod: HCNC,CPTII,S$GLB, | Performed by: OPTOMETRIST

## 2022-01-31 PROCEDURE — 92012 INTRM OPH EXAM EST PATIENT: CPT | Mod: HCNC,S$GLB,, | Performed by: OPTOMETRIST

## 2022-01-31 PROCEDURE — 3288F FALL RISK ASSESSMENT DOCD: CPT | Mod: HCNC,CPTII,S$GLB, | Performed by: OPTOMETRIST

## 2022-01-31 PROCEDURE — 3288F PR FALLS RISK ASSESSMENT DOCUMENTED: ICD-10-PCS | Mod: HCNC,CPTII,S$GLB, | Performed by: OPTOMETRIST

## 2022-01-31 PROCEDURE — 1125F AMNT PAIN NOTED PAIN PRSNT: CPT | Mod: HCNC,CPTII,S$GLB, | Performed by: OPTOMETRIST

## 2022-01-31 PROCEDURE — 1125F PR PAIN SEVERITY QUANTIFIED, PAIN PRESENT: ICD-10-PCS | Mod: HCNC,CPTII,S$GLB, | Performed by: OPTOMETRIST

## 2022-01-31 PROCEDURE — 1157F ADVNC CARE PLAN IN RCRD: CPT | Mod: HCNC,CPTII,S$GLB, | Performed by: OPTOMETRIST

## 2022-01-31 PROCEDURE — 99999 PR PBB SHADOW E&M-EST. PATIENT-LVL III: CPT | Mod: PBBFAC,HCNC,, | Performed by: OPTOMETRIST

## 2022-01-31 PROCEDURE — 1159F PR MEDICATION LIST DOCUMENTED IN MEDICAL RECORD: ICD-10-PCS | Mod: HCNC,CPTII,S$GLB, | Performed by: OPTOMETRIST

## 2022-01-31 PROCEDURE — 1157F PR ADVANCE CARE PLAN OR EQUIV PRESENT IN MEDICAL RECORD: ICD-10-PCS | Mod: HCNC,CPTII,S$GLB, | Performed by: OPTOMETRIST

## 2022-01-31 PROCEDURE — 99999 PR PBB SHADOW E&M-EST. PATIENT-LVL III: ICD-10-PCS | Mod: PBBFAC,HCNC,, | Performed by: OPTOMETRIST

## 2022-01-31 PROCEDURE — 1159F MED LIST DOCD IN RCRD: CPT | Mod: HCNC,CPTII,S$GLB, | Performed by: OPTOMETRIST

## 2022-01-31 PROCEDURE — 92012 PR EYE EXAM, EST PATIENT,INTERMED: ICD-10-PCS | Mod: HCNC,S$GLB,, | Performed by: OPTOMETRIST

## 2022-01-31 PROCEDURE — 1101F PT FALLS ASSESS-DOCD LE1/YR: CPT | Mod: HCNC,CPTII,S$GLB, | Performed by: OPTOMETRIST

## 2022-01-31 NOTE — PROGRESS NOTES
HPI     DLS: 1/18/22 - Dr. Jay    Presents today for eye pain OD x 4/5 days.  No vision complaints.    Timolol QD OU  Pred BID OU    Last edited by Milvia Clayton MA on 1/31/2022  9:51 AM. (History)        ROS     Positive for: Eyes (fuchs dyst w DSEK OD/PKP OS/cat surgery OU)    Negative for: Constitutional, Gastrointestinal, Neurological, Skin,   Genitourinary, Musculoskeletal, HENT, Endocrine, Cardiovascular,   Respiratory, Psychiatric, Allergic/Imm, Heme/Lymph    Last edited by Ken Berkowitz, OD on 1/31/2022 10:05 AM. (History)        Assessment /Plan     For exam results, see Encounter Report.    Dry eye    Corneal transplant status      1. Sp pciol OU  2. Fuchs dyst sp DSEK OD/PKP OS w sutures intact--see Dr Jay notes from earlier this month  3. Pt presents TODAY w eye pain OD only  2 to dry eye K/suspected exposure K overnight.  Pt using SYSTANE 4 times a day.  Not using CPAP  4. Pt using PRED BID OU/WAQAS qAM OU    PLAN:    1. Inc PRED to QID OD for now  2. Start SLEEP MASK at night  3. I will call pt in 2 days to check progress.  If better will slow taper PRED back to BID and have pt cont sleep mask.  If no improvement will get Misty consult               2/2/22 addendum--called pt 12:45 PM.  He reports his eye is much better.  Advised cont PRED QID OD X 5 days, then TID X 1 week, then go back to his normal BID dosing.  He will call if sx return, otherwise rtc as sched w Dr Jay

## 2022-03-06 NOTE — PROGRESS NOTES
MEDICAL HISTORY:  Persistent atrial fibrillation with previous direct cardioversion and status post pulmonary vein isolation twice  Obstructive sleep apnea   Hyperlipidemia.  Hypertension.  Previous TIA.  Right hip arthroplasty.  Bilateral inguinal hernia repair   Right rotator cuff tear.  Cataract extraction with corneal transplant. Fuchs dystrophy     SOCIAL HISTORY:  Tobacco and alcohol use -- none.  , has three children.  Works at [employer].  No formal exercise routine.     FAMILY HISTORY:  Father is , heart disease, colon cancer.  Mother , diabetes and hypertension, hip fracture.  He has one brother, hypertension and heart disease.  Three sisters: one with Crohn's disease and two sisters with hyperlipidemia.     SCREENING:  Colonoscopy on 2013 revealed a benign polyp.  Colonoscopy 2019- normal     MEDICATIONS:  Eliquis 5 mg twice a day.  Aspirin 81 mg a day.  Hydrochlorothiazide 25 mg a day.  Losartan 50 mg a day.  Metoprolol succinate 20 mg a day.  Eye drops outlined in the med sheet.        72-year-old male  Presents for an annual visit    Within the year he has followed up with Optometry/Ophthalmology.    In , 2021 was seen by dermatology.  Squamous cell carcinoma was resected from the top of the scalp.  Has not had a follow-up.    He would like to follow-up with cardiology.  Last visit was 2019 and there was a discussion about watchman's device    In regard to medications he is not on the medicine pravastatin.  He stop the medication.  There was no problems but did not want to take it.    Also for while he has not use CPAP.  The machine was stool out.  He will wake up with a dry mouth.  He reports not sleeping well as for was difficulty going to sleep.  There be some days waking up tired.  There is no reported snoring      Review of symptoms  Negative for chest pain, palpitations, shortness of breath, abdominal pain.  Regular bowel function.  Regarding  urination urine flows fine.  There is some urgency.  Nocturia x2  No headaches, heartburn, arthralgias    Examination  Weight 255  Pulse 72  Blood pressure 128/82  HEENT exam no abnormal findings  Neck no thyromegaly no masses  Chest clear breath sounds  Heart regular rate rhythm no murmurs gallops  Abdominal exam soft nontender no hepatosplenomegaly abdominal masses  Pulses 2+ carotid pulses no bruits 2+ pedal pulses  Extremities no edema  Lymph gland palpable adenopathy  Genitalia very mild right inguinal hernia.  This is chronic and has not changed.    Digital Rectal stool is brown prostate is moderately enlarged       in the past tamsulosin was prescribed but I do not believe he took the medication and he is not on it    Impression  General examination  Hypertension  Hyperlipidemia  Persistent atrial fibrillation with status post cardioversion in pulmonary vein isolation  Obstructive sleep apnea  BPH with nocturia  History of squamous cell carcinoma skin    Plan  Routine labs  Referral to Sleep Medicine  He is to arrange follow-up visits with arrhythmia, Dr. Howard  And dermatology

## 2022-03-07 ENCOUNTER — LAB VISIT (OUTPATIENT)
Dept: LAB | Facility: HOSPITAL | Age: 72
End: 2022-03-07
Attending: INTERNAL MEDICINE
Payer: MEDICARE

## 2022-03-07 ENCOUNTER — OFFICE VISIT (OUTPATIENT)
Dept: INTERNAL MEDICINE | Facility: CLINIC | Age: 72
End: 2022-03-07
Payer: MEDICARE

## 2022-03-07 VITALS
SYSTOLIC BLOOD PRESSURE: 128 MMHG | WEIGHT: 255.75 LBS | DIASTOLIC BLOOD PRESSURE: 84 MMHG | BODY MASS INDEX: 34.64 KG/M2 | HEART RATE: 70 BPM | HEIGHT: 72 IN | OXYGEN SATURATION: 99 %

## 2022-03-07 DIAGNOSIS — N40.1 BENIGN PROSTATIC HYPERPLASIA WITH NOCTURIA: ICD-10-CM

## 2022-03-07 DIAGNOSIS — I10 ESSENTIAL HYPERTENSION: ICD-10-CM

## 2022-03-07 DIAGNOSIS — Z12.5 PROSTATE CANCER SCREENING: Primary | ICD-10-CM

## 2022-03-07 DIAGNOSIS — Z00.00 ANNUAL PHYSICAL EXAM: ICD-10-CM

## 2022-03-07 DIAGNOSIS — G47.33 OBSTRUCTIVE SLEEP APNEA: ICD-10-CM

## 2022-03-07 DIAGNOSIS — R35.1 BENIGN PROSTATIC HYPERPLASIA WITH NOCTURIA: ICD-10-CM

## 2022-03-07 DIAGNOSIS — E78.5 HYPERLIPIDEMIA, UNSPECIFIED HYPERLIPIDEMIA TYPE: ICD-10-CM

## 2022-03-07 DIAGNOSIS — Z12.5 PROSTATE CANCER SCREENING: ICD-10-CM

## 2022-03-07 DIAGNOSIS — I48.19 PERSISTENT ATRIAL FIBRILLATION: ICD-10-CM

## 2022-03-07 LAB
ALBUMIN SERPL BCP-MCNC: 4.2 G/DL (ref 3.5–5.2)
ALP SERPL-CCNC: 78 U/L (ref 55–135)
ALT SERPL W/O P-5'-P-CCNC: 31 U/L (ref 10–44)
ANION GAP SERPL CALC-SCNC: 9 MMOL/L (ref 8–16)
AST SERPL-CCNC: 31 U/L (ref 10–40)
BASOPHILS # BLD AUTO: 0.04 K/UL (ref 0–0.2)
BASOPHILS NFR BLD: 0.6 % (ref 0–1.9)
BILIRUB SERPL-MCNC: 0.9 MG/DL (ref 0.1–1)
BUN SERPL-MCNC: 16 MG/DL (ref 8–23)
CALCIUM SERPL-MCNC: 9.9 MG/DL (ref 8.7–10.5)
CHLORIDE SERPL-SCNC: 101 MMOL/L (ref 95–110)
CHOLEST SERPL-MCNC: 203 MG/DL (ref 120–199)
CHOLEST/HDLC SERPL: 5.3 {RATIO} (ref 2–5)
CO2 SERPL-SCNC: 27 MMOL/L (ref 23–29)
COMPLEXED PSA SERPL-MCNC: 1.7 NG/ML (ref 0–4)
CREAT SERPL-MCNC: 1 MG/DL (ref 0.5–1.4)
DIFFERENTIAL METHOD: ABNORMAL
EOSINOPHIL # BLD AUTO: 0.1 K/UL (ref 0–0.5)
EOSINOPHIL NFR BLD: 1.8 % (ref 0–8)
ERYTHROCYTE [DISTWIDTH] IN BLOOD BY AUTOMATED COUNT: 12.3 % (ref 11.5–14.5)
EST. GFR  (AFRICAN AMERICAN): >60 ML/MIN/1.73 M^2
EST. GFR  (NON AFRICAN AMERICAN): >60 ML/MIN/1.73 M^2
GLUCOSE SERPL-MCNC: 108 MG/DL (ref 70–110)
HCT VFR BLD AUTO: 43.1 % (ref 40–54)
HDLC SERPL-MCNC: 38 MG/DL (ref 40–75)
HDLC SERPL: 18.7 % (ref 20–50)
HGB BLD-MCNC: 14.8 G/DL (ref 14–18)
IMM GRANULOCYTES # BLD AUTO: 0.05 K/UL (ref 0–0.04)
IMM GRANULOCYTES NFR BLD AUTO: 0.7 % (ref 0–0.5)
LDLC SERPL CALC-MCNC: 131.4 MG/DL (ref 63–159)
LYMPHOCYTES # BLD AUTO: 1 K/UL (ref 1–4.8)
LYMPHOCYTES NFR BLD: 14.3 % (ref 18–48)
MCH RBC QN AUTO: 32.5 PG (ref 27–31)
MCHC RBC AUTO-ENTMCNC: 34.3 G/DL (ref 32–36)
MCV RBC AUTO: 95 FL (ref 82–98)
MONOCYTES # BLD AUTO: 0.6 K/UL (ref 0.3–1)
MONOCYTES NFR BLD: 8.6 % (ref 4–15)
NEUTROPHILS # BLD AUTO: 5.3 K/UL (ref 1.8–7.7)
NEUTROPHILS NFR BLD: 74 % (ref 38–73)
NONHDLC SERPL-MCNC: 165 MG/DL
NRBC BLD-RTO: 0 /100 WBC
PLATELET # BLD AUTO: 288 K/UL (ref 150–450)
PMV BLD AUTO: 9.6 FL (ref 9.2–12.9)
POTASSIUM SERPL-SCNC: 4.1 MMOL/L (ref 3.5–5.1)
PROT SERPL-MCNC: 7 G/DL (ref 6–8.4)
RBC # BLD AUTO: 4.56 M/UL (ref 4.6–6.2)
SODIUM SERPL-SCNC: 137 MMOL/L (ref 136–145)
TRIGL SERPL-MCNC: 168 MG/DL (ref 30–150)
TSH SERPL DL<=0.005 MIU/L-ACNC: 1.55 UIU/ML (ref 0.4–4)
WBC # BLD AUTO: 7.2 K/UL (ref 3.9–12.7)

## 2022-03-07 PROCEDURE — 80053 COMPREHEN METABOLIC PANEL: CPT | Performed by: INTERNAL MEDICINE

## 2022-03-07 PROCEDURE — 1160F RVW MEDS BY RX/DR IN RCRD: CPT | Mod: CPTII,S$GLB,, | Performed by: INTERNAL MEDICINE

## 2022-03-07 PROCEDURE — 3079F DIAST BP 80-89 MM HG: CPT | Mod: CPTII,S$GLB,, | Performed by: INTERNAL MEDICINE

## 2022-03-07 PROCEDURE — 3288F PR FALLS RISK ASSESSMENT DOCUMENTED: ICD-10-PCS | Mod: CPTII,S$GLB,, | Performed by: INTERNAL MEDICINE

## 2022-03-07 PROCEDURE — 99397 PR PREVENTIVE VISIT,EST,65 & OVER: ICD-10-PCS | Mod: S$GLB,,, | Performed by: INTERNAL MEDICINE

## 2022-03-07 PROCEDURE — 3079F PR MOST RECENT DIASTOLIC BLOOD PRESSURE 80-89 MM HG: ICD-10-PCS | Mod: CPTII,S$GLB,, | Performed by: INTERNAL MEDICINE

## 2022-03-07 PROCEDURE — 1101F PT FALLS ASSESS-DOCD LE1/YR: CPT | Mod: CPTII,S$GLB,, | Performed by: INTERNAL MEDICINE

## 2022-03-07 PROCEDURE — 3008F BODY MASS INDEX DOCD: CPT | Mod: CPTII,S$GLB,, | Performed by: INTERNAL MEDICINE

## 2022-03-07 PROCEDURE — 1126F PR PAIN SEVERITY QUANTIFIED, NO PAIN PRESENT: ICD-10-PCS | Mod: CPTII,S$GLB,, | Performed by: INTERNAL MEDICINE

## 2022-03-07 PROCEDURE — 99999 PR PBB SHADOW E&M-EST. PATIENT-LVL III: CPT | Mod: PBBFAC,,, | Performed by: INTERNAL MEDICINE

## 2022-03-07 PROCEDURE — 99397 PER PM REEVAL EST PAT 65+ YR: CPT | Mod: S$GLB,,, | Performed by: INTERNAL MEDICINE

## 2022-03-07 PROCEDURE — 3008F PR BODY MASS INDEX (BMI) DOCUMENTED: ICD-10-PCS | Mod: CPTII,S$GLB,, | Performed by: INTERNAL MEDICINE

## 2022-03-07 PROCEDURE — 1157F ADVNC CARE PLAN IN RCRD: CPT | Mod: CPTII,S$GLB,, | Performed by: INTERNAL MEDICINE

## 2022-03-07 PROCEDURE — 1160F PR REVIEW ALL MEDS BY PRESCRIBER/CLIN PHARMACIST DOCUMENTED: ICD-10-PCS | Mod: CPTII,S$GLB,, | Performed by: INTERNAL MEDICINE

## 2022-03-07 PROCEDURE — 1157F PR ADVANCE CARE PLAN OR EQUIV PRESENT IN MEDICAL RECORD: ICD-10-PCS | Mod: CPTII,S$GLB,, | Performed by: INTERNAL MEDICINE

## 2022-03-07 PROCEDURE — 1159F PR MEDICATION LIST DOCUMENTED IN MEDICAL RECORD: ICD-10-PCS | Mod: CPTII,S$GLB,, | Performed by: INTERNAL MEDICINE

## 2022-03-07 PROCEDURE — 3288F FALL RISK ASSESSMENT DOCD: CPT | Mod: CPTII,S$GLB,, | Performed by: INTERNAL MEDICINE

## 2022-03-07 PROCEDURE — 1159F MED LIST DOCD IN RCRD: CPT | Mod: CPTII,S$GLB,, | Performed by: INTERNAL MEDICINE

## 2022-03-07 PROCEDURE — 3074F SYST BP LT 130 MM HG: CPT | Mod: CPTII,S$GLB,, | Performed by: INTERNAL MEDICINE

## 2022-03-07 PROCEDURE — 1101F PR PT FALLS ASSESS DOC 0-1 FALLS W/OUT INJ PAST YR: ICD-10-PCS | Mod: CPTII,S$GLB,, | Performed by: INTERNAL MEDICINE

## 2022-03-07 PROCEDURE — 99499 RISK ADDL DX/OHS AUDIT: ICD-10-PCS | Mod: S$GLB,,, | Performed by: INTERNAL MEDICINE

## 2022-03-07 PROCEDURE — 1126F AMNT PAIN NOTED NONE PRSNT: CPT | Mod: CPTII,S$GLB,, | Performed by: INTERNAL MEDICINE

## 2022-03-07 PROCEDURE — 99999 PR PBB SHADOW E&M-EST. PATIENT-LVL III: ICD-10-PCS | Mod: PBBFAC,,, | Performed by: INTERNAL MEDICINE

## 2022-03-07 PROCEDURE — 36415 COLL VENOUS BLD VENIPUNCTURE: CPT | Performed by: INTERNAL MEDICINE

## 2022-03-07 PROCEDURE — 84443 ASSAY THYROID STIM HORMONE: CPT | Performed by: INTERNAL MEDICINE

## 2022-03-07 PROCEDURE — 3074F PR MOST RECENT SYSTOLIC BLOOD PRESSURE < 130 MM HG: ICD-10-PCS | Mod: CPTII,S$GLB,, | Performed by: INTERNAL MEDICINE

## 2022-03-07 PROCEDURE — 85025 COMPLETE CBC W/AUTO DIFF WBC: CPT | Performed by: INTERNAL MEDICINE

## 2022-03-07 PROCEDURE — 80061 LIPID PANEL: CPT | Performed by: INTERNAL MEDICINE

## 2022-03-07 PROCEDURE — 84153 ASSAY OF PSA TOTAL: CPT | Mod: GA | Performed by: INTERNAL MEDICINE

## 2022-03-07 PROCEDURE — 99499 UNLISTED E&M SERVICE: CPT | Mod: S$GLB,,, | Performed by: INTERNAL MEDICINE

## 2022-03-08 RX ORDER — PRAVASTATIN SODIUM 20 MG/1
20 TABLET ORAL DAILY
Qty: 90 TABLET | Refills: 3 | Status: SHIPPED | OUTPATIENT
Start: 2022-03-08 | End: 2023-02-21

## 2022-03-08 NOTE — PROGRESS NOTES
Test results reviewed, actually with his wife  All look stable with the exception cholesterol is elevated, due to the fact that he is not taking pravastatin medication  Will restart the medication at 20 mg daily

## 2022-03-18 ENCOUNTER — TELEPHONE (OUTPATIENT)
Dept: ELECTROPHYSIOLOGY | Facility: CLINIC | Age: 72
End: 2022-03-18
Payer: MEDICARE

## 2022-03-18 DIAGNOSIS — I48.91 ATRIAL FIBRILLATION, UNSPECIFIED TYPE: Primary | ICD-10-CM

## 2022-03-22 ENCOUNTER — OFFICE VISIT (OUTPATIENT)
Dept: ELECTROPHYSIOLOGY | Facility: CLINIC | Age: 72
End: 2022-03-22
Payer: MEDICARE

## 2022-03-22 VITALS
WEIGHT: 240.75 LBS | DIASTOLIC BLOOD PRESSURE: 62 MMHG | BODY MASS INDEX: 32.61 KG/M2 | HEART RATE: 73 BPM | HEIGHT: 72 IN | SYSTOLIC BLOOD PRESSURE: 110 MMHG

## 2022-03-22 DIAGNOSIS — I48.19 PERSISTENT ATRIAL FIBRILLATION: Primary | ICD-10-CM

## 2022-03-22 DIAGNOSIS — I48.91 ATRIAL FIBRILLATION, UNSPECIFIED TYPE: ICD-10-CM

## 2022-03-22 DIAGNOSIS — Z98.890 STATUS POST CATHETER ABLATION OF ATRIAL FIBRILLATION: ICD-10-CM

## 2022-03-22 PROCEDURE — 3078F PR MOST RECENT DIASTOLIC BLOOD PRESSURE < 80 MM HG: ICD-10-PCS | Mod: CPTII,S$GLB,, | Performed by: INTERNAL MEDICINE

## 2022-03-22 PROCEDURE — 3008F BODY MASS INDEX DOCD: CPT | Mod: CPTII,S$GLB,, | Performed by: INTERNAL MEDICINE

## 2022-03-22 PROCEDURE — 1126F AMNT PAIN NOTED NONE PRSNT: CPT | Mod: CPTII,S$GLB,, | Performed by: INTERNAL MEDICINE

## 2022-03-22 PROCEDURE — 93005 ELECTROCARDIOGRAM TRACING: CPT | Mod: S$GLB,,, | Performed by: INTERNAL MEDICINE

## 2022-03-22 PROCEDURE — 1160F PR REVIEW ALL MEDS BY PRESCRIBER/CLIN PHARMACIST DOCUMENTED: ICD-10-PCS | Mod: CPTII,S$GLB,, | Performed by: INTERNAL MEDICINE

## 2022-03-22 PROCEDURE — 1159F MED LIST DOCD IN RCRD: CPT | Mod: CPTII,S$GLB,, | Performed by: INTERNAL MEDICINE

## 2022-03-22 PROCEDURE — 3078F DIAST BP <80 MM HG: CPT | Mod: CPTII,S$GLB,, | Performed by: INTERNAL MEDICINE

## 2022-03-22 PROCEDURE — 1157F ADVNC CARE PLAN IN RCRD: CPT | Mod: CPTII,S$GLB,, | Performed by: INTERNAL MEDICINE

## 2022-03-22 PROCEDURE — 93005 RHYTHM STRIP: ICD-10-PCS | Mod: S$GLB,,, | Performed by: INTERNAL MEDICINE

## 2022-03-22 PROCEDURE — 3074F PR MOST RECENT SYSTOLIC BLOOD PRESSURE < 130 MM HG: ICD-10-PCS | Mod: CPTII,S$GLB,, | Performed by: INTERNAL MEDICINE

## 2022-03-22 PROCEDURE — 1159F PR MEDICATION LIST DOCUMENTED IN MEDICAL RECORD: ICD-10-PCS | Mod: CPTII,S$GLB,, | Performed by: INTERNAL MEDICINE

## 2022-03-22 PROCEDURE — 3288F FALL RISK ASSESSMENT DOCD: CPT | Mod: CPTII,S$GLB,, | Performed by: INTERNAL MEDICINE

## 2022-03-22 PROCEDURE — 1160F RVW MEDS BY RX/DR IN RCRD: CPT | Mod: CPTII,S$GLB,, | Performed by: INTERNAL MEDICINE

## 2022-03-22 PROCEDURE — 1126F PR PAIN SEVERITY QUANTIFIED, NO PAIN PRESENT: ICD-10-PCS | Mod: CPTII,S$GLB,, | Performed by: INTERNAL MEDICINE

## 2022-03-22 PROCEDURE — 4010F ACE/ARB THERAPY RXD/TAKEN: CPT | Mod: CPTII,S$GLB,, | Performed by: INTERNAL MEDICINE

## 2022-03-22 PROCEDURE — 1157F PR ADVANCE CARE PLAN OR EQUIV PRESENT IN MEDICAL RECORD: ICD-10-PCS | Mod: CPTII,S$GLB,, | Performed by: INTERNAL MEDICINE

## 2022-03-22 PROCEDURE — 1101F PT FALLS ASSESS-DOCD LE1/YR: CPT | Mod: CPTII,S$GLB,, | Performed by: INTERNAL MEDICINE

## 2022-03-22 PROCEDURE — 1101F PR PT FALLS ASSESS DOC 0-1 FALLS W/OUT INJ PAST YR: ICD-10-PCS | Mod: CPTII,S$GLB,, | Performed by: INTERNAL MEDICINE

## 2022-03-22 PROCEDURE — 99214 PR OFFICE/OUTPT VISIT, EST, LEVL IV, 30-39 MIN: ICD-10-PCS | Mod: S$GLB,,, | Performed by: INTERNAL MEDICINE

## 2022-03-22 PROCEDURE — 99214 OFFICE O/P EST MOD 30 MIN: CPT | Mod: S$GLB,,, | Performed by: INTERNAL MEDICINE

## 2022-03-22 PROCEDURE — 3074F SYST BP LT 130 MM HG: CPT | Mod: CPTII,S$GLB,, | Performed by: INTERNAL MEDICINE

## 2022-03-22 PROCEDURE — 99999 PR PBB SHADOW E&M-EST. PATIENT-LVL III: CPT | Mod: PBBFAC,,, | Performed by: INTERNAL MEDICINE

## 2022-03-22 PROCEDURE — 99999 PR PBB SHADOW E&M-EST. PATIENT-LVL III: ICD-10-PCS | Mod: PBBFAC,,, | Performed by: INTERNAL MEDICINE

## 2022-03-22 PROCEDURE — 3008F PR BODY MASS INDEX (BMI) DOCUMENTED: ICD-10-PCS | Mod: CPTII,S$GLB,, | Performed by: INTERNAL MEDICINE

## 2022-03-22 PROCEDURE — 93010 RHYTHM STRIP: ICD-10-PCS | Mod: S$GLB,,, | Performed by: INTERNAL MEDICINE

## 2022-03-22 PROCEDURE — 4010F PR ACE/ARB THEARPY RXD/TAKEN: ICD-10-PCS | Mod: CPTII,S$GLB,, | Performed by: INTERNAL MEDICINE

## 2022-03-22 PROCEDURE — 93010 ELECTROCARDIOGRAM REPORT: CPT | Mod: S$GLB,,, | Performed by: INTERNAL MEDICINE

## 2022-03-22 PROCEDURE — 3288F PR FALLS RISK ASSESSMENT DOCUMENTED: ICD-10-PCS | Mod: CPTII,S$GLB,, | Performed by: INTERNAL MEDICINE

## 2022-03-22 NOTE — PROGRESS NOTES
Subjective:    Patient ID:  Pavel Graff is a 72 y.o. male who presents for follow-up of Atrial Fibrillation      72 year old male with history of HTN, TIA, SIMON, HLD and persistent AF since July 2012.  DCCV was attempted with early recurrence of AF.  He was rate controlled and did not note significant symptoms at the time - so a rate control strategy was pursued.  He notes though, that ever since he has been in AF - he has been much more easily fatigued and tired, not himself.  He denies overt palpitation or SOB.  He denies chest pain.    Mr. Graff underwent a PVI (07/21/16) with successful pulmonary vein antral isolation. Since the procedure, Mr. Graff reports feeling well with only two 3-second episodes of palpitations; he denies further recurrence.  denies chest pain, SOB/BLANCO, dizziness, or syncope. Mr. Graff has noted improvement in his energy level since the procedure.     12/16: Continues improved symptoms. 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 Remains on pradaxa and metoprolol.     6/17: No recurrence of AF since PVI. Feels well. No active complaints.     2/18: AF recurrence without symptoms. Remains on apixaban. The patient denies interval chest pain, sob, palpitations, syncope, near syncope.     8/18: Stable symptoms with slightly more fatigue. He asks about repeat ablation at the end of the season.     11/18: Re-do PVI 9/28/18 with touch up PVI, posterior wall isolation and CTI line. Feels some what fatigued, no post procedure complications.     5/19: No recurrence since PVI 9/18. Symptoms improved, stable.     Interval history: Onset of easy bleeding and bruising. He has had events with spontaneous sub-conjunctival bleeding that are increasing in frequency and associated with pain. Remains in NSR.    CHADSVASC: 4  HASBLED: 4    2D echo 4/16    1 - Eccentric hypertrophy.     2 - Normal left ventricular systolic function (EF 60-65%).     3 - Right ventricular enlargement with  normal systolic function.     4 - Biatrial enlargement.     5 - Mild aortic regurgitation.     6 - Mild mitral regurgitation.     7 - Mild tricuspid regurgitation.     8 - The estimated PA systolic pressure is 33 mmHg.     9 - Mildly elevated central venous pressure.     PET stress 5/16  1. There is no evidence of a discrete hemodynamically significant coronary stenosis.   2. Although no clinically significant stress defect is seen, there is resting flow heterogeneity that improves after Dipyridamole. These results suggest mild endothelial dysfunction due to elevated cholesterol, high blood pressure and diabetes without clinically significant,      localized perfusion defects.   3. Resting wall motion is physiologic. Stress wall motion is physiologic.   4. LV function is normal at rest and stress.  (normal is >= 51%)  5. The ventricular volumes are normal at rest and stress.   6. The extracardiac distribution of radioactivity is normal.   7. There was no previous study available to compare.    Past Medical History:  No date: *Atrial fibrillation  No date: Anticoagulant long-term use  No date: Atrial fibrillation  No date: Hyperlipidemia  No date: Hypertension  No date: Sleep apnea  No date: Stroke      Comment:  TIA  No date: TIA (transient ischemic attack)    Past Surgical History:  9/28/2018: ABLATION; N/A      Comment:  Performed by Romaine Howard MD at [facility] CATH LAB  7/21/2016: ABLATION; N/A      Comment:  Performed by Romaine Howard MD at [facility] CATH LAB  6/14/2016: CARDIOVERSION; N/A      Comment:  Performed by Romaine Howard MD at [facility] CATH LAB  1/11/10: CATARACT EXTRACTION W/  INTRAOCULAR LENS IMPLANT; Left  12/14/2017: CATARACT EXTRACTION W/  INTRAOCULAR LENS IMPLANT; Right      Comment:  NANCYK/ Dr. Jay  3/26/2019: COLONOSCOPY; N/A      Comment:  Performed by Mauro Dye MD at [facility] ENDO (4TH FLR)  9/9/2013: COLONOSCOPY; N/A      Comment:  Performed by Mauro Dye MD at [facility] ENDO (4TH FLR)  1997:  CORNEAL TRANSPLANT; Left      Comment:  PKP OS  03/23/2017: CORNEAL TRANSPLANT; Left  12/14/2017: CORNEAL TRANSPLANT; Right      Comment:  DMEK/ [place]; Dr. Jay  9/28/2018: ECHOCARDIOGRAM,TRANSESOPHAGEAL; N/A      Comment:  Performed by Romaine Howard MD at [facility] CATH LAB  No date: HERNIA REPAIR  No date: HIP ARTHROPLASTY  12/14/2017: INSERTION-INTRAOCULAR LENS (IOL); Right      Comment:  Performed by Chantell Jay MD at [facility] OR  12/14/2017: PHACOEMULSIFICATION-ASPIRATION-CATARACT; Right      Comment:  Performed by Chantell Jay MD at [facility] OR  No date: ROTATOR CUFF REPAIR  7/20/2016: TRANSESOPHAGEAL ECHOCARDIOGRAM (KAM); N/A      Comment:  Performed by Tameka Jay MD at [facility] CATH LAB  6/14/2016: TRANSESOPHAGEAL ECHOCARDIOGRAM (KAM); N/A      Comment:  Performed by Denzel Farmer MD at [facility] CATH LAB  6/14/2016: TRANSESOPHAGEAL ECHOCARDIOGRAM (KAM); N/A      Comment:  Performed by Romaine Howard MD at [facility] CATH LAB  12/14/2017: TRANSPLANT-CORNEA/DMEK; Right      Comment:  Performed by Chantell Jay MD at [facility] OR  3/23/2017: TRANSPLANT-CORNEA/PKP; Left      Comment:  Performed by Chantell Jay MD at [facility] OR    Social History    Socioeconomic History      Marital status:       Spouse name: Not on file      Number of children: 3      Years of education: Not on file      Highest education level: Not on file    Occupational History        Employer: [employer]    Social Needs      Financial resource strain: Not on file      Food insecurity:        Worry: Not on file        Inability: Not on file      Transportation needs:        Medical: Not on file        Non-medical: Not on file    Tobacco Use      Smoking status: Never Smoker      Smokeless tobacco: Never Used    Substance and Sexual Activity      Alcohol use: No      Drug use: No      Sexual activity: Yes        Partners: Female    Lifestyle      Physical activity:        Days per week: Not on file        Minutes per session:  Not on file      Stress: Not on file    Relationships      Social connections:        Talks on phone: Not on file        Gets together: Not on file        Attends Tenriism service: Not on file        Active member of club or organization: Not on file        Attends meetings of clubs or organizations: Not on file        Relationship status: Not on file    Other Topics      Concerns:        Not on file    Social History Narrative      Not on file    Review of patient's family history indicates:  Problem: Diabetes      Relation: Mother          Age of Onset: (Not Specified)  Problem: Heart disease      Relation: Mother          Age of Onset: (Not Specified)  Problem: Hyperlipidemia      Relation: Mother          Age of Onset: (Not Specified)  Problem: Heart disease      Relation: Father          Age of Onset: (Not Specified)  Problem: Colon cancer      Relation: Father          Age of Onset: (Not Specified)          Comment: colon  Problem: Cancer      Relation: Father          Age of Onset: (Not Specified)  Problem: Heart disease      Relation: Brother          Age of Onset: (Not Specified)  Problem: Crohn's disease      Relation: Sister          Age of Onset: (Not Specified)  Problem: Hyperlipidemia      Relation: Sister          Age of Onset: (Not Specified)  Problem: Hypertension      Relation: Sister          Age of Onset: (Not Specified)  Problem: Hyperlipidemia      Relation: Sister          Age of Onset: (Not Specified)  Problem: Sudden death      Relation: Neg Hx          Age of Onset: (Not Specified)  Problem: Amblyopia      Relation: Neg Hx          Age of Onset: (Not Specified)  Problem: Blindness      Relation: Neg Hx          Age of Onset: (Not Specified)  Problem: Cataracts      Relation: Neg Hx          Age of Onset: (Not Specified)  Problem: Glaucoma      Relation: Neg Hx          Age of Onset: (Not Specified)  Problem: Macular degeneration      Relation: Neg Hx          Age of Onset: (Not  Specified)  Problem: Retinal detachment      Relation: Neg Hx          Age of Onset: (Not Specified)  Problem: Strabismus      Relation: Neg Hx          Age of Onset: (Not Specified)  Problem: Stroke      Relation: Neg Hx          Age of Onset: (Not Specified)  Problem: Thyroid disease      Relation: Neg Hx          Age of Onset: (Not Specified)        Review of Systems   Constitutional: Negative.   HENT: Negative.    Eyes: Negative.    Cardiovascular: Negative for chest pain, dyspnea on exertion, leg swelling, near-syncope, palpitations and syncope.   Respiratory: Negative.  Negative for shortness of breath.    Endocrine: Negative.    Hematologic/Lymphatic: Negative.    Skin: Negative.    Musculoskeletal: Negative.    Gastrointestinal: Negative.    Genitourinary: Negative.    Neurological: Negative.  Negative for dizziness and light-headedness.   Psychiatric/Behavioral: Negative.    Allergic/Immunologic: Negative.         Objective:    Physical Exam  Vitals reviewed.   Constitutional:       General: He is not in acute distress.     Appearance: He is well-developed.   HENT:      Head: Normocephalic and atraumatic.   Eyes:      Pupils: Pupils are equal, round, and reactive to light.   Neck:      Thyroid: No thyromegaly.      Vascular: No JVD.   Cardiovascular:      Rate and Rhythm: Normal rate and regular rhythm.      Chest Wall: PMI is not displaced.      Heart sounds: Normal heart sounds, S1 normal and S2 normal. No murmur heard.    No friction rub. No gallop.   Pulmonary:      Effort: Pulmonary effort is normal. No respiratory distress.      Breath sounds: Normal breath sounds. No wheezing or rales.   Abdominal:      General: Bowel sounds are normal. There is no distension.      Palpations: Abdomen is soft.      Tenderness: There is no abdominal tenderness. There is no guarding or rebound.   Musculoskeletal:         General: No tenderness. Normal range of motion.      Cervical back: Normal range of motion.    Skin:     General: Skin is warm and dry.      Findings: No erythema or rash.   Neurological:      Mental Status: He is alert and oriented to person, place, and time.      Cranial Nerves: No cranial nerve deficit.   Psychiatric:         Behavior: Behavior normal.         Thought Content: Thought content normal.         Judgment: Judgment normal.       ECG: NSR nl IN, QRS, QTc        Assessment:       1. Persistent atrial fibrillation    2. Status post catheter ablation of atrial fibrillation    3. Atrial fibrillation, unspecified type         Plan:       72 yoM here for AF management. He has maintained NSR since his last ablation many years ago. He has developed recent ocular bleeding while on apixaban. The patient can tolerate short term OAC but is not a candidate for long term OAC due to recurrent bleeding issues. I discussed management options regarding LAAO. I discussed the process of LAAO via Watchman including pre-procedure testing  as well as the need to take OAC for 6 weeks post implant. We discussed post procedure KAM studies to assess KIRSTEN occlusion. Additionally I mentioned the need to take DAPT up to the 6 month point post implant.      Watchman with Anesthesia support

## 2022-03-30 NOTE — PROGRESS NOTES
Subjective:       Patient ID: Pavel Graff is a 72 y.o. male.    Chief Complaint: No chief complaint on file.    HPI     72 year old male with TIA, AFib s/p PVI, SIMON, HLD here for Watchman evaluation. He has had bleeding events while on anticoagulation. When he had his TIA he had symptoms of dysarthria and then was found to have AFib. He has had subconjunctival hemorrhages increasing in frequency with easy bruising and bleeding and would like to stop anticoagulation.    He denies angina, dyspnea on exertion, orthopnea, PND, peripheral edema, palpitations, syncope or claudication. He does not exercise routinely.    Dysphagia or odynophagia:  No  Liver Disease, esophageal disease, or known varices:  No  Upper GI Bleeding: No  Snoring:  Yes  Sleep Apnea:  Yes  Prior neck surgery or radiation:  No  History of anesthetic difficulties:  No  Family history of anesthetic difficulties:  No  Able to move neck in all directions:  Yes      Review of Systems   Constitutional: Negative for chills and fever.   HENT: Negative for nosebleeds.    Eyes: Positive for visual disturbance.   Respiratory: Negative for chest tightness and shortness of breath.    Cardiovascular: Negative for chest pain, palpitations and leg swelling.   Gastrointestinal: Negative for blood in stool.   Genitourinary: Negative for hematuria.   Musculoskeletal: Negative for gait problem.   Skin: Negative for wound.   Neurological: Negative for syncope.       Objective:       Vitals:    04/04/22 0852   BP: 138/70   Pulse: 83       Physical Exam  Constitutional:       Appearance: He is well-developed. He is not diaphoretic.   HENT:      Head: Normocephalic and atraumatic.   Eyes:      Pupils: Pupils are equal, round, and reactive to light.   Neck:      Vascular: No carotid bruit or JVD.   Cardiovascular:      Rate and Rhythm: Normal rate and regular rhythm.      Heart sounds: Normal heart sounds. Heart sounds not distant. No murmur heard.    No friction rub.  No gallop. No S3 or S4 sounds.   Pulmonary:      Effort: Pulmonary effort is normal. No respiratory distress.      Breath sounds: Normal breath sounds. No wheezing.   Abdominal:      General: Bowel sounds are normal. There is no distension.      Palpations: Abdomen is soft.      Tenderness: There is no abdominal tenderness.   Musculoskeletal:         General: No swelling.      Cervical back: Normal range of motion.   Skin:     General: Skin is warm.      Findings: No erythema.   Neurological:      Mental Status: He is alert and oriented to person, place, and time.   Psychiatric:         Behavior: Behavior normal.         Assessment:       1. Primary hypertension    2. Hyperlipidemia, unspecified hyperlipidemia type    3. Transient cerebral ischemia, unspecified type    4. Persistent atrial fibrillation        Plan:       Atrial fibrillation - Watchman evaluation    [id number] score: 3  HAS-BLED score: 3    If you answer NO to any of the four criteria below, the patient does not meet the WATCHMAN implant eligibility requirements.     1. Patient has non-valvular atrial fibrillation: Yes  2. Patient has an increased risk for stroke and is recommended for oral anticoagulation (OAC): Yes  3. Patient is suitable for short-term anticoagulation therapy but deemed unable to take long-term OAC: Yes  4. Patient has an appropriate rationale to seek a non-pharmacological alternative to OACs. Specific factors include: History of bleeding or increased bleeding risk    I agree that Watchman implantation is a reasonable stroke prevention strategy in this patient who has noticed increased bleeding events and would prefer to stop oral anticoagulation. He is also suitable for a KAM and has no contraindications for esophageal intubation.    HTN, HLD, TIA  Managed by PCP  Continue lipid lowering therapies and antihypertensive medications

## 2022-04-04 ENCOUNTER — OFFICE VISIT (OUTPATIENT)
Dept: CARDIOLOGY | Facility: CLINIC | Age: 72
End: 2022-04-04
Payer: MEDICARE

## 2022-04-04 ENCOUNTER — PATIENT OUTREACH (OUTPATIENT)
Dept: ADMINISTRATIVE | Facility: OTHER | Age: 72
End: 2022-04-04
Payer: MEDICARE

## 2022-04-04 VITALS
SYSTOLIC BLOOD PRESSURE: 138 MMHG | WEIGHT: 240.06 LBS | DIASTOLIC BLOOD PRESSURE: 70 MMHG | HEIGHT: 72 IN | BODY MASS INDEX: 32.52 KG/M2 | HEART RATE: 83 BPM

## 2022-04-04 DIAGNOSIS — G45.9 TRANSIENT CEREBRAL ISCHEMIA, UNSPECIFIED TYPE: ICD-10-CM

## 2022-04-04 DIAGNOSIS — E78.5 HYPERLIPIDEMIA, UNSPECIFIED HYPERLIPIDEMIA TYPE: ICD-10-CM

## 2022-04-04 DIAGNOSIS — I10 PRIMARY HYPERTENSION: Primary | ICD-10-CM

## 2022-04-04 DIAGNOSIS — I48.19 PERSISTENT ATRIAL FIBRILLATION: ICD-10-CM

## 2022-04-04 PROCEDURE — 3078F PR MOST RECENT DIASTOLIC BLOOD PRESSURE < 80 MM HG: ICD-10-PCS | Mod: CPTII,S$GLB,, | Performed by: INTERNAL MEDICINE

## 2022-04-04 PROCEDURE — 1160F RVW MEDS BY RX/DR IN RCRD: CPT | Mod: CPTII,S$GLB,, | Performed by: INTERNAL MEDICINE

## 2022-04-04 PROCEDURE — 1101F PR PT FALLS ASSESS DOC 0-1 FALLS W/OUT INJ PAST YR: ICD-10-PCS | Mod: CPTII,S$GLB,, | Performed by: INTERNAL MEDICINE

## 2022-04-04 PROCEDURE — 1157F PR ADVANCE CARE PLAN OR EQUIV PRESENT IN MEDICAL RECORD: ICD-10-PCS | Mod: CPTII,S$GLB,, | Performed by: INTERNAL MEDICINE

## 2022-04-04 PROCEDURE — 99999 PR PBB SHADOW E&M-EST. PATIENT-LVL III: CPT | Mod: PBBFAC,,, | Performed by: INTERNAL MEDICINE

## 2022-04-04 PROCEDURE — 3078F DIAST BP <80 MM HG: CPT | Mod: CPTII,S$GLB,, | Performed by: INTERNAL MEDICINE

## 2022-04-04 PROCEDURE — 1159F MED LIST DOCD IN RCRD: CPT | Mod: CPTII,S$GLB,, | Performed by: INTERNAL MEDICINE

## 2022-04-04 PROCEDURE — 1157F ADVNC CARE PLAN IN RCRD: CPT | Mod: CPTII,S$GLB,, | Performed by: INTERNAL MEDICINE

## 2022-04-04 PROCEDURE — 3288F FALL RISK ASSESSMENT DOCD: CPT | Mod: CPTII,S$GLB,, | Performed by: INTERNAL MEDICINE

## 2022-04-04 PROCEDURE — 4010F ACE/ARB THERAPY RXD/TAKEN: CPT | Mod: CPTII,S$GLB,, | Performed by: INTERNAL MEDICINE

## 2022-04-04 PROCEDURE — 99999 PR PBB SHADOW E&M-EST. PATIENT-LVL III: ICD-10-PCS | Mod: PBBFAC,,, | Performed by: INTERNAL MEDICINE

## 2022-04-04 PROCEDURE — 1126F AMNT PAIN NOTED NONE PRSNT: CPT | Mod: CPTII,S$GLB,, | Performed by: INTERNAL MEDICINE

## 2022-04-04 PROCEDURE — 3008F BODY MASS INDEX DOCD: CPT | Mod: CPTII,S$GLB,, | Performed by: INTERNAL MEDICINE

## 2022-04-04 PROCEDURE — 99204 PR OFFICE/OUTPT VISIT, NEW, LEVL IV, 45-59 MIN: ICD-10-PCS | Mod: S$GLB,,, | Performed by: INTERNAL MEDICINE

## 2022-04-04 PROCEDURE — 1126F PR PAIN SEVERITY QUANTIFIED, NO PAIN PRESENT: ICD-10-PCS | Mod: CPTII,S$GLB,, | Performed by: INTERNAL MEDICINE

## 2022-04-04 PROCEDURE — 99204 OFFICE O/P NEW MOD 45 MIN: CPT | Mod: S$GLB,,, | Performed by: INTERNAL MEDICINE

## 2022-04-04 PROCEDURE — 3008F PR BODY MASS INDEX (BMI) DOCUMENTED: ICD-10-PCS | Mod: CPTII,S$GLB,, | Performed by: INTERNAL MEDICINE

## 2022-04-04 PROCEDURE — 3075F PR MOST RECENT SYSTOLIC BLOOD PRESS GE 130-139MM HG: ICD-10-PCS | Mod: CPTII,S$GLB,, | Performed by: INTERNAL MEDICINE

## 2022-04-04 PROCEDURE — 1159F PR MEDICATION LIST DOCUMENTED IN MEDICAL RECORD: ICD-10-PCS | Mod: CPTII,S$GLB,, | Performed by: INTERNAL MEDICINE

## 2022-04-04 PROCEDURE — 3288F PR FALLS RISK ASSESSMENT DOCUMENTED: ICD-10-PCS | Mod: CPTII,S$GLB,, | Performed by: INTERNAL MEDICINE

## 2022-04-04 PROCEDURE — 1101F PT FALLS ASSESS-DOCD LE1/YR: CPT | Mod: CPTII,S$GLB,, | Performed by: INTERNAL MEDICINE

## 2022-04-04 PROCEDURE — 4010F PR ACE/ARB THEARPY RXD/TAKEN: ICD-10-PCS | Mod: CPTII,S$GLB,, | Performed by: INTERNAL MEDICINE

## 2022-04-04 PROCEDURE — 3075F SYST BP GE 130 - 139MM HG: CPT | Mod: CPTII,S$GLB,, | Performed by: INTERNAL MEDICINE

## 2022-04-04 PROCEDURE — 1160F PR REVIEW ALL MEDS BY PRESCRIBER/CLIN PHARMACIST DOCUMENTED: ICD-10-PCS | Mod: CPTII,S$GLB,, | Performed by: INTERNAL MEDICINE

## 2022-04-07 ENCOUNTER — PATIENT MESSAGE (OUTPATIENT)
Dept: ELECTROPHYSIOLOGY | Facility: CLINIC | Age: 72
End: 2022-04-07
Payer: MEDICARE

## 2022-04-07 DIAGNOSIS — I48.19 PERSISTENT ATRIAL FIBRILLATION: Primary | ICD-10-CM

## 2022-04-12 ENCOUNTER — OFFICE VISIT (OUTPATIENT)
Dept: INTERNAL MEDICINE | Facility: CLINIC | Age: 72
End: 2022-04-12
Payer: MEDICARE

## 2022-04-12 VITALS
OXYGEN SATURATION: 99 % | BODY MASS INDEX: 32.51 KG/M2 | SYSTOLIC BLOOD PRESSURE: 128 MMHG | WEIGHT: 240 LBS | DIASTOLIC BLOOD PRESSURE: 70 MMHG | HEIGHT: 72 IN | HEART RATE: 58 BPM

## 2022-04-12 DIAGNOSIS — B37.2 CANDIDAL INTERTRIGO: Primary | ICD-10-CM

## 2022-04-12 PROCEDURE — 3078F PR MOST RECENT DIASTOLIC BLOOD PRESSURE < 80 MM HG: ICD-10-PCS | Mod: CPTII,S$GLB,, | Performed by: INTERNAL MEDICINE

## 2022-04-12 PROCEDURE — 4010F ACE/ARB THERAPY RXD/TAKEN: CPT | Mod: CPTII,S$GLB,, | Performed by: INTERNAL MEDICINE

## 2022-04-12 PROCEDURE — 1101F PR PT FALLS ASSESS DOC 0-1 FALLS W/OUT INJ PAST YR: ICD-10-PCS | Mod: CPTII,S$GLB,, | Performed by: INTERNAL MEDICINE

## 2022-04-12 PROCEDURE — 3288F FALL RISK ASSESSMENT DOCD: CPT | Mod: CPTII,S$GLB,, | Performed by: INTERNAL MEDICINE

## 2022-04-12 PROCEDURE — 3074F SYST BP LT 130 MM HG: CPT | Mod: CPTII,S$GLB,, | Performed by: INTERNAL MEDICINE

## 2022-04-12 PROCEDURE — 99999 PR PBB SHADOW E&M-EST. PATIENT-LVL III: CPT | Mod: PBBFAC,,, | Performed by: INTERNAL MEDICINE

## 2022-04-12 PROCEDURE — 99212 PR OFFICE/OUTPT VISIT, EST, LEVL II, 10-19 MIN: ICD-10-PCS | Mod: S$GLB,,, | Performed by: INTERNAL MEDICINE

## 2022-04-12 PROCEDURE — 3078F DIAST BP <80 MM HG: CPT | Mod: CPTII,S$GLB,, | Performed by: INTERNAL MEDICINE

## 2022-04-12 PROCEDURE — 1159F PR MEDICATION LIST DOCUMENTED IN MEDICAL RECORD: ICD-10-PCS | Mod: CPTII,S$GLB,, | Performed by: INTERNAL MEDICINE

## 2022-04-12 PROCEDURE — 1160F RVW MEDS BY RX/DR IN RCRD: CPT | Mod: CPTII,S$GLB,, | Performed by: INTERNAL MEDICINE

## 2022-04-12 PROCEDURE — 3288F PR FALLS RISK ASSESSMENT DOCUMENTED: ICD-10-PCS | Mod: CPTII,S$GLB,, | Performed by: INTERNAL MEDICINE

## 2022-04-12 PROCEDURE — 99999 PR PBB SHADOW E&M-EST. PATIENT-LVL III: ICD-10-PCS | Mod: PBBFAC,,, | Performed by: INTERNAL MEDICINE

## 2022-04-12 PROCEDURE — 3008F PR BODY MASS INDEX (BMI) DOCUMENTED: ICD-10-PCS | Mod: CPTII,S$GLB,, | Performed by: INTERNAL MEDICINE

## 2022-04-12 PROCEDURE — 1101F PT FALLS ASSESS-DOCD LE1/YR: CPT | Mod: CPTII,S$GLB,, | Performed by: INTERNAL MEDICINE

## 2022-04-12 PROCEDURE — 1157F ADVNC CARE PLAN IN RCRD: CPT | Mod: CPTII,S$GLB,, | Performed by: INTERNAL MEDICINE

## 2022-04-12 PROCEDURE — 1159F MED LIST DOCD IN RCRD: CPT | Mod: CPTII,S$GLB,, | Performed by: INTERNAL MEDICINE

## 2022-04-12 PROCEDURE — 99212 OFFICE O/P EST SF 10 MIN: CPT | Mod: S$GLB,,, | Performed by: INTERNAL MEDICINE

## 2022-04-12 PROCEDURE — 1126F PR PAIN SEVERITY QUANTIFIED, NO PAIN PRESENT: ICD-10-PCS | Mod: CPTII,S$GLB,, | Performed by: INTERNAL MEDICINE

## 2022-04-12 PROCEDURE — 1126F AMNT PAIN NOTED NONE PRSNT: CPT | Mod: CPTII,S$GLB,, | Performed by: INTERNAL MEDICINE

## 2022-04-12 PROCEDURE — 3008F BODY MASS INDEX DOCD: CPT | Mod: CPTII,S$GLB,, | Performed by: INTERNAL MEDICINE

## 2022-04-12 PROCEDURE — 3074F PR MOST RECENT SYSTOLIC BLOOD PRESSURE < 130 MM HG: ICD-10-PCS | Mod: CPTII,S$GLB,, | Performed by: INTERNAL MEDICINE

## 2022-04-12 PROCEDURE — 4010F PR ACE/ARB THEARPY RXD/TAKEN: ICD-10-PCS | Mod: CPTII,S$GLB,, | Performed by: INTERNAL MEDICINE

## 2022-04-12 PROCEDURE — 1160F PR REVIEW ALL MEDS BY PRESCRIBER/CLIN PHARMACIST DOCUMENTED: ICD-10-PCS | Mod: CPTII,S$GLB,, | Performed by: INTERNAL MEDICINE

## 2022-04-12 PROCEDURE — 1157F PR ADVANCE CARE PLAN OR EQUIV PRESENT IN MEDICAL RECORD: ICD-10-PCS | Mod: CPTII,S$GLB,, | Performed by: INTERNAL MEDICINE

## 2022-04-12 RX ORDER — NYSTATIN AND TRIAMCINOLONE ACETONIDE 100000; 1 [USP'U]/G; MG/G
CREAM TOPICAL 2 TIMES DAILY
Qty: 60 G | Refills: 1 | Status: SHIPPED | OUTPATIENT
Start: 2022-04-12 | End: 2022-09-13

## 2022-04-12 NOTE — PROGRESS NOTES
MEDICAL HISTORY:  Persistent atrial fibrillation with previous direct cardioversion and status post pulmonary vein isolation twice  Obstructive sleep apnea   Hyperlipidemia.  Hypertension.  Previous TIA.  Right hip arthroplasty.  Bilateral inguinal hernia repair   Right rotator cuff tear.  Cataract extraction with corneal transplant. Fuchs dystrophy     S     MEDICATIONS:  Eliquis 5 mg twice a day.  Aspirin 81 mg a day.  Hydrochlorothiazide 25 mg a day.  Losartan 50 mg a day.  Metoprolol succinate 25 mg a day.  Eye drops outlined in the med sheet.      72-year-old male  He presents with the rash or discoloration in the intertrigo in the groin region is been there for while there is no drainage pruritus or pain.  The concern is that on May 9th he will be undergoing watchman's device in would like to get this cleared up    Examination  Weight 240  Pulse 60  Blood pressure 128/70  In the in the intertrigo area on both sides the there is hyperemia with some whitish discoloration.  No drainage.    Impression  Intertrigo/Candida dermatitis    Plan  Mycolog-II cream apply twice a day  Importance of keeping it clean and dry  No improvement into next week referred to Derm

## 2022-05-02 ENCOUNTER — LAB VISIT (OUTPATIENT)
Dept: LAB | Facility: HOSPITAL | Age: 72
End: 2022-05-02
Attending: INTERNAL MEDICINE
Payer: MEDICARE

## 2022-05-02 DIAGNOSIS — I48.19 PERSISTENT ATRIAL FIBRILLATION: ICD-10-CM

## 2022-05-02 LAB
ANION GAP SERPL CALC-SCNC: 5 MMOL/L (ref 8–16)
APTT BLDCRRT: 26 SEC (ref 21–32)
BUN SERPL-MCNC: 10 MG/DL (ref 8–23)
CALCIUM SERPL-MCNC: 9.7 MG/DL (ref 8.7–10.5)
CHLORIDE SERPL-SCNC: 103 MMOL/L (ref 95–110)
CO2 SERPL-SCNC: 30 MMOL/L (ref 23–29)
CREAT SERPL-MCNC: 0.8 MG/DL (ref 0.5–1.4)
ERYTHROCYTE [DISTWIDTH] IN BLOOD BY AUTOMATED COUNT: 11.9 % (ref 11.5–14.5)
EST. GFR  (AFRICAN AMERICAN): >60 ML/MIN/1.73 M^2
EST. GFR  (NON AFRICAN AMERICAN): >60 ML/MIN/1.73 M^2
GLUCOSE SERPL-MCNC: 109 MG/DL (ref 70–110)
HCT VFR BLD AUTO: 42.6 % (ref 40–54)
HGB BLD-MCNC: 14.9 G/DL (ref 14–18)
INR PPP: 1 (ref 0.8–1.2)
MCH RBC QN AUTO: 33.1 PG (ref 27–31)
MCHC RBC AUTO-ENTMCNC: 35 G/DL (ref 32–36)
MCV RBC AUTO: 95 FL (ref 82–98)
PLATELET # BLD AUTO: 299 K/UL (ref 150–450)
PMV BLD AUTO: 9.9 FL (ref 9.2–12.9)
POTASSIUM SERPL-SCNC: 4.3 MMOL/L (ref 3.5–5.1)
PROTHROMBIN TIME: 10.3 SEC (ref 9–12.5)
RBC # BLD AUTO: 4.5 M/UL (ref 4.6–6.2)
SODIUM SERPL-SCNC: 138 MMOL/L (ref 136–145)
WBC # BLD AUTO: 6.36 K/UL (ref 3.9–12.7)

## 2022-05-02 PROCEDURE — 85027 COMPLETE CBC AUTOMATED: CPT | Performed by: INTERNAL MEDICINE

## 2022-05-02 PROCEDURE — 85730 THROMBOPLASTIN TIME PARTIAL: CPT | Performed by: INTERNAL MEDICINE

## 2022-05-02 PROCEDURE — 80048 BASIC METABOLIC PNL TOTAL CA: CPT | Performed by: INTERNAL MEDICINE

## 2022-05-02 PROCEDURE — 85610 PROTHROMBIN TIME: CPT | Performed by: INTERNAL MEDICINE

## 2022-05-02 PROCEDURE — 36415 COLL VENOUS BLD VENIPUNCTURE: CPT | Performed by: INTERNAL MEDICINE

## 2022-05-05 ENCOUNTER — TELEPHONE (OUTPATIENT)
Dept: ELECTROPHYSIOLOGY | Facility: CLINIC | Age: 72
End: 2022-05-05
Payer: MEDICARE

## 2022-05-05 NOTE — TELEPHONE ENCOUNTER
----- Message from Vicki Schneider MA sent at 5/5/2022  9:36 AM CDT -----  Regarding: FW: please call    ----- Message -----  From: Morenita Farias  Sent: 5/5/2022   9:30 AM CDT  To: Lee Gavin Staff  Subject: please call                                      The pt is calling to speak with someone in regards to his procedure. Please call him back @ [phone] tlp [phone]. Thanks, Morenita

## 2022-05-06 ENCOUNTER — ANESTHESIA EVENT (OUTPATIENT)
Dept: MEDSURG UNIT | Facility: HOSPITAL | Age: 72
DRG: 274 | End: 2022-05-06
Payer: MEDICARE

## 2022-05-06 ENCOUNTER — TELEPHONE (OUTPATIENT)
Dept: ELECTROPHYSIOLOGY | Facility: CLINIC | Age: 72
End: 2022-05-06
Payer: MEDICARE

## 2022-05-06 NOTE — TELEPHONE ENCOUNTER
Spoke to Ms. Bruno, patient's wife    CONFIRMED procedure arrival time of 5:15am    Reiterated instructions including:  -Directions to check in desk  -NPO after midnight night prior to procedure  -High importance of HOLDING Eliquis   -Pre-procedure LABS done, no alerts  -COVID test n/a, vacc  -Confirmed no fever, cough, or shortness of breath in the past 30 days  -Confirmed no redness, rash, irritation, or yeast infection to groin area.   -Reviewed current visitor policy    Patient verbalizes understanding of above and appreciates call.

## 2022-05-07 NOTE — ANESTHESIA PREPROCEDURE EVALUATION
05/06/2022  Pavel Graff is a 72 y.o., male   Patient Active Problem List   Diagnosis    Hypertension    Hyperlipidemia    Transient cerebral ischemia    History of TIA (transient ischemic attack)    Benign prostatic hyperplasia    Inguinal hernia recurrent unilateral    Personal history of colonic polyps    Fuchs' corneal dystrophy    Keratoconus, unspecified    Obstructive sleep apnea    BLANCO (dyspnea on exertion)    Fatigue    NSVT (nonsustained ventricular tachycardia)    Persistent atrial fibrillation    Status post catheter ablation of atrial fibrillation    On amiodarone therapy    Current use of long term anticoagulation    Fuchs' endothelial dystrophy    Corneal transplant failure    Nuclear cataract    Colon cancer screening     .      Pre-op Assessment    I have reviewed the Patient Summary Reports.       I have reviewed the Medications.     Review of Systems  Anesthesia Hx:  No problems with previous Anesthesia   Denies Personal Hx of Anesthesia complications.       Physical Exam    Airway:  No airway management difficulties anticipated  Dental:No active dental issues noted  Chest/Lungs:  Clear to auscultation    Heart:  Rate: Normal  Rhythm: Regular Rhythm  Sounds: Normal        Anesthesia Plan  Type of Anesthesia, risks & benefits discussed:    Anesthesia Type: Gen ETT  Informed Consent: Informed consent signed with the Patient and all parties understand the risks and agree with anesthesia plan.  All questions answered.   ASA Score: 3  Anesthesia Plan Notes: Chart reviewed. Patient seen and examined. Anesthesia plan discussed and questions answered. E-consent signed. Mati Tirado MD    Ready For Surgery From Anesthesia Perspective.     .

## 2022-05-09 ENCOUNTER — HOSPITAL ENCOUNTER (INPATIENT)
Facility: HOSPITAL | Age: 72
LOS: 1 days | Discharge: HOME | DRG: 274 | End: 2022-05-09
Attending: INTERNAL MEDICINE | Admitting: INTERNAL MEDICINE
Payer: MEDICARE

## 2022-05-09 ENCOUNTER — ANESTHESIA (OUTPATIENT)
Dept: MEDSURG UNIT | Facility: HOSPITAL | Age: 72
DRG: 274 | End: 2022-05-09
Payer: MEDICARE

## 2022-05-09 VITALS
SYSTOLIC BLOOD PRESSURE: 138 MMHG | RESPIRATION RATE: 18 BRPM | OXYGEN SATURATION: 94 % | DIASTOLIC BLOOD PRESSURE: 86 MMHG | TEMPERATURE: 98 F | WEIGHT: 220 LBS | BODY MASS INDEX: 29.8 KG/M2 | HEIGHT: 72 IN | HEART RATE: 70 BPM

## 2022-05-09 DIAGNOSIS — I49.9 ARRHYTHMIA: ICD-10-CM

## 2022-05-09 DIAGNOSIS — I48.19 PERSISTENT ATRIAL FIBRILLATION: ICD-10-CM

## 2022-05-09 DIAGNOSIS — Z95.818 PRESENCE OF WATCHMAN LEFT ATRIAL APPENDAGE CLOSURE DEVICE: ICD-10-CM

## 2022-05-09 LAB
ABO + RH BLD: NORMAL
BLD GP AB SCN CELLS X3 SERPL QL: NORMAL

## 2022-05-09 PROCEDURE — 25500020 PHARM REV CODE 255: Performed by: INTERNAL MEDICINE

## 2022-05-09 PROCEDURE — D9220A PRA ANESTHESIA: ICD-10-PCS | Mod: CRNA,,, | Performed by: NURSE ANESTHETIST, CERTIFIED REGISTERED

## 2022-05-09 PROCEDURE — 63600175 PHARM REV CODE 636 W HCPCS: Performed by: NURSE ANESTHETIST, CERTIFIED REGISTERED

## 2022-05-09 PROCEDURE — 33340 PR TRANSCATH CLOSURE, PERC, LEFT ATRIAL APPENDAGE, W/IMPLANT: ICD-10-PCS | Mod: GC,,, | Performed by: INTERNAL MEDICINE

## 2022-05-09 PROCEDURE — 93005 ELECTROCARDIOGRAM TRACING: CPT

## 2022-05-09 PROCEDURE — 86901 BLOOD TYPING SEROLOGIC RH(D): CPT | Performed by: NURSE PRACTITIONER

## 2022-05-09 PROCEDURE — C1753 CATH, INTRAVAS ULTRASOUND: HCPCS | Performed by: INTERNAL MEDICINE

## 2022-05-09 PROCEDURE — C1894 INTRO/SHEATH, NON-LASER: HCPCS | Performed by: INTERNAL MEDICINE

## 2022-05-09 PROCEDURE — 93662 INTRACARDIAC ECG (ICE): CPT | Mod: 26,GC,, | Performed by: INTERNAL MEDICINE

## 2022-05-09 PROCEDURE — 93662 INTRACARDIAC ECG (ICE): CPT | Mod: GC | Performed by: INTERNAL MEDICINE

## 2022-05-09 PROCEDURE — D9220A PRA ANESTHESIA: ICD-10-PCS | Mod: ANES,,, | Performed by: ANESTHESIOLOGY

## 2022-05-09 PROCEDURE — 37000009 HC ANESTHESIA EA ADD 15 MINS: Performed by: INTERNAL MEDICINE

## 2022-05-09 PROCEDURE — 93010 EKG 12-LEAD: ICD-10-PCS | Mod: 76,,, | Performed by: INTERNAL MEDICINE

## 2022-05-09 PROCEDURE — 33340 PERQ CLSR TCAT L ATR APNDGE: CPT | Mod: GC | Performed by: INTERNAL MEDICINE

## 2022-05-09 PROCEDURE — 37000008 HC ANESTHESIA 1ST 15 MINUTES: Performed by: INTERNAL MEDICINE

## 2022-05-09 PROCEDURE — 93005 ELECTROCARDIOGRAM TRACING: CPT | Mod: 59

## 2022-05-09 PROCEDURE — 33340 PERQ CLSR TCAT L ATR APNDGE: CPT | Mod: GC,,, | Performed by: INTERNAL MEDICINE

## 2022-05-09 PROCEDURE — 27800903 OPTIME MED/SURG SUP & DEVICES OTHER IMPLANTS: Performed by: INTERNAL MEDICINE

## 2022-05-09 PROCEDURE — 27201423 OPTIME MED/SURG SUP & DEVICES STERILE SUPPLY: Performed by: INTERNAL MEDICINE

## 2022-05-09 PROCEDURE — 25000003 PHARM REV CODE 250: Performed by: NURSE ANESTHETIST, CERTIFIED REGISTERED

## 2022-05-09 PROCEDURE — 63600175 PHARM REV CODE 636 W HCPCS: Performed by: INTERNAL MEDICINE

## 2022-05-09 PROCEDURE — 25000003 PHARM REV CODE 250: Performed by: INTERNAL MEDICINE

## 2022-05-09 PROCEDURE — 93010 ELECTROCARDIOGRAM REPORT: CPT | Mod: 76,,, | Performed by: INTERNAL MEDICINE

## 2022-05-09 PROCEDURE — 93010 ELECTROCARDIOGRAM REPORT: CPT | Mod: ,,, | Performed by: INTERNAL MEDICINE

## 2022-05-09 PROCEDURE — D9220A PRA ANESTHESIA: Mod: ANES,,, | Performed by: ANESTHESIOLOGY

## 2022-05-09 PROCEDURE — 93662 PR INTRACARD ECHO, THER/DX INTERVENT: ICD-10-PCS | Mod: 26,GC,, | Performed by: INTERNAL MEDICINE

## 2022-05-09 PROCEDURE — D9220A PRA ANESTHESIA: Mod: CRNA,,, | Performed by: NURSE ANESTHETIST, CERTIFIED REGISTERED

## 2022-05-09 PROCEDURE — 11000001 HC ACUTE MED/SURG PRIVATE ROOM

## 2022-05-09 PROCEDURE — 86920 COMPATIBILITY TEST SPIN: CPT | Mod: 59 | Performed by: INTERNAL MEDICINE

## 2022-05-09 PROCEDURE — C1769 GUIDE WIRE: HCPCS | Performed by: INTERNAL MEDICINE

## 2022-05-09 DEVICE — LEFT ATRIAL APPENDAGE CLOSURE DEVICE WITH DELIVERY SYSTEM
Type: IMPLANTABLE DEVICE | Site: HEART | Status: FUNCTIONAL
Brand: WATCHMAN FLX™

## 2022-05-09 RX ORDER — ROCURONIUM BROMIDE 10 MG/ML
INJECTION, SOLUTION INTRAVENOUS
Status: DISCONTINUED | OUTPATIENT
Start: 2022-05-09 | End: 2022-05-09

## 2022-05-09 RX ORDER — LIDOCAINE HYDROCHLORIDE 20 MG/ML
INJECTION, SOLUTION EPIDURAL; INFILTRATION; INTRACAUDAL; PERINEURAL
Status: DISCONTINUED | OUTPATIENT
Start: 2022-05-09 | End: 2022-05-09

## 2022-05-09 RX ORDER — DIPHENHYDRAMINE HYDROCHLORIDE 50 MG/ML
25 INJECTION INTRAMUSCULAR; INTRAVENOUS EVERY 6 HOURS PRN
Status: DISCONTINUED | OUTPATIENT
Start: 2022-05-09 | End: 2022-05-09 | Stop reason: HOSPADM

## 2022-05-09 RX ORDER — PROPOFOL 10 MG/ML
VIAL (ML) INTRAVENOUS
Status: DISCONTINUED | OUTPATIENT
Start: 2022-05-09 | End: 2022-05-09

## 2022-05-09 RX ORDER — LIDOCAINE HYDROCHLORIDE 20 MG/ML
INJECTION, SOLUTION INFILTRATION; PERINEURAL
Status: DISCONTINUED | OUTPATIENT
Start: 2022-05-09 | End: 2022-05-09 | Stop reason: HOSPADM

## 2022-05-09 RX ORDER — ONDANSETRON 2 MG/ML
INJECTION INTRAMUSCULAR; INTRAVENOUS
Status: DISCONTINUED | OUTPATIENT
Start: 2022-05-09 | End: 2022-05-09

## 2022-05-09 RX ORDER — CEFAZOLIN SODIUM/D5W 2 G/100 ML
PLASTIC BAG, INJECTION (ML) INTRAVENOUS
Status: DISCONTINUED | OUTPATIENT
Start: 2022-05-09 | End: 2022-05-09

## 2022-05-09 RX ORDER — FENTANYL CITRATE 50 UG/ML
INJECTION, SOLUTION INTRAMUSCULAR; INTRAVENOUS
Status: DISCONTINUED | OUTPATIENT
Start: 2022-05-09 | End: 2022-05-09

## 2022-05-09 RX ORDER — HYDROCODONE BITARTRATE AND ACETAMINOPHEN 500; 5 MG/1; MG/1
TABLET ORAL
Status: DISCONTINUED | OUTPATIENT
Start: 2022-05-09 | End: 2022-05-09 | Stop reason: HOSPADM

## 2022-05-09 RX ORDER — HYDROMORPHONE HYDROCHLORIDE 1 MG/ML
0.2 INJECTION, SOLUTION INTRAMUSCULAR; INTRAVENOUS; SUBCUTANEOUS EVERY 5 MIN PRN
Status: DISCONTINUED | OUTPATIENT
Start: 2022-05-09 | End: 2022-05-09 | Stop reason: HOSPADM

## 2022-05-09 RX ORDER — HEPARIN SODIUM 1000 [USP'U]/ML
INJECTION, SOLUTION INTRAVENOUS; SUBCUTANEOUS
Status: DISCONTINUED | OUTPATIENT
Start: 2022-05-09 | End: 2022-05-09

## 2022-05-09 RX ORDER — FENTANYL CITRATE 50 UG/ML
25 INJECTION, SOLUTION INTRAMUSCULAR; INTRAVENOUS EVERY 5 MIN PRN
Status: DISCONTINUED | OUTPATIENT
Start: 2022-05-09 | End: 2022-05-09 | Stop reason: HOSPADM

## 2022-05-09 RX ORDER — MIDAZOLAM HYDROCHLORIDE 1 MG/ML
INJECTION, SOLUTION INTRAMUSCULAR; INTRAVENOUS
Status: DISCONTINUED | OUTPATIENT
Start: 2022-05-09 | End: 2022-05-09

## 2022-05-09 RX ORDER — IODIXANOL 320 MG/ML
INJECTION, SOLUTION INTRAVASCULAR
Status: DISCONTINUED | OUTPATIENT
Start: 2022-05-09 | End: 2022-05-09 | Stop reason: HOSPADM

## 2022-05-09 RX ORDER — ACETAMINOPHEN 325 MG/1
650 TABLET ORAL EVERY 4 HOURS PRN
Status: DISCONTINUED | OUTPATIENT
Start: 2022-05-09 | End: 2022-05-09 | Stop reason: HOSPADM

## 2022-05-09 RX ORDER — EPHEDRINE SULFATE 50 MG/ML
INJECTION, SOLUTION INTRAVENOUS
Status: DISCONTINUED | OUTPATIENT
Start: 2022-05-09 | End: 2022-05-09

## 2022-05-09 RX ORDER — HEPARIN SOD,PORCINE/0.9 % NACL 1000/500ML
INTRAVENOUS SOLUTION INTRAVENOUS
Status: DISCONTINUED | OUTPATIENT
Start: 2022-05-09 | End: 2022-05-09 | Stop reason: HOSPADM

## 2022-05-09 RX ORDER — CEFAZOLIN SODIUM/D5W 2 G/100 ML
2 PLASTIC BAG, INJECTION (ML) INTRAVENOUS
Status: DISCONTINUED | OUTPATIENT
Start: 2022-05-09 | End: 2022-05-09 | Stop reason: HOSPADM

## 2022-05-09 RX ORDER — ONDANSETRON 2 MG/ML
4 INJECTION INTRAMUSCULAR; INTRAVENOUS ONCE AS NEEDED
Status: DISCONTINUED | OUTPATIENT
Start: 2022-05-09 | End: 2022-05-09 | Stop reason: HOSPADM

## 2022-05-09 RX ORDER — PROTAMINE SULFATE 10 MG/ML
INJECTION, SOLUTION INTRAVENOUS
Status: DISCONTINUED | OUTPATIENT
Start: 2022-05-09 | End: 2022-05-09

## 2022-05-09 RX ADMIN — FENTANYL CITRATE 100 MCG: 50 INJECTION INTRAMUSCULAR; INTRAVENOUS at 07:05

## 2022-05-09 RX ADMIN — EPHEDRINE SULFATE 10 MG: 50 INJECTION INTRAVENOUS at 07:05

## 2022-05-09 RX ADMIN — HEPARIN SODIUM 10000 UNITS: 1000 INJECTION, SOLUTION INTRAVENOUS; SUBCUTANEOUS at 08:05

## 2022-05-09 RX ADMIN — Medication 2 G: at 07:05

## 2022-05-09 RX ADMIN — PROTAMINE SULFATE 100 MG: 10 INJECTION, SOLUTION INTRAVENOUS at 08:05

## 2022-05-09 RX ADMIN — ROCURONIUM BROMIDE 10 MG: 10 INJECTION INTRAVENOUS at 08:05

## 2022-05-09 RX ADMIN — LIDOCAINE HYDROCHLORIDE 60 MG: 20 INJECTION, SOLUTION EPIDURAL; INFILTRATION; INTRACAUDAL at 07:05

## 2022-05-09 RX ADMIN — PROPOFOL 70 MG: 10 INJECTION, EMULSION INTRAVENOUS at 07:05

## 2022-05-09 RX ADMIN — SUGAMMADEX 200 MG: 100 INJECTION, SOLUTION INTRAVENOUS at 09:05

## 2022-05-09 RX ADMIN — EPHEDRINE SULFATE 5 MG: 50 INJECTION INTRAVENOUS at 08:05

## 2022-05-09 RX ADMIN — ONDANSETRON 4 MG: 2 INJECTION INTRAMUSCULAR; INTRAVENOUS at 09:05

## 2022-05-09 RX ADMIN — MIDAZOLAM 2 MG: 1 INJECTION INTRAMUSCULAR; INTRAVENOUS at 07:05

## 2022-05-09 RX ADMIN — ROCURONIUM BROMIDE 50 MG: 10 INJECTION INTRAVENOUS at 07:05

## 2022-05-09 RX ADMIN — SODIUM CHLORIDE: 0.9 INJECTION, SOLUTION INTRAVENOUS at 07:05

## 2022-05-09 NOTE — DISCHARGE SUMMARY
[facility] - Short Stay Cardiac Unit  Cardiac Electrophysiology  Discharge Summary      Patient Name: Pavel Graff  MRN: [id number]  Admission Date: 5/9/2022  Hospital Length of Stay: 0 days  Discharge Date and Time:  05/09/2022 4:09 PM  Attending Physician: Romaine Howard MD    Discharging Provider: Trent Gonzalez MD  Primary Care Physician: Joe Peterson MD      No notes on file    Procedure(s) (LRB):  Left atrial appendage occlusion (N/A)  ECHOCARDIOGRAM, TRANSESOPHAGEAL (N/A)     Indwelling Lines/Drains at time of discharge:  Lines/Drains/Airways     None                 Hospital Course:    Pavel Graff is a 72 year old male with known history of HTN, TIA, SIMON, HLD and persistent AF . Mr. Graff underwent a PVI (07/21/16) with successful pulmonary vein antral isolation. He then had  Re-do PVI 9/28/18 with touch up PVI, posterior wall isolation and CTI line. He had done well since from afib point of view. He is on anticoagulation for high chadvasc of  4. While on anticoagulation, he had onset of bleeding (ophthalmology related bleeds) and easy bruising. He has had events with spontaneous sub-conjunctival bleeding. He was seen in arrhythmia clinic where he opted for LAAO with Watchman. He is clinically stable today. Procedure for LAAO was explained to him in detail and he expressed understanding and consented to the procedure. All his questions were addressed.  His last dose of anticoagulation was last night. He is accompanied by his wife today.     After consenting to the procedure, Mr. Graff was brought to the EP lab where he underwent successful LAAO with watchman. He tolerated the procedure well and was discharged home in good condition following bedrest.       Goals of Care Treatment Preferences:  Code Status: Full Code    Living Will: Yes              Consults:     Significant Diagnostic Studies:  Lab Results   Component Value Date    WBC 6.36 05/02/2022    HGB 14.9 05/02/2022    HCT 42.6  05/02/2022    MCV 95 05/02/2022     05/02/2022       CMP  Sodium   Date Value Ref Range Status   05/02/2022 138 136 - 145 mmol/L Final     Potassium   Date Value Ref Range Status   05/02/2022 4.3 3.5 - 5.1 mmol/L Final     Chloride   Date Value Ref Range Status   05/02/2022 103 95 - 110 mmol/L Final     CO2   Date Value Ref Range Status   05/02/2022 30 (H) 23 - 29 mmol/L Final     Glucose   Date Value Ref Range Status   05/02/2022 109 70 - 110 mg/dL Final     BUN   Date Value Ref Range Status   05/02/2022 10 8 - 23 mg/dL Final     Creatinine   Date Value Ref Range Status   05/02/2022 0.8 0.5 - 1.4 mg/dL Final     Calcium   Date Value Ref Range Status   05/02/2022 9.7 8.7 - 10.5 mg/dL Final     Total Protein   Date Value Ref Range Status   03/07/2022 7.0 6.0 - 8.4 g/dL Final     Albumin   Date Value Ref Range Status   03/07/2022 4.2 3.5 - 5.2 g/dL Final     Total Bilirubin   Date Value Ref Range Status   03/07/2022 0.9 0.1 - 1.0 mg/dL Final     Comment:     For infants and newborns, interpretation of results should be based  on gestational age, weight and in agreement with clinical  observations.    Premature Infant recommended reference ranges:  Up to 24 hours.............<8.0 mg/dL  Up to 48 hours............<12.0 mg/dL  3-5 days..................<15.0 mg/dL  6-29 days.................<15.0 mg/dL       Alkaline Phosphatase   Date Value Ref Range Status   03/07/2022 78 55 - 135 U/L Final     AST   Date Value Ref Range Status   03/07/2022 31 10 - 40 U/L Final     ALT   Date Value Ref Range Status   03/07/2022 31 10 - 44 U/L Final     Anion Gap   Date Value Ref Range Status   05/02/2022 5 (L) 8 - 16 mmol/L Final     eGFR if    Date Value Ref Range Status   05/02/2022 >60.0 >60 mL/min/1.73 m^2 Final     eGFR if non    Date Value Ref Range Status   05/02/2022 >60.0 >60 mL/min/1.73 m^2 Final     Comment:     Calculation used to obtain the estimated glomerular filtration  rate (eGFR)  is the CKD-EPI equation.            Pending Diagnostic Studies:     None          Final Active Diagnoses:    Diagnosis Date Noted POA    PRINCIPAL PROBLEM:  Presence of Watchman left atrial appendage closure device [Z95.818] 05/09/2022 Unknown    Hypertension [I10] 07/03/2012 Yes      Problems Resolved During this Admission:     No new Assessment & Plan notes have been filed under this hospital service since the last note was generated.  Service: Arrhythmia      Discharged Condition: good    Disposition: Home     Follow Up:   Follow-up Information     Romaine Howard MD Follow up in 2 month(s).    Specialties: Electrophysiology, Cardiovascular Disease  Why: Follow up for a repeat KAM  Contact information:  [id number]  [facility] [id number]  [phone]                       Patient Instructions:     Please resume your usual home medications including eliquis tonight  You will have to follow up in about 6 weeks to repeat your KAM   Do not strain or lift anything greater than 5 lb for 1 week.  Do not drive or operate any dangerous machinery for 24 hours.   Clean the area with mild soap and water and then cover it with a bandage.   Once the skin has healed, bathing in a tub or swimming is allowed.   Inspect the groin site daily and report to the physician any swelling at the site that   cannot be controlled with manual pressure for 10 minutes, unusual pain at the   access site or affected extremity, unusual swelling at the access site, or signs or   symptoms of infection such as redness, pain, or fever.   Call 911 if you have:   Bleeding from the puncture site that you cannot stop by doing the following:   Relax and lie down right away. Keep your leg flat and apply firm pressure to the site using your fingers and a gauze pad. Keep the pressure on for 20 minutes. Continue this until the bleeding stops. This may take awhile. When bleeding stops, cover the site with a sterile bandage and keep your leg still  as much as possible.    Medications:  Reconciled Home Medications:      Medication List      CONTINUE taking these medications    aspirin 81 MG Chew  Take 81 mg by mouth once daily.     ELIQUIS 5 mg Tab  Generic drug: apixaban  TAKE 1 TABLET BY MOUTH TWICE A DAY     fish oil-omega-3 fatty acids 300-1,000 mg capsule  Take 1 g by mouth once daily.     hydroCHLOROthiazide 25 MG tablet  Commonly known as: HYDRODIURIL  TAKE 1 TABLET BY MOUTH EVERY DAY     losartan 50 MG tablet  Commonly known as: COZAAR  TAKE 1 TABLET BY MOUTH EVERY DAY     metoprolol succinate 25 MG 24 hr tablet  Commonly known as: TOPROL-XL  Take 1 tablet (25 mg total) by mouth once daily.     nystatin-triamcinolone cream  Commonly known as: MYCOLOG II  Apply topically 2 (two) times daily.     pravastatin 20 MG tablet  Commonly known as: PRAVACHOL  Take 1 tablet (20 mg total) by mouth once daily.     prednisoLONE acetate 1 % Drps  Commonly known as: PRED FORTE  INSTILL 1 DROP INTO BOTH EYES TWICE A DAY     timolol maleate 0.5% 0.5 % Drop  Commonly known as: TIMOPTIC  INSTILL 1 DROP INTO LEFT EYE 2 TIMES A DAY            Time spent on the discharge of patient: 20 minutes    Trent Gonzalez MD  Cardiac Electrophysiology  [facility] - Short Stay Cardiac Unit

## 2022-05-09 NOTE — Clinical Note
A venogram was performed in the KIRSTEN. The vessel was injected via hand injection  with 8 mL of contrast.

## 2022-05-09 NOTE — HOSPITAL COURSE
Pavel Graff is a 72 year old male with known history of HTN, TIA, SIMON, HLD and persistent AF . Mr. Graff underwent a PVI (07/21/16) with successful pulmonary vein antral isolation. He then had  Re-do PVI 9/28/18 with touch up PVI, posterior wall isolation and CTI line. He had done well since from afib point of view. He is on anticoagulation for high chadvasc of  4. While on anticoagulation, he had onset of bleeding (ophthalmology related bleeds) and easy bruising. He has had events with spontaneous sub-conjunctival bleeding. He was seen in arrhythmia clinic where he opted for LAAO with Watchman. He is clinically stable today. Procedure for LAAO was explained to him in detail and he expressed understanding and consented to the procedure. All his questions were addressed.  His last dose of anticoagulation was last night. He is accompanied by his wife today.     After consenting to the procedure, Mr. Graff was brought to the EP lab where he underwent successful LAAO with watchman. He tolerated the procedure well and was discharged home in good condition following bedrest.

## 2022-05-09 NOTE — BRIEF OP NOTE
: Romaine Howard M.D  Date of procedure: 5/9/2022  Post-operative Diagnosis: Hx of afib with high chadsvasc and high has bled    Procedure Performed: Left Atrial Appendage Occlusion with Watchman device     Description of Procedure: The patient was brought to the EP lab in the fasting state. Prepped and draped in sterile fashion. Safety timeout was performed. Sedation administered by anesthesia staff. Ultrasound guided venous access of the right and left femoral veins was performed. 16 Fr short sheath placed in right femoral vein. Heparin bolus given. ICE catheter via a femoral access of the left.  AKM and fluoroscopic guided single transseptal puncture performed. Watchman deployed in KIRSTEN using KAM and fluoroscopic guidance. Tug test confirmed stable position. KAM confirmed adequate compression and no significant nguyen-device flow. Sheaths removed. Two silk figure of 8 sutures placed at right femoral access site for hemostasis.           Plan:  Post op orders including bedrest x 6 hours  Remove sutures after 4 hours  Resume routine home medications including anticoagulation   Dc likely today after bedrest

## 2022-05-09 NOTE — PLAN OF CARE
Pt arrived to cath lab holding bed 11. Assumed care of pt. Pt laying flat in bed with no S/S of distress or SOB. Denies pain. Needs met. Bilateral groin sites assessed; CDI, no bleeding or hematoma noted. Will remove per protocol. Bed in lowest position. Will continue to monitor.

## 2022-05-09 NOTE — TRANSFER OF CARE
Anesthesia Transfer of Care Note    Patient: Pavel Graff    Procedure(s) Performed: Procedure(s) (LRB):  Left atrial appendage occlusion (N/A)  ECHOCARDIOGRAM, TRANSESOPHAGEAL (N/A)    Patient location: PACU    Anesthesia Type: general    Transport from OR: Transported from OR on 6-10 L/min O2 by face mask with adequate spontaneous ventilation    Post pain: adequate analgesia    Post assessment: no apparent anesthetic complications and tolerated procedure well    Post vital signs: stable    Level of consciousness: awake, alert and oriented    Nausea/Vomiting: no nausea/vomiting    Complications: none    Transfer of care protocol was followed      Last vitals:   Visit Vitals  BP (!) 142/76   Pulse 67   Temp 36.6 °C (97.9 °F) (Temporal)   Resp 14   Ht 6' (1.829 m)   Wt 99.8 kg (220 lb)   SpO2 99%   BMI 29.84 kg/m²

## 2022-05-09 NOTE — DISCHARGE INSTRUCTIONS
Please resume your usual home medications including eliquis tonight  You will have to follow up in about 6 weeks to repeat your KAM     Do not strain or lift anything greater than 5 lb for 1 week.  Do not drive or operate any dangerous machinery for 24 hours.   Clean the area with mild soap and water and then cover it with a bandage.   Once the skin has healed, bathing in a tub or swimming is allowed.   Inspect the groin site daily and report to the physician any swelling at the site that   cannot be controlled with manual pressure for 10 minutes, unusual pain at the   access site or affected extremity, unusual swelling at the access site, or signs or   symptoms of infection such as redness, pain, or fever.   Call 911 if you have:   Bleeding from the puncture site that you cannot stop by doing the following:   Relax and lie down right away. Keep your leg flat and apply firm pressure to the site using your fingers and a gauze pad. Keep the pressure on for 20 minutes. Continue this until the bleeding stops. This may take awhile. When bleeding stops, cover the site with a sterile bandage and keep your leg still as much as possible.     mid chest area while cough only

## 2022-05-09 NOTE — CONSULTS
[facility], [place]  Consult Note  KAM Cardiology      Pavel Graff  YOB: 1950  Medical Record Number:  [id number]  Attending Physician:  No att. providers found   Date of Admission: (Not on file)       Hospital Day:  0  Current Principal Problem:  <principal problem not specified>      History     Cc: Atrial Fibrillation     HPI   Pavel Graff is a 72 y.o. male who presents for Watchman device. Pt is doing well and has no complaints this am. See previous clinic note below:      Clinic Note from Dr Howard 3/22/22:  72 year old male with history of HTN, TIA, SIMON, HLD and persistent AF since July 2012.  DCCV was attempted with early recurrence of AF.  He was rate controlled and did not note significant symptoms at the time - so a rate control strategy was pursued.  He notes though, that ever since he has been in AF - he has been much more easily fatigued and tired, not himself.  He denies overt palpitation or SOB.  He denies chest pain.     Mr. Graff underwent a PVI (07/21/16) with successful pulmonary vein antral isolation. Since the procedure, Mr. Graff reports feeling well with only two 3-second episodes of palpitations; he denies further recurrence.  denies chest pain, SOB/BLANCO, dizziness, or syncope. Mr. Graff has noted improvement in his energy level since the procedure.      12/16: Continues improved symptoms. 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 Remains on pradaxa and metoprolol.      6/17: No recurrence of AF since PVI. Feels well. No active complaints.      2/18: AF recurrence without symptoms. Remains on apixaban. The patient denies interval chest pain, sob, palpitations, syncope, near syncope.      8/18: Stable symptoms with slightly more fatigue. He asks about repeat ablation at the end of the season.      11/18: Re-do PVI 9/28/18 with touch up PVI, posterior wall isolation and CTI line. Feels some what fatigued, no post procedure complications.       5/19: No recurrence since PVI 9/18. Symptoms improved, stable.      Interval history: Onset of easy bleeding and bruising. He has had events with spontaneous sub-conjunctival bleeding that are increasing in frequency and associated with pain. Remains in NSR.    KAM 9/28/18:  CONCLUSIONS     1 - Left atrial appendage is single lobed with spontaneous echo contrast with no visualized thrombus.     2 - Biatrial enlargement.     3 - Normal left ventricular systolic function.     4 - Trivial aortic regurgitation.     5 - Trivial mitral regurgitation.     6 - Trivial to mild tricuspid regurgitation.     7 - Right lower pulmonary vein not visualized.     8 - Left lower pulmonary vein not visualized.     Medications - Outpatient  Prior to Admission medications    Medication Sig Start Date End Date Taking? Authorizing Provider   aspirin 81 MG Chew Take 81 mg by mouth once daily.    Historical Provider   ELIQUIS 5 mg Tab TAKE 1 TABLET BY MOUTH TWICE A DAY 10/25/21   Romaine Howard MD   fish oil-omega-3 fatty acids 300-1,000 mg capsule Take 1 g by mouth once daily.    Historical Provider   hydroCHLOROthiazide (HYDRODIURIL) 25 MG tablet TAKE 1 TABLET BY MOUTH EVERY DAY 1/11/22   Srinivasan Olea MD   losartan (COZAAR) 50 MG tablet TAKE 1 TABLET BY MOUTH EVERY DAY 11/19/21   Joe Peterson MD   metoprolol succinate (TOPROL-XL) 25 MG 24 hr tablet Take 1 tablet (25 mg total) by mouth once daily. 6/8/21   Romaine Howard MD   nystatin-triamcinolone (MYCOLOG II) cream Apply topically 2 (two) times daily. 4/12/22   Joe Peterson MD   pravastatin (PRAVACHOL) 20 MG tablet Take 1 tablet (20 mg total) by mouth once daily. 3/8/22 3/8/23  Joe Peterson MD   prednisoLONE acetate (PRED FORTE) 1 % DrpS INSTILL 1 DROP INTO BOTH EYES TWICE A DAY 12/21/21   Chantell Jay MD   timolol maleate 0.5% (TIMOPTIC) 0.5 % Drop INSTILL 1 DROP INTO LEFT EYE 2 TIMES A DAY 5/6/22   Chantell Jay MD         Medications -  Current  Scheduled Meds:  Continuous Infusions:  PRN Meds:.      Allergies  Review of patient's allergies indicates:  No Known Allergies      Past Medical History  Past Medical History:   Diagnosis Date    *Atrial fibrillation     Anticoagulant long-term use     Atrial fibrillation     Hyperlipidemia     Hypertension     Sleep apnea     Squamous cell carcinoma of skin     Stroke     TIA    TIA (transient ischemic attack)          Past Surgical History  Past Surgical History:   Procedure Laterality Date    ABLATION N/A 9/28/2018    Procedure: ABLATION;  Surgeon: Romaine Howard MD;  Location: [facility] CATH LAB;  Service: Cardiology;  Laterality: N/A;  AF, KAM, PVI, RFA, MAME, Gen, MB, 3 Prep    CATARACT EXTRACTION W/  INTRAOCULAR LENS IMPLANT Left 1/11/10    CATARACT EXTRACTION W/  INTRAOCULAR LENS IMPLANT Right 12/14/2017    [facility]/ Dr. Jay    COLONOSCOPY N/A 3/26/2019    Procedure: COLONOSCOPY;  Surgeon: Mauro Dye MD;  [facility]  Service: Endoscopy;  Laterality: N/A;  ok to hold Eliquis x 2 days per Dr. Howard-MS    CORNEAL TRANSPLANT Left 1997    PKP OS    CORNEAL TRANSPLANT Left 03/23/2017    CORNEAL TRANSPLANT Right 12/14/2017    [facility]ME[facility]/ [facility]; Dr. Jay    HERNIA REPAIR      HIP ARTHROPLASTY      ROTATOR CUFF REPAIR           Social History  Social History     Socioeconomic History    Marital status:     Number of children: 3   Occupational History     Employer: [employer]   Tobacco Use    Smoking status: Never Smoker    Smokeless tobacco: Never Used   Substance and Sexual Activity    Alcohol use: No    Drug use: No    Sexual activity: Yes     Partners: Female         ROS   Admits Denies   Constitutional  Chills, diaphoresis, malaise   Eyes  Visual changes   ENMT  Dysphagia, Epistaxis, nasal congestion, hearing loss   Cardiovascular  Chest pain, palpitations, edema   Respiratory  Cough, dyspnea, wheezing   Gastrointestinal  Nausea, vomiting,  constipation, diarrhea, anorexia.     Genitourinary  Dysuria, incontinence   Musculoskeletal  Myalgias, joint pain, joint swelling   Integumentary  Rash, inflammation, burning   Neruo-Psychiatric  Anxiety, insomnia.  Changes in speech, strength, sensation.     Endocrine     Hematologic  Abnormal bruising, bleeding   Immunologic  Inflammation, pain at IV sites.  Pruritis.     Dysphagia or odynophagia:  No  Liver Disease, esophageal disease, or known varices:  No  Upper GI Bleeding: No  Snoring:  No  Sleep Apnea:  No  Prior neck surgery or radiation:  No  History of anesthetic difficulties:  No  Family history of anesthetic difficulties:  No  Last oral intake:  12 hours ago  Able to move neck in all directions:  Yes      Physical Examination         Vital Signs             24 Hour VS Range    BP: ()/()   Arterial Line BP: ()/()   No intake or output data in the 24 hours ending 05/09/22 0648      General: Lying in bed in no distress  Head: NCAT  Eyes: conjunctivae and lids normal, no scleral icterus, EOMI.  ENMT:  no gingival bleeding, normal oral mucosa without pallor or cyanosis.   Neck:  JVP normal.  Trachea non-displaced.     Chest:  Normal respiratory effort.  Chest clear to auscultation.  No wheezes, rales, or rhonchi.  Heart:  Normal PMI, S1 S2 normal quality, splitting.  No murmurs rubs or gallops.  Peripheral pulses 2+.  Abdomen:  Non-distended, normal bowel sounds, non-tender.  No hepato-jugular reflux.  Extremities:  No edema. Normal capillary refill.    Skin:  Warm and dry.  No cyanosis or pallor.  No ulcers, stasis.  IV sites without tenderness or inflammation.    Neurological / Psychiatric:  Oriented to person, time, and place.  No facial asymmetry, drift.  Fluent without dysarthria.  Mood euthymic, affect normal.         Data       Recent Labs   Lab 05/02/22  1002   WBC 6.36   HGB 14.9   HCT 42.6           Recent Labs   Lab 05/02/22  1002   INR 1.0        Recent Labs   Lab 05/02/22  1002       K 4.3      CO2 30*   BUN 10   CREATININE 0.8   ANIONGAP 5*   CALCIUM 9.7        No results for input(s): PROT, ALBUMIN, BILITOT, ALKPHOS, AST, ALT in the last 168 hours.     No results for input(s): TROPONINI in the last 168 hours.     BNP (pg/mL)   Date Value   03/22/2016 198 (H)   07/03/2012 196 (H)           Assessment & Plan     Atrial Fibrilliation  KAM for evaluation of KIRSTEN prior to Watchman   -No absolute contraindications of esophageal stricture, tumor, perforation, laceration,or diverticulum and/or active GI bleed  -The risks, benefits & alternatives of the procedure were explained to the patient.   -The risks of transesophageal echo include but are not limited to:  Dental trauma, esophageal trauma/perforation, bleeding, laryngospasm/brochospasm, aspiration, sore throat/hoarseness, & dislodgement of the endotracheal tube/nasogastric tube (where applicable).    -The risks of moderate sedation include hypotension, respiratory depression, arrhythmias, bronchospasm, & death.    -Informed consent was obtained. The patient is agreeable to proceed with the procedure and all questions and concerns addressed.    Case discussed with an attending in echocardiography lab.     Further recommendations per attending addendum          Signed:  Gurjit Hennessy M.D.  Page # [phone]  Cardiovascular Fellow  [facility]

## 2022-05-09 NOTE — PROGRESS NOTES
Sutures removed per protocol. Gauze and tegaderm applied. CDI,no bleeding or hematoma noted. Will continue to monitor.

## 2022-05-09 NOTE — ANESTHESIA PROCEDURE NOTES
Intubation    Date/Time: 5/9/2022 7:24 AM  Performed by: Aziza Styles CRNA  Authorized by: Mati Tirado MD     Intubation:     Induction:  Intravenous    Intubated:  Postinduction    Mask Ventilation:  Easy mask    Attempts:  1    Attempted By:  CRNA    Method of Intubation:  Direct    Blade:  Arzola 2    Laryngeal View Grade: Grade I - full view of cords      Difficult Airway Encountered?: No      Complications:  None    Airway Device:  Oral endotracheal tube    Airway Device Size:  7.5    Style/Cuff Inflation:  Cuffed    Inflation Amount (mL):  8    Tube secured:  22    Secured at:  The lips    Placement Verified By:  Capnometry    Complicating Factors:  None    Findings Post-Intubation:  BS equal bilateral and atraumatic/condition of teeth unchanged

## 2022-05-09 NOTE — ANESTHESIA POSTPROCEDURE EVALUATION
Anesthesia Post Evaluation    Patient: Pavel Graff    Procedure(s) Performed: Procedure(s) (LRB):  Left atrial appendage occlusion (N/A)  ECHOCARDIOGRAM, TRANSESOPHAGEAL (N/A)    Final Anesthesia Type: general      Patient location during evaluation: DOSC  Level of consciousness: awake and alert  Post-procedure vital signs: reviewed and stable  Pain control: Pain has been treated.  Airway patency: patent    PONV status: Absent or treated.  Anesthetic complications: no      Cardiovascular status: hemodynamically stable  Respiratory status: unassisted  Hydration status: euvolemic            Vitals Value Taken Time   /78 05/09/22 1332   Temp 36.6 °C (97.9 °F) 05/09/22 0914   Pulse 65 05/09/22 1335   Resp 16 05/09/22 1335   SpO2 93 % 05/09/22 1335   Vitals shown include unvalidated device data.      No case tracking events are documented in the log.      Pain/Mark Score: Mark Score: 9 (5/9/2022  9:14 AM)

## 2022-05-09 NOTE — PLAN OF CARE
Patient arrived to room. PIV placed x2, labs sent. Admit assessment completed. Plan of care discussed with patient. Will monitor. Call light within reach, instructed in use

## 2022-05-09 NOTE — H&P
Electrophysiology Pre Procedure H&P  Reason for visit: Elective LAAO with watchman      HPI:   Pavel Graff is a 72 y.o. male with known history of HTN, TIA, SIMON, HLD and persistent AF . Mr. Graff underwent a PVI (07/21/16) with successful pulmonary vein antral isolation. He then had  Re-do PVI 9/28/18 with touch up PVI, posterior wall isolation and CTI line. He had done well since from afib point of view. He is on anticoagulation for high chadvasc of  4. While on anticoagulation, he had onset of bleeding (ophthalmology related bleeds) and easy bruising. He has had events with spontaneous sub-conjunctival bleeding that are increasing in frequency and associated with pain. He was seen in arrhythmia clinic where he opted for LAAO with Watchman. He is clinically stable today. Procedure for LAAO was explained to him in detail and he expressed understanding and consented to the procedure. All his questions were addressed.  His last dose of anticoagulation was last night. He is accompanied by his wife today.       ECG today shows normal sinus rhythm.   He has had no recent echos.        CHADSVASC: 4  HASBLED: 4         Past Medical History:   Diagnosis Date    *Atrial fibrillation     Anticoagulant long-term use     Atrial fibrillation     Hyperlipidemia     Hypertension     Sleep apnea     Squamous cell carcinoma of skin     Stroke     TIA    TIA (transient ischemic attack)         Social History     Socioeconomic History    Marital status:     Number of children: 3   Occupational History     Employer: [employer]   Tobacco Use    Smoking status: Never Smoker    Smokeless tobacco: Never Used   Substance and Sexual Activity    Alcohol use: No    Drug use: No    Sexual activity: Yes     Partners: Female        Family History   Problem Relation Age of Onset    Diabetes Mother     Heart disease Mother     Hyperlipidemia Mother     Heart disease Father     Colon cancer Father          colon    Cancer Father     Heart disease Brother     Crohn's disease Sister     Hyperlipidemia Sister     Hypertension Sister     Hyperlipidemia Sister     Sudden death Neg Hx     Amblyopia Neg Hx     Blindness Neg Hx     Cataracts Neg Hx     Glaucoma Neg Hx     Macular degeneration Neg Hx     Retinal detachment Neg Hx     Strabismus Neg Hx     Stroke Neg Hx     Thyroid disease Neg Hx     Melanoma Neg Hx         No current facility-administered medications for this encounter.     Current Outpatient Medications   Medication Sig Dispense Refill    aspirin 81 MG Chew Take 81 mg by mouth once daily.      ELIQUIS 5 mg Tab TAKE 1 TABLET BY MOUTH TWICE A DAY 60 tablet 11    fish oil-omega-3 fatty acids 300-1,000 mg capsule Take 1 g by mouth once daily.      hydroCHLOROthiazide (HYDRODIURIL) 25 MG tablet TAKE 1 TABLET BY MOUTH EVERY DAY 90 tablet 4    losartan (COZAAR) 50 MG tablet TAKE 1 TABLET BY MOUTH EVERY DAY 90 tablet 1    metoprolol succinate (TOPROL-XL) 25 MG 24 hr tablet Take 1 tablet (25 mg total) by mouth once daily. 90 tablet 3    nystatin-triamcinolone (MYCOLOG II) cream Apply topically 2 (two) times daily. 60 g 1    pravastatin (PRAVACHOL) 20 MG tablet Take 1 tablet (20 mg total) by mouth once daily. 90 tablet 3    prednisoLONE acetate (PRED FORTE) 1 % DrpS INSTILL 1 DROP INTO BOTH EYES TWICE A DAY 10 mL 3    timolol maleate 0.5% (TIMOPTIC) 0.5 % Drop INSTILL 1 DROP INTO LEFT EYE 2 TIMES A DAY 15 mL 1     Facility-Administered Medications Ordered in Other Encounters   Medication Dose Route Frequency Provider Last Rate Last Admin    0.9%  NaCl infusion   Intravenous Continuous Zoey Newman NP   Stopped at 09/28/18 1204      ROS:  Constitutional: (-)fevers, (-)chills, (-)night sweats  HEENT: (-) headaches (-) lightheadedness (-) blurry vision, vision issues   Cardiovascular: (-)chest pain (-)paroxysmal nocturnal dyspnea (-)orthopnea  Respiratory: (-)shortness of breath,  (-)dyspnea on exertion (-)hemoptysis  Gastrointestinal: (-)abdominal pain (-)nausea (-)vomiting (-)tarry stools  Musculoskeletal: (-)arthralgias (-)limited range of motion  Neurologic: (-)parasthesias (-)mood disorder (-)anxiety  Endo: (-)polyuria (-)polydipsia (-)heat/cold intolerance  Skin: mild bruising     Physical Exam:   Wt Readings from Last 3 Encounters:   05/09/22 99.8 kg (220 lb)   04/12/22 108.9 kg (240 lb)   04/04/22 108.9 kg (240 lb 1.3 oz)     Temp Readings from Last 3 Encounters:   05/09/22 98.6 °F (37 °C) (Temporal)   09/03/21 98.1 °F (36.7 °C) (Oral)   03/26/19 97.5 °F (36.4 °C)     BP Readings from Last 3 Encounters:   05/09/22 (!) 154/81   04/12/22 128/70   04/04/22 138/70     Pulse Readings from Last 3 Encounters:   05/09/22 68   04/12/22 (!) 58   04/04/22 83       Gen: no acute distress, pleasant patient answering questions appropriately  HEENT: extra-ocular muscles intact, normocephalic-atraumatic  Neck: no jugular venous distension, no lymphadenopathy, no thyromegaly, no carotid bruits  CVS: regular rate and rhythm, S1S2 normal, no murmurs/rubs/gallops  CHEST: clear to auscultation bilaterally, no wheezes/rales/ronchi  ABD: Soft, non-tender, nondistended, bowel sounds present  EXT: No Edema, 2+ pulses bilaterally   NEURO: awake, alert, and oriented   Skin: No rash     Review of Labs:   Lab Results   Component Value Date    WBC 6.36 05/02/2022    HGB 14.9 05/02/2022    HCT 42.6 05/02/2022    MCV 95 05/02/2022     05/02/2022       CMP  Sodium   Date Value Ref Range Status   05/02/2022 138 136 - 145 mmol/L Final     Potassium   Date Value Ref Range Status   05/02/2022 4.3 3.5 - 5.1 mmol/L Final     Chloride   Date Value Ref Range Status   05/02/2022 103 95 - 110 mmol/L Final     CO2   Date Value Ref Range Status   05/02/2022 30 (H) 23 - 29 mmol/L Final     Glucose   Date Value Ref Range Status   05/02/2022 109 70 - 110 mg/dL Final     BUN   Date Value Ref Range Status   05/02/2022 10 8 -  23 mg/dL Final     Creatinine   Date Value Ref Range Status   05/02/2022 0.8 0.5 - 1.4 mg/dL Final     Calcium   Date Value Ref Range Status   05/02/2022 9.7 8.7 - 10.5 mg/dL Final     Total Protein   Date Value Ref Range Status   03/07/2022 7.0 6.0 - 8.4 g/dL Final     Albumin   Date Value Ref Range Status   03/07/2022 4.2 3.5 - 5.2 g/dL Final     Total Bilirubin   Date Value Ref Range Status   03/07/2022 0.9 0.1 - 1.0 mg/dL Final     Comment:     For infants and newborns, interpretation of results should be based  on gestational age, weight and in agreement with clinical  observations.    Premature Infant recommended reference ranges:  Up to 24 hours.............<8.0 mg/dL  Up to 48 hours............<12.0 mg/dL  3-5 days..................<15.0 mg/dL  6-29 days.................<15.0 mg/dL       Alkaline Phosphatase   Date Value Ref Range Status   03/07/2022 78 55 - 135 U/L Final     AST   Date Value Ref Range Status   03/07/2022 31 10 - 40 U/L Final     ALT   Date Value Ref Range Status   03/07/2022 31 10 - 44 U/L Final     Anion Gap   Date Value Ref Range Status   05/02/2022 5 (L) 8 - 16 mmol/L Final     eGFR if    Date Value Ref Range Status   05/02/2022 >60.0 >60 mL/min/1.73 m^2 Final     eGFR if non    Date Value Ref Range Status   05/02/2022 >60.0 >60 mL/min/1.73 m^2 Final     Comment:     Calculation used to obtain the estimated glomerular filtration  rate (eGFR) is the CKD-EPI equation.        Lab Results   Component Value Date    INR 1.0 05/02/2022    INR 1.0 09/25/2018    INR 1.1 07/20/2016         Most recent ECG   Normal sinus rhythm    Assessment/Plan:  Pavel Graff is a 72 y.o. male with afib s/p PVI and redo in 2018. He is on anticoagulation for high CHADSVASC 4 and has had retina related bleeds and wants to be off anticoagulation. He presents today for elective LAAO with watchman. He is clinically stable today. Procedure for LAAO was explained to him in detail and  he expressed understanding and consented to the planned procedure. All his questions were addressed.    Plan:  KAM guided LAAO with Jessica

## 2022-05-09 NOTE — NURSING TRANSFER
Nursing Transfer Note      5/9/2022     Reason patient is being transferred: post procedure    Transfer To: [facility]    Transfer via stretcher    Transfer with cardiac monitoring    Transported by RN    Medicines sent: na    Any special needs or follow-up needed: routine    Chart send with patient: Yes    Notified: spouse    Patient reassessed at: 1015 on 5/9

## 2022-05-09 NOTE — PROGRESS NOTES
Pt ambulated and voided without difficulty. Bilateral groin sites assessed; CDI, no bleeding or hematoma noted.

## 2022-05-10 LAB
POC ACTIVATED CLOTTING TIME K: 219 SEC (ref 74–137)
POC ACTIVATED CLOTTING TIME K: 220 SEC (ref 74–137)
SAMPLE: ABNORMAL
SAMPLE: ABNORMAL

## 2022-05-13 ENCOUNTER — TELEPHONE (OUTPATIENT)
Dept: ELECTROPHYSIOLOGY | Facility: CLINIC | Age: 72
End: 2022-05-13
Payer: MEDICARE

## 2022-05-13 LAB
BLD PROD TYP BPU: NORMAL
BLD PROD TYP BPU: NORMAL
BLOOD UNIT EXPIRATION DATE: NORMAL
BLOOD UNIT EXPIRATION DATE: NORMAL
BLOOD UNIT TYPE CODE: 6200
BLOOD UNIT TYPE CODE: 6200
BLOOD UNIT TYPE: NORMAL
BLOOD UNIT TYPE: NORMAL
CODING SYSTEM: NORMAL
CODING SYSTEM: NORMAL
DISPENSE STATUS: NORMAL
DISPENSE STATUS: NORMAL
TRANS ERYTHROCYTES VOL PATIENT: NORMAL ML
TRANS ERYTHROCYTES VOL PATIENT: NORMAL ML

## 2022-05-13 NOTE — TELEPHONE ENCOUNTER
Spoke to patient's wife Kiana regarding post op care. Patient denies any complaints. Wound site dry and intact without redness, swelling, hematoma or drainage. Patient denies any fever or pain.   Patient  has resumed medications and/or picked up new prescriptions ordered at discharge.Reviewed post op instructions and informed of date for follow up KAM.   She verbalized understanding.

## 2022-05-16 NOTE — TELEPHONE ENCOUNTER
Refill Routing Note   Medication(s) are not appropriate for processing by [facility] for the following reason(s):      - Patient has been seen in the ED/Hospital since the last PCP visit    [facility] action(s):  Defer          Medication reconciliation completed: No     Appointments  past 12m or future 3m with PCP    Date Provider   Last Visit   4/12/2022 Joe Peterson MD   Next Visit   Visit date not found Joe Peterson MD   ED visits in past 90 days: 0        Note composed:12:25 PM 05/16/2022

## 2022-05-16 NOTE — TELEPHONE ENCOUNTER
No new care gaps identified.  [facility] Embedded Care Gaps. Reference number: [id number]. 5/16/2022   12:11:30 AM CDT

## 2022-05-20 ENCOUNTER — OFFICE VISIT (OUTPATIENT)
Dept: DERMATOLOGY | Facility: CLINIC | Age: 72
End: 2022-05-20
Payer: MEDICARE

## 2022-05-20 DIAGNOSIS — Z12.83 SCREENING FOR SKIN CANCER: ICD-10-CM

## 2022-05-20 DIAGNOSIS — L57.8 ACTINIC SKIN DAMAGE: Primary | ICD-10-CM

## 2022-05-20 DIAGNOSIS — L57.0 AK (ACTINIC KERATOSIS): ICD-10-CM

## 2022-05-20 DIAGNOSIS — Z85.828 HISTORY OF NONMELANOMA SKIN CANCER: ICD-10-CM

## 2022-05-20 DIAGNOSIS — L91.8 ACROCHORDON: ICD-10-CM

## 2022-05-20 DIAGNOSIS — D17.1 LIPOMA OF TORSO: ICD-10-CM

## 2022-05-20 DIAGNOSIS — L82.1 SK (SEBORRHEIC KERATOSIS): ICD-10-CM

## 2022-05-20 DIAGNOSIS — D48.5 NEOPLASM OF UNCERTAIN BEHAVIOR OF SKIN: ICD-10-CM

## 2022-05-20 DIAGNOSIS — L81.4 LENTIGINES: ICD-10-CM

## 2022-05-20 DIAGNOSIS — D18.01 CHERRY ANGIOMA: ICD-10-CM

## 2022-05-20 DIAGNOSIS — D22.9 MULTIPLE BENIGN NEVI: ICD-10-CM

## 2022-05-20 DIAGNOSIS — B07.8 OTHER VIRAL WARTS: ICD-10-CM

## 2022-05-20 DIAGNOSIS — L73.8 SEBACEOUS HYPERPLASIA: ICD-10-CM

## 2022-05-20 PROCEDURE — 1101F PR PT FALLS ASSESS DOC 0-1 FALLS W/OUT INJ PAST YR: ICD-10-PCS | Mod: CPTII,S$GLB,, | Performed by: DERMATOLOGY

## 2022-05-20 PROCEDURE — 88341 PR IHC OR ICC EACH ADD'L SINGLE ANTIBODY  STAINPR: ICD-10-PCS | Mod: 26,,, | Performed by: PATHOLOGY

## 2022-05-20 PROCEDURE — 88341 IMHCHEM/IMCYTCHM EA ADD ANTB: CPT | Performed by: PATHOLOGY

## 2022-05-20 PROCEDURE — 1126F PR PAIN SEVERITY QUANTIFIED, NO PAIN PRESENT: ICD-10-PCS | Mod: CPTII,S$GLB,, | Performed by: DERMATOLOGY

## 2022-05-20 PROCEDURE — 99214 OFFICE O/P EST MOD 30 MIN: CPT | Mod: 25,S$GLB,, | Performed by: DERMATOLOGY

## 2022-05-20 PROCEDURE — 3288F PR FALLS RISK ASSESSMENT DOCUMENTED: ICD-10-PCS | Mod: CPTII,S$GLB,, | Performed by: DERMATOLOGY

## 2022-05-20 PROCEDURE — 88342 IMHCHEM/IMCYTCHM 1ST ANTB: CPT | Performed by: PATHOLOGY

## 2022-05-20 PROCEDURE — 4010F PR ACE/ARB THEARPY RXD/TAKEN: ICD-10-PCS | Mod: CPTII,S$GLB,, | Performed by: DERMATOLOGY

## 2022-05-20 PROCEDURE — 17000 DESTRUCT PREMALG LESION: CPT | Mod: S$GLB,,, | Performed by: DERMATOLOGY

## 2022-05-20 PROCEDURE — 99999 PR PBB SHADOW E&M-EST. PATIENT-LVL III: CPT | Mod: PBBFAC,,, | Performed by: DERMATOLOGY

## 2022-05-20 PROCEDURE — 88305 TISSUE EXAM BY PATHOLOGIST: CPT | Performed by: PATHOLOGY

## 2022-05-20 PROCEDURE — 88342 CHG IMMUNOCYTOCHEMISTRY: ICD-10-PCS | Mod: 26,,, | Performed by: PATHOLOGY

## 2022-05-20 PROCEDURE — 1157F PR ADVANCE CARE PLAN OR EQUIV PRESENT IN MEDICAL RECORD: ICD-10-PCS | Mod: CPTII,S$GLB,, | Performed by: DERMATOLOGY

## 2022-05-20 PROCEDURE — 88305 TISSUE EXAM BY PATHOLOGIST: ICD-10-PCS | Mod: 26,,, | Performed by: PATHOLOGY

## 2022-05-20 PROCEDURE — 11102 TANGNTL BX SKIN SINGLE LES: CPT | Mod: 59,S$GLB,, | Performed by: DERMATOLOGY

## 2022-05-20 PROCEDURE — 1101F PT FALLS ASSESS-DOCD LE1/YR: CPT | Mod: CPTII,S$GLB,, | Performed by: DERMATOLOGY

## 2022-05-20 PROCEDURE — 1111F PR DISCHARGE MEDS RECONCILED W/ CURRENT OUTPATIENT MED LIST: ICD-10-PCS | Mod: CPTII,S$GLB,, | Performed by: DERMATOLOGY

## 2022-05-20 PROCEDURE — 17000 PR DESTRUCTION(LASER SURGERY,CRYOSURGERY,CHEMOSURGERY),PREMALIGNANT LESIONS,FIRST LESION: ICD-10-PCS | Mod: S$GLB,,, | Performed by: DERMATOLOGY

## 2022-05-20 PROCEDURE — 88305 TISSUE EXAM BY PATHOLOGIST: CPT | Mod: 26,,, | Performed by: PATHOLOGY

## 2022-05-20 PROCEDURE — 1157F ADVNC CARE PLAN IN RCRD: CPT | Mod: CPTII,S$GLB,, | Performed by: DERMATOLOGY

## 2022-05-20 PROCEDURE — 1111F DSCHRG MED/CURRENT MED MERGE: CPT | Mod: CPTII,S$GLB,, | Performed by: DERMATOLOGY

## 2022-05-20 PROCEDURE — 99214 PR OFFICE/OUTPT VISIT, EST, LEVL IV, 30-39 MIN: ICD-10-PCS | Mod: 25,S$GLB,, | Performed by: DERMATOLOGY

## 2022-05-20 PROCEDURE — 1126F AMNT PAIN NOTED NONE PRSNT: CPT | Mod: CPTII,S$GLB,, | Performed by: DERMATOLOGY

## 2022-05-20 PROCEDURE — 11102 PR TANGENTIAL BIOPSY, SKIN, SINGLE LESION: ICD-10-PCS | Mod: 59,S$GLB,, | Performed by: DERMATOLOGY

## 2022-05-20 PROCEDURE — 3288F FALL RISK ASSESSMENT DOCD: CPT | Mod: CPTII,S$GLB,, | Performed by: DERMATOLOGY

## 2022-05-20 PROCEDURE — 17003 DESTRUCTION, PREMALIGNANT LESIONS; SECOND THROUGH 14 LESIONS: ICD-10-PCS | Mod: S$GLB,,, | Performed by: DERMATOLOGY

## 2022-05-20 PROCEDURE — 1159F MED LIST DOCD IN RCRD: CPT | Mod: CPTII,S$GLB,, | Performed by: DERMATOLOGY

## 2022-05-20 PROCEDURE — 4010F ACE/ARB THERAPY RXD/TAKEN: CPT | Mod: CPTII,S$GLB,, | Performed by: DERMATOLOGY

## 2022-05-20 PROCEDURE — 88342 IMHCHEM/IMCYTCHM 1ST ANTB: CPT | Mod: 26,,, | Performed by: PATHOLOGY

## 2022-05-20 PROCEDURE — 1159F PR MEDICATION LIST DOCUMENTED IN MEDICAL RECORD: ICD-10-PCS | Mod: CPTII,S$GLB,, | Performed by: DERMATOLOGY

## 2022-05-20 PROCEDURE — 99999 PR PBB SHADOW E&M-EST. PATIENT-LVL III: ICD-10-PCS | Mod: PBBFAC,,, | Performed by: DERMATOLOGY

## 2022-05-20 PROCEDURE — 88341 IMHCHEM/IMCYTCHM EA ADD ANTB: CPT | Mod: 26,,, | Performed by: PATHOLOGY

## 2022-05-20 PROCEDURE — 17003 DESTRUCT PREMALG LES 2-14: CPT | Mod: S$GLB,,, | Performed by: DERMATOLOGY

## 2022-05-20 RX ORDER — FLUOROURACIL 50 MG/G
CREAM TOPICAL
Qty: 40 G | Refills: 1 | Status: SHIPPED | OUTPATIENT
Start: 2022-05-20

## 2022-05-20 NOTE — PROGRESS NOTES
Subjective:       Patient ID:  Pavel Graff is a 72 y.o. male who presents for   Chief Complaint   Patient presents with    Skin Check     UBSE     HPI     Established patient.  Here today for annual upper body skin exam.    Last UBSE 6/2021 at which time SCC biopsied from mid upper forehead, subsequent MMS with Dr Cartagena in 7/2021.   Today c/o scaliness at nose.   No further complaints today.     Review of Systems   Constitutional: Negative for fever and chills.   Skin: Positive for wears hat (usually - ). Negative for daily sunscreen use and activity-related sunscreen use.   Hematologic/Lymphatic: Bruises/bleeds easily (on Eliquis).        Objective:    Physical Exam   Constitutional: He appears well-developed and well-nourished. No distress.   Neurological: He is alert and oriented to person, place, and time. He is not disoriented.   Psychiatric: He has a normal mood and affect.   Skin:   Areas Examined (abnormalities noted in diagram):   Scalp / Hair Palpated and Inspected  Head / Face Inspection Performed  Neck Inspection Performed  Chest / Axilla Inspection Performed  Abdomen Inspection Performed  Back Inspection Performed  RUE Inspected  LUE Inspection Performed                       Diagram Legend     Erythematous scaling macule/papule c/w actinic keratosis       Vascular papule c/w angioma      Pigmented verrucoid papule/plaque c/w seborrheic keratosis      Yellow umbilicated papule c/w sebaceous hyperplasia      Irregularly shaped tan macule c/w lentigo     1-2 mm smooth white papules consistent with Milia      Movable subcutaneous cyst with punctum c/w epidermal inclusion cyst      Subcutaneous movable cyst c/w pilar cyst      Firm pink to brown papule c/w dermatofibroma      Pedunculated fleshy papule(s) c/w skin tag(s)      Evenly pigmented macule c/w junctional nevus     Mildly variegated pigmented, slightly irregular-bordered macule c/w mildly atypical nevus      Flesh colored to  evenly pigmented papule c/w intradermal nevus       Pink pearly papule/plaque c/w basal cell carcinoma      Erythematous hyperkeratotic cursted plaque c/w SCC      Surgical scar with no sign of skin cancer recurrence      Open and closed comedones      Inflammatory papules and pustules      Verrucoid papule consistent consistent with wart     Erythematous eczematous patches and plaques     Dystrophic onycholytic nail with subungual debris c/w onychomycosis     Umbilicated papule    Erythematous-base heme-crusted tan verrucoid plaque consistent with inflamed seborrheic keratosis     Erythematous Silvery Scaling Plaque c/w Psoriasis     See annotation        Assessment / Plan:      Pathology Orders:     Normal Orders This Visit    Specimen to Pathology, Dermatology     Comments:    Number of Specimens:->1  ------------------------->-------------------------  Spec 1 Procedure:->Biopsy  Spec 1 Clinical Impression:->Ninfa r/o scc is vs isk vs r/o lm  Spec 1 Source:->R lateral forehead  Release to patient->Immediate    Questions:    Procedure Type: Dermatology and skin neoplasms    Number of Specimens: 1    ------------------------: -------------------------    Spec 1 Procedure: Biopsy    Spec 1 Clinical Impression: Ninfa r/o scc is vs isk vs r/o lm    Spec 1 Source: R lateral forehead    Release to patient: Immediate        AK (actinic keratosis)  - Discussed diagnosis, etiology, and precancerous nature of condition.   - Cryosurgery Procedure Note: Discussed procedure with patient/patient's guardian including risks and benefits as well as treatment alternatives. Risks of procedure include pain, itching, swelling, redness, blistering, crusting, wound formation, post-inflammatory pigmentary alteration, scar, recurrence. Verbal consent obtained. LN2 cryosurgery performed to 2 lesion(s). Patient tolerated procedure well. After-visit wound care instructions reviewed and provided in writing.      Actinic skin damage  -      fluorouraciL (EFUDEX) 5 % cream; AAA at nose bid x 2 weeks  Dispense: 40 g; Refill: 1  Patient to treat lesion at nose with 2-week course Efudex following LN2 today.   - Counseled on potential SE of medication(s) and instructed on use.     Neoplasm of uncertain behavior of skin  -     Specimen to Pathology, Dermatology  - Discussed diagnosis with patient and explained uncertain nature of condition, including differential DDX.   - Discussed treatment options (biopsy, close monitoring) with patient, including the risks and benefits of each. Patient opted to pursue biopsy.  - Shave Biopsy Procedure Note: Discussed procedure with patient/patient's guardian including risks and benefits as well as treatment alternatives. Risks of procedure include pain, bleeding, infection, post-inflammatory pigmentary alteration, scar, recurrence. Patient informed that the purpose of a biopsy is sampling of condition in question rather than removal in entirety; further treatment may be necessary. Verbal consent obtained. Area to be biopsied marked and cleansed with alcohol. Local anesthesia achieved by injecting approximately 1 cc of 1% lidocaine with epinephrine. One shave biopsy performed using a double edge razor blade; specimen submitted to pathology. Residual lesion treated with LN2. Hemostasis achieved with aluminum chloride. Petroleum jelly and bandage applied to wound. Patient tolerated procedure well. After-visit wound care instructions reviewed and provided in writing.     Multiple benign nevi  - Discussed diagnosis, etiology, and benign-nature of condition.  - Reassured; no lesions suspicious for malignancy noted on exam today.   - Recommended routine self examination of skin. Discussed the ABCDEs of melanoma and ugly duckling sign.   - Recommended daily sun protection, including the use of OTC broad-spectrum sunscreen (SPF 30 or greater) and sun-protective clothing.      Lentigines  - Benign; reassured treatment not  necessary.   - Recommended daily sun protection, including the use of OTC broad-spectrum sunscreen (SPF 30 or greater) and sun-protective clothing.       SK (seborrheic keratosis)  Acrochordon  Cherry angioma  Sebaceous hyperplasia  Lipoma  Other viral warts   - Benign; reassured treatment not necessary.     Screening for skin cancer  - Upper body skin examination performed today.  - Findings listed above.   - Recommended routine self examination of skin.    - Recommended daily sun protection, including the use of OTC broad-spectrum sunscreen (SPF 30 or greater) and sun-protective clothing.             Follow up in about 3 months (around 8/20/2022).  Recheck nose

## 2022-05-20 NOTE — PATIENT INSTRUCTIONS
Field Treatment for Actinic Keratoses (precancerous lesions)    5-Fluorouracil - This is a topical chemotherapy for your skin. This cream should never be applied without discussing with you dermatologist.    This treatment gets your immune system involved in fighting precancers (actinic keratoses), even those we can't yet see.     HOW TO APPLY: Apply a fingertip length amount to the entire area 2x/day for 2 weeks. Wash your hands carefully after applying the cream and wipe glasses, BIPAP/CPAP machines, or anything else that comes in frequent contact with the cream.    WHAT TO EXPECT: Your skin will likely become red, crusted, sore, and tender during the treatment usually starting around day 3 and continuing for about 1 week after last application. Your skin may take up to 6 weeks to return to its normal coloring (lose the pink).     HOW TO HEAL FAST: To speed healing, wash with a gentle wash and apply Vaseline jelly especially to crusted open areas.    WE CAN HELP: Please contact us for any questions or problem shooting the application of these creams: [phone] or [email or web address]    IT WILL BE WORTH IT!!!        Shave Biopsy Wound Care    Your doctor has performed a shave biopsy today.  A band aid and vaseline ointment has been placed over the site.  This should remain in place for NO LONGER THAN 48 hours.  It is fine to remove the bandaid after 24 hours, if the area is no longer bleeding. It is recommended that you keep the area dry (do not wet)) for the first 24 hours.  After 24 hours, wash the area with warm soap and water and apply Vaseline jelly.  Many patients prefer to use Neosporin or Bacitracin ointment.  This is acceptable; however, know that you can develop an allergy to this medication even if you have used it safely for years.  It is important to keep the area moist.  Letting it dry out and get air slows healing time, and will worsen the scar.        If you notice increasing redness, tenderness,  pain, or yellow drainage at the biopsy site, please notify your doctor.  These are signs of an infection.    If your biopsy site is bleeding, apply firm pressure for 15 minutes straight.  Repeat for another 15 minutes, if it is still bleeding.   If the surgical site continues to bleed, then please contact your doctor.      For [facility] users:   You will receive your biopsy results in [facility] as soon as they are available. Please be assured that your physician/provider will review your results and will then determine what further treatment, evaluation, or planning is required. You should be contacted by your physician's/provider's office within 5 business days of receiving your results; If not, please reach out to directly. This is one more way [facility] is putting you first.     [facility] [place] [address]/ [phone] FAX/ [email or web address]           CRYOSURGERY      Your doctor has used a method called cryosurgery to treat your skin condition. Cryosurgery refers to the use of very cold substances to treat a variety of skin conditions such as warts, pre-skin cancers, molluscum contagiosum, sun spots, and several benign growths. The substance we use in cryosurgery is liquid nitrogen and is so cold (-195 degrees Celsius) that is burns when administered.     Following treatment in the office, the skin may immediately burn and become red. You may find the area around the lesion is affected as well. It is sometimes necessary to treat not only the lesion, but a small area of the surrounding normal skin to achieve a good response.     A blister, and even a blood filled blister, may form after treatment.   This is a normal response. If the blister is painful, it is acceptable to sterilize a needle and with rubbing alcohol and gently pop the blister. It is important that you gently wash the area with soap and warm water as the blister fluid may contain wart virus if a wart was treated. Do no  remove the roof of the blister.     The area treated can take anywhere from 1-3 weeks to heal. Healing time depends on the kind skin lesion treated, the location, and how aggressively the lesion was treated. It is recommended that the areas treated are covered with Vaseline or bacitracin ointment and a band-aid. If a band-aid is not practical, just ointment applied several times per day will do. Keeping these areas moist will speed the healing time.    Treatment with liquid nitrogen can leave a scar. In dark skin, it may be a light or dark scar, in light skin it may be a white or pink scar. These will generally fade with time, but may never go away completely.     If you have any concerns after your treatment, please feel free to call the office.       [facility] [place] [address]/ [phone] FAX/ [email or web address]

## 2022-05-26 ENCOUNTER — TELEPHONE (OUTPATIENT)
Dept: ELECTROPHYSIOLOGY | Facility: CLINIC | Age: 72
End: 2022-05-26
Payer: MEDICARE

## 2022-05-26 DIAGNOSIS — I48.91 ATRIAL FIBRILLATION, UNSPECIFIED TYPE: Primary | ICD-10-CM

## 2022-05-26 LAB
FINAL PATHOLOGIC DIAGNOSIS: NORMAL
GROSS: NORMAL
Lab: NORMAL
MICROSCOPIC EXAM: NORMAL

## 2022-05-30 NOTE — PROGRESS NOTES
1. Skin, right lateral forehead, shave biopsy:   - ACTINIC KERATOSIS, PIGMENTED.   - MELANOCYTIC HYPERPLASIA OF SUN-DAMAGED SKIN.     Base of biopsy and residual lesion already treated with LN2. Will recheck area in 3 months along with nose. Please call to schedule 3 month f/u visit with any [facility] provider.

## 2022-06-07 ENCOUNTER — PATIENT MESSAGE (OUTPATIENT)
Dept: ELECTROPHYSIOLOGY | Facility: CLINIC | Age: 72
End: 2022-06-07
Payer: MEDICARE

## 2022-06-17 ENCOUNTER — TELEPHONE (OUTPATIENT)
Dept: ELECTROPHYSIOLOGY | Facility: CLINIC | Age: 72
End: 2022-06-17
Payer: MEDICARE

## 2022-06-17 NOTE — TELEPHONE ENCOUNTER
Spoke with Ms. Bruno patient's wife and confirmed procedure details for Monday. She said that she did get my voicemail. She verbalized understanding and appreciated the call.

## 2022-06-17 NOTE — TELEPHONE ENCOUNTER
Attempted to contact patient to confirm procedure details. No answer. Left detailed voicemail including: arrival time, NPO after midnight and medication instructions, along with callback number.     Labs n/a    Covid n/a, vacc

## 2022-06-17 NOTE — TELEPHONE ENCOUNTER
----- Message from Ale Ponce MA sent at 6/17/2022  3:12 PM CDT -----  Wendie the patient is returning your phone call please call [phone]. Thank you.

## 2022-06-19 ENCOUNTER — PATIENT MESSAGE (OUTPATIENT)
Dept: DERMATOLOGY | Facility: CLINIC | Age: 72
End: 2022-06-19
Payer: MEDICARE

## 2022-06-20 ENCOUNTER — ANESTHESIA EVENT (OUTPATIENT)
Dept: MEDSURG UNIT | Facility: HOSPITAL | Age: 72
End: 2022-06-20
Payer: MEDICARE

## 2022-06-20 ENCOUNTER — HOSPITAL ENCOUNTER (OUTPATIENT)
Dept: CARDIOLOGY | Facility: HOSPITAL | Age: 72
Discharge: HOME | End: 2022-06-20
Attending: INTERNAL MEDICINE | Admitting: INTERNAL MEDICINE
Payer: MEDICARE

## 2022-06-20 ENCOUNTER — HOSPITAL ENCOUNTER (OUTPATIENT)
Facility: HOSPITAL | Age: 72
Discharge: HOME | End: 2022-06-20
Attending: INTERNAL MEDICINE | Admitting: INTERNAL MEDICINE
Payer: MEDICARE

## 2022-06-20 ENCOUNTER — ANESTHESIA (OUTPATIENT)
Dept: MEDSURG UNIT | Facility: HOSPITAL | Age: 72
End: 2022-06-20
Payer: MEDICARE

## 2022-06-20 VITALS
WEIGHT: 240 LBS | TEMPERATURE: 98 F | OXYGEN SATURATION: 99 % | HEIGHT: 72 IN | BODY MASS INDEX: 32.51 KG/M2 | DIASTOLIC BLOOD PRESSURE: 82 MMHG | SYSTOLIC BLOOD PRESSURE: 146 MMHG | RESPIRATION RATE: 21 BRPM | HEART RATE: 67 BPM

## 2022-06-20 VITALS
SYSTOLIC BLOOD PRESSURE: 146 MMHG | HEIGHT: 72 IN | BODY MASS INDEX: 32.51 KG/M2 | WEIGHT: 240 LBS | DIASTOLIC BLOOD PRESSURE: 78 MMHG

## 2022-06-20 DIAGNOSIS — I48.91 ATRIAL FIBRILLATION: ICD-10-CM

## 2022-06-20 DIAGNOSIS — Z95.818 PRESENCE OF WATCHMAN LEFT ATRIAL APPENDAGE CLOSURE DEVICE: Primary | ICD-10-CM

## 2022-06-20 DIAGNOSIS — I48.19 PERSISTENT ATRIAL FIBRILLATION: ICD-10-CM

## 2022-06-20 LAB
AORTIC ROOT ANNULUS: 2.4 CM
BSA FOR ECHO PROCEDURE: 2.35 M2
EJECTION FRACTION: 60 %
PROX AORTA: 3.8 CM
SINUS: 3.8 CM
STJ: 3.5 CM

## 2022-06-20 PROCEDURE — 93312 TRANSESOPHAGEAL ECHO (TEE) (CUPID ONLY): ICD-10-PCS | Mod: 26,,, | Performed by: INTERNAL MEDICINE

## 2022-06-20 PROCEDURE — D9220A PRA ANESTHESIA: Mod: CRNA,,, | Performed by: NURSE ANESTHETIST, CERTIFIED REGISTERED

## 2022-06-20 PROCEDURE — 93010 EKG 12-LEAD: ICD-10-PCS | Mod: ,,, | Performed by: INTERNAL MEDICINE

## 2022-06-20 PROCEDURE — D9220A PRA ANESTHESIA: ICD-10-PCS | Mod: CRNA,,, | Performed by: NURSE ANESTHETIST, CERTIFIED REGISTERED

## 2022-06-20 PROCEDURE — 37000009 HC ANESTHESIA EA ADD 15 MINS

## 2022-06-20 PROCEDURE — 63600175 PHARM REV CODE 636 W HCPCS: Performed by: NURSE ANESTHETIST, CERTIFIED REGISTERED

## 2022-06-20 PROCEDURE — 25000003 PHARM REV CODE 250: Performed by: NURSE ANESTHETIST, CERTIFIED REGISTERED

## 2022-06-20 PROCEDURE — 93325 TRANSESOPHAGEAL ECHO (TEE) (CUPID ONLY): ICD-10-PCS | Mod: 26,,, | Performed by: INTERNAL MEDICINE

## 2022-06-20 PROCEDURE — D9220A PRA ANESTHESIA: ICD-10-PCS | Mod: ANES,,, | Performed by: STUDENT IN AN ORGANIZED HEALTH CARE EDUCATION/TRAINING PROGRAM

## 2022-06-20 PROCEDURE — 93320 DOPPLER ECHO COMPLETE: CPT | Mod: 26,,, | Performed by: INTERNAL MEDICINE

## 2022-06-20 PROCEDURE — 93325 DOPPLER ECHO COLOR FLOW MAPG: CPT | Mod: 26,,, | Performed by: INTERNAL MEDICINE

## 2022-06-20 PROCEDURE — 93010 ELECTROCARDIOGRAM REPORT: CPT | Mod: ,,, | Performed by: INTERNAL MEDICINE

## 2022-06-20 PROCEDURE — 37000008 HC ANESTHESIA 1ST 15 MINUTES

## 2022-06-20 PROCEDURE — 93320 TRANSESOPHAGEAL ECHO (TEE) (CUPID ONLY): ICD-10-PCS | Mod: 26,,, | Performed by: INTERNAL MEDICINE

## 2022-06-20 PROCEDURE — 93005 ELECTROCARDIOGRAM TRACING: CPT | Mod: 59 | Performed by: INTERNAL MEDICINE

## 2022-06-20 PROCEDURE — 93325 DOPPLER ECHO COLOR FLOW MAPG: CPT

## 2022-06-20 PROCEDURE — 93312 ECHO TRANSESOPHAGEAL: CPT | Mod: 26,,, | Performed by: INTERNAL MEDICINE

## 2022-06-20 PROCEDURE — D9220A PRA ANESTHESIA: Mod: ANES,,, | Performed by: STUDENT IN AN ORGANIZED HEALTH CARE EDUCATION/TRAINING PROGRAM

## 2022-06-20 RX ORDER — SODIUM CHLORIDE 0.9 % (FLUSH) 0.9 %
10 SYRINGE (ML) INJECTION
Status: DISCONTINUED | OUTPATIENT
Start: 2022-06-20 | End: 2022-06-20 | Stop reason: HOSPADM

## 2022-06-20 RX ORDER — CLOPIDOGREL BISULFATE 75 MG/1
75 TABLET ORAL DAILY
Qty: 90 TABLET | Refills: 1 | Status: SHIPPED | OUTPATIENT
Start: 2022-06-20 | End: 2022-12-17

## 2022-06-20 RX ORDER — LIDOCAINE HYDROCHLORIDE 20 MG/ML
INJECTION INTRAVENOUS
Status: DISCONTINUED | OUTPATIENT
Start: 2022-06-20 | End: 2022-06-20

## 2022-06-20 RX ORDER — PROPOFOL 10 MG/ML
VIAL (ML) INTRAVENOUS
Status: DISCONTINUED | OUTPATIENT
Start: 2022-06-20 | End: 2022-06-20

## 2022-06-20 RX ORDER — PROPOFOL 10 MG/ML
VIAL (ML) INTRAVENOUS CONTINUOUS PRN
Status: DISCONTINUED | OUTPATIENT
Start: 2022-06-20 | End: 2022-06-20

## 2022-06-20 RX ADMIN — GLYCOPYRROLATE 0.2 MG: 0.2 INJECTION, SOLUTION INTRAMUSCULAR; INTRAVITREAL at 10:06

## 2022-06-20 RX ADMIN — LIDOCAINE HYDROCHLORIDE 100 MG: 20 INJECTION, SOLUTION INTRAVENOUS at 10:06

## 2022-06-20 RX ADMIN — SODIUM CHLORIDE: 9 INJECTION, SOLUTION INTRAVENOUS at 09:06

## 2022-06-20 RX ADMIN — PROPOFOL 50 MG: 10 INJECTION, EMULSION INTRAVENOUS at 10:06

## 2022-06-20 RX ADMIN — Medication 175 MCG/KG/MIN: at 10:06

## 2022-06-20 NOTE — ANESTHESIA PREPROCEDURE EVALUATION
06/20/2022  Pavel Graff is a 72 y.o., male.  Pre-operative evaluation for Procedure(s) (LRB):  Transesophageal echo (KAM) intra-procedure log documentation (N/A)    Pavel Graff is a 72 y.o. male     Patient Active Problem List   Diagnosis    Hypertension    Hyperlipidemia    Transient cerebral ischemia    History of TIA (transient ischemic attack)    Benign prostatic hyperplasia    Inguinal hernia recurrent unilateral    Personal history of colonic polyps    Fuchs' corneal dystrophy    Keratoconus, unspecified    Obstructive sleep apnea    BLANCO (dyspnea on exertion)    Fatigue    NSVT (nonsustained ventricular tachycardia)    Persistent atrial fibrillation    Status post catheter ablation of atrial fibrillation    On amiodarone therapy    Current use of long term anticoagulation    Fuchs' endothelial dystrophy    Corneal transplant failure    Nuclear cataract    Colon cancer screening    Presence of Watchman left atrial appendage closure device       Review of patient's allergies indicates:  No Known Allergies    Current Facility-Administered Medications on File Prior to Encounter   Medication Dose Route Frequency Provider Last Rate Last Admin    0.9%  NaCl infusion   Intravenous Continuous Zoey Newman NP   Stopped at 09/28/18 1204     Current Outpatient Medications on File Prior to Encounter   Medication Sig Dispense Refill    aspirin 81 MG Chew Take 81 mg by mouth once daily.      ELIQUIS 5 mg Tab TAKE 1 TABLET BY MOUTH TWICE A DAY 60 tablet 11    fish oil-omega-3 fatty acids 300-1,000 mg capsule Take 1 g by mouth once daily.      hydroCHLOROthiazide (HYDRODIURIL) 25 MG tablet TAKE 1 TABLET BY MOUTH EVERY DAY 90 tablet 4    losartan (COZAAR) 50 MG tablet TAKE 1 TABLET BY MOUTH EVERY DAY 90 tablet 1    pravastatin (PRAVACHOL) 20 MG tablet Take 1 tablet (20 mg  total) by mouth once daily. 90 tablet 3    prednisoLONE acetate (PRED FORTE) 1 % DrpS INSTILL 1 DROP INTO BOTH EYES TWICE A DAY 10 mL 3       Past Surgical History:   Procedure Laterality Date    ABLATION N/A 9/28/2018    Procedure: ABLATION;  Surgeon: Romaine Howard MD;  Location: [facility] CATH LAB;  Service: Cardiology;  Laterality: N/A;  AF, ERASMO, PVI, RFA, MAME, Gen, MB, 3 Prep    CATARACT EXTRACTION W/  INTRAOCULAR LENS IMPLANT Left 1/11/10    CATARACT EXTRACTION W/  INTRAOCULAR LENS IMPLANT Right 12/14/2017    DMEK/ Dr. Jay    COLONOSCOPY N/A 3/26/2019    Procedure: COLONOSCOPY;  Surgeon: Mauro Dye MD;  Location: [facility] ENDO (4TH FLR);  Service: Endoscopy;  Laterality: N/A;  ok to hold Eliquis x 2 days per Dr. Howard-MS    CORNEAL TRANSPLANT Left 1997    PKP OS    CORNEAL TRANSPLANT Left 03/23/2017    CORNEAL TRANSPLANT Right 12/14/2017    DMEK/ [facility]; Dr. Jay    HERNIA REPAIR      HIP ARTHROPLASTY      OCCLUSION OF LEFT ATRIAL APPENDAGE N/A 5/9/2022    Procedure: Left atrial appendage occlusion;  Surgeon: Romaine Howard MD;  Location: [facility] EP LAB;  Service: Cardiology;  Laterality: N/A;  afib, watchman, BSCI, erasmo, anes, MB/DM, 3prep    ROTATOR CUFF REPAIR      TRANSESOPHAGEAL ECHOCARDIOGRAPHY N/A 5/9/2022    Procedure: ECHOCARDIOGRAM, TRANSESOPHAGEAL;  Surgeon: Fanta Dacosta MD;  Location: [facility] EP LAB;  Service: Cardiology;  Laterality: N/A;       Social History     Socioeconomic History    Marital status:     Number of children: 3   Occupational History     Employer: [employer]   Tobacco Use    Smoking status: Never Smoker    Smokeless tobacco: Never Used   Substance and Sexual Activity    Alcohol use: No    Drug use: No    Sexual activity: Yes     Partners: Female         CBC: No results for input(s): WBC, RBC, HGB, HCT, PLT, MCV, MCH, MCHC in the last 72 hours.    CMP: No results for input(s): NA, K, CL, CO2, BUN, CREATININE, GLU, MG, PHOS, CALCIUM,  ALBUMIN, PROT, ALKPHOS, ALT, AST, BILITOT in the last 72 hours.    INR  No results for input(s): PT, INR, PROTIME, APTT in the last 72 hours.        Diagnostic Studies:      EKD Echo:  Results for orders placed or performed during the hospital encounter of 16   2D echo with color flow doppler   Result Value Ref Range    EF + QEF 60 55 - 65    Mitral Valve Regurgitation MILD     Diastolic Dysfunction No     Aortic Valve Regurgitation MILD     Est. PA Systolic Pressure 33     Mitral Valve Mobility NORMAL     Tricuspid Valve Regurgitation MILD            Pre-op Assessment    I have reviewed the Patient Summary Reports.     I have reviewed the Nursing Notes. I have reviewed the NPO Status.   I have reviewed the Medications.     Review of Systems  Cardiovascular:   Hypertension BLANCO    Neurological:   TIA, CVA        Physical Exam    Airway:  Mallampati: II   Mouth Opening: Normal  TM Distance: Normal  Neck ROM: Normal ROM        Anesthesia Plan  Type of Anesthesia, risks & benefits discussed:    Anesthesia Type: Gen Natural Airway  Intra-op Monitoring Plan: Standard ASA Monitors  Post Op Pain Control Plan: multimodal analgesia  Induction:  IV  Airway Plan: Direct, Post-Induction  Informed Consent: Informed consent signed with the Patient and all parties understand the risks and agree with anesthesia plan.  All questions answered.   ASA Score: 3  Day of Surgery Review of History & Physical: H&P Update referred to the surgeon/provider.    Ready For Surgery From Anesthesia Perspective.     .

## 2022-06-20 NOTE — PLAN OF CARE
Patient  received to recovery bay 9 s/p KAM. Asleep, but aroussable. bedside monitoring connected. Vss. Breathing is even and unlabored. No distress noted. Family notified.  Will monitor.

## 2022-06-20 NOTE — DISCHARGE SUMMARY
[facility] - Short Stay Cardiac Unit  Cardiology  Discharge Summary      Patient Name: Pavel Graff  MRN: [id number]  Admission Date: 6/20/2022  Hospital Length of Stay: 0 days  Discharge Date and Time:  06/20/2022 11:49 AM  Attending Physician: Romaine Howard MD  Discharging Provider: Dayne Viramontes MD  Primary Care Physician: Joe Peterson MD    HPI:   Mr. Graff has has a h/o persistent atrial fibrillation but has maintained NSR since his last ablation many years ago. He has a h/o HTN, TIA, SIMON, HLD and persistent afib.  He has a CHADSVASC 4.  He recently developed recent ocular bleeding while on apixaban. He can tolerate short term OAC but is not a candidate for long term OAC due to recurrent bleeding issues. He had LAAO via Watchman on 5/9/22 and here today to verify complete closure before stopping anticoagulation therapy.      KAM on 5/9/22:  · The left ventricle is normal in size with normal systolic function. The estimated ejection fraction is 65%.  · Normal right ventricular size with normal right ventricular systolic function.  · Normal appearing left atrial appendage. No thrombus is present in the appendage. Normal appendage velocities.  · The sinuses of Valsalva is mildly dilated.  · Grade 1 plaque present in the descending aorta.  · Small anterolateral pericardial effusion noted prior to procedure, unchanged post procedure.  · S/p successfull 27MM WATCHMAN FLX CLSR device without any peridevice leak and 26% compression.  · Small iatrogenic post procedure ASD noted.  · Mild aortic regurgitation.     TTE 9/28/18:   1 - Left atrial appendage is single lobed with spontaneous echo contrast with no visualized thrombus.     2 - Biatrial enlargement.     3 - Normal left ventricular systolic function.     4 - Trivial aortic regurgitation.     5 - Trivial mitral regurgitation.     6 - Trivial to mild tricuspid regurgitation.     7 - Right lower pulmonary vein not visualized.     8 - Left lower pulmonary  vein not visualized    Procedure(s) (LRB):  Transesophageal echo (KAM) intra-procedure log documentation (N/A)     Indwelling Lines/Drains at time of discharge:  Lines/Drains/Airways     None                 Hospital Course (synopsis of major diagnoses, care, treatment, and services provided during the course of the hospital stay):     Successful KAM evaluation of the KIRSTEN occluder device that shows a small 3 mm nguyen-device leak. Will proceed with discontinuation of DOAC and start DAPT (ASA+Plavix) until 6 months post device implant. Will follow up with EP clinic as outpatient for further instructions. Please see full KAM report for details.     Pending Diagnostic Studies:     None          There are no hospital problems to display for this patient.      Discharged Condition: good    Follow Up:    Patient Instructions:   No discharge procedures on file.  Medications:  Reconciled Home Medications:      Medication List      START taking these medications    clopidogreL 75 mg tablet  Commonly known as: PLAVIX  Take 1 tablet (75 mg total) by mouth once daily.        CONTINUE taking these medications    aspirin 81 MG Chew  Take 81 mg by mouth once daily.     fish oil-omega-3 fatty acids 300-1,000 mg capsule  Take 1 g by mouth once daily.     fluorouraciL 5 % cream  Commonly known as: EFUDEX  AAA at nose bid x 2 weeks     hydroCHLOROthiazide 25 MG tablet  Commonly known as: HYDRODIURIL  TAKE 1 TABLET BY MOUTH EVERY DAY     losartan 50 MG tablet  Commonly known as: COZAAR  TAKE 1 TABLET BY MOUTH EVERY DAY     metoprolol succinate 25 MG 24 hr tablet  Commonly known as: TOPROL-XL  TAKE 1 TABLET BY MOUTH EVERY DAY     nystatin-triamcinolone cream  Commonly known as: MYCOLOG II  Apply topically 2 (two) times daily.     pravastatin 20 MG tablet  Commonly known as: PRAVACHOL  Take 1 tablet (20 mg total) by mouth once daily.     prednisoLONE acetate 1 % Drps  Commonly known as: PRED FORTE  INSTILL 1 DROP INTO BOTH EYES TWICE A  DAY     timolol maleate 0.5% 0.5 % Drop  Commonly known as: TIMOPTIC  INSTILL 1 DROP INTO LEFT EYE 2 TIMES A DAY        STOP taking these medications    ELIQUIS 5 mg Tab  Generic drug: apixaban            Time spent on the discharge of patient: 35 minutes    Dayne Viramontes MD  Cardiology  [facility] - Short Stay Cardiac Unit

## 2022-06-20 NOTE — H&P
[facility]  H&P  KAM Cardiology      Pavel Graff  YOB: 1950  Medical Record Number:  [id number]  Attending Physician:  Romaine Howard MD   Date of Admission: 6/20/2022       Hospital Day:  0  Current Principal Problem:  <principal problem not specified>      History     Cc: KAM for LAAO    HPI  Mr. Graff has has a h/o persistent atrial fibrillation but has maintained NSR since his last ablation many years ago. He has a h/o HTN, TIA, SIMON, HLD and persistent afib.  He has a CHADSVASC 4.  He recently developed recent ocular bleeding while on apixaban. He can tolerate short term OAC but is not a candidate for long term OAC due to recurrent bleeding issues. He had LAAO via Watchman on 5/9/22 and here today to verify complete closure before stopping anticoagulation therapy.     KAM on 5/9/22:  · The left ventricle is normal in size with normal systolic function. The estimated ejection fraction is 65%.  · Normal right ventricular size with normal right ventricular systolic function.  · Normal appearing left atrial appendage. No thrombus is present in the appendage. Normal appendage velocities.  · The sinuses of Valsalva is mildly dilated.  · Grade 1 plaque present in the descending aorta.  · Small anterolateral pericardial effusion noted prior to procedure, unchanged post procedure.  · S/p successfull 27MM WATCHMAN FLX CLSR device without any peridevice leak and 26% compression.  · Small iatrogenic post procedure ASD noted.  · Mild aortic regurgitation.    TTE 9/28/18:   1 - Left atrial appendage is single lobed with spontaneous echo contrast with no visualized thrombus.     2 - Biatrial enlargement.     3 - Normal left ventricular systolic function.     4 - Trivial aortic regurgitation.     5 - Trivial mitral regurgitation.     6 - Trivial to mild tricuspid regurgitation.     7 - Right lower pulmonary vein not visualized.     8 - Left lower pulmonary vein not visualized.      Medications - Outpatient  Prior to Admission medications    Medication Sig Start Date End Date Taking? Authorizing Provider   aspirin 81 MG Chew Take 81 mg by mouth once daily.    Historical Provider   ELIQUIS 5 mg Tab TAKE 1 TABLET BY MOUTH TWICE A DAY 10/25/21   Romaine Howard MD   fish oil-omega-3 fatty acids 300-1,000 mg capsule Take 1 g by mouth once daily.    Historical Provider   fluorouraciL (EFUDEX) 5 % cream AAA at nose bid x 2 weeks 5/20/22   Loulou Clements MD   hydroCHLOROthiazide (HYDRODIURIL) 25 MG tablet TAKE 1 TABLET BY MOUTH EVERY DAY 1/11/22   Srinivasan Olea MD   losartan (COZAAR) 50 MG tablet TAKE 1 TABLET BY MOUTH EVERY DAY 11/19/21   Joe Peterson MD   metoprolol succinate (TOPROL-XL) 25 MG 24 hr tablet TAKE 1 TABLET BY MOUTH EVERY DAY 5/29/22   Romaine Howard MD   nystatin-triamcinolone (MYCOLOG II) cream Apply topically 2 (two) times daily. 4/12/22   Joe Peterson MD   pravastatin (PRAVACHOL) 20 MG tablet Take 1 tablet (20 mg total) by mouth once daily. 3/8/22 3/8/23  Joe Peterson MD   prednisoLONE acetate (PRED FORTE) 1 % DrpS INSTILL 1 DROP INTO BOTH EYES TWICE A DAY 12/21/21   Chantell Jay MD   timolol maleate 0.5% (TIMOPTIC) 0.5 % Drop INSTILL 1 DROP INTO LEFT EYE 2 TIMES A DAY 5/6/22   Chantell Jay MD         Medications - Current  Scheduled Meds:  Continuous Infusions:  PRN Meds:.sodium chloride 0.9%      Allergies  Review of patient's allergies indicates:  No Known Allergies      Past Medical History  Past Medical History:   Diagnosis Date    *Atrial fibrillation     Anticoagulant long-term use     Atrial fibrillation     Hyperlipidemia     Hypertension     Sleep apnea     Squamous cell carcinoma of skin     Stroke     TIA    TIA (transient ischemic attack)          Past Surgical History  Past Surgical History:   Procedure Laterality Date    ABLATION N/A 9/28/2018    Procedure: ABLATION;  Surgeon: Romaine Howard MD;  Location: [facility]  CATH LAB;  Service: Cardiology;  Laterality: N/A;  AF, ERASMO, PVI, RFA, MAME, Gen, MB, 3 Prep    CATARACT EXTRACTION W/  INTRAOCULAR LENS IMPLANT Left 1/11/10    CATARACT EXTRACTION W/  INTRAOCULAR LENS IMPLANT Right 12/14/2017    DMEK/ Dr. Jay    COLONOSCOPY N/A 3/26/2019    Procedure: COLONOSCOPY;  Surgeon: Mauro Dye MD;  Location: [facility] ENDO ([address] [place] [address]);  Service: Endoscopy;  Laterality: N/A;  ok to hold Eliquis x 2 days per Dr. Howard-MS    CORNEAL TRANSPLANT Left 1997    PKP OS    CORNEAL TRANSPLANT Left 03/23/2017    CORNEAL TRANSPLANT Right 12/14/2017    DMEK/ [facility]; Dr. Jay    HERNIA REPAIR      HIP ARTHROPLASTY      OCCLUSION OF LEFT ATRIAL APPENDAGE N/A 5/9/2022    Procedure: Left atrial appendage occlusion;  Surgeon: Romaine Howard MD;  Location: [facility] EP LAB;  Service: Cardiology;  Laterality: N/A;  afib, watchman, BSCI, erasmo, anes, MB/DM, 3prep    ROTATOR CUFF REPAIR      TRANSESOPHAGEAL ECHOCARDIOGRAPHY N/A 5/9/2022    Procedure: ECHOCARDIOGRAM, TRANSESOPHAGEAL;  Surgeon: Fanta Dacosta MD;  Location: [facility] EP LAB;  Service: Cardiology;  Laterality: N/A;         Social History  Social History     Socioeconomic History    Marital status:     Number of children: 3   Occupational History     Employer: [employer]   Tobacco Use    Smoking status: Never Smoker    Smokeless tobacco: Never Used   Substance and Sexual Activity    Alcohol use: No    Drug use: No    Sexual activity: Yes     Partners: Female         ROS   Admits Denies   Constitutional  Chills, diaphoresis, malaise   Eyes  Visual changes   ENMT  Dysphagia, Epistaxis, nasal congestion, hearing loss   Cardiovascular  Chest pain, palpitations, edema   Respiratory  Cough, dyspnea, wheezing   Gastrointestinal  Nausea, vomiting, constipation, diarrhea, anorexia.     Genitourinary  Dysuria, incontinence   Musculoskeletal  Myalgias, joint pain, joint swelling   Integumentary  Rash, inflammation, burning    Neruo-Psychiatric  Anxiety, insomnia.  Changes in speech, strength, sensation.     Endocrine     Hematologic  Abnormal bruising, bleeding   Immunologic  Inflammation, pain at IV sites.  Pruritis.     Dysphagia or odynophagia:  No  Liver Disease, esophageal disease, or known varices:  No  Upper GI Bleeding: No  Snoring:  No  Sleep Apnea:  Yes  Prior neck surgery or radiation:  No  History of anesthetic difficulties:  No  Family history of anesthetic difficulties:  No  Last oral intake:  12 hours ago  Able to move neck in all directions:  Yes      Physical Examination         Vital Signs             24 Hour VS Range       No intake or output data in the 24 hours ending 06/20/22 0808      General:   Head: NCAT  Eyes: conjunctivae and lids normal, no scleral icterus, EOMI.  ENMT:  no gingival bleeding, normal oral mucosa without pallor or cyanosis.   Neck:  JVP normal.  Trachea non-displaced.     Chest:  Normal respiratory effort.  Chest clear to auscultation.  No wheezes, rales, or rhonchi.  Heart:  Normal PMI, S1 S2 normal quality, splitting.  No murmurs rubs or gallops.  Peripheral pulses 2+.  Abdomen:  Non-distended, normal bowel sounds, non-tender.  No hepato-jugular reflux.  Extremities:  No edema. Normal capillary refill.    Skin:  Warm and dry.  No cyanosis or pallor.  No ulcers, stasis.  IV sites without tenderness or inflammation.    Neurological / Psychiatric:  Oriented to person, time, and place.  No facial asymmetry, drift.  Fluent without dysarthria.  Mood euthymic, affect normal.         Data       No results for input(s): WBC, HGB, HCT, PLT in the last 168 hours.     No results for input(s): PROTIME, INR in the last 168 hours.     No results for input(s): NA, K, CL, CO2, BUN, CREATININE, ANIONGAP, CALCIUM in the last 168 hours.     No results for input(s): PROT, ALBUMIN, BILITOT, ALKPHOS, AST, ALT in the last 168 hours.     No results for input(s): TROPONINI in the last 168 hours.     BNP (pg/mL)    Date Value   03/22/2016 198 (H)   07/03/2012 196 (H)       No results for input(s): LABBLOO in the last 168 hours.       Assessment & Plan     1. KAM for evaluation of evaluate for any leakage around the LAAO device  -No absolute contraindications of esophageal stricture, tumor, perforation, laceration,or diverticulum and/or active GI bleed  -The risks, benefits & alternatives of the procedure were explained to the patient.   -The risks of transesophageal echo include but are not limited to:  Dental trauma, esophageal trauma/perforation, bleeding, laryngospasm/brochospasm, aspiration, sore throat/hoarseness, & dislodgement of the endotracheal tube/nasogastric tube (where applicable).    -The risks of moderate sedation include hypotension, respiratory depression, arrhythmias, bronchospasm, & death.    -Informed consent was obtained. The patient is agreeable to proceed with the procedure and all questions and concerns addressed.    Case discussed with an attending in echocardiography lab.     Further recommendations per attending addendum          Signed:  MARIAM Garber  [phone]  Cardiovascular Nurse Practitioner  [facility]

## 2022-06-20 NOTE — PLAN OF CARE
patient aao x4. Vss. Spouse at bedside. patient without any complaints. ambulated to bathroom.  Tolerated oral fluids without any difficulty. AVS printed, home care instructions reviewed with patient. All questions answered. Patient did not want wheelchair, ambulaltory for discharge.

## 2022-06-20 NOTE — PLAN OF CARE
Patient arrived to room. PIV placed. Admit assessment completed. Plan of care discussed with patient. Nurse call bell within reach. Will monitor

## 2022-06-20 NOTE — ANESTHESIA POSTPROCEDURE EVALUATION
Anesthesia Post Evaluation    Patient: Pavel Graff    Procedure(s) Performed: Procedure(s) (LRB):  Transesophageal echo (KAM) intra-procedure log documentation (N/A)    Final Anesthesia Type: general      Patient location during evaluation: PACU  Patient participation: Yes- Able to Participate  Level of consciousness: awake and alert, awake and oriented  Post-procedure vital signs: reviewed and stable  Pain management: adequate  Airway patency: patent    PONV status at discharge: No PONV  Anesthetic complications: no      Cardiovascular status: blood pressure returned to baseline, hemodynamically stable and stable  Respiratory status: unassisted, spontaneous ventilation and room air  Hydration status: euvolemic  Follow-up not needed.          Vitals Value Taken Time   /82 06/20/22 1147   Temp 36.7 °C (98.1 °F) 06/20/22 1040   Pulse 69 06/20/22 1152   Resp 20 06/20/22 1151   SpO2 99 % 06/20/22 1152   Vitals shown include unvalidated device data.      No case tracking events are documented in the log.      Pain/Mark Score: Mark Score: 10 (6/20/2022 11:15 AM)

## 2022-06-20 NOTE — TRANSFER OF CARE
Anesthesia Transfer of Care Note    Patient: Pavel Graff    Procedure(s) Performed: Procedure(s) (LRB):  Transesophageal echo (KAM) intra-procedure log documentation (N/A)    Patient location: PACU    Anesthesia Type: general    Transport from OR: Transported from OR on 2-3 L/min O2 by NC with adequate spontaneous ventilation    Post pain: adequate analgesia    Post assessment: no apparent anesthetic complications    Post vital signs: stable    Level of consciousness: sedated    Nausea/Vomiting: no nausea/vomiting    Complications: none    Transfer of care protocol was followed      Last vitals:   Visit Vitals  BP (!) 151/73 (BP Location: Left arm, Patient Position: Lying)   Pulse 72   Temp 37.2 °C (98.9 °F) (Temporal)   Resp 20   Ht 6' (1.829 m)   Wt 108.9 kg (240 lb)   SpO2 95%   BMI 32.55 kg/m²

## 2022-07-08 ENCOUNTER — PATIENT MESSAGE (OUTPATIENT)
Dept: INTERNAL MEDICINE | Facility: CLINIC | Age: 72
End: 2022-07-08
Payer: MEDICARE

## 2022-07-08 DIAGNOSIS — I48.19 PERSISTENT ATRIAL FIBRILLATION: ICD-10-CM

## 2022-07-08 RX ORDER — LOSARTAN POTASSIUM 50 MG/1
50 TABLET ORAL DAILY
Qty: 90 TABLET | Refills: 1 | Status: SHIPPED | OUTPATIENT
Start: 2022-07-08 | End: 2022-12-26

## 2022-07-08 RX ORDER — LOSARTAN POTASSIUM 50 MG/1
50 TABLET ORAL DAILY
Qty: 90 TABLET | Refills: 1 | Status: CANCELLED | OUTPATIENT
Start: 2022-07-08

## 2022-07-08 NOTE — TELEPHONE ENCOUNTER
No new care gaps identified.  [facility] Embedded Care Gaps. Reference number: [id number]. 7/08/2022   3:28:28 PM CDT

## 2022-07-08 NOTE — TELEPHONE ENCOUNTER
No new care gaps identified.  [facility] Embedded Care Gaps. Reference number: [id number]. 7/08/2022   12:22:10 PM CDT

## 2022-07-10 RX ORDER — LOSARTAN POTASSIUM 50 MG/1
TABLET ORAL
Qty: 90 TABLET | Refills: 1 | OUTPATIENT
Start: 2022-07-10

## 2022-09-06 ENCOUNTER — OFFICE VISIT (OUTPATIENT)
Dept: DERMATOLOGY | Facility: CLINIC | Age: 72
End: 2022-09-06
Payer: MEDICARE

## 2022-09-06 DIAGNOSIS — B36.0 TINEA VERSICOLOR: ICD-10-CM

## 2022-09-06 DIAGNOSIS — L82.1 SEBORRHEIC KERATOSES: ICD-10-CM

## 2022-09-06 DIAGNOSIS — L57.0 AK (ACTINIC KERATOSIS): Primary | ICD-10-CM

## 2022-09-06 DIAGNOSIS — Z12.83 SCREENING EXAM FOR SKIN CANCER: ICD-10-CM

## 2022-09-06 PROCEDURE — 1126F AMNT PAIN NOTED NONE PRSNT: CPT | Mod: CPTII,S$GLB,, | Performed by: DERMATOLOGY

## 2022-09-06 PROCEDURE — 1159F MED LIST DOCD IN RCRD: CPT | Mod: CPTII,S$GLB,, | Performed by: DERMATOLOGY

## 2022-09-06 PROCEDURE — 4010F ACE/ARB THERAPY RXD/TAKEN: CPT | Mod: CPTII,S$GLB,, | Performed by: DERMATOLOGY

## 2022-09-06 PROCEDURE — 1159F PR MEDICATION LIST DOCUMENTED IN MEDICAL RECORD: ICD-10-PCS | Mod: CPTII,S$GLB,, | Performed by: DERMATOLOGY

## 2022-09-06 PROCEDURE — 3288F PR FALLS RISK ASSESSMENT DOCUMENTED: ICD-10-PCS | Mod: CPTII,S$GLB,, | Performed by: DERMATOLOGY

## 2022-09-06 PROCEDURE — 1101F PT FALLS ASSESS-DOCD LE1/YR: CPT | Mod: CPTII,S$GLB,, | Performed by: DERMATOLOGY

## 2022-09-06 PROCEDURE — 1157F PR ADVANCE CARE PLAN OR EQUIV PRESENT IN MEDICAL RECORD: ICD-10-PCS | Mod: CPTII,S$GLB,, | Performed by: DERMATOLOGY

## 2022-09-06 PROCEDURE — 1157F ADVNC CARE PLAN IN RCRD: CPT | Mod: CPTII,S$GLB,, | Performed by: DERMATOLOGY

## 2022-09-06 PROCEDURE — 1101F PR PT FALLS ASSESS DOC 0-1 FALLS W/OUT INJ PAST YR: ICD-10-PCS | Mod: CPTII,S$GLB,, | Performed by: DERMATOLOGY

## 2022-09-06 PROCEDURE — 99213 OFFICE O/P EST LOW 20 MIN: CPT | Mod: S$GLB,,, | Performed by: DERMATOLOGY

## 2022-09-06 PROCEDURE — 99999 PR PBB SHADOW E&M-EST. PATIENT-LVL II: CPT | Mod: PBBFAC,,, | Performed by: DERMATOLOGY

## 2022-09-06 PROCEDURE — 3288F FALL RISK ASSESSMENT DOCD: CPT | Mod: CPTII,S$GLB,, | Performed by: DERMATOLOGY

## 2022-09-06 PROCEDURE — 99999 PR PBB SHADOW E&M-EST. PATIENT-LVL II: ICD-10-PCS | Mod: PBBFAC,,, | Performed by: DERMATOLOGY

## 2022-09-06 PROCEDURE — 4010F PR ACE/ARB THEARPY RXD/TAKEN: ICD-10-PCS | Mod: CPTII,S$GLB,, | Performed by: DERMATOLOGY

## 2022-09-06 PROCEDURE — 99213 PR OFFICE/OUTPT VISIT, EST, LEVL III, 20-29 MIN: ICD-10-PCS | Mod: S$GLB,,, | Performed by: DERMATOLOGY

## 2022-09-06 PROCEDURE — 1126F PR PAIN SEVERITY QUANTIFIED, NO PAIN PRESENT: ICD-10-PCS | Mod: CPTII,S$GLB,, | Performed by: DERMATOLOGY

## 2022-09-06 NOTE — PROGRESS NOTES
Subjective:       Patient ID:  Pavel Graff is a 72 y.o. male who presents for   Chief Complaint   Patient presents with    Follow-up     R lateral forehead     Pt here today for f/u of lesion to R lateral forehead, was last seen by Dr. Clements 5/2022    Follow-up    Review of Systems   Skin:  Positive for activity-related sunscreen use. Negative for daily sunscreen use.   Hematologic/Lymphatic: Bruises/bleeds easily.      At last visit had AK biopsied on right forehead: that has completely healed.     Final Pathologic Diagnosis   Date Value Ref Range Status   05/20/2022   Final    1. Skin, right lateral forehead, shave biopsy:  - ACTINIC KERATOSIS, PIGMENTED.  - MELANOCYTIC HYPERPLASIA OF SUN-DAMAGED SKIN.  This lesion is atypical and further treatment may be required. You will be  contacted by your provider's office.       Comment:     Interp By Azul Denton M.D., Signed on 05/26/2022 at 15:06     Also at last visit was instructed to use Efudex cream to the nose, with good response.    Today The patient denies any moles or growths of the skin that are rapidly growing, hurting, itching, bleeding, or changing colors.    Works outdoors, wears sunscreen regularly.        Objective:    Physical Exam   Constitutional: He appears well-developed and well-nourished. No distress.   Neurological: He is alert and oriented to person, place, and time. He is not disoriented.   Psychiatric: He has a normal mood and affect.   Skin:   Areas Examined (abnormalities noted in diagram):   Scalp / Hair Palpated and Inspected  Head / Face Inspection Performed            Diagram Legend     Erythematous scaling macule/papule c/w actinic keratosis       Vascular papule c/w angioma      Pigmented verrucoid papule/plaque c/w seborrheic keratosis      Yellow umbilicated papule c/w sebaceous hyperplasia      Irregularly shaped tan macule c/w lentigo     1-2 mm smooth white papules consistent with Milia      Movable subcutaneous  cyst with punctum c/w epidermal inclusion cyst      Subcutaneous movable cyst c/w pilar cyst      Firm pink to brown papule c/w dermatofibroma      Pedunculated fleshy papule(s) c/w skin tag(s)      Evenly pigmented macule c/w junctional nevus     Mildly variegated pigmented, slightly irregular-bordered macule c/w mildly atypical nevus      Flesh colored to evenly pigmented papule c/w intradermal nevus       Pink pearly papule/plaque c/w basal cell carcinoma      Erythematous hyperkeratotic cursted plaque c/w SCC      Surgical scar with no sign of skin cancer recurrence      Open and closed comedones      Inflammatory papules and pustules      Verrucoid papule consistent consistent with wart     Erythematous eczematous patches and plaques     Dystrophic onycholytic nail with subungual debris c/w onychomycosis     Umbilicated papule    Erythematous-base heme-crusted tan verrucoid plaque consistent with inflamed seborrheic keratosis     Erythematous Silvery Scaling Plaque c/w Psoriasis     See annotation      Assessment / Plan:        AK (actinic keratosis)  Right forehead and nose - resolved    Screening exam for skin cancer  Area(s) of previous NMSC evaluated with no signs of recurrence.    Upper body skin examination performed today including at least 6 points as noted in physical examination. No lesions suspicious for malignancy noted.    Recommend daily sun protection/avoidance and use of at least SPF 30, broad spectrum sunscreen (OTC drug).     Seborrheic keratoses  These are benign inherited growths without a malignant potential. Reassurance given to patient. No treatment is necessary.     Tinea versicolor    Rec daily OTC dandruff shampoo to chest in shower           No follow-ups on file.

## 2022-09-13 ENCOUNTER — HOSPITAL ENCOUNTER (OUTPATIENT)
Dept: CARDIOLOGY | Facility: CLINIC | Age: 72
Discharge: HOME | End: 2022-09-13
Payer: MEDICARE

## 2022-09-13 ENCOUNTER — OFFICE VISIT (OUTPATIENT)
Dept: ELECTROPHYSIOLOGY | Facility: CLINIC | Age: 72
End: 2022-09-13
Payer: MEDICARE

## 2022-09-13 VITALS
SYSTOLIC BLOOD PRESSURE: 122 MMHG | DIASTOLIC BLOOD PRESSURE: 76 MMHG | HEART RATE: 81 BPM | WEIGHT: 240.75 LBS | HEIGHT: 72 IN | BODY MASS INDEX: 32.61 KG/M2

## 2022-09-13 DIAGNOSIS — I49.8 OTHER CARDIAC ARRHYTHMIA: Primary | ICD-10-CM

## 2022-09-13 DIAGNOSIS — I48.19 PERSISTENT ATRIAL FIBRILLATION: Primary | ICD-10-CM

## 2022-09-13 DIAGNOSIS — I47.29 NSVT (NONSUSTAINED VENTRICULAR TACHYCARDIA): ICD-10-CM

## 2022-09-13 DIAGNOSIS — Z95.818 PRESENCE OF WATCHMAN LEFT ATRIAL APPENDAGE CLOSURE DEVICE: ICD-10-CM

## 2022-09-13 DIAGNOSIS — I48.91 ATRIAL FIBRILLATION, UNSPECIFIED TYPE: ICD-10-CM

## 2022-09-13 DIAGNOSIS — Z86.73 HISTORY OF TIA (TRANSIENT ISCHEMIC ATTACK): ICD-10-CM

## 2022-09-13 PROCEDURE — 3288F PR FALLS RISK ASSESSMENT DOCUMENTED: ICD-10-PCS | Mod: CPTII,S$GLB,, | Performed by: INTERNAL MEDICINE

## 2022-09-13 PROCEDURE — 93005 RHYTHM STRIP: ICD-10-PCS | Mod: S$GLB,,, | Performed by: INTERNAL MEDICINE

## 2022-09-13 PROCEDURE — 3074F PR MOST RECENT SYSTOLIC BLOOD PRESSURE < 130 MM HG: ICD-10-PCS | Mod: CPTII,S$GLB,, | Performed by: INTERNAL MEDICINE

## 2022-09-13 PROCEDURE — 99214 OFFICE O/P EST MOD 30 MIN: CPT | Mod: S$GLB,,, | Performed by: INTERNAL MEDICINE

## 2022-09-13 PROCEDURE — 1126F AMNT PAIN NOTED NONE PRSNT: CPT | Mod: CPTII,S$GLB,, | Performed by: INTERNAL MEDICINE

## 2022-09-13 PROCEDURE — 4010F PR ACE/ARB THEARPY RXD/TAKEN: ICD-10-PCS | Mod: CPTII,S$GLB,, | Performed by: INTERNAL MEDICINE

## 2022-09-13 PROCEDURE — 3008F PR BODY MASS INDEX (BMI) DOCUMENTED: ICD-10-PCS | Mod: CPTII,S$GLB,, | Performed by: INTERNAL MEDICINE

## 2022-09-13 PROCEDURE — 1160F PR REVIEW ALL MEDS BY PRESCRIBER/CLIN PHARMACIST DOCUMENTED: ICD-10-PCS | Mod: CPTII,S$GLB,, | Performed by: INTERNAL MEDICINE

## 2022-09-13 PROCEDURE — 1159F MED LIST DOCD IN RCRD: CPT | Mod: CPTII,S$GLB,, | Performed by: INTERNAL MEDICINE

## 2022-09-13 PROCEDURE — 99999 PR PBB SHADOW E&M-EST. PATIENT-LVL IV: CPT | Mod: PBBFAC,,, | Performed by: INTERNAL MEDICINE

## 2022-09-13 PROCEDURE — 1157F PR ADVANCE CARE PLAN OR EQUIV PRESENT IN MEDICAL RECORD: ICD-10-PCS | Mod: CPTII,S$GLB,, | Performed by: INTERNAL MEDICINE

## 2022-09-13 PROCEDURE — 1159F PR MEDICATION LIST DOCUMENTED IN MEDICAL RECORD: ICD-10-PCS | Mod: CPTII,S$GLB,, | Performed by: INTERNAL MEDICINE

## 2022-09-13 PROCEDURE — 1126F PR PAIN SEVERITY QUANTIFIED, NO PAIN PRESENT: ICD-10-PCS | Mod: CPTII,S$GLB,, | Performed by: INTERNAL MEDICINE

## 2022-09-13 PROCEDURE — 3078F DIAST BP <80 MM HG: CPT | Mod: CPTII,S$GLB,, | Performed by: INTERNAL MEDICINE

## 2022-09-13 PROCEDURE — 4010F ACE/ARB THERAPY RXD/TAKEN: CPT | Mod: CPTII,S$GLB,, | Performed by: INTERNAL MEDICINE

## 2022-09-13 PROCEDURE — 3074F SYST BP LT 130 MM HG: CPT | Mod: CPTII,S$GLB,, | Performed by: INTERNAL MEDICINE

## 2022-09-13 PROCEDURE — 3288F FALL RISK ASSESSMENT DOCD: CPT | Mod: CPTII,S$GLB,, | Performed by: INTERNAL MEDICINE

## 2022-09-13 PROCEDURE — 1101F PR PT FALLS ASSESS DOC 0-1 FALLS W/OUT INJ PAST YR: ICD-10-PCS | Mod: CPTII,S$GLB,, | Performed by: INTERNAL MEDICINE

## 2022-09-13 PROCEDURE — 1157F ADVNC CARE PLAN IN RCRD: CPT | Mod: CPTII,S$GLB,, | Performed by: INTERNAL MEDICINE

## 2022-09-13 PROCEDURE — 3008F BODY MASS INDEX DOCD: CPT | Mod: CPTII,S$GLB,, | Performed by: INTERNAL MEDICINE

## 2022-09-13 PROCEDURE — 3078F PR MOST RECENT DIASTOLIC BLOOD PRESSURE < 80 MM HG: ICD-10-PCS | Mod: CPTII,S$GLB,, | Performed by: INTERNAL MEDICINE

## 2022-09-13 PROCEDURE — 93010 RHYTHM STRIP: ICD-10-PCS | Mod: S$GLB,,, | Performed by: INTERNAL MEDICINE

## 2022-09-13 PROCEDURE — 93010 ELECTROCARDIOGRAM REPORT: CPT | Mod: S$GLB,,, | Performed by: INTERNAL MEDICINE

## 2022-09-13 PROCEDURE — 93005 ELECTROCARDIOGRAM TRACING: CPT | Mod: S$GLB,,, | Performed by: INTERNAL MEDICINE

## 2022-09-13 PROCEDURE — 1101F PT FALLS ASSESS-DOCD LE1/YR: CPT | Mod: CPTII,S$GLB,, | Performed by: INTERNAL MEDICINE

## 2022-09-13 PROCEDURE — 99999 PR PBB SHADOW E&M-EST. PATIENT-LVL IV: ICD-10-PCS | Mod: PBBFAC,,, | Performed by: INTERNAL MEDICINE

## 2022-09-13 PROCEDURE — 1160F RVW MEDS BY RX/DR IN RCRD: CPT | Mod: CPTII,S$GLB,, | Performed by: INTERNAL MEDICINE

## 2022-09-13 PROCEDURE — 99214 PR OFFICE/OUTPT VISIT, EST, LEVL IV, 30-39 MIN: ICD-10-PCS | Mod: S$GLB,,, | Performed by: INTERNAL MEDICINE

## 2022-09-13 NOTE — PROGRESS NOTES
Subjective:    Patient ID:  Pavel Graff is a 72 y.o. male who presents for follow-up of Atrial Fibrillation      72 year old male with history of HTN, TIA, SIMON, HLD and persistent AF since July 2012.  DCCV was attempted with early recurrence of AF.  He was rate controlled and did not note significant symptoms at the time - so a rate control strategy was pursued.  He notes though, that ever since he has been in AF - he has been much more easily fatigued and tired, not himself.  He denies overt palpitation or SOB.  He denies chest pain.    Mr. Graff underwent a PVI (07/21/16) with successful pulmonary vein antral isolation. Since the procedure, Mr. Graff reports feeling well with only two 3-second episodes of palpitations; he denies further recurrence.  denies chest pain, SOB/BLANCO, dizziness, or syncope. Mr. Graff has noted improvement in his energy level since the procedure.     12/16: Continues improved symptoms. 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 Remains on pradaxa and metoprolol.     6/17: No recurrence of AF since PVI. Feels well. No active complaints.     2/18: AF recurrence without symptoms. Remains on apixaban. The patient denies interval chest pain, sob, palpitations, syncope, near syncope.     8/18: Stable symptoms with slightly more fatigue. He asks about repeat ablation at the end of the season.     11/18: Re-do PVI 9/28/18 with touch up PVI, posterior wall isolation and CTI line. Feels some what fatigued, no post procedure complications.     5/19: No recurrence since PVI 9/18. Symptoms improved, stable.     3/22:Onset of easy bleeding and bruising. He has had events with spontaneous sub-conjunctival bleeding that are increasing in frequency and associated with pain. Remains in NSR.    Interval history: 5/9/22 LAAO with cessation of OAC 6/20/22. Now on DAPT. No recurrence    CHADSVASC: 4  HASBLED: 4    2D echo 4/16    1 - Eccentric hypertrophy.     2 - Normal left ventricular  systolic function (EF 60-65%).     3 - Right ventricular enlargement with normal systolic function.     4 - Biatrial enlargement.     5 - Mild aortic regurgitation.     6 - Mild mitral regurgitation.     7 - Mild tricuspid regurgitation.     8 - The estimated PA systolic pressure is 33 mmHg.     9 - Mildly elevated central venous pressure.     PET stress 5/16  1. There is no evidence of a discrete hemodynamically significant coronary stenosis.   2. Although no clinically significant stress defect is seen, there is resting flow heterogeneity that improves after Dipyridamole. These results suggest mild endothelial dysfunction due to elevated cholesterol, high blood pressure and diabetes without clinically significant,      localized perfusion defects.   3. Resting wall motion is physiologic. Stress wall motion is physiologic.   4. LV function is normal at rest and stress.  (normal is >= 51%)  5. The ventricular volumes are normal at rest and stress.   6. The extracardiac distribution of radioactivity is normal.   7. There was no previous study available to compare.    Past Medical History:  No date: *Atrial fibrillation  No date: Anticoagulant long-term use  No date: Atrial fibrillation  No date: Hyperlipidemia  No date: Hypertension  No date: Sleep apnea  No date: Squamous cell carcinoma of skin  No date: Stroke      Comment:  TIA  No date: TIA (transient ischemic attack)    Past Surgical History:  9/28/2018: ABLATION; N/A      Comment:  Procedure: ABLATION;  Surgeon: Romaine Howard MD;                 Location: [facility] CATH [facility];  Service: Cardiology;                 Laterality: N/A;  AF, KAM, PVI, RFA, MAME, Gen, MB, 3 Prep  1/11/10: CATARACT EXTRACTION W/  INTRAOCULAR LENS IMPLANT; Left  12/14/2017: CATARACT EXTRACTION W/  INTRAOCULAR LENS IMPLANT; Right      Comment:  DMEK/ Dr. Jay  3/26/2019: COLONOSCOPY; N/A      Comment:  Procedure: COLONOSCOPY;  Surgeon: Mauro Dye MD;                 Location: [facility] ENDO  (4TH FLR);  Service: Endoscopy;                 Laterality: N/A;  ok to hold Eliquis x 2 days per Dr. Howard-MS  1997: CORNEAL TRANSPLANT; Left      Comment:  PKP OS  03/23/2017: CORNEAL TRANSPLANT; Left  12/14/2017: CORNEAL TRANSPLANT; Right      Comment:  DMEK/ [facility]; Dr. Jay  No date: HERNIA REPAIR  No date: HIP ARTHROPLASTY  5/9/2022: OCCLUSION OF LEFT ATRIAL APPENDAGE; N/A      Comment:  Procedure: Left atrial appendage occlusion;  Surgeon:                Romaien Howard MD;  Location: [facility] EP LAB;  Service:               Cardiology;  Laterality: N/A;  afib, watchman, BSCI, erasmo,               anes, MB/DM, 3prep  No date: ROTATOR CUFF REPAIR  5/9/2022: TRANSESOPHAGEAL ECHOCARDIOGRAPHY; N/A      Comment:  Procedure: ECHOCARDIOGRAM, TRANSESOPHAGEAL;  Surgeon:                Fanta Dacosta MD;  Location: [facility] EP LAB;                 Service: Cardiology;  Laterality: N/A;    Social History    Socioeconomic History      Marital status:       Number of children: 3    Occupational History        Employer: [email or web address]    Tobacco Use      Smoking status: Never      Smokeless tobacco: Never    Substance and Sexual Activity      Alcohol use: No      Drug use: No      Sexual activity: Yes        Partners: Female    Review of patient's family history indicates:    Current Outpatient Medications:  aspirin 81 MG Chew, Take 81 mg by mouth once daily., Disp: , Rfl:   clopidogreL (PLAVIX) 75 mg tablet, Take 1 tablet (75 mg total) by mouth once daily., Disp: 90 tablet, Rfl: 1  fish oil-omega-3 fatty acids 300-1,000 mg capsule, Take 1 g by mouth once daily., Disp: , Rfl:   hydroCHLOROthiazide (HYDRODIURIL) 25 MG tablet, TAKE 1 TABLET BY MOUTH EVERY DAY, Disp: 90 tablet, Rfl: 4  losartan (COZAAR) 50 MG tablet, Take 1 tablet (50 mg total) by mouth once daily., Disp: 90 tablet, Rfl: 1  metoprolol succinate (TOPROL-XL) 25 MG 24 hr tablet, TAKE 1 TABLET BY MOUTH EVERY DAY, Disp: 90  tablet, Rfl: 3  pravastatin (PRAVACHOL) 20 MG tablet, Take 1 tablet (20 mg total) by mouth once daily., Disp: 90 tablet, Rfl: 3  prednisoLONE acetate (PRED FORTE) 1 % DrpS, INSTILL 1 DROP INTO BOTH EYES TWICE A DAY, Disp: 10 mL, Rfl: 3  timolol maleate 0.5% (TIMOPTIC) 0.5 % Drop, INSTILL 1 DROP INTO LEFT EYE 2 TIMES A DAY, Disp: 15 mL, Rfl: 1  fluorouraciL (EFUDEX) 5 % cream, AAA at nose bid x 2 weeks, Disp: 40 g, Rfl: 1    No current facility-administered medications for this visit.  Facility-Administered Medications Ordered in Other Visits:  0.9%  NaCl infusion, , Intravenous, Continuous, Zoey Newman NP, Stopped at 09/28/18 1204              Review of Systems   Constitutional: Negative.   HENT: Negative.     Eyes: Negative.    Cardiovascular:  Negative for chest pain, dyspnea on exertion, leg swelling, near-syncope, palpitations and syncope.   Respiratory: Negative.  Negative for shortness of breath.    Endocrine: Negative.    Hematologic/Lymphatic: Negative.    Skin: Negative.    Musculoskeletal: Negative.    Gastrointestinal: Negative.    Genitourinary: Negative.    Neurological: Negative.  Negative for dizziness and light-headedness.   Psychiatric/Behavioral: Negative.     Allergic/Immunologic: Negative.       Objective:    Physical Exam  Vitals reviewed.   Constitutional:       General: He is not in acute distress.     Appearance: He is well-developed.   HENT:      Head: Normocephalic and atraumatic.   Eyes:      Pupils: Pupils are equal, round, and reactive to light.   Neck:      Thyroid: No thyromegaly.      Vascular: No JVD.   Cardiovascular:      Rate and Rhythm: Normal rate and regular rhythm.      Chest Wall: PMI is not displaced.      Heart sounds: Normal heart sounds, S1 normal and S2 normal. No murmur heard.    No friction rub. No gallop.   Pulmonary:      Effort: Pulmonary effort is normal. No respiratory distress.      Breath sounds: Normal breath sounds. No wheezing or rales.   Abdominal:       General: Bowel sounds are normal. There is no distension.      Palpations: Abdomen is soft.      Tenderness: There is no abdominal tenderness. There is no guarding or rebound.   Musculoskeletal:         General: No tenderness. Normal range of motion.      Cervical back: Normal range of motion.   Skin:     General: Skin is warm and dry.      Findings: No erythema or rash.   Neurological:      Mental Status: He is alert and oriented to person, place, and time.      Cranial Nerves: No cranial nerve deficit.   Psychiatric:         Behavior: Behavior normal.         Thought Content: Thought content normal.         Judgment: Judgment normal.     ECG: NSR nl MS, QRS, QTc        Assessment:       1. Persistent atrial fibrillation    2. Presence of Watchman left atrial appendage closure device    3. History of TIA (transient ischemic attack)    4. NSVT (nonsustained ventricular tachycardia)         Plan:       72 yoM persistent AF s/p ablation and LAAO. Now off OAC. Stop plavix 11/9/22. RTC 6m

## 2022-10-06 ENCOUNTER — PATIENT MESSAGE (OUTPATIENT)
Dept: ELECTROPHYSIOLOGY | Facility: CLINIC | Age: 72
End: 2022-10-06
Payer: MEDICARE

## 2022-10-27 ENCOUNTER — OFFICE VISIT (OUTPATIENT)
Dept: URGENT CARE | Facility: CLINIC | Age: 72
End: 2022-10-27
Payer: MEDICARE

## 2022-10-27 VITALS
TEMPERATURE: 98 F | WEIGHT: 245 LBS | OXYGEN SATURATION: 96 % | SYSTOLIC BLOOD PRESSURE: 155 MMHG | BODY MASS INDEX: 33.23 KG/M2 | RESPIRATION RATE: 16 BRPM | HEART RATE: 72 BPM | DIASTOLIC BLOOD PRESSURE: 87 MMHG

## 2022-10-27 DIAGNOSIS — J06.9 UPPER RESPIRATORY TRACT INFECTION, UNSPECIFIED TYPE: Primary | ICD-10-CM

## 2022-10-27 DIAGNOSIS — Z11.59 SCREENING FOR VIRAL DISEASE: ICD-10-CM

## 2022-10-27 DIAGNOSIS — R05.9 COUGH, UNSPECIFIED TYPE: ICD-10-CM

## 2022-10-27 LAB
CTP QC/QA: YES
SARS-COV-2 RDRP RESP QL NAA+PROBE: NEGATIVE

## 2022-10-27 PROCEDURE — 3077F SYST BP >= 140 MM HG: CPT | Mod: CPTII,S$GLB,, | Performed by: NURSE PRACTITIONER

## 2022-10-27 PROCEDURE — 1159F MED LIST DOCD IN RCRD: CPT | Mod: CPTII,S$GLB,, | Performed by: NURSE PRACTITIONER

## 2022-10-27 PROCEDURE — 1157F PR ADVANCE CARE PLAN OR EQUIV PRESENT IN MEDICAL RECORD: ICD-10-PCS | Mod: CPTII,S$GLB,, | Performed by: NURSE PRACTITIONER

## 2022-10-27 PROCEDURE — 87635 SARS-COV-2 COVID-19 AMP PRB: CPT | Mod: QW,S$GLB,, | Performed by: NURSE PRACTITIONER

## 2022-10-27 PROCEDURE — 1126F AMNT PAIN NOTED NONE PRSNT: CPT | Mod: CPTII,S$GLB,, | Performed by: NURSE PRACTITIONER

## 2022-10-27 PROCEDURE — 4010F PR ACE/ARB THEARPY RXD/TAKEN: ICD-10-PCS | Mod: CPTII,S$GLB,, | Performed by: NURSE PRACTITIONER

## 2022-10-27 PROCEDURE — 1160F RVW MEDS BY RX/DR IN RCRD: CPT | Mod: CPTII,S$GLB,, | Performed by: NURSE PRACTITIONER

## 2022-10-27 PROCEDURE — 1159F PR MEDICATION LIST DOCUMENTED IN MEDICAL RECORD: ICD-10-PCS | Mod: CPTII,S$GLB,, | Performed by: NURSE PRACTITIONER

## 2022-10-27 PROCEDURE — 99203 PR OFFICE/OUTPT VISIT, NEW, LEVL III, 30-44 MIN: ICD-10-PCS | Mod: S$GLB,,, | Performed by: NURSE PRACTITIONER

## 2022-10-27 PROCEDURE — 4010F ACE/ARB THERAPY RXD/TAKEN: CPT | Mod: CPTII,S$GLB,, | Performed by: NURSE PRACTITIONER

## 2022-10-27 PROCEDURE — 3079F DIAST BP 80-89 MM HG: CPT | Mod: CPTII,S$GLB,, | Performed by: NURSE PRACTITIONER

## 2022-10-27 PROCEDURE — 1160F PR REVIEW ALL MEDS BY PRESCRIBER/CLIN PHARMACIST DOCUMENTED: ICD-10-PCS | Mod: CPTII,S$GLB,, | Performed by: NURSE PRACTITIONER

## 2022-10-27 PROCEDURE — 3079F PR MOST RECENT DIASTOLIC BLOOD PRESSURE 80-89 MM HG: ICD-10-PCS | Mod: CPTII,S$GLB,, | Performed by: NURSE PRACTITIONER

## 2022-10-27 PROCEDURE — 3077F PR MOST RECENT SYSTOLIC BLOOD PRESSURE >= 140 MM HG: ICD-10-PCS | Mod: CPTII,S$GLB,, | Performed by: NURSE PRACTITIONER

## 2022-10-27 PROCEDURE — 99203 OFFICE O/P NEW LOW 30 MIN: CPT | Mod: S$GLB,,, | Performed by: NURSE PRACTITIONER

## 2022-10-27 PROCEDURE — 87635: ICD-10-PCS | Mod: QW,S$GLB,, | Performed by: NURSE PRACTITIONER

## 2022-10-27 PROCEDURE — 1126F PR PAIN SEVERITY QUANTIFIED, NO PAIN PRESENT: ICD-10-PCS | Mod: CPTII,S$GLB,, | Performed by: NURSE PRACTITIONER

## 2022-10-27 PROCEDURE — 1157F ADVNC CARE PLAN IN RCRD: CPT | Mod: CPTII,S$GLB,, | Performed by: NURSE PRACTITIONER

## 2022-10-27 RX ORDER — PROMETHAZINE HYDROCHLORIDE AND DEXTROMETHORPHAN HYDROBROMIDE 6.25; 15 MG/5ML; MG/5ML
5 SYRUP ORAL EVERY 4 HOURS PRN
Qty: 240 ML | Refills: 0 | Status: SHIPPED | OUTPATIENT
Start: 2022-10-27 | End: 2022-11-06

## 2022-10-27 RX ORDER — BENZONATATE 100 MG/1
100 CAPSULE ORAL 3 TIMES DAILY PRN
Qty: 30 CAPSULE | Refills: 0 | Status: SHIPPED | OUTPATIENT
Start: 2022-10-27 | End: 2022-11-06

## 2022-10-27 RX ORDER — FLUTICASONE PROPIONATE 50 MCG
2 SPRAY, SUSPENSION (ML) NASAL DAILY
Qty: 18.2 ML | Refills: 0 | Status: SHIPPED | OUTPATIENT
Start: 2022-10-27 | End: 2022-11-26

## 2022-10-27 RX ORDER — GUAIFENESIN 600 MG/1
1200 TABLET, EXTENDED RELEASE ORAL 2 TIMES DAILY
Qty: 40 TABLET | Refills: 0 | Status: SHIPPED | OUTPATIENT
Start: 2022-10-27 | End: 2022-11-06

## 2022-10-27 RX ORDER — CETIRIZINE HYDROCHLORIDE 10 MG/1
10 TABLET ORAL DAILY
Qty: 30 TABLET | Refills: 0 | Status: SHIPPED | OUTPATIENT
Start: 2022-10-27 | End: 2023-03-14

## 2022-10-27 NOTE — PROGRESS NOTES
"Subjective:       Patient ID: Pavel Graff is a 72 y.o. male.    Vitals:  weight is 111.1 kg (245 lb). His oral temperature is 98.3 °F (36.8 °C). His blood pressure is 155/87 (abnormal) and his pulse is 72. His respiration is 16 and oxygen saturation is 96%.     Chief Complaint: Cough    This is a 72 y.o. male who presents today with a chief complaint of cough and nasal congestion x 2 weeks.  Reports productive cough with thick mucus that feels "stuck" in his throat, reports green mucus from nose.  Reports frontal headache.  Denies fever/chills, denies n/v/d, denies chest pain or SOB, denies sore throat.  Reports some mild improvement with OTC cough medication and saline nasal spray.  Denies known sick contacts, reports COVID vaccination, denies flu vaccination.    Cough  Associated symptoms include headaches and postnasal drip. Pertinent negatives include no chills, ear pain, fever, rash, sore throat, shortness of breath or wheezing. The symptoms are aggravated by lying down. There is no history of asthma, bronchitis, environmental allergies or pneumonia.     Constitution: Negative for chills and fever.   HENT:  Positive for congestion and postnasal drip. Negative for ear pain and sore throat.    Respiratory:  Positive for cough. Negative for shortness of breath and wheezing.    Gastrointestinal:  Negative for nausea, vomiting and diarrhea.   Skin:  Negative for rash.   Allergic/Immunologic: Negative for environmental allergies.   Neurological:  Positive for headaches.     Objective:      Physical Exam   Constitutional: He is oriented to person, place, and time. He appears well-developed. He is cooperative.  Non-toxic appearance. He does not appear ill. No distress.   HENT:   Head: Normocephalic and atraumatic.   Ears:   Right Ear: Hearing, external ear and ear canal normal. Tympanic membrane is bulging. Tympanic membrane is not erythematous and not retracted. A middle ear effusion (clear fluid) is present. "   Left Ear: Hearing, external ear and ear canal normal. Tympanic membrane is bulging. Tympanic membrane is not erythematous and not retracted. A middle ear effusion (clear fluid) is present.   Nose: Mucosal edema (erythema to BL turbinates) present. No rhinorrhea, purulent discharge, sinus tenderness or nasal deformity. No epistaxis. Right sinus exhibits no maxillary sinus tenderness and no frontal sinus tenderness. Left sinus exhibits no maxillary sinus tenderness and no frontal sinus tenderness.   Mouth/Throat: Uvula is midline and mucous membranes are normal. No trismus in the jaw. Normal dentition. No uvula swelling. Oropharyngeal exudate (clear postnasal drip) present. No posterior oropharyngeal edema or posterior oropharyngeal erythema. Tonsils are 1+ on the right. Tonsils are 1+ on the left. No tonsillar exudate.   Eyes: Conjunctivae and lids are normal. No scleral icterus.   Neck: Trachea normal and phonation normal. Neck supple. No edema present. No erythema present. No neck rigidity present.   Cardiovascular: Normal rate, regular rhythm, normal heart sounds and normal pulses.   Pulmonary/Chest: Effort normal and breath sounds normal. No accessory muscle usage or stridor. No tachypnea. No respiratory distress. He has no decreased breath sounds. He has no wheezes. He has no rhonchi. He has no rales.   Dry cough witnessed on exam.         Comments: Dry cough witnessed on exam.    Abdominal: Normal appearance.   Musculoskeletal: Normal range of motion.         General: No deformity. Normal range of motion.   Lymphadenopathy:        Head (right side): No submandibular adenopathy present.        Head (left side): No submandibular adenopathy present.     He has no cervical adenopathy.   Neurological: He is alert and oriented to person, place, and time. He exhibits normal muscle tone. Coordination normal.   Skin: Skin is warm, dry, intact, not diaphoretic and not pale.   Psychiatric: His speech is normal and  behavior is normal. Judgment and thought content normal.   Nursing note and vitals reviewed.    Results for orders placed or performed in visit on 10/27/22   POCT COVID-19 Rapid Screening   Result Value Ref Range    POC Rapid COVID Negative Negative     Acceptable Yes            Assessment:       1. Upper respiratory tract infection, unspecified type    2. Screening for viral disease    3. Cough, unspecified type          Plan:       Provided education on prescribed medications.  Provided education on return/ER precautions, follow-up with PCP if symptoms do not improve with treatment.  Pt verbalized understanding and agreed to plan.      Upper respiratory tract infection, unspecified type  -     cetirizine (ZYRTEC) 10 MG tablet; Take 1 tablet (10 mg total) by mouth once daily.  Dispense: 30 tablet; Refill: 0  -     fluticasone propionate (FLONASE) 50 mcg/actuation nasal spray; 2 sprays (100 mcg total) by Each Nostril route once daily.  Dispense: 18.2 mL; Refill: 0    Screening for viral disease  -     POCT COVID-19 Rapid Screening    Cough, unspecified type  -     benzonatate (TESSALON) 100 MG capsule; Take 1 capsule (100 mg total) by mouth 3 (three) times daily as needed for Cough.  Dispense: 30 capsule; Refill: 0  -     guaiFENesin (MUCINEX) 600 mg 12 hr tablet; Take 2 tablets (1,200 mg total) by mouth 2 (two) times daily. for 10 days  Dispense: 40 tablet; Refill: 0  -     promethazine-dextromethorphan (PROMETHAZINE-DM) 6.25-15 mg/5 mL Syrp; Take 5 mLs by mouth every 4 (four) hours as needed (cough).  Dispense: 240 mL; Refill: 0       Patient Instructions   If your condition worsens or fails to improve, we recommend that you receive another evaluation at the ER immediately contact your PCP to discuss your concerns, or return here.  You must understand that you've received an urgent care treatment only, and that you may be released before all your medical problems are known or treated.  You, the  "patient, will arrange for follow-up care as instructed.     If we discussed that I think your illness is viral, it will not respond to antibiotics and will last 10-14 days.  If we discussed "wait and see" antibiotics, and if over the next few days the symptoms worsen, start the antibiotics I have given you.     If you are female and on birth control pills and do take the antibiotics, use additional methods to prevent pregnancy while on the antibiotics and for one cycle after.     Flonase (fluticasone) is a nasal spray which is available over the counter and may help with your symptoms.  Zyrtec D, Claritin D, or Allegra D can also help with symptoms of congestion and drainage.  If you have hypertension, avoid using the "D" which is the decongestant formula.    If you just have drainage, you can take plain Zyrtec, Claritin, or Allegra.  If you just have a congested feeling, you can take pseudoephedrine (unless you have high blood pressure), which you have to sign for behind the counter.  Do not buy phenylephrine OTC, as it is not effective.    Rest and fluids are also important.  Tylenol or ibuprofen can also be used as directed for pain, unless you have an allergy to them or medical condition (such as stomach ulcers, kidney or liver disease, or use blood thinners, etc.) for which you should not be taking these type of medications.     If you are flying in the next few days, Afrin nose drops for the airplane flight upon take off and landing may help.  Other than at those times, refrain from using Afrin.     If you were prescribed a narcotic or sedating cough medicine, do not drive or operate heavy machinery while taking these medications.                 "

## 2022-12-11 ENCOUNTER — PATIENT MESSAGE (OUTPATIENT)
Dept: INTERNAL MEDICINE | Facility: CLINIC | Age: 72
End: 2022-12-11
Payer: MEDICARE

## 2022-12-12 ENCOUNTER — OFFICE VISIT (OUTPATIENT)
Dept: INTERNAL MEDICINE | Facility: CLINIC | Age: 72
End: 2022-12-12
Payer: MEDICARE

## 2022-12-12 VITALS
HEIGHT: 72 IN | BODY MASS INDEX: 32.85 KG/M2 | HEART RATE: 50 BPM | TEMPERATURE: 98 F | DIASTOLIC BLOOD PRESSURE: 82 MMHG | OXYGEN SATURATION: 97 % | WEIGHT: 242.5 LBS | SYSTOLIC BLOOD PRESSURE: 136 MMHG

## 2022-12-12 DIAGNOSIS — J45.30 MILD PERSISTENT REACTIVE AIRWAY DISEASE WITHOUT COMPLICATION: ICD-10-CM

## 2022-12-12 DIAGNOSIS — K21.9 LARYNGOPHARYNGEAL REFLUX (LPR): ICD-10-CM

## 2022-12-12 DIAGNOSIS — R05.3 PERSISTENT COUGH FOR 3 WEEKS OR LONGER: Primary | ICD-10-CM

## 2022-12-12 PROCEDURE — 1157F ADVNC CARE PLAN IN RCRD: CPT | Mod: HCNC,CPTII,S$GLB, | Performed by: INTERNAL MEDICINE

## 2022-12-12 PROCEDURE — 3079F DIAST BP 80-89 MM HG: CPT | Mod: HCNC,CPTII,S$GLB, | Performed by: INTERNAL MEDICINE

## 2022-12-12 PROCEDURE — 1126F AMNT PAIN NOTED NONE PRSNT: CPT | Mod: HCNC,CPTII,S$GLB, | Performed by: INTERNAL MEDICINE

## 2022-12-12 PROCEDURE — 3008F PR BODY MASS INDEX (BMI) DOCUMENTED: ICD-10-PCS | Mod: HCNC,CPTII,S$GLB, | Performed by: INTERNAL MEDICINE

## 2022-12-12 PROCEDURE — 1126F PR PAIN SEVERITY QUANTIFIED, NO PAIN PRESENT: ICD-10-PCS | Mod: HCNC,CPTII,S$GLB, | Performed by: INTERNAL MEDICINE

## 2022-12-12 PROCEDURE — 3075F PR MOST RECENT SYSTOLIC BLOOD PRESS GE 130-139MM HG: ICD-10-PCS | Mod: HCNC,CPTII,S$GLB, | Performed by: INTERNAL MEDICINE

## 2022-12-12 PROCEDURE — 1160F PR REVIEW ALL MEDS BY PRESCRIBER/CLIN PHARMACIST DOCUMENTED: ICD-10-PCS | Mod: HCNC,CPTII,S$GLB, | Performed by: INTERNAL MEDICINE

## 2022-12-12 PROCEDURE — 3079F PR MOST RECENT DIASTOLIC BLOOD PRESSURE 80-89 MM HG: ICD-10-PCS | Mod: HCNC,CPTII,S$GLB, | Performed by: INTERNAL MEDICINE

## 2022-12-12 PROCEDURE — 1157F PR ADVANCE CARE PLAN OR EQUIV PRESENT IN MEDICAL RECORD: ICD-10-PCS | Mod: HCNC,CPTII,S$GLB, | Performed by: INTERNAL MEDICINE

## 2022-12-12 PROCEDURE — 3075F SYST BP GE 130 - 139MM HG: CPT | Mod: HCNC,CPTII,S$GLB, | Performed by: INTERNAL MEDICINE

## 2022-12-12 PROCEDURE — 1159F MED LIST DOCD IN RCRD: CPT | Mod: HCNC,CPTII,S$GLB, | Performed by: INTERNAL MEDICINE

## 2022-12-12 PROCEDURE — 99999 PR PBB SHADOW E&M-EST. PATIENT-LVL III: ICD-10-PCS | Mod: PBBFAC,HCNC,, | Performed by: INTERNAL MEDICINE

## 2022-12-12 PROCEDURE — 3288F PR FALLS RISK ASSESSMENT DOCUMENTED: ICD-10-PCS | Mod: HCNC,CPTII,S$GLB, | Performed by: INTERNAL MEDICINE

## 2022-12-12 PROCEDURE — 1101F PR PT FALLS ASSESS DOC 0-1 FALLS W/OUT INJ PAST YR: ICD-10-PCS | Mod: HCNC,CPTII,S$GLB, | Performed by: INTERNAL MEDICINE

## 2022-12-12 PROCEDURE — 4010F ACE/ARB THERAPY RXD/TAKEN: CPT | Mod: HCNC,CPTII,S$GLB, | Performed by: INTERNAL MEDICINE

## 2022-12-12 PROCEDURE — 3008F BODY MASS INDEX DOCD: CPT | Mod: HCNC,CPTII,S$GLB, | Performed by: INTERNAL MEDICINE

## 2022-12-12 PROCEDURE — 1159F PR MEDICATION LIST DOCUMENTED IN MEDICAL RECORD: ICD-10-PCS | Mod: HCNC,CPTII,S$GLB, | Performed by: INTERNAL MEDICINE

## 2022-12-12 PROCEDURE — 4010F PR ACE/ARB THEARPY RXD/TAKEN: ICD-10-PCS | Mod: HCNC,CPTII,S$GLB, | Performed by: INTERNAL MEDICINE

## 2022-12-12 PROCEDURE — 1160F RVW MEDS BY RX/DR IN RCRD: CPT | Mod: HCNC,CPTII,S$GLB, | Performed by: INTERNAL MEDICINE

## 2022-12-12 PROCEDURE — 99214 PR OFFICE/OUTPT VISIT, EST, LEVL IV, 30-39 MIN: ICD-10-PCS | Mod: HCNC,S$GLB,, | Performed by: INTERNAL MEDICINE

## 2022-12-12 PROCEDURE — 3288F FALL RISK ASSESSMENT DOCD: CPT | Mod: HCNC,CPTII,S$GLB, | Performed by: INTERNAL MEDICINE

## 2022-12-12 PROCEDURE — 99999 PR PBB SHADOW E&M-EST. PATIENT-LVL III: CPT | Mod: PBBFAC,HCNC,, | Performed by: INTERNAL MEDICINE

## 2022-12-12 PROCEDURE — 1101F PT FALLS ASSESS-DOCD LE1/YR: CPT | Mod: HCNC,CPTII,S$GLB, | Performed by: INTERNAL MEDICINE

## 2022-12-12 PROCEDURE — 99214 OFFICE O/P EST MOD 30 MIN: CPT | Mod: HCNC,S$GLB,, | Performed by: INTERNAL MEDICINE

## 2022-12-12 RX ORDER — PREDNISONE 20 MG/1
TABLET ORAL
Qty: 12 TABLET | Refills: 0 | Status: SHIPPED | OUTPATIENT
Start: 2022-12-12 | End: 2023-03-01

## 2022-12-12 RX ORDER — OMEPRAZOLE 20 MG/1
20 CAPSULE, DELAYED RELEASE ORAL DAILY
Qty: 14 CAPSULE | Refills: 0 | Status: SHIPPED | OUTPATIENT
Start: 2022-12-12 | End: 2023-03-14

## 2022-12-12 NOTE — PROGRESS NOTES
MEDICAL HISTORY:  Persistent atrial fibrillation with previous direct cardioversion and status post pulmonary vein isolation twice  Obstructive sleep apnea   Hyperlipidemia.  Hypertension.  Previous TIA.  Right hip arthroplasty.  Bilateral inguinal hernia repair   Right rotator cuff tear.  Cataract extraction with corneal transplant. Fuchs dystrophy     S     MEDICATIONS:  Eliquis 5 mg twice a day.  Aspirin 81 mg a day.  Hydrochlorothiazide 25 mg a day.  Losartan 50 mg a day.  Metoprolol succinate 25 mg a day.  Eye drops outlined in the med sheet.    72-year-old male    For about a month , possibly longer he has been experiencing a persistent cough.  He was seen in urgent care on October 27th for this    At times becomes a choking cough.  He is aware of a postnasal drip.  He is aware of mucus buildup starting has to clearing sometimes he can cough glob of clear mucus.  During this time no fever chills shortness of breath or wheezing    He is aware of rhinorrhea and postnasal drip and some nasal congestion.  He was given a prescription of Zyrtec which she is used as well as Flonase which he has used as needed    At times after swallowing might feel it may not go down right particularly in his right side of his throat    Examination   Weight 242   Pulse 56   Blood pressure 130/82   Tympanic membranes normal   Nasal mucosa is clear   Oropharynx no abnormal findings   Neck no palpable adenopathy   Chest clear breath sounds with inspiration   slight coarse sounds with forced expiration.  At times this induced a cough  Heart regular rate rhythm     Impression   Persistent cough which I think is related reactive airway disease   Chronic rhinitis  Possible LPR    Plan   Omeprazole 20 mg once a day for breakfast   Prednisone taper over the next 8 days  Use Flonase on a regular basis

## 2022-12-17 RX ORDER — CLOPIDOGREL BISULFATE 75 MG/1
TABLET ORAL
Qty: 90 TABLET | Refills: 3 | Status: SHIPPED | OUTPATIENT
Start: 2022-12-17 | End: 2023-03-01

## 2023-02-07 DIAGNOSIS — Z00.00 ENCOUNTER FOR MEDICARE ANNUAL WELLNESS EXAM: ICD-10-CM

## 2023-02-09 DIAGNOSIS — Z00.00 ENCOUNTER FOR MEDICARE ANNUAL WELLNESS EXAM: ICD-10-CM

## 2023-03-01 ENCOUNTER — OFFICE VISIT (OUTPATIENT)
Dept: INTERNAL MEDICINE | Facility: CLINIC | Age: 73
End: 2023-03-01
Payer: MEDICARE

## 2023-03-01 ENCOUNTER — LAB VISIT (OUTPATIENT)
Dept: LAB | Facility: HOSPITAL | Age: 73
End: 2023-03-01
Attending: INTERNAL MEDICINE
Payer: MEDICARE

## 2023-03-01 VITALS
WEIGHT: 242.31 LBS | HEART RATE: 70 BPM | HEIGHT: 72 IN | BODY MASS INDEX: 32.82 KG/M2 | OXYGEN SATURATION: 95 % | SYSTOLIC BLOOD PRESSURE: 116 MMHG | DIASTOLIC BLOOD PRESSURE: 70 MMHG

## 2023-03-01 DIAGNOSIS — I48.19 PERSISTENT ATRIAL FIBRILLATION: ICD-10-CM

## 2023-03-01 DIAGNOSIS — E78.5 HYPERLIPIDEMIA, UNSPECIFIED HYPERLIPIDEMIA TYPE: ICD-10-CM

## 2023-03-01 DIAGNOSIS — Z12.5 PROSTATE CANCER SCREENING: ICD-10-CM

## 2023-03-01 DIAGNOSIS — Z00.00 ANNUAL PHYSICAL EXAM: ICD-10-CM

## 2023-03-01 DIAGNOSIS — I10 ESSENTIAL HYPERTENSION: ICD-10-CM

## 2023-03-01 DIAGNOSIS — Z00.00 ANNUAL PHYSICAL EXAM: Primary | ICD-10-CM

## 2023-03-01 LAB
ALBUMIN SERPL BCP-MCNC: 4.3 G/DL (ref 3.5–5.2)
ALP SERPL-CCNC: 80 U/L (ref 55–135)
ALT SERPL W/O P-5'-P-CCNC: 32 U/L (ref 10–44)
ANION GAP SERPL CALC-SCNC: 7 MMOL/L (ref 8–16)
AST SERPL-CCNC: 31 U/L (ref 10–40)
BASOPHILS # BLD AUTO: 0.05 K/UL (ref 0–0.2)
BASOPHILS NFR BLD: 0.9 % (ref 0–1.9)
BILIRUB SERPL-MCNC: 1 MG/DL (ref 0.1–1)
BUN SERPL-MCNC: 15 MG/DL (ref 8–23)
CALCIUM SERPL-MCNC: 10.3 MG/DL (ref 8.7–10.5)
CHLORIDE SERPL-SCNC: 104 MMOL/L (ref 95–110)
CHOLEST SERPL-MCNC: 197 MG/DL (ref 120–199)
CHOLEST/HDLC SERPL: 5.3 {RATIO} (ref 2–5)
CO2 SERPL-SCNC: 28 MMOL/L (ref 23–29)
COMPLEXED PSA SERPL-MCNC: 1.6 NG/ML (ref 0–4)
CREAT SERPL-MCNC: 1.1 MG/DL (ref 0.5–1.4)
DIFFERENTIAL METHOD: ABNORMAL
EOSINOPHIL # BLD AUTO: 0.2 K/UL (ref 0–0.5)
EOSINOPHIL NFR BLD: 2.9 % (ref 0–8)
ERYTHROCYTE [DISTWIDTH] IN BLOOD BY AUTOMATED COUNT: 12.3 % (ref 11.5–14.5)
EST. GFR  (NO RACE VARIABLE): >60 ML/MIN/1.73 M^2
GLUCOSE SERPL-MCNC: 97 MG/DL (ref 70–110)
HCT VFR BLD AUTO: 44.8 % (ref 40–54)
HDLC SERPL-MCNC: 37 MG/DL (ref 40–75)
HDLC SERPL: 18.8 % (ref 20–50)
HGB BLD-MCNC: 15.3 G/DL (ref 14–18)
IMM GRANULOCYTES # BLD AUTO: 0.04 K/UL (ref 0–0.04)
IMM GRANULOCYTES NFR BLD AUTO: 0.7 % (ref 0–0.5)
LDLC SERPL CALC-MCNC: 123.6 MG/DL (ref 63–159)
LYMPHOCYTES # BLD AUTO: 0.9 K/UL (ref 1–4.8)
LYMPHOCYTES NFR BLD: 17 % (ref 18–48)
MCH RBC QN AUTO: 31.8 PG (ref 27–31)
MCHC RBC AUTO-ENTMCNC: 34.2 G/DL (ref 32–36)
MCV RBC AUTO: 93 FL (ref 82–98)
MONOCYTES # BLD AUTO: 0.6 K/UL (ref 0.3–1)
MONOCYTES NFR BLD: 11.2 % (ref 4–15)
NEUTROPHILS # BLD AUTO: 3.7 K/UL (ref 1.8–7.7)
NEUTROPHILS NFR BLD: 67.3 % (ref 38–73)
NONHDLC SERPL-MCNC: 160 MG/DL
NRBC BLD-RTO: 0 /100 WBC
PLATELET # BLD AUTO: 329 K/UL (ref 150–450)
PMV BLD AUTO: 10 FL (ref 9.2–12.9)
POTASSIUM SERPL-SCNC: 4.5 MMOL/L (ref 3.5–5.1)
PROT SERPL-MCNC: 7.2 G/DL (ref 6–8.4)
RBC # BLD AUTO: 4.81 M/UL (ref 4.6–6.2)
SODIUM SERPL-SCNC: 139 MMOL/L (ref 136–145)
TRIGL SERPL-MCNC: 182 MG/DL (ref 30–150)
TSH SERPL DL<=0.005 MIU/L-ACNC: 1.43 UIU/ML (ref 0.4–4)
WBC # BLD AUTO: 5.53 K/UL (ref 3.9–12.7)

## 2023-03-01 PROCEDURE — 1160F RVW MEDS BY RX/DR IN RCRD: CPT | Mod: HCNC,CPTII,S$GLB, | Performed by: INTERNAL MEDICINE

## 2023-03-01 PROCEDURE — 84153 ASSAY OF PSA TOTAL: CPT | Mod: HCNC | Performed by: INTERNAL MEDICINE

## 2023-03-01 PROCEDURE — 85025 COMPLETE CBC W/AUTO DIFF WBC: CPT | Mod: HCNC | Performed by: INTERNAL MEDICINE

## 2023-03-01 PROCEDURE — 80053 COMPREHEN METABOLIC PANEL: CPT | Mod: HCNC | Performed by: INTERNAL MEDICINE

## 2023-03-01 PROCEDURE — 99397 PR PREVENTIVE VISIT,EST,65 & OVER: ICD-10-PCS | Mod: HCNC,S$GLB,, | Performed by: INTERNAL MEDICINE

## 2023-03-01 PROCEDURE — 3008F PR BODY MASS INDEX (BMI) DOCUMENTED: ICD-10-PCS | Mod: HCNC,CPTII,S$GLB, | Performed by: INTERNAL MEDICINE

## 2023-03-01 PROCEDURE — 1101F PR PT FALLS ASSESS DOC 0-1 FALLS W/OUT INJ PAST YR: ICD-10-PCS | Mod: HCNC,CPTII,S$GLB, | Performed by: INTERNAL MEDICINE

## 2023-03-01 PROCEDURE — 1159F PR MEDICATION LIST DOCUMENTED IN MEDICAL RECORD: ICD-10-PCS | Mod: HCNC,CPTII,S$GLB, | Performed by: INTERNAL MEDICINE

## 2023-03-01 PROCEDURE — 36415 COLL VENOUS BLD VENIPUNCTURE: CPT | Mod: HCNC | Performed by: INTERNAL MEDICINE

## 2023-03-01 PROCEDURE — 84443 ASSAY THYROID STIM HORMONE: CPT | Mod: HCNC | Performed by: INTERNAL MEDICINE

## 2023-03-01 PROCEDURE — 1157F PR ADVANCE CARE PLAN OR EQUIV PRESENT IN MEDICAL RECORD: ICD-10-PCS | Mod: HCNC,CPTII,S$GLB, | Performed by: INTERNAL MEDICINE

## 2023-03-01 PROCEDURE — 3078F DIAST BP <80 MM HG: CPT | Mod: HCNC,CPTII,S$GLB, | Performed by: INTERNAL MEDICINE

## 2023-03-01 PROCEDURE — 1157F ADVNC CARE PLAN IN RCRD: CPT | Mod: HCNC,CPTII,S$GLB, | Performed by: INTERNAL MEDICINE

## 2023-03-01 PROCEDURE — 99999 PR PBB SHADOW E&M-EST. PATIENT-LVL III: ICD-10-PCS | Mod: PBBFAC,HCNC,, | Performed by: INTERNAL MEDICINE

## 2023-03-01 PROCEDURE — 1160F PR REVIEW ALL MEDS BY PRESCRIBER/CLIN PHARMACIST DOCUMENTED: ICD-10-PCS | Mod: HCNC,CPTII,S$GLB, | Performed by: INTERNAL MEDICINE

## 2023-03-01 PROCEDURE — 1101F PT FALLS ASSESS-DOCD LE1/YR: CPT | Mod: HCNC,CPTII,S$GLB, | Performed by: INTERNAL MEDICINE

## 2023-03-01 PROCEDURE — 3288F FALL RISK ASSESSMENT DOCD: CPT | Mod: HCNC,CPTII,S$GLB, | Performed by: INTERNAL MEDICINE

## 2023-03-01 PROCEDURE — 1159F MED LIST DOCD IN RCRD: CPT | Mod: HCNC,CPTII,S$GLB, | Performed by: INTERNAL MEDICINE

## 2023-03-01 PROCEDURE — 1126F PR PAIN SEVERITY QUANTIFIED, NO PAIN PRESENT: ICD-10-PCS | Mod: HCNC,CPTII,S$GLB, | Performed by: INTERNAL MEDICINE

## 2023-03-01 PROCEDURE — 3008F BODY MASS INDEX DOCD: CPT | Mod: HCNC,CPTII,S$GLB, | Performed by: INTERNAL MEDICINE

## 2023-03-01 PROCEDURE — 1126F AMNT PAIN NOTED NONE PRSNT: CPT | Mod: HCNC,CPTII,S$GLB, | Performed by: INTERNAL MEDICINE

## 2023-03-01 PROCEDURE — 3078F PR MOST RECENT DIASTOLIC BLOOD PRESSURE < 80 MM HG: ICD-10-PCS | Mod: HCNC,CPTII,S$GLB, | Performed by: INTERNAL MEDICINE

## 2023-03-01 PROCEDURE — 3074F PR MOST RECENT SYSTOLIC BLOOD PRESSURE < 130 MM HG: ICD-10-PCS | Mod: HCNC,CPTII,S$GLB, | Performed by: INTERNAL MEDICINE

## 2023-03-01 PROCEDURE — 80061 LIPID PANEL: CPT | Mod: HCNC | Performed by: INTERNAL MEDICINE

## 2023-03-01 PROCEDURE — 3288F PR FALLS RISK ASSESSMENT DOCUMENTED: ICD-10-PCS | Mod: HCNC,CPTII,S$GLB, | Performed by: INTERNAL MEDICINE

## 2023-03-01 PROCEDURE — 99397 PER PM REEVAL EST PAT 65+ YR: CPT | Mod: HCNC,S$GLB,, | Performed by: INTERNAL MEDICINE

## 2023-03-01 PROCEDURE — 3074F SYST BP LT 130 MM HG: CPT | Mod: HCNC,CPTII,S$GLB, | Performed by: INTERNAL MEDICINE

## 2023-03-01 PROCEDURE — 99999 PR PBB SHADOW E&M-EST. PATIENT-LVL III: CPT | Mod: PBBFAC,HCNC,, | Performed by: INTERNAL MEDICINE

## 2023-03-01 NOTE — PROGRESS NOTES
MEDICAL HISTORY:  Persistent atrial fibrillation with previous direct cardioversion and status post pulmonary vein isolation twice  Watchman's device/left atrial appendage occlusion  Obstructive sleep apnea   Hyperlipidemia.  Hypertension.  Previous TIA.  Right hip arthroplasty.  Bilateral inguinal hernia repair   Right rotator cuff tear.  Cataract extraction with corneal transplant. Fuchs dystrophy     SOCIAL HISTORY:  Tobacco and alcohol use -- none.  , has three children.  Works at [employer].  No formal exercise routine.     FAMILY HISTORY:  Father is , heart disease, colon cancer.  Mother , diabetes and hypertension, hip fracture.  He has one brother, hypertension and heart disease.  Three sisters: one with Crohn's disease and two sisters with hyperlipidemia.     SCREENING:  Colonoscopy on 2013 revealed a benign polyp.  Colonoscopy 2019- normal     MEDICATIONS:  Aspirin 81 mg a day.  Hydrochlorothiazide 25 mg a day.  Losartan 50 mg a day.  Metoprolol succinate 25 mg a day.  Pravastatin 20 mg  Eye drops outlined in the med sheet.      73-year-old male   Annual visit   Overall feels well.  Recently seen for upper respiratory infection and cough.  This has resolved.  He was given prednisone and omeprazole at the time    Review of symptoms  Reports no chest pain, palpitations, shortness breath, abdominal pain.  Bowel function stable occasional constipation loose stools, nothing new.  No difficulty urinating, nocturia x2   No arthralgias headaches indigestion heartburn    Examination   Weight 242 lb   Pulse 72   Blood pressure 120/62   HEENT exam no abnormal findings   Neck no thyromegaly no masses   Chest clear breath sounds good effort   Heart regular rate rhythm, no murmurs gallops   Abdominal exam nontender soft no hepatosplenomegaly abdominal masses  Pulses 2+ carotid pulses no bruits, 2+ pedal pulses  Extremities, no edema  Lymph gland palpable adenopathy   Skin no  gross abnormal findings    Impression   General exam   Hypertension  Hyperlipidemia   Persistent atrial fibrillation, status post cardioversion and watchman's device    Plan   Routine labs   Maintain proper diet physical activity

## 2023-03-13 NOTE — PROGRESS NOTES
Subjective:    Patient ID:  Pavel Graff is a 73 y.o. male who presents for follow-up of Atrial Fibrillation      73 year old male with history of HTN, TIA, SIMON, HLD and persistent AF since July 2012.  DCCV was attempted with early recurrence of AF.  He was rate controlled and did not note significant symptoms at the time - so a rate control strategy was pursued.  He notes though, that ever since he has been in AF - he has been much more easily fatigued and tired, not himself.  He denies overt palpitation or SOB.  He denies chest pain.    Mr. Graff underwent a PVI (07/21/16) with successful pulmonary vein antral isolation. Since the procedure, Mr. Graff reports feeling well with only two 3-second episodes of palpitations; he denies further recurrence.  denies chest pain, SOB/BLANCO, dizziness, or syncope. Mr. Graff has noted improvement in his energy level since the procedure.     12/16: Continues improved symptoms. 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 Remains on pradaxa and metoprolol.     6/17: No recurrence of AF since PVI. Feels well. No active complaints.     2/18: AF recurrence without symptoms. Remains on apixaban. The patient denies interval chest pain, sob, palpitations, syncope, near syncope.     8/18: Stable symptoms with slightly more fatigue. He asks about repeat ablation at the end of the season.     11/18: Re-do PVI 9/28/18 with touch up PVI, posterior wall isolation and CTI line. Feels some what fatigued, no post procedure complications.     5/19: No recurrence since PVI 9/18. Symptoms improved, stable.     3/22:Onset of easy bleeding and bruising. He has had events with spontaneous sub-conjunctival bleeding that are increasing in frequency and associated with pain. Remains in NSR.    9/22: 5/9/22 LAAO with cessation of OAC 6/20/22. Now on DAPT. No recurrence    Interval history: No recurrence, now off plavix.     CHADSVASC: 4  HASBLED: 4    2D echo 4/16    1 - Eccentric  hypertrophy.     2 - Normal left ventricular systolic function (EF 60-65%).     3 - Right ventricular enlargement with normal systolic function.     4 - Biatrial enlargement.     5 - Mild aortic regurgitation.     6 - Mild mitral regurgitation.     7 - Mild tricuspid regurgitation.     8 - The estimated PA systolic pressure is 33 mmHg.     9 - Mildly elevated central venous pressure.     PET stress 5/16  1. There is no evidence of a discrete hemodynamically significant coronary stenosis.   2. Although no clinically significant stress defect is seen, there is resting flow heterogeneity that improves after Dipyridamole. These results suggest mild endothelial dysfunction due to elevated cholesterol, high blood pressure and diabetes without clinically significant,      localized perfusion defects.   3. Resting wall motion is physiologic. Stress wall motion is physiologic.   4. LV function is normal at rest and stress.  (normal is >= 51%)  5. The ventricular volumes are normal at rest and stress.   6. The extracardiac distribution of radioactivity is normal.   7. There was no previous study available to compare.    Past Medical History:  No date: *Atrial fibrillation  No date: Anticoagulant long-term use  No date: Atrial fibrillation  No date: Hyperlipidemia  No date: Hypertension  No date: Sleep apnea  No date: Squamous cell carcinoma of skin  No date: Stroke      Comment:  TIA  No date: TIA (transient ischemic attack)    Past Surgical History:  9/28/2018: ABLATION; N/A      Comment:  Procedure: ABLATION;  Surgeon: Romaine Howard MD;                 Location: [facility] CATH LAB;  Service: Cardiology;                 Laterality: N/A;  AF, KAM, PVI, RFA, MAME, Gen, MB, 3 Prep  1/11/10: CATARACT EXTRACTION W/  INTRAOCULAR LENS IMPLANT; Left  12/14/2017: CATARACT EXTRACTION W/  INTRAOCULAR LENS IMPLANT; Right      Comment:  NANCYK/ Dr. Jay  3/26/2019: COLONOSCOPY; N/A      Comment:  Procedure: COLONOSCOPY;  Surgeon: Mauro  MD Hardik;                 Location: [facility] ENDO (4TH FLR);  Service: Endoscopy;                 Laterality: N/A;  ok to hold Eliquis x 2 days per Dr. Howard-MS  1997: CORNEAL TRANSPLANT; Left      Comment:  PKP OS  03/23/2017: CORNEAL TRANSPLANT; Left  12/14/2017: CORNEAL TRANSPLANT; Right      Comment:  DMEK/ [facility]; Dr. Jay  No date: HERNIA REPAIR  No date: HIP ARTHROPLASTY  5/9/2022: OCCLUSION OF LEFT ATRIAL APPENDAGE; N/A      Comment:  Procedure: Left atrial appendage occlusion;  Surgeon:                Romaine Howard MD;  Location: [facility] EP LAB;  Service:               Cardiology;  Laterality: N/A;  afib, watchman, BSCI, erasmo,               anes, MB/DM, 3prep  No date: ROTATOR CUFF REPAIR  5/9/2022: TRANSESOPHAGEAL ECHOCARDIOGRAPHY; N/A      Comment:  Procedure: ECHOCARDIOGRAM, TRANSESOPHAGEAL;  Surgeon:                Fanta Dacosta MD;  Location: [facility] EP LAB;                 Service: Cardiology;  Laterality: N/A;    Social History    Socioeconomic History      Marital status:       Number of children: 3    Occupational History        Employer: [employer]    Tobacco Use      Smoking status: Never      Smokeless tobacco: Never    Substance and Sexual Activity      Alcohol use: No      Drug use: No      Sexual activity: Yes        Partners: Female    Review of patient's family history indicates:    Current Outpatient Medications:  aspirin 81 MG Chew, Take 81 mg by mouth once daily., Disp: , Rfl:   cetirizine (ZYRTEC) 10 MG tablet, Take 1 tablet (10 mg total) by mouth once daily., Disp: 30 tablet, Rfl: 0  fish oil-omega-3 fatty acids 300-1,000 mg capsule, Take 1 g by mouth once daily., Disp: , Rfl:   fluorouraciL (EFUDEX) 5 % cream, AAA at nose bid x 2 weeks, Disp: 40 g, Rfl: 1  hydroCHLOROthiazide (HYDRODIURIL) 25 MG tablet, TAKE 1 TABLET BY MOUTH EVERY DAY, Disp: 90 tablet, Rfl: 4  losartan (COZAAR) 50 MG tablet, TAKE 1 TABLET BY MOUTH EVERY DAY, Disp: 90 tablet,  Rfl: 0  metoprolol succinate (TOPROL-XL) 25 MG 24 hr tablet, TAKE 1 TABLET BY MOUTH EVERY DAY, Disp: 90 tablet, Rfl: 3  omeprazole (PRILOSEC) 20 MG capsule, Take 1 capsule (20 mg total) by mouth once daily. for 14 days, Disp: 14 capsule, Rfl: 0  pravastatin (PRAVACHOL) 20 MG tablet, TAKE 1 TABLET BY MOUTH EVERY DAY, Disp: 90 tablet, Rfl: 0  prednisoLONE acetate (PRED FORTE) 1 % DrpS, INSTILL 1 DROP INTO BOTH EYES TWICE A DAY, Disp: 10 mL, Rfl: 3  timolol maleate 0.5% (TIMOPTIC) 0.5 % Drop, INSTILL 1 DROP INTO LEFT EYE TWICE DAILY, Disp: 15 mL, Rfl: 2    No current facility-administered medications for this visit.  Facility-Administered Medications Ordered in Other Visits:  0.9%  NaCl infusion, , Intravenous, Continuous, Zoey Newman NP, Stopped at 09/28/18 1204      Review of Systems   Constitutional: Negative.   HENT: Negative.     Eyes: Negative.    Cardiovascular:  Negative for chest pain, dyspnea on exertion, leg swelling, near-syncope, palpitations and syncope.   Respiratory: Negative.  Negative for shortness of breath.    Endocrine: Negative.    Hematologic/Lymphatic: Negative.    Skin: Negative.    Musculoskeletal: Negative.    Gastrointestinal: Negative.    Genitourinary: Negative.    Neurological: Negative.  Negative for dizziness and light-headedness.   Psychiatric/Behavioral: Negative.     Allergic/Immunologic: Negative.       Objective:    Physical Exam  Vitals reviewed.   Constitutional:       General: He is not in acute distress.     Appearance: He is well-developed.   HENT:      Head: Normocephalic and atraumatic.   Eyes:      Pupils: Pupils are equal, round, and reactive to light.   Neck:      Thyroid: No thyromegaly.      Vascular: No JVD.   Cardiovascular:      Rate and Rhythm: Normal rate and regular rhythm.      Chest Wall: PMI is not displaced.      Heart sounds: Normal heart sounds, S1 normal and S2 normal. No murmur heard.    No friction rub. No gallop.   Pulmonary:      Effort:  Pulmonary effort is normal. No respiratory distress.      Breath sounds: Normal breath sounds. No wheezing or rales.   Abdominal:      General: Bowel sounds are normal. There is no distension.      Palpations: Abdomen is soft.      Tenderness: There is no abdominal tenderness. There is no guarding or rebound.   Musculoskeletal:         General: No tenderness. Normal range of motion.      Cervical back: Normal range of motion.   Skin:     General: Skin is warm and dry.      Findings: No erythema or rash.   Neurological:      Mental Status: He is alert and oriented to person, place, and time.      Cranial Nerves: No cranial nerve deficit.   Psychiatric:         Behavior: Behavior normal.         Thought Content: Thought content normal.         Judgment: Judgment normal.       ECG: NSR nl VA, QRS, QTc    Assessment:       1. Persistent atrial fibrillation    2. Presence of Watchman left atrial appendage closure device    3. Status post catheter ablation of atrial fibrillation    4. Transient cerebral ischemia, unspecified type         Plan:       73 yoM here for arrhythmia follow up. No recurrence. Now off OAC and plavix. No changes to therapy. RTC 1y

## 2023-03-14 ENCOUNTER — HOSPITAL ENCOUNTER (OUTPATIENT)
Dept: CARDIOLOGY | Facility: CLINIC | Age: 73
Discharge: HOME | End: 2023-03-14
Payer: MEDICARE

## 2023-03-14 ENCOUNTER — OFFICE VISIT (OUTPATIENT)
Dept: ELECTROPHYSIOLOGY | Facility: CLINIC | Age: 73
End: 2023-03-14
Payer: MEDICARE

## 2023-03-14 VITALS
HEIGHT: 72 IN | WEIGHT: 244.94 LBS | DIASTOLIC BLOOD PRESSURE: 68 MMHG | SYSTOLIC BLOOD PRESSURE: 122 MMHG | BODY MASS INDEX: 33.18 KG/M2 | HEART RATE: 76 BPM

## 2023-03-14 DIAGNOSIS — G45.9 TRANSIENT CEREBRAL ISCHEMIA, UNSPECIFIED TYPE: ICD-10-CM

## 2023-03-14 DIAGNOSIS — I48.19 PERSISTENT ATRIAL FIBRILLATION: Primary | ICD-10-CM

## 2023-03-14 DIAGNOSIS — Z95.818 PRESENCE OF WATCHMAN LEFT ATRIAL APPENDAGE CLOSURE DEVICE: ICD-10-CM

## 2023-03-14 DIAGNOSIS — Z98.890 STATUS POST CATHETER ABLATION OF ATRIAL FIBRILLATION: ICD-10-CM

## 2023-03-14 DIAGNOSIS — I49.8 OTHER CARDIAC ARRHYTHMIA: ICD-10-CM

## 2023-03-14 PROCEDURE — 3008F PR BODY MASS INDEX (BMI) DOCUMENTED: ICD-10-PCS | Mod: HCNC,CPTII,S$GLB, | Performed by: INTERNAL MEDICINE

## 2023-03-14 PROCEDURE — 99214 PR OFFICE/OUTPT VISIT, EST, LEVL IV, 30-39 MIN: ICD-10-PCS | Mod: HCNC,S$GLB,, | Performed by: INTERNAL MEDICINE

## 2023-03-14 PROCEDURE — 3288F PR FALLS RISK ASSESSMENT DOCUMENTED: ICD-10-PCS | Mod: HCNC,CPTII,S$GLB, | Performed by: INTERNAL MEDICINE

## 2023-03-14 PROCEDURE — 1126F AMNT PAIN NOTED NONE PRSNT: CPT | Mod: HCNC,CPTII,S$GLB, | Performed by: INTERNAL MEDICINE

## 2023-03-14 PROCEDURE — 99214 OFFICE O/P EST MOD 30 MIN: CPT | Mod: HCNC,S$GLB,, | Performed by: INTERNAL MEDICINE

## 2023-03-14 PROCEDURE — 1101F PT FALLS ASSESS-DOCD LE1/YR: CPT | Mod: HCNC,CPTII,S$GLB, | Performed by: INTERNAL MEDICINE

## 2023-03-14 PROCEDURE — 3074F SYST BP LT 130 MM HG: CPT | Mod: HCNC,CPTII,S$GLB, | Performed by: INTERNAL MEDICINE

## 2023-03-14 PROCEDURE — 99999 PR PBB SHADOW E&M-EST. PATIENT-LVL III: CPT | Mod: PBBFAC,HCNC,, | Performed by: INTERNAL MEDICINE

## 2023-03-14 PROCEDURE — 1159F MED LIST DOCD IN RCRD: CPT | Mod: HCNC,CPTII,S$GLB, | Performed by: INTERNAL MEDICINE

## 2023-03-14 PROCEDURE — 93010 RHYTHM STRIP: ICD-10-PCS | Mod: HCNC,S$GLB,, | Performed by: INTERNAL MEDICINE

## 2023-03-14 PROCEDURE — 1157F ADVNC CARE PLAN IN RCRD: CPT | Mod: HCNC,CPTII,S$GLB, | Performed by: INTERNAL MEDICINE

## 2023-03-14 PROCEDURE — 93005 ELECTROCARDIOGRAM TRACING: CPT | Mod: HCNC,S$GLB,, | Performed by: INTERNAL MEDICINE

## 2023-03-14 PROCEDURE — 93010 ELECTROCARDIOGRAM REPORT: CPT | Mod: HCNC,S$GLB,, | Performed by: INTERNAL MEDICINE

## 2023-03-14 PROCEDURE — 1126F PR PAIN SEVERITY QUANTIFIED, NO PAIN PRESENT: ICD-10-PCS | Mod: HCNC,CPTII,S$GLB, | Performed by: INTERNAL MEDICINE

## 2023-03-14 PROCEDURE — 3078F PR MOST RECENT DIASTOLIC BLOOD PRESSURE < 80 MM HG: ICD-10-PCS | Mod: HCNC,CPTII,S$GLB, | Performed by: INTERNAL MEDICINE

## 2023-03-14 PROCEDURE — 99999 PR PBB SHADOW E&M-EST. PATIENT-LVL III: ICD-10-PCS | Mod: PBBFAC,HCNC,, | Performed by: INTERNAL MEDICINE

## 2023-03-14 PROCEDURE — 3008F BODY MASS INDEX DOCD: CPT | Mod: HCNC,CPTII,S$GLB, | Performed by: INTERNAL MEDICINE

## 2023-03-14 PROCEDURE — 1101F PR PT FALLS ASSESS DOC 0-1 FALLS W/OUT INJ PAST YR: ICD-10-PCS | Mod: HCNC,CPTII,S$GLB, | Performed by: INTERNAL MEDICINE

## 2023-03-14 PROCEDURE — 1157F PR ADVANCE CARE PLAN OR EQUIV PRESENT IN MEDICAL RECORD: ICD-10-PCS | Mod: HCNC,CPTII,S$GLB, | Performed by: INTERNAL MEDICINE

## 2023-03-14 PROCEDURE — 93005 RHYTHM STRIP: ICD-10-PCS | Mod: HCNC,S$GLB,, | Performed by: INTERNAL MEDICINE

## 2023-03-14 PROCEDURE — 3288F FALL RISK ASSESSMENT DOCD: CPT | Mod: HCNC,CPTII,S$GLB, | Performed by: INTERNAL MEDICINE

## 2023-03-14 PROCEDURE — 3078F DIAST BP <80 MM HG: CPT | Mod: HCNC,CPTII,S$GLB, | Performed by: INTERNAL MEDICINE

## 2023-03-14 PROCEDURE — 3074F PR MOST RECENT SYSTOLIC BLOOD PRESSURE < 130 MM HG: ICD-10-PCS | Mod: HCNC,CPTII,S$GLB, | Performed by: INTERNAL MEDICINE

## 2023-03-14 PROCEDURE — 1159F PR MEDICATION LIST DOCUMENTED IN MEDICAL RECORD: ICD-10-PCS | Mod: HCNC,CPTII,S$GLB, | Performed by: INTERNAL MEDICINE

## 2023-04-05 ENCOUNTER — PATIENT MESSAGE (OUTPATIENT)
Dept: INTERNAL MEDICINE | Facility: CLINIC | Age: 73
End: 2023-04-05
Payer: MEDICARE

## 2023-04-05 DIAGNOSIS — Z12.5 PROSTATE CANCER SCREENING: ICD-10-CM

## 2023-04-05 DIAGNOSIS — I48.19 PERSISTENT ATRIAL FIBRILLATION: ICD-10-CM

## 2023-04-05 DIAGNOSIS — Z00.00 ANNUAL PHYSICAL EXAM: Primary | ICD-10-CM

## 2023-04-05 DIAGNOSIS — E78.5 HYPERLIPIDEMIA, UNSPECIFIED HYPERLIPIDEMIA TYPE: ICD-10-CM

## 2023-04-05 DIAGNOSIS — I10 ESSENTIAL HYPERTENSION: ICD-10-CM

## 2023-04-05 RX ORDER — HYDROCHLOROTHIAZIDE 25 MG/1
25 TABLET ORAL DAILY
Qty: 90 TABLET | Refills: 3 | Status: SHIPPED | OUTPATIENT
Start: 2023-04-05

## 2023-04-05 NOTE — PROGRESS NOTES
Test results had been reviewed all look fine stable  Continue with current medications and maintain yearly exams

## 2023-05-11 DIAGNOSIS — I48.19 PERSISTENT ATRIAL FIBRILLATION: ICD-10-CM

## 2023-05-11 RX ORDER — METOPROLOL SUCCINATE 25 MG/1
TABLET, EXTENDED RELEASE ORAL
Qty: 90 TABLET | Refills: 3 | Status: SHIPPED | OUTPATIENT
Start: 2023-05-11

## 2023-05-11 NOTE — TELEPHONE ENCOUNTER
No care due was identified.  [facility] Embedded Care Due Messages. Reference number: [id number].   5/11/2023 7:35:30 AM CDT

## 2023-05-11 NOTE — TELEPHONE ENCOUNTER
Refill Decision Note   Pavel Graff  is requesting a refill authorization.  Brief Assessment and Rationale for Refill:  Approve     Medication Therapy Plan:         Comments:     Note composed:1:53 PM 05/11/2023

## 2023-05-20 RX ORDER — PRAVASTATIN SODIUM 20 MG/1
TABLET ORAL
Qty: 90 TABLET | Refills: 3 | Status: SHIPPED | OUTPATIENT
Start: 2023-05-20

## 2023-05-20 NOTE — TELEPHONE ENCOUNTER
No care due was identified.  [facility] Embedded Care Due Messages. Reference number: [id number].   5/20/2023 1:14:47 AM CDT

## 2023-05-20 NOTE — TELEPHONE ENCOUNTER
Refill Decision Note   Pavel Dajuan  is requesting a refill authorization.  Brief Assessment and Rationale for Refill:  Approve     Medication Therapy Plan:       Medication Reconciliation Completed: No   Comments:     No Care Gaps recommended.     Note composed:10:11 AM 05/20/2023

## 2023-06-06 ENCOUNTER — OFFICE VISIT (OUTPATIENT)
Dept: DERMATOLOGY | Facility: CLINIC | Age: 73
End: 2023-06-06
Payer: MEDICARE

## 2023-06-06 DIAGNOSIS — D18.01 CHERRY ANGIOMA: ICD-10-CM

## 2023-06-06 DIAGNOSIS — Z12.83 SCREENING EXAM FOR SKIN CANCER: Primary | ICD-10-CM

## 2023-06-06 DIAGNOSIS — L82.1 SEBORRHEIC KERATOSES: ICD-10-CM

## 2023-06-06 DIAGNOSIS — L21.9 SEBORRHEIC DERMATITIS: ICD-10-CM

## 2023-06-06 DIAGNOSIS — B36.0 TINEA VERSICOLOR: ICD-10-CM

## 2023-06-06 PROCEDURE — 3288F FALL RISK ASSESSMENT DOCD: CPT | Mod: HCNC,CPTII,S$GLB, | Performed by: DERMATOLOGY

## 2023-06-06 PROCEDURE — 1159F MED LIST DOCD IN RCRD: CPT | Mod: HCNC,CPTII,S$GLB, | Performed by: DERMATOLOGY

## 2023-06-06 PROCEDURE — 1126F PR PAIN SEVERITY QUANTIFIED, NO PAIN PRESENT: ICD-10-PCS | Mod: HCNC,CPTII,S$GLB, | Performed by: DERMATOLOGY

## 2023-06-06 PROCEDURE — 4010F ACE/ARB THERAPY RXD/TAKEN: CPT | Mod: HCNC,CPTII,S$GLB, | Performed by: DERMATOLOGY

## 2023-06-06 PROCEDURE — 1101F PR PT FALLS ASSESS DOC 0-1 FALLS W/OUT INJ PAST YR: ICD-10-PCS | Mod: HCNC,CPTII,S$GLB, | Performed by: DERMATOLOGY

## 2023-06-06 PROCEDURE — 1159F PR MEDICATION LIST DOCUMENTED IN MEDICAL RECORD: ICD-10-PCS | Mod: HCNC,CPTII,S$GLB, | Performed by: DERMATOLOGY

## 2023-06-06 PROCEDURE — 1160F PR REVIEW ALL MEDS BY PRESCRIBER/CLIN PHARMACIST DOCUMENTED: ICD-10-PCS | Mod: HCNC,CPTII,S$GLB, | Performed by: DERMATOLOGY

## 2023-06-06 PROCEDURE — 4010F PR ACE/ARB THEARPY RXD/TAKEN: ICD-10-PCS | Mod: HCNC,CPTII,S$GLB, | Performed by: DERMATOLOGY

## 2023-06-06 PROCEDURE — 1157F ADVNC CARE PLAN IN RCRD: CPT | Mod: HCNC,CPTII,S$GLB, | Performed by: DERMATOLOGY

## 2023-06-06 PROCEDURE — 99214 PR OFFICE/OUTPT VISIT, EST, LEVL IV, 30-39 MIN: ICD-10-PCS | Mod: HCNC,S$GLB,, | Performed by: DERMATOLOGY

## 2023-06-06 PROCEDURE — 99214 OFFICE O/P EST MOD 30 MIN: CPT | Mod: HCNC,S$GLB,, | Performed by: DERMATOLOGY

## 2023-06-06 PROCEDURE — 3288F PR FALLS RISK ASSESSMENT DOCUMENTED: ICD-10-PCS | Mod: HCNC,CPTII,S$GLB, | Performed by: DERMATOLOGY

## 2023-06-06 PROCEDURE — 1160F RVW MEDS BY RX/DR IN RCRD: CPT | Mod: HCNC,CPTII,S$GLB, | Performed by: DERMATOLOGY

## 2023-06-06 PROCEDURE — 1126F AMNT PAIN NOTED NONE PRSNT: CPT | Mod: HCNC,CPTII,S$GLB, | Performed by: DERMATOLOGY

## 2023-06-06 PROCEDURE — 99999 PR PBB SHADOW E&M-EST. PATIENT-LVL III: ICD-10-PCS | Mod: PBBFAC,HCNC,, | Performed by: DERMATOLOGY

## 2023-06-06 PROCEDURE — 99999 PR PBB SHADOW E&M-EST. PATIENT-LVL III: CPT | Mod: PBBFAC,HCNC,, | Performed by: DERMATOLOGY

## 2023-06-06 PROCEDURE — 1157F PR ADVANCE CARE PLAN OR EQUIV PRESENT IN MEDICAL RECORD: ICD-10-PCS | Mod: HCNC,CPTII,S$GLB, | Performed by: DERMATOLOGY

## 2023-06-06 PROCEDURE — 1101F PT FALLS ASSESS-DOCD LE1/YR: CPT | Mod: HCNC,CPTII,S$GLB, | Performed by: DERMATOLOGY

## 2023-06-06 RX ORDER — KETOCONAZOLE 20 MG/ML
SHAMPOO, SUSPENSION TOPICAL
Qty: 120 ML | Refills: 5 | Status: SHIPPED | OUTPATIENT
Start: 2023-06-06

## 2023-06-06 NOTE — PROGRESS NOTES
Subjective:      Patient ID:  Pavel Graff is a 73 y.o. male who presents for   Chief Complaint   Patient presents with    Skin Check     UBSE     History of Present Illness: The patient presents for follow up of skin check.    The patient was last seen on: 9/06/22 for cryosurgery to actinic keratoses which have resolved.   This is a high risk patient here to check for the development of new lesions.  H/o SCC and multiple AK's.    Other skin complaints: none       Review of Systems   Skin:  Positive for activity-related sunscreen use and wears hat (when outside). Negative for daily sunscreen use and recent sunburn.   Hematologic/Lymphatic: Bruises/bleeds easily (on asa).     Objective:   Physical Exam   Constitutional: He appears well-developed and well-nourished. No distress.   Neurological: He is alert and oriented to person, place, and time. He is not disoriented.   Psychiatric: He has a normal mood and affect.   Skin:   Areas Examined (abnormalities noted in diagram):   Scalp / Hair Palpated and Inspected  Head / Face Inspection Performed  Neck Inspection Performed  Chest / Axilla Inspection Performed  Abdomen Inspection Performed  Back Inspection Performed  RUE Inspected  LUE Inspection Performed                   Diagram Legend     Erythematous scaling macule/papule c/w actinic keratosis       Vascular papule c/w angioma      Pigmented verrucoid papule/plaque c/w seborrheic keratosis      Yellow umbilicated papule c/w sebaceous hyperplasia      Irregularly shaped tan macule c/w lentigo     1-2 mm smooth white papules consistent with Milia      Movable subcutaneous cyst with punctum c/w epidermal inclusion cyst      Subcutaneous movable cyst c/w pilar cyst      Firm pink to brown papule c/w dermatofibroma      Pedunculated fleshy papule(s) c/w skin tag(s)      Evenly pigmented macule c/w junctional nevus     Mildly variegated pigmented, slightly irregular-bordered macule c/w mildly atypical nevus       Flesh colored to evenly pigmented papule c/w intradermal nevus       Pink pearly papule/plaque c/w basal cell carcinoma      Erythematous hyperkeratotic cursted plaque c/w SCC      Surgical scar with no sign of skin cancer recurrence      Open and closed comedones      Inflammatory papules and pustules      Verrucoid papule consistent consistent with wart     Erythematous eczematous patches and plaques     Dystrophic onycholytic nail with subungual debris c/w onychomycosis     Umbilicated papule    Erythematous-base heme-crusted tan verrucoid plaque consistent with inflamed seborrheic keratosis     Erythematous Silvery Scaling Plaque c/w Psoriasis     See annotation      Assessment / Plan:        Screening exam for skin cancer  Upper body skin examination performed today including at least 6 points as noted in physical examination. No lesions suspicious for malignancy noted.  Recommend daily sun protection/avoidance and use of at least SPF 30, broad spectrum sunscreen (OTC drug).     Seborrheic keratoses  These are benign inherited growths without a malignant potential. Reassurance given to patient. No treatment is necessary.     Cherry angioma  This is a benign vascular lesion. Reassurance given. No treatment required.     Seborrheic dermatitis  -     ketoconazole (NIZORAL) 2 % shampoo; Wash hair with medicated shampoo at least 2x/week - let sit on scalp at least 5 minutes prior to rinsing  Dispense: 120 mL; Refill: 5           No follow-ups on file.

## 2023-07-19 ENCOUNTER — TELEPHONE (OUTPATIENT)
Dept: INTERNAL MEDICINE | Facility: CLINIC | Age: 73
End: 2023-07-19
Payer: MEDICARE

## 2023-07-19 RX ORDER — ACETAZOLAMIDE 250 MG/1
250 TABLET ORAL 2 TIMES DAILY
Qty: 20 TABLET | Refills: 0 | Status: SHIPPED | OUTPATIENT
Start: 2023-07-19 | End: 2023-07-22

## 2023-07-22 RX ORDER — ACETAZOLAMIDE 125 MG/1
125 TABLET ORAL 2 TIMES DAILY
Qty: 10 TABLET | Refills: 0 | Status: SHIPPED | OUTPATIENT
Start: 2023-07-22 | End: 2023-07-27

## 2023-07-24 ENCOUNTER — TELEPHONE (OUTPATIENT)
Dept: OPHTHALMOLOGY | Facility: CLINIC | Age: 73
End: 2023-07-24
Payer: MEDICARE

## 2023-07-24 NOTE — TELEPHONE ENCOUNTER
Spoke to patient's wife regarding scheduling follow up appointment with Dr. Jay in order to refill drops. Schedule first available appointment- 9/6 @ 10:45 am at [facility]. Called in PF refills to [facility] on file.

## 2023-09-06 ENCOUNTER — OFFICE VISIT (OUTPATIENT)
Dept: OPHTHALMOLOGY | Facility: CLINIC | Age: 73
End: 2023-09-06
Payer: MEDICARE

## 2023-09-06 DIAGNOSIS — H18.623 KERATOCONUS, UNSTABLE, BILATERAL: ICD-10-CM

## 2023-09-06 DIAGNOSIS — H18.513 FUCHS' CORNEAL DYSTROPHY OF BOTH EYES: Primary | ICD-10-CM

## 2023-09-06 DIAGNOSIS — Z94.7 STATUS POST CORNEAL TRANSPLANT: ICD-10-CM

## 2023-09-06 PROCEDURE — 4010F ACE/ARB THERAPY RXD/TAKEN: CPT | Mod: HCNC,CPTII,S$GLB, | Performed by: OPHTHALMOLOGY

## 2023-09-06 PROCEDURE — 92014 COMPRE OPH EXAM EST PT 1/>: CPT | Mod: HCNC,S$GLB,, | Performed by: OPHTHALMOLOGY

## 2023-09-06 PROCEDURE — 1157F ADVNC CARE PLAN IN RCRD: CPT | Mod: HCNC,CPTII,S$GLB, | Performed by: OPHTHALMOLOGY

## 2023-09-06 PROCEDURE — 1126F PR PAIN SEVERITY QUANTIFIED, NO PAIN PRESENT: ICD-10-PCS | Mod: HCNC,CPTII,S$GLB, | Performed by: OPHTHALMOLOGY

## 2023-09-06 PROCEDURE — 1159F PR MEDICATION LIST DOCUMENTED IN MEDICAL RECORD: ICD-10-PCS | Mod: HCNC,CPTII,S$GLB, | Performed by: OPHTHALMOLOGY

## 2023-09-06 PROCEDURE — 1101F PR PT FALLS ASSESS DOC 0-1 FALLS W/OUT INJ PAST YR: ICD-10-PCS | Mod: HCNC,CPTII,S$GLB, | Performed by: OPHTHALMOLOGY

## 2023-09-06 PROCEDURE — 3288F FALL RISK ASSESSMENT DOCD: CPT | Mod: HCNC,CPTII,S$GLB, | Performed by: OPHTHALMOLOGY

## 2023-09-06 PROCEDURE — 99999 PR PBB SHADOW E&M-EST. PATIENT-LVL III: ICD-10-PCS | Mod: PBBFAC,HCNC,, | Performed by: OPHTHALMOLOGY

## 2023-09-06 PROCEDURE — 1160F PR REVIEW ALL MEDS BY PRESCRIBER/CLIN PHARMACIST DOCUMENTED: ICD-10-PCS | Mod: HCNC,CPTII,S$GLB, | Performed by: OPHTHALMOLOGY

## 2023-09-06 PROCEDURE — 99999 PR PBB SHADOW E&M-EST. PATIENT-LVL III: CPT | Mod: PBBFAC,HCNC,, | Performed by: OPHTHALMOLOGY

## 2023-09-06 PROCEDURE — 1159F MED LIST DOCD IN RCRD: CPT | Mod: HCNC,CPTII,S$GLB, | Performed by: OPHTHALMOLOGY

## 2023-09-06 PROCEDURE — 1101F PT FALLS ASSESS-DOCD LE1/YR: CPT | Mod: HCNC,CPTII,S$GLB, | Performed by: OPHTHALMOLOGY

## 2023-09-06 PROCEDURE — 92014 PR EYE EXAM, EST PATIENT,COMPREHESV: ICD-10-PCS | Mod: HCNC,S$GLB,, | Performed by: OPHTHALMOLOGY

## 2023-09-06 PROCEDURE — 1160F RVW MEDS BY RX/DR IN RCRD: CPT | Mod: HCNC,CPTII,S$GLB, | Performed by: OPHTHALMOLOGY

## 2023-09-06 PROCEDURE — 1157F PR ADVANCE CARE PLAN OR EQUIV PRESENT IN MEDICAL RECORD: ICD-10-PCS | Mod: HCNC,CPTII,S$GLB, | Performed by: OPHTHALMOLOGY

## 2023-09-06 PROCEDURE — 1126F AMNT PAIN NOTED NONE PRSNT: CPT | Mod: HCNC,CPTII,S$GLB, | Performed by: OPHTHALMOLOGY

## 2023-09-06 PROCEDURE — 4010F PR ACE/ARB THEARPY RXD/TAKEN: ICD-10-PCS | Mod: HCNC,CPTII,S$GLB, | Performed by: OPHTHALMOLOGY

## 2023-09-06 PROCEDURE — 3288F PR FALLS RISK ASSESSMENT DOCUMENTED: ICD-10-PCS | Mod: HCNC,CPTII,S$GLB, | Performed by: OPHTHALMOLOGY

## 2023-09-06 RX ORDER — PREDNISOLONE ACETATE 10 MG/ML
1 SUSPENSION/ DROPS OPHTHALMIC 4 TIMES DAILY
Qty: 10 ML | Refills: 6 | Status: SHIPPED | OUTPATIENT
Start: 2023-09-06 | End: 2023-09-16

## 2023-09-06 NOTE — PROGRESS NOTES
HPI    Patient is here today for Fuchs' follow up. Patient states his vision is   doing well. Patient is not experiencing any pain in his eyes today.   Patient states his eyes are dry and currently using OTC artificial tears   for relief as needed.     Prednisolone Acetate 2x a day OU  Timolol 2x a day OU  Last edited by Allie Nichols on 9/6/2023 10:52 AM.            Assessment /Plan     For exam results, see Encounter Report.    Fuchs' corneal dystrophy of both eyes    Keratoconus, unstable, bilateral    Status post corneal transplant        OD DSEK graft attached and clear. Signs and symptoms of graft rejection reviewed.  OS PKP stable and clear. Signs and symptoms of graft rejection reviewed.  IOP good  Excellent vision with MRx...

## 2023-11-03 ENCOUNTER — TELEPHONE (OUTPATIENT)
Dept: ELECTROPHYSIOLOGY | Facility: CLINIC | Age: 73
End: 2023-11-03
Payer: MEDICARE

## 2024-01-08 ENCOUNTER — PATIENT MESSAGE (OUTPATIENT)
Dept: ADMINISTRATIVE | Facility: HOSPITAL | Age: 74
End: 2024-01-08
Payer: MEDICARE

## 2024-01-17 ENCOUNTER — OFFICE VISIT (OUTPATIENT)
Dept: OPHTHALMOLOGY | Facility: CLINIC | Age: 74
End: 2024-01-17
Payer: MEDICARE

## 2024-01-17 DIAGNOSIS — H18.513 FUCHS' CORNEAL DYSTROPHY OF BOTH EYES: Primary | ICD-10-CM

## 2024-01-17 DIAGNOSIS — H16.001 CORNEAL EROSION OF RIGHT EYE: ICD-10-CM

## 2024-01-17 DIAGNOSIS — Z94.7 STATUS POST CORNEAL TRANSPLANT: ICD-10-CM

## 2024-01-17 PROCEDURE — 1160F RVW MEDS BY RX/DR IN RCRD: CPT | Mod: HCNC,CPTII,S$GLB, | Performed by: OPHTHALMOLOGY

## 2024-01-17 PROCEDURE — 1125F AMNT PAIN NOTED PAIN PRSNT: CPT | Mod: HCNC,CPTII,S$GLB, | Performed by: OPHTHALMOLOGY

## 2024-01-17 PROCEDURE — 1159F MED LIST DOCD IN RCRD: CPT | Mod: HCNC,CPTII,S$GLB, | Performed by: OPHTHALMOLOGY

## 2024-01-17 PROCEDURE — 92014 COMPRE OPH EXAM EST PT 1/>: CPT | Mod: HCNC,S$GLB,, | Performed by: OPHTHALMOLOGY

## 2024-01-17 PROCEDURE — 3288F FALL RISK ASSESSMENT DOCD: CPT | Mod: HCNC,CPTII,S$GLB, | Performed by: OPHTHALMOLOGY

## 2024-01-17 PROCEDURE — 1101F PT FALLS ASSESS-DOCD LE1/YR: CPT | Mod: HCNC,CPTII,S$GLB, | Performed by: OPHTHALMOLOGY

## 2024-01-17 PROCEDURE — 99999 PR PBB SHADOW E&M-EST. PATIENT-LVL II: CPT | Mod: PBBFAC,HCNC,, | Performed by: OPHTHALMOLOGY

## 2024-01-17 PROCEDURE — 1157F ADVNC CARE PLAN IN RCRD: CPT | Mod: HCNC,CPTII,S$GLB, | Performed by: OPHTHALMOLOGY

## 2024-01-17 NOTE — PROGRESS NOTES
HPI    Patient present today for eye pain   Pt state pain and pressure around the upper right quad,feels like a   blister   Unable to touch eye. Pt also stated feel like eye ball swollen. OS looks   like broken blood vessels   Last edited by Galo Mejia on 1/17/2024  9:46 AM.            Assessment /Plan     For exam results, see Encounter Report.    Fuchs' corneal dystrophy of both eyes    Status post corneal transplant    Corneal erosion of right eye      Likely peripheral erosion OD, healed  DSEK OD clear  PKP OS, stable

## 2024-03-04 NOTE — PROGRESS NOTES
MEDICAL HISTORY:  Persistent atrial fibrillation with previous direct cardioversion and status post pulmonary vein isolation twice  Watchman's device/left atrial appendage occlusion  Obstructive sleep apnea   Hyperlipidemia.  Hypertension.  Previous TIA.  Right hip arthroplasty.  Bilateral inguinal hernia repair  Right rotator cuff tear.  Cataract extraction with corneal transplant. Fuchs dystrophy     SOCIAL HISTORY:  Tobacco and alcohol use -- none.  , has three children.  Works at [employer].  No formal exercise routine.     FAMILY HISTORY:  Father is , heart disease, colon cancer.  Mother , diabetes and hypertension, hip fracture.  He has one brother, hypertension and heart disease.  Three sisters: one with Crohn's disease and two sisters with hyperlipidemia.     SCREENING:  Colonoscopy on 2013 revealed a benign polyp.  Colonoscopy 2019- normal     MEDICATIONS:  Aspirin 81 mg a day.  Hydrochlorothiazide 25 mg a day.  Losartan 50 mg a day.  Metoprolol succinate 25 mg a day.  Pravastatin 20 mg  Eye drops outlined in the med sheet.      74-year-old male  Presents for routine annual visit   Overall he feels well.  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  Has regular bowel function.  No difficulty urinating, nocturia x2.  No indigestion heartburn.  Occasional left knee arthralgias    Examination   Weight 145 lb  BMI 33.28   Pulse 56   Blood pressure 135/62  HEENT exam no abnormal findings  Neck no thyromegaly no masses  Chest clear breath sounds  Heart regular rate and rhythm, no murmurs  Abdominal exam nontender soft no hepatosplenomegaly abdominal masses  Pulses 2+ carotid pulses no bruits, 2+ dorsalis pedal pulses  Extremities, no edema  Lymph gland, no palpable adenopathy  Skin, no gross abnormal findings   Musculoskeletal left knee crepitation slight  enlargement    Impression  General exam  Permanent atrial fibrillation  Hypertension   Hyperlipidemia  Left knee osteoarthritis  Status post watchman's device  Fuchs dystrophy of the eyes  Prostate cancer screening  Class 1 obesity    Plan   Routine labs  Attention a proper diet physical activity

## 2024-03-05 ENCOUNTER — OFFICE VISIT (OUTPATIENT)
Dept: INTERNAL MEDICINE | Facility: CLINIC | Age: 74
End: 2024-03-05
Payer: MEDICARE

## 2024-03-05 ENCOUNTER — LAB VISIT (OUTPATIENT)
Dept: LAB | Facility: HOSPITAL | Age: 74
End: 2024-03-05
Attending: INTERNAL MEDICINE
Payer: MEDICARE

## 2024-03-05 VITALS
WEIGHT: 245.38 LBS | OXYGEN SATURATION: 97 % | DIASTOLIC BLOOD PRESSURE: 70 MMHG | HEIGHT: 72 IN | BODY MASS INDEX: 33.23 KG/M2 | HEART RATE: 79 BPM | SYSTOLIC BLOOD PRESSURE: 118 MMHG

## 2024-03-05 DIAGNOSIS — E66.9 CLASS 1 OBESITY WITHOUT SERIOUS COMORBIDITY WITH BODY MASS INDEX (BMI) OF 33.0 TO 33.9 IN ADULT, UNSPECIFIED OBESITY TYPE: ICD-10-CM

## 2024-03-05 DIAGNOSIS — Z12.5 PROSTATE CANCER SCREENING: ICD-10-CM

## 2024-03-05 DIAGNOSIS — I10 PRIMARY HYPERTENSION: ICD-10-CM

## 2024-03-05 DIAGNOSIS — I48.19 PERSISTENT ATRIAL FIBRILLATION: ICD-10-CM

## 2024-03-05 DIAGNOSIS — H18.513 FUCHS' CORNEAL DYSTROPHY OF BOTH EYES: ICD-10-CM

## 2024-03-05 DIAGNOSIS — R35.1 BENIGN PROSTATIC HYPERPLASIA WITH NOCTURIA: ICD-10-CM

## 2024-03-05 DIAGNOSIS — Z00.00 ANNUAL PHYSICAL EXAM: Primary | ICD-10-CM

## 2024-03-05 DIAGNOSIS — N40.1 BENIGN PROSTATIC HYPERPLASIA WITH NOCTURIA: ICD-10-CM

## 2024-03-05 DIAGNOSIS — E78.5 HYPERLIPIDEMIA, UNSPECIFIED HYPERLIPIDEMIA TYPE: ICD-10-CM

## 2024-03-05 DIAGNOSIS — Z00.00 ANNUAL PHYSICAL EXAM: ICD-10-CM

## 2024-03-05 LAB
ALBUMIN SERPL BCP-MCNC: 4.3 G/DL (ref 3.5–5.2)
ALP SERPL-CCNC: 79 U/L (ref 55–135)
ALT SERPL W/O P-5'-P-CCNC: 21 U/L (ref 10–44)
ANION GAP SERPL CALC-SCNC: 11 MMOL/L (ref 8–16)
AST SERPL-CCNC: 28 U/L (ref 10–40)
BASOPHILS # BLD AUTO: 0.05 K/UL (ref 0–0.2)
BASOPHILS NFR BLD: 0.8 % (ref 0–1.9)
BILIRUB SERPL-MCNC: 0.9 MG/DL (ref 0.1–1)
BUN SERPL-MCNC: 14 MG/DL (ref 8–23)
CALCIUM SERPL-MCNC: 10.6 MG/DL (ref 8.7–10.5)
CHLORIDE SERPL-SCNC: 104 MMOL/L (ref 95–110)
CHOLEST SERPL-MCNC: 179 MG/DL (ref 120–199)
CHOLEST/HDLC SERPL: 4.7 {RATIO} (ref 2–5)
CO2 SERPL-SCNC: 24 MMOL/L (ref 23–29)
COMPLEXED PSA SERPL-MCNC: 1.6 NG/ML (ref 0–4)
CREAT SERPL-MCNC: 1 MG/DL (ref 0.5–1.4)
DIFFERENTIAL METHOD BLD: ABNORMAL
EOSINOPHIL # BLD AUTO: 0.2 K/UL (ref 0–0.5)
EOSINOPHIL NFR BLD: 3.2 % (ref 0–8)
ERYTHROCYTE [DISTWIDTH] IN BLOOD BY AUTOMATED COUNT: 12.6 % (ref 11.5–14.5)
EST. GFR  (NO RACE VARIABLE): >60 ML/MIN/1.73 M^2
GLUCOSE SERPL-MCNC: 88 MG/DL (ref 70–110)
HCT VFR BLD AUTO: 43.4 % (ref 40–54)
HDLC SERPL-MCNC: 38 MG/DL (ref 40–75)
HDLC SERPL: 21.2 % (ref 20–50)
HGB BLD-MCNC: 15 G/DL (ref 14–18)
IMM GRANULOCYTES # BLD AUTO: 0.05 K/UL (ref 0–0.04)
IMM GRANULOCYTES NFR BLD AUTO: 0.8 % (ref 0–0.5)
LDLC SERPL CALC-MCNC: 109.2 MG/DL (ref 63–159)
LYMPHOCYTES # BLD AUTO: 1.1 K/UL (ref 1–4.8)
LYMPHOCYTES NFR BLD: 17.8 % (ref 18–48)
MCH RBC QN AUTO: 32.3 PG (ref 27–31)
MCHC RBC AUTO-ENTMCNC: 34.6 G/DL (ref 32–36)
MCV RBC AUTO: 93 FL (ref 82–98)
MONOCYTES # BLD AUTO: 0.7 K/UL (ref 0.3–1)
MONOCYTES NFR BLD: 11.6 % (ref 4–15)
NEUTROPHILS # BLD AUTO: 4.1 K/UL (ref 1.8–7.7)
NEUTROPHILS NFR BLD: 65.8 % (ref 38–73)
NONHDLC SERPL-MCNC: 141 MG/DL
NRBC BLD-RTO: 0 /100 WBC
PLATELET # BLD AUTO: 285 K/UL (ref 150–450)
PMV BLD AUTO: 9.8 FL (ref 9.2–12.9)
POTASSIUM SERPL-SCNC: 4.3 MMOL/L (ref 3.5–5.1)
PROT SERPL-MCNC: 7.1 G/DL (ref 6–8.4)
RBC # BLD AUTO: 4.65 M/UL (ref 4.6–6.2)
SODIUM SERPL-SCNC: 139 MMOL/L (ref 136–145)
TRIGL SERPL-MCNC: 159 MG/DL (ref 30–150)
TSH SERPL DL<=0.005 MIU/L-ACNC: 1.35 UIU/ML (ref 0.4–4)
WBC # BLD AUTO: 6.19 K/UL (ref 3.9–12.7)

## 2024-03-05 PROCEDURE — 85025 COMPLETE CBC W/AUTO DIFF WBC: CPT | Mod: HCNC | Performed by: INTERNAL MEDICINE

## 2024-03-05 PROCEDURE — 1157F ADVNC CARE PLAN IN RCRD: CPT | Mod: HCNC,CPTII,S$GLB, | Performed by: INTERNAL MEDICINE

## 2024-03-05 PROCEDURE — 3078F DIAST BP <80 MM HG: CPT | Mod: HCNC,CPTII,S$GLB, | Performed by: INTERNAL MEDICINE

## 2024-03-05 PROCEDURE — 99397 PER PM REEVAL EST PAT 65+ YR: CPT | Mod: HCNC,S$GLB,, | Performed by: INTERNAL MEDICINE

## 2024-03-05 PROCEDURE — 1126F AMNT PAIN NOTED NONE PRSNT: CPT | Mod: HCNC,CPTII,S$GLB, | Performed by: INTERNAL MEDICINE

## 2024-03-05 PROCEDURE — 80061 LIPID PANEL: CPT | Mod: HCNC | Performed by: INTERNAL MEDICINE

## 2024-03-05 PROCEDURE — 1160F RVW MEDS BY RX/DR IN RCRD: CPT | Mod: HCNC,CPTII,S$GLB, | Performed by: INTERNAL MEDICINE

## 2024-03-05 PROCEDURE — 36415 COLL VENOUS BLD VENIPUNCTURE: CPT | Mod: HCNC | Performed by: INTERNAL MEDICINE

## 2024-03-05 PROCEDURE — 99999 PR PBB SHADOW E&M-EST. PATIENT-LVL IV: CPT | Mod: PBBFAC,HCNC,, | Performed by: INTERNAL MEDICINE

## 2024-03-05 PROCEDURE — 84443 ASSAY THYROID STIM HORMONE: CPT | Mod: HCNC | Performed by: INTERNAL MEDICINE

## 2024-03-05 PROCEDURE — 1159F MED LIST DOCD IN RCRD: CPT | Mod: HCNC,CPTII,S$GLB, | Performed by: INTERNAL MEDICINE

## 2024-03-05 PROCEDURE — 3074F SYST BP LT 130 MM HG: CPT | Mod: HCNC,CPTII,S$GLB, | Performed by: INTERNAL MEDICINE

## 2024-03-05 PROCEDURE — 3008F BODY MASS INDEX DOCD: CPT | Mod: HCNC,CPTII,S$GLB, | Performed by: INTERNAL MEDICINE

## 2024-03-05 PROCEDURE — 80053 COMPREHEN METABOLIC PANEL: CPT | Mod: HCNC | Performed by: INTERNAL MEDICINE

## 2024-03-05 PROCEDURE — 84153 ASSAY OF PSA TOTAL: CPT | Mod: HCNC | Performed by: INTERNAL MEDICINE

## 2024-03-15 RX ORDER — LOSARTAN POTASSIUM 50 MG/1
TABLET ORAL
Qty: 90 TABLET | Refills: 3 | Status: SHIPPED | OUTPATIENT
Start: 2024-03-15

## 2024-03-15 NOTE — TELEPHONE ENCOUNTER
No care due was identified.  [facility] Embedded Care Due Messages. Reference number: [id number].   3/15/2024 12:34:35 AM CDT

## 2024-03-15 NOTE — TELEPHONE ENCOUNTER
Refill Decision Note   Pavel Graff  is requesting a refill authorization.  Brief Assessment and Rationale for Refill:  Approve     Medication Therapy Plan:         Comments:     Note composed:6:01 AM 03/15/2024

## 2024-03-20 ENCOUNTER — PATIENT MESSAGE (OUTPATIENT)
Dept: INTERNAL MEDICINE | Facility: CLINIC | Age: 74
End: 2024-03-20
Payer: MEDICARE

## 2024-03-20 NOTE — TELEPHONE ENCOUNTER
Attempted to call patient's wife to discuss appointment or concerns, no answer left voicemail.       Sent portal msg.

## 2024-03-27 DIAGNOSIS — M25.562 ARTHRALGIA OF LEFT KNEE: ICD-10-CM

## 2024-03-27 DIAGNOSIS — M16.12 PRIMARY OSTEOARTHRITIS OF LEFT HIP: ICD-10-CM

## 2024-03-27 DIAGNOSIS — M25.552 ARTHRALGIA OF HIP OR THIGH, LEFT: Primary | ICD-10-CM

## 2024-03-28 ENCOUNTER — HOSPITAL ENCOUNTER (OUTPATIENT)
Dept: RADIOLOGY | Facility: HOSPITAL | Age: 74
Discharge: HOME | End: 2024-03-28
Attending: INTERNAL MEDICINE
Payer: MEDICARE

## 2024-03-28 DIAGNOSIS — M25.552 ARTHRALGIA OF HIP OR THIGH, LEFT: ICD-10-CM

## 2024-03-28 DIAGNOSIS — M25.562 ARTHRALGIA OF LEFT KNEE: ICD-10-CM

## 2024-03-28 PROCEDURE — 73562 X-RAY EXAM OF KNEE 3: CPT | Mod: 26,LT,, | Performed by: RADIOLOGY

## 2024-03-28 PROCEDURE — 73562 X-RAY EXAM OF KNEE 3: CPT | Mod: TC,LT

## 2024-03-28 PROCEDURE — 73560 X-RAY EXAM OF KNEE 1 OR 2: CPT | Mod: 26,59,RT, | Performed by: RADIOLOGY

## 2024-03-28 PROCEDURE — 73521 X-RAY EXAM HIPS BI 2 VIEWS: CPT | Mod: TC

## 2024-03-28 PROCEDURE — 73521 X-RAY EXAM HIPS BI 2 VIEWS: CPT | Mod: 26,,, | Performed by: RADIOLOGY

## 2024-03-28 PROCEDURE — 73560 X-RAY EXAM OF KNEE 1 OR 2: CPT | Mod: TC,59,RT

## 2024-04-04 ENCOUNTER — PATIENT MESSAGE (OUTPATIENT)
Dept: INTERNAL MEDICINE | Facility: CLINIC | Age: 74
End: 2024-04-04
Payer: MEDICARE

## 2024-04-04 DIAGNOSIS — M16.12 PRIMARY OSTEOARTHRITIS OF LEFT HIP: Primary | ICD-10-CM

## 2024-04-11 ENCOUNTER — OFFICE VISIT (OUTPATIENT)
Dept: ORTHOPEDICS | Facility: CLINIC | Age: 74
End: 2024-04-11
Payer: MEDICARE

## 2024-04-11 VITALS — WEIGHT: 242.5 LBS | BODY MASS INDEX: 32.85 KG/M2 | HEIGHT: 72 IN

## 2024-04-11 DIAGNOSIS — M16.12 PRIMARY OSTEOARTHRITIS OF LEFT HIP: Primary | ICD-10-CM

## 2024-04-11 PROCEDURE — 3288F FALL RISK ASSESSMENT DOCD: CPT | Mod: CPTII,S$GLB,,

## 2024-04-11 PROCEDURE — 99213 OFFICE O/P EST LOW 20 MIN: CPT | Mod: S$GLB,,,

## 2024-04-11 PROCEDURE — 1125F AMNT PAIN NOTED PAIN PRSNT: CPT | Mod: CPTII,S$GLB,,

## 2024-04-11 PROCEDURE — 1157F ADVNC CARE PLAN IN RCRD: CPT | Mod: CPTII,S$GLB,,

## 2024-04-11 PROCEDURE — 1160F RVW MEDS BY RX/DR IN RCRD: CPT | Mod: CPTII,S$GLB,,

## 2024-04-11 PROCEDURE — 99999 PR PBB SHADOW E&M-EST. PATIENT-LVL III: CPT | Mod: PBBFAC,,,

## 2024-04-11 PROCEDURE — 4010F ACE/ARB THERAPY RXD/TAKEN: CPT | Mod: CPTII,S$GLB,,

## 2024-04-11 PROCEDURE — 1159F MED LIST DOCD IN RCRD: CPT | Mod: CPTII,S$GLB,,

## 2024-04-11 PROCEDURE — 1101F PT FALLS ASSESS-DOCD LE1/YR: CPT | Mod: CPTII,S$GLB,,

## 2024-04-11 NOTE — PROGRESS NOTES
SUBJECTIVE:     Chief Complaint & History of Present Illness:  Pavel Graff is a 74 y.o. male with a past medical history of right KLAUS in 2000 by Dr. [facility]ner who is seen here today with a complaint of left hip pain and tightness. The pain has been present for about 3 years. The patient describes the pain as a moderate dull throb located in the groin. The pain is worse with any weight-bearing and sitting and improved by nothing. The patient notes left hip stiffness but no associated injury, effusion, weakness, numbness, or tingling of the left leg.  He has not received any prior treatments of the left hip.    .  Past Medical History:   Diagnosis Date    *Atrial fibrillation     Anticoagulant long-term use     Atrial fibrillation     Hyperlipidemia     Hypertension     Sleep apnea     Squamous cell carcinoma of skin     Stroke     TIA    TIA (transient ischemic attack)        Past Surgical History:   Procedure Laterality Date    ABLATION N/A 9/28/2018    Procedure: ABLATION;  Surgeon: Romaine Howard MD;  Location: [facility] CATH LAB;  Service: Cardiology;  Laterality: N/A;  AF, ERASMO, PVI, RFA, MAME, Gen, MB, 3 Prep    CATARACT EXTRACTION W/  INTRAOCULAR LENS IMPLANT Left 1/11/10    CATARACT EXTRACTION W/  INTRAOCULAR LENS IMPLANT Right 12/14/2017    NANCYK/ Dr. Jay    COLONOSCOPY N/A 3/26/2019    Procedure: COLONOSCOPY;  Surgeon: Mauro Dye MD;  Location: [facility] ENDO ([address] [place] [address]);  Service: Endoscopy;  Laterality: N/A;  ok to hold Eliquis x 2 days per Dr. Howard-MS    CORNEAL TRANSPLANT Left 1997    PKP OS    CORNEAL TRANSPLANT Left 03/23/2017    CORNEAL TRANSPLANT Right 12/14/2017    DMEK/ [employer]; Dr. Jay    HERNIA REPAIR      HIP ARTHROPLASTY      OCCLUSION OF LEFT ATRIAL APPENDAGE N/A 5/9/2022    Procedure: Left atrial appendage occlusion;  Surgeon: Romaine Howard MD;  Location: [facility] EP LAB;  Service: Cardiology;  Laterality: N/A;  afib, watchman, BSCI, erasmo, anes, MB/DM, 3prep    ROTATOR CUFF REPAIR       TRANSESOPHAGEAL ECHOCARDIOGRAPHY N/A 5/9/2022    Procedure: ECHOCARDIOGRAM, TRANSESOPHAGEAL;  Surgeon: Fanta Dacosta MD;  Location: [facility] LAB;  Service: Cardiology;  Laterality: N/A;       Family History   Problem Relation Age of Onset    Diabetes Mother     Heart disease Mother     Hyperlipidemia Mother     Heart disease Father     Colon cancer Father         colon    Cancer Father     Heart disease Brother     Crohn's disease Sister     Hyperlipidemia Sister     Hypertension Sister     Hyperlipidemia Sister     Sudden death Neg Hx     Amblyopia Neg Hx     Blindness Neg Hx     Cataracts Neg Hx     Glaucoma Neg Hx     Macular degeneration Neg Hx     Retinal detachment Neg Hx     Strabismus Neg Hx     Stroke Neg Hx     Thyroid disease Neg Hx     Melanoma Neg Hx        Review of patient's allergies indicates:  No Known Allergies      Current Outpatient Medications:     acetaZOLAMIDE (DIAMOX) 125 MG Tab, Take 1 tablet (125 mg total) by mouth 2 (two) times daily. for 5 days, Disp: 10 tablet, Rfl: 0    aspirin 81 MG Chew, Take 81 mg by mouth once daily., Disp: , Rfl:     cetirizine (ZYRTEC) 10 MG tablet, Take 1 tablet (10 mg total) by mouth once daily., Disp: 30 tablet, Rfl: 0    fish oil-omega-3 fatty acids 300-1,000 mg capsule, Take 1 g by mouth once daily., Disp: , Rfl:     fluorouraciL (EFUDEX) 5 % cream, AAA at nose bid x 2 weeks, Disp: 40 g, Rfl: 1    hydroCHLOROthiazide (HYDRODIURIL) 25 MG tablet, TAKE 1 TABLET BY MOUTH EVERY DAY, Disp: 90 tablet, Rfl: 3    ketoconazole (NIZORAL) 2 % shampoo, Wash hair with medicated shampoo at least 2x/week - let sit on scalp at least 5 minutes prior to rinsing, Disp: 120 mL, Rfl: 5    losartan (COZAAR) 50 MG tablet, TAKE 1 TABLET BY MOUTH EVERY DAY, Disp: 90 tablet, Rfl: 3    metoprolol succinate (TOPROL-XL) 25 MG 24 hr tablet, TAKE 1 TABLET BY MOUTH EVERY DAY, Disp: 90 tablet, Rfl: 3    omeprazole (PRILOSEC) 20 MG capsule, Take 1 capsule (20 mg total) by mouth once  daily. for 14 days, Disp: 14 capsule, Rfl: 0    pravastatin (PRAVACHOL) 20 MG tablet, TAKE 1 TABLET BY MOUTH EVERY DAY, Disp: 90 tablet, Rfl: 3    prednisoLONE acetate (PRED FORTE) 1 % DrpS, INSTILL 1 DROP INTO BOTH EYES TWICE DAILY, Disp: 5 mL, Rfl: 3    timolol maleate 0.5% (TIMOPTIC) 0.5 % Drop, INSTILL 1 DROP INTO LEFT EYE TWICE DAILY, Disp: 15 mL, Rfl: 2  No current facility-administered medications for this visit.    Facility-Administered Medications Ordered in Other Visits:     0.9%  NaCl infusion, , Intravenous, Continuous, Zoey Newman, NP, Stopped at 09/28/18 1204      Review of Systems:  ROS:  The updated medical history is in the chart and has been reviewed. A review of systems is updated and there is no reported vision changes, ear/nose/mouth/throat complaints, chest pain, shortness of breath, abdominal pain, urological complaints, fevers or chills, psychiatric complaints. Musculoskeletal and neurologcial symptoms are as documented. All other systems are negative.      OBJECTIVE:     PHYSICAL EXAM:  There were no vitals taken for this visit.  General: Pleasant, cooperative, NAD.  HEENT: NCAT, sclera nonicteric.  Lungs: Respirations are equal and unlabored.   Abdomen: Soft and non-tender.  CV: 2+ bilateral upper and lower extremity pulses.  Neuro: Sensation intact to light touch.  Skin: Intact throughout LE with no rashes, erythema, or lesions.  Extremities: No LE edema, NVI lower extremities. antalgic gait.      leftHip Exam:  TTP: None  70 degrees flexion  10 degrees extension   10 degrees internal rotation  20 degrees external rotation  30 degrees abduction  20 degrees adduction   10 flexion contracture   negative KIM   positive FADIR  positive [facility]    Back Exam  full range of motion, no tenderness, palpable spasm or pain on motion, normal reflexes and strength bilateral lower extremities, sensory exam intact bilateral lower extremities      RADIOGRAPHS:  X-rays of the bilateral hips  taken on 03/27/2024 personally reviewed. Imaging reveals moderate to severe superior joint space narrowing with osteophytes along the femoral neck of the left hip.  Right hip reveals a well aligned prosthesis with no dislocations or fractures.    ASSESSMENT:       ICD-10-CM ICD-9-CM   1. Primary osteoarthritis of left hip  M16.12 715.15       PLAN:     We discussed with the patient at length all the different treatment options available including anti-inflammatories, acetaminophen, rest, ice, lower extremity strengthening exercise, occasional cortisone injections for temporary relief, and finally total hip arthroplasty.     - Patient interested in having a left hip total arthroplasty with Dr. Fox.    - Patient is scheduled to see Dr. Fox on 04/19/2024 for for evaluation and surgical planning.

## 2024-04-19 ENCOUNTER — OFFICE VISIT (OUTPATIENT)
Dept: ORTHOPEDICS | Facility: CLINIC | Age: 74
End: 2024-04-19
Payer: MEDICARE

## 2024-04-19 VITALS — HEIGHT: 72 IN | WEIGHT: 239.63 LBS | BODY MASS INDEX: 32.46 KG/M2

## 2024-04-19 DIAGNOSIS — M16.12 PRIMARY OSTEOARTHRITIS OF LEFT HIP: Primary | ICD-10-CM

## 2024-04-19 DIAGNOSIS — Z96.641 HISTORY OF TOTAL HIP ARTHROPLASTY, RIGHT: ICD-10-CM

## 2024-04-19 PROCEDURE — 99999 PR PBB SHADOW E&M-EST. PATIENT-LVL III: CPT | Mod: PBBFAC,,, | Performed by: ORTHOPAEDIC SURGERY

## 2024-04-19 PROCEDURE — 1125F AMNT PAIN NOTED PAIN PRSNT: CPT | Mod: CPTII,S$GLB,, | Performed by: ORTHOPAEDIC SURGERY

## 2024-04-19 PROCEDURE — 1101F PT FALLS ASSESS-DOCD LE1/YR: CPT | Mod: CPTII,S$GLB,, | Performed by: ORTHOPAEDIC SURGERY

## 2024-04-19 PROCEDURE — 1157F ADVNC CARE PLAN IN RCRD: CPT | Mod: CPTII,S$GLB,, | Performed by: ORTHOPAEDIC SURGERY

## 2024-04-19 PROCEDURE — 4010F ACE/ARB THERAPY RXD/TAKEN: CPT | Mod: CPTII,S$GLB,, | Performed by: ORTHOPAEDIC SURGERY

## 2024-04-19 PROCEDURE — 1160F RVW MEDS BY RX/DR IN RCRD: CPT | Mod: CPTII,S$GLB,, | Performed by: ORTHOPAEDIC SURGERY

## 2024-04-19 PROCEDURE — 3288F FALL RISK ASSESSMENT DOCD: CPT | Mod: CPTII,S$GLB,, | Performed by: ORTHOPAEDIC SURGERY

## 2024-04-19 PROCEDURE — 99213 OFFICE O/P EST LOW 20 MIN: CPT | Mod: S$GLB,,, | Performed by: ORTHOPAEDIC SURGERY

## 2024-04-19 PROCEDURE — 1159F MED LIST DOCD IN RCRD: CPT | Mod: CPTII,S$GLB,, | Performed by: ORTHOPAEDIC SURGERY

## 2024-04-23 NOTE — PROGRESS NOTES
Subjective:     HPI:   Pavel Graff is a 74 y.o. male who presents for L hip OA ref by Dr Fox for KLAUS    History of Present Illness  The patient presents for evaluation of left hip pain.    The patient has been experiencing discomfort in his left hip for several years, particularly when in a supine position. The pain is primarily localized to the anterior hip and radiates into the groin. He reports limited flexibility in his hip and back, but denies any radiating pain down his legs or toes. His mobility is compromised, as evidenced by his inability to maintain an upright posture. His gait is compromised. He recalls a childhood diagnosis of stiff back, which necessitated stretching exercises. Following the  service, he experienced difficulty in maintaining even foot mobility, with one step being shorter than the other. He speculates that the pain may be related to his hip. He has attempted to manage his pain with Tylenol, but can not attribute the source of his hip or knee pain. The patient underwent a right hip arthroplasty performed by Dr. O[facility]ner in 01/2000, which was uneventful without any complications. He was on crutches for approximately 6 weeks post-surgery.    Supplemental Information  He had a TIA and was diagnosed with atrial fibrillation. He underwent a WATCHMAN procedure. He is on baby aspirin a day. He is not on any other blood thinners. He has been using a CPAP machine for sleep apnea for 5 years. He is not on Flomax for his prostate. He urinates about 3 to 4 times at night. He stays hydrated during the day. He continues to be active, working as a . He tore the rotator cuff. He denies any heart attacks, no stents in his heart, no diabetes, no history of blood clots in the veins of his legs or lungs.        Hx: R post KLAUS (2000s) with Dr. Ochsner he thinks around jan 2000  The patient reported no issues with his inicision healing and no complications with infection after surgery. The  patient spent 4 night(s) in the hospital. The patient had 2 weeks of home health physical therapy. The patient stated that he healed well since surgery.   -no records/imaging in epic or legacy documents       Past surgical history: None.     Hx DVT: None.    Medications: Icy Hot/Voltaren Gel 1%, has tried tylenol     Injections: None.     Physical Therapy: None - not indicated for bone on bone arthritis    Assistive Devices: None.     Walkin mile    Limitations:  not able to  well, not able to keep up with his grandchildren when he watches them       Occupation: The patient is a  and [employer])    Social support: The patient stated that they live at home with their wife. The patient stated that their wife would be able to help take care of them if they were to have surgery.        ROS:  The updated medical history is in the chart and has been reviewed. A review of systems is updated and there is no reported vision changes, ear/nose/mouth/throat complaints,  chest pain, shortness of breath, abdominal pain, urological complaints, fevers or chills, psychiatric complaints. Musculoskeletal and neurologcial symptoms are as documented. All other systems are negative.      Objective:   Exam:  There were no vitals filed for this visit.  There is no height or weight on file to calculate BMI.    Physical examination included assessment of the patient's general appearance with particular attention to development, nutrition, body habitus, attention to grooming, and any evidence of distress.  Constitutional: The patient is a well-developed, well-nourished patient in no acute distress.   Cardiovascular: Vascular examination included warmth and capillary refill as well inspection for edema and assessment of pedal pulses. Pulses are palpable and regular.  Musculoskeletal: Gait was assessed as to whether it was steady, non-antalgic, and/or required the use of an assist device. The patient was  also asked to walk independently and get onto the examination table.  Skin: The skin was examined for any obvious rashes or lesions in the extremity.  Neurologic: Sensation is intact to light touch in the extremity. The patient has good coordination without hyperreflexia and is alert and oriented to person, place and time and has normal mood and affect.     All of the above were examined and found to be within normal limits except for the following pertinent clinical findings:    Physical Exam  The patient has an antalgic gait with a limp favoring the left side, walks with hip flexion contracture, slight sagittal forward balance, negative Trendelenburg. He is nontender over the greater trochanter. He has discomfort with active straight leg raise. He has 10 to 100 hip flexion, so a 10 degree hip flexion contracture. He has 20 abduction 20, adduction 20, external 0, internal rotation which recreates his symptoms with both hips flexed 10 degrees. No significant limb length discrepancy supine. He is distally neurovascularly intact with good strength, sensation and pulses. He has about a 5 degree knee flexion contracture. No extensor lag. Valgus deformity. He is mildly tender at the lateral joint line consistent with valgus knee posttraumatic osteoarthritis, but I think most of his hip flexion contracture is actually coming from his hip joint. His skin is intact to the anterior hip on the right hip. He has got a well-healed posterior total hip incision. No groin pain with straight leg raise. No pain with active, passive range of motion of his hip joint.            Imaging:    Results  Imaging  Right hip x-ray shows no obvious signs of wear, loosening, or displacement. Left hip x-ray shows bone-on-bone arthritis and a stiff spine.    HIP L ARTHRITIS         Indication:  Left hip pain  Exam Ordered: Radiographs include an anteroposterior pelvis, an anteroposterior and lateral view of the proximal femur including the hip  joint.  Details of Examination: Exam shows evidence of joint space narrowing, osteophyte formation, and subchondral sclerosis, all consistent with degenerative arthritis of the hip.  No other significant findings are noted.  Impression:  Degenerative Arthritis, Left Hip    Tg3 bone on bone severe L hip arthritis      Assessment:     No diagnosis found.     TIA - dx'd a fib  A fib s/p watchman no on ASA 81mg daily no thinners  SIMON  no cpap  BPH no meds, 3-4 times/night    L tibial plateau fx, non-op, KLG4 valgus OA post traumatic    Plan:     Assessment & Plan  1. Left hip arthritis.  The patient's left hip exhibits significant bone-on-bone contact, leading to stiffness, soreness, and swelling. Additionally, the patient has a stiff spine. The patient is a suitable candidate for hip arthroplasty. Preoperative medical clearance will be initiated, including blood work to ensure cardiac and pulmonary health. A preoperative visit with my nurse practitioner or PA, Thais or Elías, will be scheduled to complete necessary paperwork. Given the patient's WATCHMAN procedure, the surgery can be scheduled at our orthopedic hospital, with clearance from his cardiologist.    The above findings were discussed with patient length. We discussed the risks of conservative versus surgical management of the patients hip arthritis. Conservative management consisting of anti-inflammatory medications, weight loss, physical therapy, and activity modification was discussed at length. At this point considering the patient's level of activity, pain, and radiographic findings I recommend KLAUS    This patient has significant symptoms in their hip that are affecting their quality of life and daily activities.  They have tried non-operative treatment including analgesics, an exercise program, and activity modification, but the symptoms have persisted. I believe they make a good candidate for hip arthroplasty.     The risks and benefits of hip  arthroplasty have been discussed with the patient which include, but are not limited to infections (including severe sequelae), potential component failure, fracture, DVT, pulmonary embolus, nerve palsy, dislocation, leg length inequality, persistent pain, wound healing complications, transfusions, medical complications and death.     We discussed surgical options including implant type and why I believe the implant selected is a good match for the patient's needs. The patient expressed understanding and agrees to proceed with the planned surgery.     Pre-operative planning will include the following:  A pre-surgical evaluation will be arranged.  Pre-op orders will be placed.  We will make arrangements with the operating room for proper time and staffing.  We will make Social Service arrangements for the patient.    Approach: Anterior    Implants:   Company: [employer]  Cup: [place]  Stem: c-stem, 3 bags simplex P    DVT prophylaxis: ASA 81mg BID x1 month  Dispo: home health PT    Admission status: Outpatient/23hr OBS               Location: [place]                              L ant hybrid KLAUS  Going to cardiologist may 5th  Wants surgery end of may after spring training  POUR protocol, give bethanechol post op, flomax pre-op  1 night  R+B today  KLAUS info    No orders of the defined types were placed in this encounter.      This note was generated with the assistance of ambient listening technology. Verbal consent was obtained by the patient and accompanying visitor(s) for the recording of patient appointment to facilitate this note. I attest to having reviewed and edited the generated note for accuracy, though some syntax or spelling errors may persist. Please contact the author of this note for any clarification.            Past Medical History:   Diagnosis Date    *Atrial fibrillation     Anticoagulant long-term use     Atrial fibrillation     Hyperlipidemia     Hypertension     Sleep apnea     Squamous cell  carcinoma of skin     Stroke     TIA    TIA (transient ischemic attack)        Past Surgical History:   Procedure Laterality Date    ABLATION N/A 9/28/2018    Procedure: ABLATION;  Surgeon: Romaine Howard MD;  Location: [facility] CATH LAB;  Service: Cardiology;  Laterality: N/A;  AF, ERASMO, PVI, RFA, MAME, Gen, MB, 3 Prep    CATARACT EXTRACTION W/  INTRAOCULAR LENS IMPLANT Left 1/11/10    CATARACT EXTRACTION W/  INTRAOCULAR LENS IMPLANT Right 12/14/2017    DMEK/ Dr. Jay    COLONOSCOPY N/A 3/26/2019    Procedure: COLONOSCOPY;  Surgeon: Mauro Dye MD;  Location: [facility] ENDO (4TH FLR);  Service: Endoscopy;  Laterality: N/A;  ok to hold Eliquis x 2 days per Dr. Howard-MS    CORNEAL TRANSPLANT Left 1997    PKP OS    CORNEAL TRANSPLANT Left 03/23/2017    CORNEAL TRANSPLANT Right 12/14/2017    DMEK/ [employer]; Dr. Jay    HERNIA REPAIR      HIP ARTHROPLASTY      OCCLUSION OF LEFT ATRIAL APPENDAGE N/A 5/9/2022    Procedure: Left atrial appendage occlusion;  Surgeon: Romaine Howard MD;  Location: [facility] EP LAB;  Service: Cardiology;  Laterality: N/A;  afib, watchman, BSCI, erasmo, anes, MB/DM, 3prep    ROTATOR CUFF REPAIR      TRANSESOPHAGEAL ECHOCARDIOGRAPHY N/A 5/9/2022    Procedure: ECHOCARDIOGRAM, TRANSESOPHAGEAL;  Surgeon: Fanta Dacosta MD;  Location: [facility] EP LAB;  Service: Cardiology;  Laterality: N/A;       Family History   Problem Relation Name Age of Onset    Diabetes Mother      Heart disease Mother      Hyperlipidemia Mother      Heart disease Father      Colon cancer Father          colon    Cancer Father      Heart disease Brother      Crohn's disease Sister      Hyperlipidemia Sister      Hypertension Sister      Hyperlipidemia Sister      Sudden death Neg Hx      Amblyopia Neg Hx      Blindness Neg Hx      Cataracts Neg Hx      Glaucoma Neg Hx      Macular degeneration Neg Hx      Retinal detachment Neg Hx      Strabismus Neg Hx      Stroke Neg Hx      Thyroid disease Neg Hx      Melanoma Neg Hx         Social  History     Socioeconomic History    Marital status:     Number of children: 3   Occupational History     Employer: [email or web address]   Tobacco Use    Smoking status: Never     Passive exposure: Never    Smokeless tobacco: Never   Substance and Sexual Activity    Alcohol use: No    Drug use: No    Sexual activity: Yes     Partners: Female

## 2024-04-24 ENCOUNTER — OFFICE VISIT (OUTPATIENT)
Dept: ORTHOPEDICS | Facility: CLINIC | Age: 74
End: 2024-04-24
Payer: MEDICARE

## 2024-04-24 VITALS — BODY MASS INDEX: 32.46 KG/M2 | WEIGHT: 239.63 LBS | HEIGHT: 72 IN

## 2024-04-24 DIAGNOSIS — M16.12 PRIMARY OSTEOARTHRITIS OF LEFT HIP: Primary | ICD-10-CM

## 2024-04-24 PROBLEM — Z96.641 HISTORY OF TOTAL HIP ARTHROPLASTY, RIGHT: Status: ACTIVE | Noted: 2024-04-24

## 2024-04-24 PROCEDURE — 1125F AMNT PAIN NOTED PAIN PRSNT: CPT | Mod: CPTII,S$GLB,, | Performed by: ORTHOPAEDIC SURGERY

## 2024-04-24 PROCEDURE — 4010F ACE/ARB THERAPY RXD/TAKEN: CPT | Mod: CPTII,S$GLB,, | Performed by: ORTHOPAEDIC SURGERY

## 2024-04-24 PROCEDURE — 99215 OFFICE O/P EST HI 40 MIN: CPT | Mod: S$GLB,,, | Performed by: ORTHOPAEDIC SURGERY

## 2024-04-24 PROCEDURE — 1157F ADVNC CARE PLAN IN RCRD: CPT | Mod: CPTII,S$GLB,, | Performed by: ORTHOPAEDIC SURGERY

## 2024-04-24 PROCEDURE — 99999 PR PBB SHADOW E&M-EST. PATIENT-LVL II: CPT | Mod: PBBFAC,,, | Performed by: ORTHOPAEDIC SURGERY

## 2024-04-24 NOTE — PROGRESS NOTES
Subjective:      Patient ID: Pavel Graff is a 74 y.o. male.    Chief Complaint:   Pain of the Left Hip    HPI  He comes in today to discuss left KLAUS.  From his recent office note with another provider in the clinic, reviewed with him and confirmed:  Pavel Graff is a 74 y.o. male with a past medical history of right KLAUS in 2000 by Dr. O[facility]ner who is seen here today with a complaint of left hip pain and tightness. The pain has been present for about 3 years. The patient describes the pain as a moderate dull throb located in the groin. The pain is worse with any weight-bearing and sitting and improved by nothing. The patient notes left hip stiffness but no associated injury, effusion, weakness, numbness, or tingling of the left leg.  He has not received any prior treatments of the left hip.         Objective:        Ortho/SPM Exam    Positive C sign, positive Stinchfield sign.  No tenderness at the greater trochanter or iliotibial band.  No tenderness at the SI joint.  The patient has pain in the groin with passive flexion and internal rotation of the hip which is limited.  Knee benign without tenderness or effusion, with normal range of motion.  Skin intact.  Distal neurovascular intact.  Flip test negative.        IMAGING:  Recent x-rays show well-fixed and positioned right KLAUS components without signs of loosening, osteolysis, wear, other complications.  There is bone-on-bone DJD of the left hip with mild lateral femoral head subluxation and small marginal osteophytes  Assessment:   Advanced DJD, left hip      Plan:   He is referred to Dr. Gallo to discuss surgical planning.    The patient's pathophysiology was explained in detail with reference to x-rays, models, other visual aids as appropriate.  Treatment options were discussed in detail.  Questions were invited and answered to the patient's satisfaction.      Romaine Fox MD  Orthopedic Surgery

## 2024-04-25 ENCOUNTER — PATIENT MESSAGE (OUTPATIENT)
Dept: ORTHOPEDICS | Facility: CLINIC | Age: 74
End: 2024-04-25
Payer: MEDICARE

## 2024-05-01 ENCOUNTER — TELEPHONE (OUTPATIENT)
Dept: ORTHOPEDICS | Facility: CLINIC | Age: 74
End: 2024-05-01
Payer: MEDICARE

## 2024-05-01 NOTE — TELEPHONE ENCOUNTER
I called the patient today regarding her voice message. I reviewed the patient's appointment with the wife as the  didn't talk  about his upcoming appointments. I reviewed all of the appointments with them. The patient verbalized understanding and has no further questions.           ----- Message from Kayla Fernandez sent at 5/1/2024  9:00 AM CDT -----  Regarding: Pt's Wife Kiana Graff called to speak to the nurse regarding clarification for the pt's upcoming procedure and she would like a call back today  Patient Advice:    Pt's Wife Kiana Graff called to speak to the nurse regarding clarification for the pt's upcoming procedure and she would like a call back today    Mrs Graff can be reached at [phone]

## 2024-05-06 NOTE — PROGRESS NOTES
Subjective   Patient ID:  Pavel Graff is a 74 y.o. male who presents for follow-up of Atrial Fibrillation      74 year old male with history of HTN, TIA, SIMON, HLD and persistent AF since July 2012.  DCCV was attempted with early recurrence of AF.  He was rate controlled and did not note significant symptoms at the time - so a rate control strategy was pursued.  He notes though, that ever since he has been in AF - he has been much more easily fatigued and tired, not himself.  He denies overt palpitation or SOB.  He denies chest pain.    Mr. Graff underwent a PVI (07/21/16) with successful pulmonary vein antral isolation. Since the procedure, Mr. Graff reports feeling well with only two 3-second episodes of palpitations; he denies further recurrence.  denies chest pain, SOB/BLANCO, dizziness, or syncope. Mr. Graff has noted improvement in his energy level since the procedure.     12/16: Continues improved symptoms. 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 Remains on pradaxa and metoprolol.     6/17: No recurrence of AF since PVI. Feels well. No active complaints.     2/18: AF recurrence without symptoms. Remains on apixaban. The patient denies interval chest pain, sob, palpitations, syncope, near syncope.     8/18: Stable symptoms with slightly more fatigue. He asks about repeat ablation at the end of the season.     11/18: Re-do PVI 9/28/18 with touch up PVI, posterior wall isolation and CTI line. Feels some what fatigued, no post procedure complications.     5/19: No recurrence since PVI 9/18. Symptoms improved, stable.     3/22:Onset of easy bleeding and bruising. He has had events with spontaneous sub-conjunctival bleeding that are increasing in frequency and associated with pain. Remains in NSR.    9/22: 5/9/22 LAAO with cessation of OAC 6/20/22. Now on DAPT. No recurrence    3/23: No recurrence, now off plavix.     Interval history: No recurrence    CHADSVASC: 4  HASBLED: 4    2D echo  4/16    1 - Eccentric hypertrophy.     2 - Normal left ventricular systolic function (EF 60-65%).     3 - Right ventricular enlargement with normal systolic function.     4 - Biatrial enlargement.     5 - Mild aortic regurgitation.     6 - Mild mitral regurgitation.     7 - Mild tricuspid regurgitation.     8 - The estimated PA systolic pressure is 33 mmHg.     9 - Mildly elevated central venous pressure.     PET stress 5/16  1. There is no evidence of a discrete hemodynamically significant coronary stenosis.   2. Although no clinically significant stress defect is seen, there is resting flow heterogeneity that improves after Dipyridamole. These results suggest mild endothelial dysfunction due to elevated cholesterol, high blood pressure and diabetes without clinically significant,      localized perfusion defects.   3. Resting wall motion is physiologic. Stress wall motion is physiologic.   4. LV function is normal at rest and stress.  (normal is >= 51%)  5. The ventricular volumes are normal at rest and stress.   6. The extracardiac distribution of radioactivity is normal.   7. There was no previous study available to compare.    Past Medical History:  No date: *Atrial fibrillation  No date: Anticoagulant long-term use  No date: Atrial fibrillation  No date: Hyperlipidemia  No date: Hypertension  No date: Sleep apnea  No date: Squamous cell carcinoma of skin  No date: Stroke      Comment:  TIA  No date: TIA (transient ischemic attack)    Past Surgical History:  9/28/2018: ABLATION; N/A      Comment:  Procedure: ABLATION;  Surgeon: Romaine Howard MD;                 Location: [facility] CATH LAB;  Service: Cardiology;                 Laterality: N/A;  AF, KAM, PVI, RFA, MAME, Gen, MB, 3 Prep  1/11/10: CATARACT EXTRACTION W/  INTRAOCULAR LENS IMPLANT; Left  12/14/2017: CATARACT EXTRACTION W/  INTRAOCULAR LENS IMPLANT; Right      Comment:  NANCYK/ Dr. Jay  3/26/2019: COLONOSCOPY; N/A      Comment:  Procedure:  COLONOSCOPY;  Surgeon: Mauro Dye MD;                 Location: [facility] ENDO (4TH FLR);  Service: Endoscopy;                 Laterality: N/A;  ok to hold Eliquis x 2 days per Dr. Howard-MS  1997: CORNEAL TRANSPLANT; Left      Comment:  PKP OS  03/23/2017: CORNEAL TRANSPLANT; Left  12/14/2017: CORNEAL TRANSPLANT; Right      Comment:  DMEK/ [place]; Dr. Jay  No date: HERNIA REPAIR  No date: HIP ARTHROPLASTY  5/9/2022: OCCLUSION OF LEFT ATRIAL APPENDAGE; N/A      Comment:  Procedure: Left atrial appendage occlusion;  Surgeon:                Romaine Howard MD;  Location: [facility] EP LAB;  Service:               Cardiology;  Laterality: N/A;  afib, watchman, BSCI, erasmo,               anes, MB/DM, 3prep  No date: ROTATOR CUFF REPAIR  5/9/2022: TRANSESOPHAGEAL ECHOCARDIOGRAPHY; N/A      Comment:  Procedure: ECHOCARDIOGRAM, TRANSESOPHAGEAL;  Surgeon:                Fanta Dacosta MD;  Location: [facility] EP LAB;                 Service: Cardiology;  Laterality: N/A;    Social History    Socioeconomic History      Marital status:       Number of children: 3    Occupational History        Employer: [employer]    Tobacco Use      Smoking status: Never        Passive exposure: Never      Smokeless tobacco: Never    Substance and Sexual Activity      Alcohol use: No      Drug use: No      Sexual activity: Yes        Partners: Female      Review of patient's family history indicates:  Problem: Diabetes      Relation: Mother          Name:               Age of Onset: (Not Specified)  Problem: Heart disease      Relation: Mother          Name:               Age of Onset: (Not Specified)  Problem: Hyperlipidemia      Relation: Mother          Name:               Age of Onset: (Not Specified)  Problem: Heart disease      Relation: Father          Name:               Age of Onset: (Not Specified)  Problem: Colon cancer      Relation: Father          Name:               Age of Onset: (Not  Specified)              Comment: colon  Problem: Cancer      Relation: Father          Name:               Age of Onset: (Not Specified)  Problem: Heart disease      Relation: Brother          Name:               Age of Onset: (Not Specified)  Problem: Crohn's disease      Relation: Sister          Name:               Age of Onset: (Not Specified)  Problem: Hyperlipidemia      Relation: Sister          Name:               Age of Onset: (Not Specified)  Problem: Hypertension      Relation: Sister          Name:               Age of Onset: (Not Specified)  Problem: Hyperlipidemia      Relation: Sister          Name:               Age of Onset: (Not Specified)  Problem: Sudden death      Relation: Neg Hx          Name:               Age of Onset: (Not Specified)  Problem: Amblyopia      Relation: Neg Hx          Name:               Age of Onset: (Not Specified)  Problem: Blindness      Relation: Neg Hx          Name:               Age of Onset: (Not Specified)  Problem: Cataracts      Relation: Neg Hx          Name:               Age of Onset: (Not Specified)  Problem: Glaucoma      Relation: Neg Hx          Name:               Age of Onset: (Not Specified)  Problem: Macular degeneration      Relation: Neg Hx          Name:               Age of Onset: (Not Specified)  Problem: Retinal detachment      Relation: Neg Hx          Name:               Age of Onset: (Not Specified)  Problem: Strabismus      Relation: Neg Hx          Name:               Age of Onset: (Not Specified)  Problem: Stroke      Relation: Neg Hx          Name:               Age of Onset: (Not Specified)  Problem: Thyroid disease      Relation: Neg Hx          Name:               Age of Onset: (Not Specified)  Problem: Melanoma      Relation: Neg Hx          Name:               Age of Onset: (Not Specified)    Current Outpatient Medications:  acetaZOLAMIDE (DIAMOX) 125 MG Tab, Take 1 tablet (125 mg total) by mouth 2 (two) times daily. for 5 days, Disp:  10 tablet, Rfl: 0  aspirin 81 MG Chew, Take 81 mg by mouth once daily., Disp: , Rfl:   cetirizine (ZYRTEC) 10 MG tablet, Take 1 tablet (10 mg total) by mouth once daily., Disp: 30 tablet, Rfl: 0  fish oil-omega-3 fatty acids 300-1,000 mg capsule, Take 1 g by mouth once daily., Disp: , Rfl:   fluorouraciL (EFUDEX) 5 % cream, AAA at nose bid x 2 weeks, Disp: 40 g, Rfl: 1  hydroCHLOROthiazide (HYDRODIURIL) 25 MG tablet, TAKE 1 TABLET BY MOUTH EVERY DAY, Disp: 90 tablet, Rfl: 3  ketoconazole (NIZORAL) 2 % shampoo, Wash hair with medicated shampoo at least 2x/week - let sit on scalp at least 5 minutes prior to rinsing, Disp: 120 mL, Rfl: 5  losartan (COZAAR) 50 MG tablet, TAKE 1 TABLET BY MOUTH EVERY DAY, Disp: 90 tablet, Rfl: 3  metoprolol succinate (TOPROL-XL) 25 MG 24 hr tablet, TAKE 1 TABLET BY MOUTH EVERY DAY, Disp: 90 tablet, Rfl: 3  omeprazole (PRILOSEC) 20 MG capsule, Take 1 capsule (20 mg total) by mouth once daily. for 14 days, Disp: 14 capsule, Rfl: 0  pravastatin (PRAVACHOL) 20 MG tablet, TAKE 1 TABLET BY MOUTH EVERY DAY, Disp: 90 tablet, Rfl: 3  prednisoLONE acetate (PRED FORTE) 1 % DrpS, INSTILL 1 DROP INTO BOTH EYES TWICE DAILY, Disp: 5 mL, Rfl: 3  timolol maleate 0.5% (TIMOPTIC) 0.5 % Drop, INSTILL 1 DROP INTO LEFT EYE TWICE DAILY, Disp: 15 mL, Rfl: 2    No current facility-administered medications for this visit.  Facility-Administered Medications Ordered in Other Visits:  0.9%  NaCl infusion, , Intravenous, Continuous, Zoey Newman NP, Stopped at 09/28/18 1204      Review of Systems   Constitutional: Negative.   HENT: Negative.     Eyes: Negative.    Cardiovascular:  Negative for chest pain, dyspnea on exertion, leg swelling, near-syncope, palpitations and syncope.   Respiratory: Negative.  Negative for shortness of breath.    Endocrine: Negative.    Hematologic/Lymphatic: Negative.    Skin: Negative.    Musculoskeletal: Negative.    Gastrointestinal: Negative.    Genitourinary: Negative.     Neurological: Negative.  Negative for dizziness and light-headedness.   Psychiatric/Behavioral: Negative.     Allergic/Immunologic: Negative.    All other systems reviewed and are negative.         Objective     Physical Exam  Vitals reviewed.   Constitutional:       General: He is not in acute distress.     Appearance: He is well-developed.   HENT:      Head: Normocephalic and atraumatic.   Eyes:      Pupils: Pupils are equal, round, and reactive to light.   Neck:      Thyroid: No thyromegaly.      Vascular: No JVD.   Cardiovascular:      Rate and Rhythm: Normal rate and regular rhythm.      Chest Wall: PMI is not displaced.      Heart sounds: Normal heart sounds, S1 normal and S2 normal. No murmur heard.     No friction rub. No gallop.   Pulmonary:      Effort: Pulmonary effort is normal. No respiratory distress.      Breath sounds: Normal breath sounds. No wheezing or rales.   Abdominal:      General: Bowel sounds are normal. There is no distension.      Palpations: Abdomen is soft.      Tenderness: There is no abdominal tenderness. There is no guarding or rebound.   Musculoskeletal:         General: No tenderness. Normal range of motion.      Cervical back: Normal range of motion.   Skin:     General: Skin is warm and dry.      Findings: No erythema or rash.   Neurological:      Mental Status: He is alert and oriented to person, place, and time.      Cranial Nerves: No cranial nerve deficit.   Psychiatric:         Behavior: Behavior normal.         Thought Content: Thought content normal.         Judgment: Judgment normal.       ECG: NSR nl CO, QRS, QTc       Assessment and Plan     1. Presence of Watchman left atrial appendage closure device    2. Persistent atrial fibrillation        Plan:    74 yoM persistent AF s/p LAAO and PVI x 2. No recurrence and he is off OAC. NO changes to therapy. RTC 1y

## 2024-05-07 ENCOUNTER — OFFICE VISIT (OUTPATIENT)
Dept: ELECTROPHYSIOLOGY | Facility: CLINIC | Age: 74
End: 2024-05-07
Payer: MEDICARE

## 2024-05-07 ENCOUNTER — HOSPITAL ENCOUNTER (OUTPATIENT)
Dept: CARDIOLOGY | Facility: CLINIC | Age: 74
Discharge: HOME | End: 2024-05-07
Payer: MEDICARE

## 2024-05-07 VITALS
HEIGHT: 72 IN | DIASTOLIC BLOOD PRESSURE: 88 MMHG | SYSTOLIC BLOOD PRESSURE: 130 MMHG | BODY MASS INDEX: 32.76 KG/M2 | HEART RATE: 76 BPM | WEIGHT: 241.88 LBS

## 2024-05-07 DIAGNOSIS — Z95.818 PRESENCE OF WATCHMAN LEFT ATRIAL APPENDAGE CLOSURE DEVICE: Primary | ICD-10-CM

## 2024-05-07 DIAGNOSIS — I49.8 OTHER CARDIAC ARRHYTHMIA: ICD-10-CM

## 2024-05-07 DIAGNOSIS — I48.19 PERSISTENT ATRIAL FIBRILLATION: ICD-10-CM

## 2024-05-07 LAB
OHS QRS DURATION: 92 MS
OHS QTC CALCULATION: 443 MS

## 2024-05-07 PROCEDURE — 93010 ELECTROCARDIOGRAM REPORT: CPT | Mod: HCNC,S$GLB,, | Performed by: INTERNAL MEDICINE

## 2024-05-07 PROCEDURE — 4010F ACE/ARB THERAPY RXD/TAKEN: CPT | Mod: HCNC,CPTII,S$GLB, | Performed by: INTERNAL MEDICINE

## 2024-05-07 PROCEDURE — 3079F DIAST BP 80-89 MM HG: CPT | Mod: HCNC,CPTII,S$GLB, | Performed by: INTERNAL MEDICINE

## 2024-05-07 PROCEDURE — 99999 PR PBB SHADOW E&M-EST. PATIENT-LVL III: CPT | Mod: PBBFAC,HCNC,, | Performed by: INTERNAL MEDICINE

## 2024-05-07 PROCEDURE — 1101F PT FALLS ASSESS-DOCD LE1/YR: CPT | Mod: HCNC,CPTII,S$GLB, | Performed by: INTERNAL MEDICINE

## 2024-05-07 PROCEDURE — 99214 OFFICE O/P EST MOD 30 MIN: CPT | Mod: 25,HCNC,S$GLB, | Performed by: INTERNAL MEDICINE

## 2024-05-07 PROCEDURE — 3075F SYST BP GE 130 - 139MM HG: CPT | Mod: HCNC,CPTII,S$GLB, | Performed by: INTERNAL MEDICINE

## 2024-05-07 PROCEDURE — 1157F ADVNC CARE PLAN IN RCRD: CPT | Mod: HCNC,CPTII,S$GLB, | Performed by: INTERNAL MEDICINE

## 2024-05-07 PROCEDURE — 1126F AMNT PAIN NOTED NONE PRSNT: CPT | Mod: HCNC,CPTII,S$GLB, | Performed by: INTERNAL MEDICINE

## 2024-05-07 PROCEDURE — 3288F FALL RISK ASSESSMENT DOCD: CPT | Mod: HCNC,CPTII,S$GLB, | Performed by: INTERNAL MEDICINE

## 2024-05-07 PROCEDURE — 1159F MED LIST DOCD IN RCRD: CPT | Mod: HCNC,CPTII,S$GLB, | Performed by: INTERNAL MEDICINE

## 2024-05-07 PROCEDURE — 93005 ELECTROCARDIOGRAM TRACING: CPT | Mod: HCNC,S$GLB,, | Performed by: INTERNAL MEDICINE

## 2024-05-07 PROCEDURE — 1160F RVW MEDS BY RX/DR IN RCRD: CPT | Mod: HCNC,CPTII,S$GLB, | Performed by: INTERNAL MEDICINE

## 2024-05-10 NOTE — ANESTHESIA PAT ROS NOTE
05/10/2024  Pavel Graff is a 74 y.o., male.      Pre-op Assessment          Review of Systems           Anesthesia Assessment: Preoperative EQUATION    Planned Procedure: Procedure(s) (LRB):  ARTHROPLASTY, HIP, TOTAL, ANTERIOR APPROACH: LEFT: DEPUY - ACTIS + PINNACLE (Left)  Requested Anesthesia Type:Spinal/Epidural  Surgeon: Christiano Gallo III, MD  Service: Orthopedics  Known or anticipated Date of Surgery:6/3/2024    Surgeon notes: reviewed    Electronic QUestionnaire Assessment completed via nurse interview with patient.        Triage considerations:     The patient has no apparent active cardiac condition (No unstable coronary Syndrome such as severe unstable angina or recent [<1 month] myocardial infarction, decompensated CHF, severe valvular   disease or significant arrhythmia)    Previous anesthesia records:GETA and MAC    Patient reports no personal or family history of anesthesia complications.    6/20/22  Transesophageal echo (KAM) intra-procedure log documentation   Airway:  Mallampati: II   Mouth Opening: Normal  TM Distance: Normal  Neck ROM: Normal ROM    5/9/22  Left atrial appendage occlusion (Chest)   ECHOCARDIOGRAM, TRANSESOPHAGEAL (Chest)     Airway:  No airway management difficulties anticipated  Airway Placement Date: 05/09/22 Placement Time: 0724 , created via procedure documentation Method of Intubation: Direct laryngoscopy Mask Ventilation: Easy Intubated: Postinduction Blade: Arzola #2 Airway Device Size: 7.5 Placement Verified By: Capnometry Complicating Factors: None Findings Post-Intubation: Bilateral breath sounds;Atraumatic/Condition of teeth unchanged Secured at: Lips Complications: None         Last PCP note: within 3 months , within [facility] Dr. Miceli  Subspecialty notes: Ortho  EP                         Dr. LIZZY Howard  Ophthalmology   Dr. Jay  Dermatology         Dr. Segura  Cardiology           Dr. Deutsch    Other important co-morbidities: A FIB, HLD, HTN, Obesity, and SIMON,  TIA 6/2012 (asa 81 mg), Watchman Left atrial appendage closure device (5/2022), BPH, hx NSVT (per Holter); hx BCC     Tests already available:  Available tests,  within [facility] .   3/5/24      PSA   TSH   Lipid panel   CMP   CBC  6/20/22    KAM   EKG  9/30/21    CT Head w/o contrast  6/28/12    MRA Neck    MRA Brain            Instructions given. (See in Nurse's note)    Optimization:  Anesthesia Preop Clinic Assessment  Indicated   Will schedule POC.    Medical Opinion Indicated       Sub-specialist consult indicated:   [facility] NP.       Plan:    Testing:  CBC, CMP, EKG, and PT/INR   Pre-anesthesia  visit       Visit focus: possible regional anesthesia and/or nerve block      Consultation: . PCC NP for medical and anesthesia optimization.     Patient  has previously scheduled Medical Appointment:    5/20    Navigation: Tests Scheduled.              Consults scheduled.             Results will be tracked by Preop Clinic.     5/20/24  Patient is optimized     I spent a total of 46 minutes on the day of the visit.This includes face to face time and non-face to face time preparing to see the patient (eg, review of tests), obtaining and/or reviewing separately obtained history, documenting clinical information in the electronic or other health record, independently interpreting results and communicating results to the patient/family/caregiver, or care coordinator.        Phani Garber NP  Perioperative Medicine  [facility]

## 2024-05-12 DIAGNOSIS — I48.19 PERSISTENT ATRIAL FIBRILLATION: ICD-10-CM

## 2024-05-12 RX ORDER — METOPROLOL SUCCINATE 25 MG/1
TABLET, EXTENDED RELEASE ORAL
Qty: 90 TABLET | Refills: 3 | Status: SHIPPED | OUTPATIENT
Start: 2024-05-12

## 2024-05-12 NOTE — TELEPHONE ENCOUNTER
Refill Decision Note   Pavel Graff  is requesting a refill authorization.  Brief Assessment and Rationale for Refill:  Approve     Medication Therapy Plan:         Comments:     Note composed:2:32 AM 05/12/2024

## 2024-05-12 NOTE — TELEPHONE ENCOUNTER
No care due was identified.  Health [facility] Embedded Care Due Messages. Reference number: [id number].   5/12/2024 1:09:42 AM CDT

## 2024-05-17 ENCOUNTER — TELEPHONE (OUTPATIENT)
Dept: INTERNAL MEDICINE | Facility: CLINIC | Age: 74
End: 2024-05-17
Payer: MEDICARE

## 2024-05-17 DIAGNOSIS — M16.12 PRIMARY OSTEOARTHRITIS OF LEFT HIP: Primary | ICD-10-CM

## 2024-05-20 ENCOUNTER — HOSPITAL ENCOUNTER (OUTPATIENT)
Dept: RADIOLOGY | Facility: HOSPITAL | Age: 74
Discharge: HOME | End: 2024-05-20
Attending: NURSE PRACTITIONER
Payer: MEDICARE

## 2024-05-20 ENCOUNTER — OFFICE VISIT (OUTPATIENT)
Dept: INTERNAL MEDICINE | Facility: CLINIC | Age: 74
End: 2024-05-20
Payer: MEDICARE

## 2024-05-20 ENCOUNTER — OFFICE VISIT (OUTPATIENT)
Dept: ORTHOPEDICS | Facility: CLINIC | Age: 74
End: 2024-05-20
Payer: MEDICARE

## 2024-05-20 ENCOUNTER — HOSPITAL ENCOUNTER (OUTPATIENT)
Dept: CARDIOLOGY | Facility: CLINIC | Age: 74
Discharge: HOME | End: 2024-05-20
Payer: MEDICARE

## 2024-05-20 ENCOUNTER — PATIENT MESSAGE (OUTPATIENT)
Dept: ORTHOPEDICS | Facility: CLINIC | Age: 74
End: 2024-05-20

## 2024-05-20 VITALS — BODY MASS INDEX: 32.55 KG/M2 | HEIGHT: 72 IN | WEIGHT: 240.31 LBS

## 2024-05-20 VITALS
BODY MASS INDEX: 32.85 KG/M2 | TEMPERATURE: 98 F | HEART RATE: 67 BPM | SYSTOLIC BLOOD PRESSURE: 157 MMHG | OXYGEN SATURATION: 96 % | DIASTOLIC BLOOD PRESSURE: 76 MMHG | WEIGHT: 242.5 LBS | HEIGHT: 72 IN

## 2024-05-20 DIAGNOSIS — Z96.642 S/P HIP REPLACEMENT, LEFT: ICD-10-CM

## 2024-05-20 DIAGNOSIS — Z01.818 PRE-OP TESTING: ICD-10-CM

## 2024-05-20 DIAGNOSIS — E87.1 HYPONATREMIA: ICD-10-CM

## 2024-05-20 DIAGNOSIS — G45.9 TRANSIENT CEREBRAL ISCHEMIA, UNSPECIFIED TYPE: ICD-10-CM

## 2024-05-20 DIAGNOSIS — M16.12 PRIMARY OSTEOARTHRITIS OF LEFT HIP: ICD-10-CM

## 2024-05-20 DIAGNOSIS — I10 PRIMARY HYPERTENSION: ICD-10-CM

## 2024-05-20 DIAGNOSIS — Z95.818 PRESENCE OF WATCHMAN LEFT ATRIAL APPENDAGE CLOSURE DEVICE: ICD-10-CM

## 2024-05-20 DIAGNOSIS — M16.12 PRIMARY OSTEOARTHRITIS OF LEFT HIP: Primary | ICD-10-CM

## 2024-05-20 DIAGNOSIS — H91.90 HEARING LOSS, UNSPECIFIED HEARING LOSS TYPE, UNSPECIFIED LATERALITY: Primary | ICD-10-CM

## 2024-05-20 DIAGNOSIS — I47.29 NSVT (NONSUSTAINED VENTRICULAR TACHYCARDIA): ICD-10-CM

## 2024-05-20 DIAGNOSIS — G47.33 OBSTRUCTIVE SLEEP APNEA: ICD-10-CM

## 2024-05-20 DIAGNOSIS — I48.19 PERSISTENT ATRIAL FIBRILLATION: ICD-10-CM

## 2024-05-20 DIAGNOSIS — N40.0 BENIGN PROSTATIC HYPERPLASIA WITHOUT LOWER URINARY TRACT SYMPTOMS: ICD-10-CM

## 2024-05-20 PROBLEM — Z12.11 COLON CANCER SCREENING: Status: RESOLVED | Noted: 2019-03-26 | Resolved: 2024-05-20

## 2024-05-20 LAB
OHS QRS DURATION: 92 MS
OHS QTC CALCULATION: 430 MS

## 2024-05-20 PROCEDURE — 1160F RVW MEDS BY RX/DR IN RCRD: CPT | Mod: HCNC,CPTII,S$GLB, | Performed by: NURSE PRACTITIONER

## 2024-05-20 PROCEDURE — 3288F FALL RISK ASSESSMENT DOCD: CPT | Mod: HCNC,CPTII,S$GLB, | Performed by: NURSE PRACTITIONER

## 2024-05-20 PROCEDURE — 99215 OFFICE O/P EST HI 40 MIN: CPT | Mod: HCNC,S$GLB,, | Performed by: NURSE PRACTITIONER

## 2024-05-20 PROCEDURE — 4010F ACE/ARB THERAPY RXD/TAKEN: CPT | Mod: HCNC,CPTII,S$GLB, | Performed by: NURSE PRACTITIONER

## 2024-05-20 PROCEDURE — 93005 ELECTROCARDIOGRAM TRACING: CPT | Mod: HCNC,S$GLB,, | Performed by: ANESTHESIOLOGY

## 2024-05-20 PROCEDURE — 72170 X-RAY EXAM OF PELVIS: CPT | Mod: TC,HCNC

## 2024-05-20 PROCEDURE — 1159F MED LIST DOCD IN RCRD: CPT | Mod: HCNC,CPTII,S$GLB, | Performed by: NURSE PRACTITIONER

## 2024-05-20 PROCEDURE — 99999 PR PBB SHADOW E&M-EST. PATIENT-LVL IV: CPT | Mod: PBBFAC,HCNC,, | Performed by: NURSE PRACTITIONER

## 2024-05-20 PROCEDURE — 93010 ELECTROCARDIOGRAM REPORT: CPT | Mod: HCNC,S$GLB,, | Performed by: INTERNAL MEDICINE

## 2024-05-20 PROCEDURE — 3077F SYST BP >= 140 MM HG: CPT | Mod: HCNC,CPTII,S$GLB, | Performed by: NURSE PRACTITIONER

## 2024-05-20 PROCEDURE — 1125F AMNT PAIN NOTED PAIN PRSNT: CPT | Mod: HCNC,CPTII,S$GLB, | Performed by: NURSE PRACTITIONER

## 2024-05-20 PROCEDURE — 1101F PT FALLS ASSESS-DOCD LE1/YR: CPT | Mod: HCNC,CPTII,S$GLB, | Performed by: NURSE PRACTITIONER

## 2024-05-20 PROCEDURE — 3078F DIAST BP <80 MM HG: CPT | Mod: HCNC,CPTII,S$GLB, | Performed by: NURSE PRACTITIONER

## 2024-05-20 PROCEDURE — 72170 X-RAY EXAM OF PELVIS: CPT | Mod: 26,HCNC,, | Performed by: INTERNAL MEDICINE

## 2024-05-20 PROCEDURE — 99999 PR PBB SHADOW E&M-EST. PATIENT-LVL III: CPT | Mod: PBBFAC,HCNC,, | Performed by: NURSE PRACTITIONER

## 2024-05-20 PROCEDURE — 1157F ADVNC CARE PLAN IN RCRD: CPT | Mod: HCNC,CPTII,S$GLB, | Performed by: NURSE PRACTITIONER

## 2024-05-20 PROCEDURE — 99214 OFFICE O/P EST MOD 30 MIN: CPT | Mod: HCNC,S$GLB,, | Performed by: NURSE PRACTITIONER

## 2024-05-20 RX ORDER — PROCHLORPERAZINE EDISYLATE 5 MG/ML
5 INJECTION INTRAMUSCULAR; INTRAVENOUS EVERY 6 HOURS PRN
Status: CANCELLED | OUTPATIENT
Start: 2024-05-20

## 2024-05-20 RX ORDER — OXYCODONE HYDROCHLORIDE 5 MG/1
5 TABLET ORAL
Status: CANCELLED | OUTPATIENT
Start: 2024-05-20

## 2024-05-20 RX ORDER — MUPIROCIN 20 MG/G
1 OINTMENT TOPICAL 2 TIMES DAILY
Status: CANCELLED | OUTPATIENT
Start: 2024-05-20 | End: 2024-05-25

## 2024-05-20 RX ORDER — AMOXICILLIN 250 MG
1 CAPSULE ORAL 2 TIMES DAILY
Status: CANCELLED | OUTPATIENT
Start: 2024-05-20

## 2024-05-20 RX ORDER — PREGABALIN 75 MG/1
75 CAPSULE ORAL
Status: CANCELLED | OUTPATIENT
Start: 2024-05-20

## 2024-05-20 RX ORDER — SODIUM CHLORIDE 9 MG/ML
INJECTION, SOLUTION INTRAVENOUS
Status: CANCELLED | OUTPATIENT
Start: 2024-05-20

## 2024-05-20 RX ORDER — METHOCARBAMOL 750 MG/1
750 TABLET, FILM COATED ORAL 3 TIMES DAILY
Status: CANCELLED | OUTPATIENT
Start: 2024-05-20

## 2024-05-20 RX ORDER — NALOXONE HCL 0.4 MG/ML
0.02 VIAL (ML) INJECTION
Status: CANCELLED | OUTPATIENT
Start: 2024-05-20

## 2024-05-20 RX ORDER — MIDAZOLAM HYDROCHLORIDE 1 MG/ML
4 INJECTION, SOLUTION INTRAMUSCULAR; INTRAVENOUS
Status: CANCELLED | OUTPATIENT
Start: 2024-05-20 | End: 2024-05-21

## 2024-05-20 RX ORDER — ACETAMINOPHEN 500 MG
1000 TABLET ORAL EVERY 6 HOURS
Status: CANCELLED | OUTPATIENT
Start: 2024-05-20

## 2024-05-20 RX ORDER — ASPIRIN 81 MG/1
81 TABLET ORAL 2 TIMES DAILY
Status: CANCELLED | OUTPATIENT
Start: 2024-05-20

## 2024-05-20 RX ORDER — SODIUM CHLORIDE 9 MG/ML
INJECTION, SOLUTION INTRAVENOUS CONTINUOUS
Status: CANCELLED | OUTPATIENT
Start: 2024-05-20 | End: 2024-05-21

## 2024-05-20 RX ORDER — CELECOXIB 200 MG/1
200 CAPSULE ORAL DAILY
Status: CANCELLED | OUTPATIENT
Start: 2024-05-20

## 2024-05-20 RX ORDER — PREGABALIN 75 MG/1
75 CAPSULE ORAL NIGHTLY
Status: CANCELLED | OUTPATIENT
Start: 2024-05-20

## 2024-05-20 RX ORDER — OXYCODONE HYDROCHLORIDE 5 MG/1
10 TABLET ORAL
Status: CANCELLED | OUTPATIENT
Start: 2024-05-20

## 2024-05-20 RX ORDER — FAMOTIDINE 20 MG/1
20 TABLET, FILM COATED ORAL 2 TIMES DAILY
Status: CANCELLED | OUTPATIENT
Start: 2024-05-20

## 2024-05-20 RX ORDER — DICLOFENAC SODIUM 10 MG/G
2 GEL TOPICAL DAILY
COMMUNITY

## 2024-05-20 RX ORDER — CELECOXIB 200 MG/1
400 CAPSULE ORAL ONCE
Status: CANCELLED | OUTPATIENT
Start: 2024-05-20 | End: 2024-05-20

## 2024-05-20 RX ORDER — ACETAMINOPHEN 500 MG
1000 TABLET ORAL
Status: CANCELLED | OUTPATIENT
Start: 2024-05-20

## 2024-05-20 RX ORDER — CEFAZOLIN SODIUM 2 G/50ML
2 SOLUTION INTRAVENOUS
Status: CANCELLED | OUTPATIENT
Start: 2024-05-20

## 2024-05-20 RX ORDER — MORPHINE SULFATE 2 MG/ML
2 INJECTION, SOLUTION INTRAMUSCULAR; INTRAVENOUS
Status: CANCELLED | OUTPATIENT
Start: 2024-05-20

## 2024-05-20 RX ORDER — CEFAZOLIN SODIUM 2 G/50ML
2 SOLUTION INTRAVENOUS
Status: CANCELLED | OUTPATIENT
Start: 2024-05-20 | End: 2024-05-21

## 2024-05-20 RX ORDER — ONDANSETRON HYDROCHLORIDE 2 MG/ML
4 INJECTION, SOLUTION INTRAVENOUS EVERY 8 HOURS PRN
Status: CANCELLED | OUTPATIENT
Start: 2024-05-20

## 2024-05-20 RX ORDER — POLYETHYLENE GLYCOL 3350 17 G/17G
17 POWDER, FOR SOLUTION ORAL DAILY
Status: CANCELLED | OUTPATIENT
Start: 2024-05-20

## 2024-05-20 RX ORDER — POLYETHYLENE GLYCOL 3350 17 G/17G
17 POWDER, FOR SOLUTION ORAL DAILY PRN
Status: CANCELLED | OUTPATIENT
Start: 2024-05-20

## 2024-05-20 RX ORDER — FENTANYL CITRATE 50 UG/ML
100 INJECTION, SOLUTION INTRAMUSCULAR; INTRAVENOUS
Status: CANCELLED | OUTPATIENT
Start: 2024-05-20 | End: 2024-05-21

## 2024-05-20 RX ORDER — FENTANYL CITRATE 50 UG/ML
25 INJECTION, SOLUTION INTRAMUSCULAR; INTRAVENOUS EVERY 5 MIN PRN
Status: CANCELLED | OUTPATIENT
Start: 2024-05-20

## 2024-05-20 RX ORDER — MUPIROCIN 20 MG/G
1 OINTMENT TOPICAL
Status: CANCELLED | OUTPATIENT
Start: 2024-05-20

## 2024-05-20 RX ORDER — LIDOCAINE HYDROCHLORIDE 10 MG/ML
1 INJECTION, SOLUTION EPIDURAL; INFILTRATION; INTRACAUDAL; PERINEURAL
Status: CANCELLED | OUTPATIENT
Start: 2024-05-20

## 2024-05-20 NOTE — ASSESSMENT & PLAN NOTE
This client has a diagnosis of obstructive sleep apnea (SIMON)  Does not wear CPAP    Instructions were given to avoid the following: sleeping supine, weight gain ,alcoholic beverages , sedative medications, and CNS depressant use as these can all worsen SIMON.    Untreated sleep apnea has been shown to increase daytime sleepiness, hypertension, heart disease and stroke which were discussed with the patient at this time      Please use caution with medications that induce respiratory depression in the perioperative period

## 2024-05-20 NOTE — HPI
This is a 74 y.o. male  who presents today for a preoperative evaluation in preparation for left hip replacement  Surgery is indicated for  left hip replacement.   Patient is new to me.    The history has been obtained by speaking with the patient and reviewing the electronic medical record and/or outside health information. Significant health conditions for the perioperative period are discussed below in assessment and plan.     Patient reports current health status to be Good.  Denies any new symptoms before surgery.

## 2024-05-20 NOTE — ASSESSMENT & PLAN NOTE
Had ablation and watchman  SR today    5/20/24    Vent. Rate : 071 BPM     Atrial Rate : 071 BPM      P-R Int : 190 ms          QRS Dur : 092 ms       QT Int : 396 ms       P-R-T Axes : 071 -25 022 degrees      QTc Int : 430 ms     Normal sinus rhythm   Normal ECG   When compared with ECG of 07-MAY-2024 08:47,   No significant change was found   Confirmed by Juan Jose Muhammad MD ([id number]) on 5/20/2024 12:15:45 PM     Referred By: MAX DARDEN           Confirmed By:Juan Jose Muhammad MD     Specimen Collected: 05/20/24 07:52 CDT Last Resulted: 05/20/24 12:15 CDT

## 2024-05-20 NOTE — DISCHARGE INSTRUCTIONS
Your surgery has been scheduled for:______6/3/24____________________________________    You should report to:  ____[facility], located on the [place] side of the first floor of the           [facility] ([phone])  ____[facility], located on the [facility] side of the            Second floor of the [facility] ([phone])  ____3rd Floor [facility] located on the [facility] side of the [facility] [phone]  __X__[facility]: located at [address] [place] [address]. [address].     Please Note   Tell your doctor if you take Aspirin, products containing Aspirin, herbal medications  or blood thinners, such as Coumadin, Ticlid, or Plavix.  (Consult your provider regarding holding or stopping before surgery).  Arrange for someone to drive you home following surgery.  You will not be allowed to leave the surgical facility alone or drive yourself home following sedation and anesthesia.    Before Surgery  Stop taking all herbal medications, vitamins, and supplements 7 days prior to surgery  No Motrin/Advil (Ibuprofen) 7 days before surgery  No Aleve (Naproxen) 7 days before surgery   No Goody's/BC Powder 7 days before surgery  Refrain from drinking alcoholic beverages for 24 hours before and after surgery  Stop or limit smoking at least 24 hours prior to surgery  You may take Tylenol for pain    Night before Surgery  Do not eat or drink after midnight  Take a shower or bath (shower is recommended).  Bathe with Hibiclens soap or an antibacterial soap from the neck down.  If not supplied by your surgeon, hibiclens soap will need to be purchased over the counter in pharmacy.  Rinse soap off thoroughly.  Shampoo your hair with your regular shampoo    The Day of Surgery  Take another bath or shower with hibiclens or any antibacterial soap, to reduce the chance of infection.  Take heart and  blood pressure medications with a small sip of water, as advised by the perioperative team.  Do not take fluid pills  You may brush your teeth and rinse your mouth, but do not swallow any additional water.   Do not apply perfumes, powder, body lotions or deodorant on the day of surgery.  Nail polish should be removed.  Do not wear makeup or moisturizer  Wear comfortable clothes, such as a button front shirt and loose fitting pants.  Leave all jewelry, including body piercings, and valuables at home.    Bring any devices you will need after surgery such as crutches or canes.  If you have sleep apnea, please bring your CPAP machine  In the event that your physical condition changes including the onset of a cold or respiratory illness, or if you have to delay or cancel your surgery, please notify your surgeon.

## 2024-05-20 NOTE — PROGRESS NOTES
[facility]  Progress Note    Patient Name: Pavel Graff  MRN: [id number]  Date of Evaluation- 05/20/2024  PCP- Joe Peterson MD    Future cases for Pavel Graff [[id number]]       Case ID Status Date Time Nas Procedure Provider Location    [id number] [facility] 6/3/2024  1:38  ARTHROPLASTY, HIP, TOTAL, ANTERIOR APPROACH: LEFT: DEPUY - ACTIS + PINNACLE Christiano Gallo III, MD [[id number]] [facility]            HPI:  This is a 74 y.o. male  who presents today for a preoperative evaluation in preparation for left hip replacement  Surgery is indicated for  left hip replacement.   Patient is new to me.    The history has been obtained by speaking with the patient and reviewing the electronic medical record and/or outside health information. Significant health conditions for the perioperative period are discussed below in assessment and plan.     Patient reports current health status to be Good.  Denies any new symptoms before surgery.       Subjective/ Objective:     Chief Complaint: Preoperative evaulation, perioperative medical management, and complication reduction plan.     Functional Capacity:  Able to climb a flight of stairs without CP SOB or Syncope.  Able to meet 4 METs        Anesthesia issues: None     Difficulty mouth opening: None    Steroid use in the last 12 months: eye drops     Dental Issues: missing teeth- partial plate    Family anesthesia difficulty: None     Family Hx of Thrombosis FAther had blood clot- unknown reason    Past Medical History:   Diagnosis Date    *Atrial fibrillation     Anticoagulant long-term use     Atrial fibrillation     Colon cancer screening 03/26/2019    BLANCO (dyspnea on exertion) 05/20/2016    Fatigue 05/20/2016    GERD (gastroesophageal reflux disease)     Hyperlipidemia     Hypertension     Inguinal hernia recurrent unilateral 06/25/2013    On amiodarone therapy 09/21/2016    Sleep apnea     Squamous cell carcinoma of skin     Status post catheter ablation of  atrial fibrillation 09/21/2016    Stroke     TIA    TIA (transient ischemic attack)      Past Surgical History:   Procedure Laterality Date    ABLATION N/A 9/28/2018    Procedure: ABLATION;  Surgeon: Romaine Howard MD;  Location: [facility] CATH LAB;  Service: Cardiology;  Laterality: N/A;  AF, ERASMO, PVI, RFA, MAME, Gen, MB, 3 Prep    CATARACT EXTRACTION W/  INTRAOCULAR LENS IMPLANT Left 1/11/10    CATARACT EXTRACTION W/  INTRAOCULAR LENS IMPLANT Right 12/14/2017    DMEK/ Dr. Jay    COLONOSCOPY N/A 3/26/2019    Procedure: COLONOSCOPY;  Surgeon: Mauro Dye MD;  Location: [facility] ENDO (4TH FLR);  Service: Endoscopy;  Laterality: N/A;  ok to hold Eliquis x 2 days per Dr. Howard-MS    CORNEAL TRANSPLANT Left 1997    PKP OS    CORNEAL TRANSPLANT Left 03/23/2017    CORNEAL TRANSPLANT Right 12/14/2017    DMEK/ [facility]; Dr. Jya    HERNIA REPAIR      HIP ARTHROPLASTY      OCCLUSION OF LEFT ATRIAL APPENDAGE N/A 5/9/2022    Procedure: Left atrial appendage occlusion;  Surgeon: Romaine Howard MD;  Location: [facility] EP LAB;  Service: Cardiology;  Laterality: N/A;  afib, watchman, BSCI, erasmo, anes, MB/DM, 3prep    ROTATOR CUFF REPAIR      TRANSESOPHAGEAL ECHOCARDIOGRAPHY N/A 5/9/2022    Procedure: ECHOCARDIOGRAM, TRANSESOPHAGEAL;  Surgeon: Fanta Dacosta MD;  Location: [facility] EP LAB;  Service: Cardiology;  Laterality: N/A;       Review of Systems   Constitutional:  Negative for chills, fatigue, fever and unexpected weight change.   HENT:  Negative for trouble swallowing and voice change.    Eyes:  Negative for photophobia and visual disturbance.        No acute visual changes   Respiratory:  Negative for cough, shortness of breath and wheezing.         Tested positive for SIMON   Cardiovascular:  Negative for chest pain, palpitations and leg swelling.   Gastrointestinal:  Negative for abdominal pain, blood in stool, constipation, diarrhea, nausea and vomiting.        No FLD, Hepatitis, Cirrhosis  No BRB or black tarry stool    Genitourinary:  Negative for difficulty urinating, dysuria, frequency, hematuria and urgency.        Nocturia 3   Musculoskeletal:  Positive for arthralgias, back pain, gait problem and neck stiffness. Negative for myalgias and neck pain.   Neurological:  Negative for dizziness, tremors, seizures, syncope, weakness, light-headedness, numbness and headaches.   Psychiatric/Behavioral:  Negative for agitation, behavioral problems, confusion, decreased concentration, dysphoric mood, hallucinations, self-injury, sleep disturbance and suicidal ideas. The patient is not nervous/anxious and is not hyperactive.       VITALS  Visit Vitals  BP (!) 157/76 (BP Location: Left arm, Patient Position: Sitting)   Pulse 67   Temp 98.1 °F (36.7 °C) (Oral)   Ht 6' (1.829 m)   Wt 110 kg (242 lb 8.1 oz)   SpO2 96%   BMI 32.89 kg/m²          Physical Exam  Constitutional:       General: He is not in acute distress.     Appearance: He is well-developed. He is not diaphoretic.   HENT:      Head: Normocephalic.      Right Ear: Hearing normal.      Left Ear: Hearing normal.      Nose: Nose normal.      Mouth/Throat:      Lips: Pink.      Mouth: Mucous membranes are moist.   Eyes:      General: Lids are normal.      Conjunctiva/sclera: Conjunctivae normal.      Pupils: Pupils are equal, round, and reactive to light.   Neck:      Vascular: No carotid bruit.      Trachea: Trachea and phonation normal.   Cardiovascular:      Rate and Rhythm: Normal rate and regular rhythm.      Pulses:           Carotid pulses are 2+ on the right side and 2+ on the left side.       Radial pulses are 2+ on the right side and 2+ on the left side.        Posterior tibial pulses are 2+ on the right side and 2+ on the left side.      Heart sounds: Normal heart sounds. No murmur heard.     No friction rub. No gallop.   Pulmonary:      Effort: Pulmonary effort is normal.      Breath sounds: Normal breath sounds.      Comments: Clear and equal  Anterior and Posterior  BBS  Abdominal:      General: Abdomen is protuberant. Bowel sounds are normal. There is no distension.      Palpations: Abdomen is soft.      Tenderness: There is no abdominal tenderness.   Musculoskeletal:         General: No tenderness or deformity. Normal range of motion.      Cervical back: Normal range of motion.      Right lower leg: No edema.      Left lower leg: No edema.   Lymphadenopathy:      Head:      Right side of head: No submental, submandibular, tonsillar, preauricular, posterior auricular or occipital adenopathy.      Left side of head: No submental, submandibular, tonsillar, preauricular, posterior auricular or occipital adenopathy.      Cervical:      Right cervical: No superficial cervical adenopathy.     Left cervical: No superficial cervical adenopathy.   Skin:     General: Skin is warm and dry.      Capillary Refill: Capillary refill takes 2 to 3 seconds.      Coloration: Skin is not pale.      Findings: No erythema or rash.   Neurological:      Mental Status: He is alert and oriented to person, place, and time.      GCS: GCS eye subscore is 4. GCS verbal subscore is 5. GCS motor subscore is 6.      Motor: No abnormal muscle tone.      Coordination: Coordination normal.   Psychiatric:         Attention and Perception: Attention and perception normal.         Mood and Affect: Mood and affect normal.         Speech: Speech normal.         Behavior: Behavior normal. Behavior is cooperative.         Thought Content: Thought content normal.         Cognition and Memory: Cognition and memory normal.         Judgment: Judgment normal.          Significant Labs:  Lab Results   Component Value Date    WBC 7.14 05/20/2024    HGB 15.3 05/20/2024    HCT 42.1 05/20/2024     05/20/2024    CHOL 179 03/05/2024    TRIG 159 (H) 03/05/2024    HDL 38 (L) 03/05/2024    ALT 23 05/20/2024    AST 25 05/20/2024     (L) 05/20/2024    K 4.2 05/20/2024     05/20/2024    CREATININE 0.9 05/20/2024     BUN 14 05/20/2024    CO2 26 05/20/2024    TSH 1.354 03/05/2024    PSA 1.6 03/05/2024    INR 0.9 05/20/2024    HGBA1C 5.1 03/16/2021       Diagnostic Studies: No relevant studies.    EKG:   Results for orders placed or performed during the hospital encounter of 05/20/24   EKG 12-lead    Collection Time: 05/20/24  7:52 AM   Result Value Ref Range    QRS Duration 92 ms    OHS QTC Calculation 430 ms    Narrative    Test Reason : Z01.818,    Vent. Rate : 071 BPM     Atrial Rate : 071 BPM     P-R Int : 190 ms          QRS Dur : 092 ms      QT Int : 396 ms       P-R-T Axes : 071 -25 022 degrees     QTc Int : 430 ms    Normal sinus rhythm  Normal ECG  When compared with ECG of 07-MAY-2024 08:47,  No significant change was found  Confirmed by Juan Jose Muhammad MD ([id number]) on 5/20/2024 12:15:45 PM    Referred By: MAX DARDEN           Confirmed By:Juan Jose Muhammad MD       2D ECHO:  TTE:  No results found for this or any previous visit.    KAM:  Results for orders placed or performed during the hospital encounter of 06/20/22   Transesophageal echo (KAM)   Result Value Ref Range    BSA 2.35 m2    EF 60 %    Ao root annulus 2.40 cm    Sinus 3.80 cm    STJ 3.50 cm    Proximal aorta 3.80 cm    Narrative    · KAM done for 6 week evaluation s/p 27mm Watchman FLX implantation.  · The Watchman device is well-seated with no device-related thrombus.   There is a very small (2mm) nguyen-device leak.  · The left ventricle is normal in size with normal systolic function. The   estimated ejection fraction is 60%.  · Mild aortic regurgitation.  · Normal right ventricular size with normal right ventricular systolic   function.  · Mild tricuspid regurgitation.  · Grade 3 plaque present in the descending aorta.  · The sinuses of Valsalva is mildly dilated.           Imaging     Active Cardiac Conditions: None      Revised Cardiac Risk Index   High -Risk Surgery  Intraperitoneal; Intrathoracic; suprainguinal vascular Yes- + 1 No- 0   History of Ischemic  Heart Disease   (Hx of MI/positive exercise test/current chest pain due to ischemia/use of nitrate therapy/EKG with pathological Q waves) Yes- + 1 No- 0   History of CHF  (Pulmonary edema/bilateral rales or S3 gallop/PND/CXR showing pulmonary vascular redistribution) Yes- + 1 No- 0   History of CVA   (Prior stroke or TIA) Yes- + 1 No- 0   Pre-operative treatment with insulin Yes- + 1 No- 0   Pre-operative creatinine > 2mg/dl Yes- + 1 No- 0   Total:      Risk Status:  Estimated risk of cardiac complications after non-cardiac surgery using the Revised Cardiac Risk Index for Preoperative risk is 6.0 %      ARISCAT (Canet) risk index: Low: 1.6% risk of post-op pulmonary complications.    American Society of Anesthesiologists Physical Status classification (ASA): 3           No further cardiac workup needed prior to surgery.        Preoperative cardiac risk assessment-  The patient does not have any active cardiac conditions . Revised cardiac risk index predictors- ---.Functional capacity is more than 4 Mets. He will be undergoing a Orthopedic procedure that carries a Moderate Risk risk     The estimated risk of the rate of adverse cardiac outcomes  3.9    No further cardiac work up is indicated prior to proceeding with the surgery        American Society of Anesthesiologists Physical status classification ( ASA ) class: 3     Postoperative pulmonary complication risk assessment: 1.6     ARISCAT ( Canet) risk index- risk class -  Low, if duration of surgery is under 3 hours, intermediate, if duration of surgery is over 3 hours          Assessment/Plan:     Transient cerebral ischemia  Episode occurred while working   His symptoms were Mouth started watering, & couldn't speak  The symptoms resolved spontaneously    No residual deficits  He was treated with blood thinners as he also had Afib at the time      Hypertension  Current BP  157/76 today.    Taking: HCTZ Metoprolol   No home BP readings   Clinic readings for past  3 months 118-130/70-88    Lifestyle changes to reduce systolic BP: ; exercise 30 minutes per day,  5 days per week or 150 minutes weekly; sodium reduction and avoidance of high salt foods such as processed meats, frozen meals and  fast foods.   Keeping a healthy weight/BMI can help with better BP control    BP acceptable for surgery. I recommend monitoring BP during perioperative period as uncontrolled pain can elevate blood pressure.         Persistent atrial fibrillation  Had ablation and watchman  SR today    5/20/24    Vent. Rate : 071 BPM     Atrial Rate : 071 BPM      P-R Int : 190 ms          QRS Dur : 092 ms       QT Int : 396 ms       P-R-T Axes : 071 -25 022 degrees      QTc Int : 430 ms     Normal sinus rhythm   Normal ECG   When compared with ECG of 07-MAY-2024 08:47,   No significant change was found   Confirmed by Sabina VELA, Juan Jose ([id number]) on 5/20/2024 12:15:45 PM     Referred By: MAX DARDEN           Confirmed By:Juan Jose Muhammad MD     Specimen Collected: 05/20/24 07:52 CDT Last Resulted: 05/20/24 12:15 CDT       Benign prostatic hyperplasia  Awakens 3 time per night  Please monitor for urinary retention during the perioperative period  Monitor strict I&O and bladder scan as needed    Primary osteoarthritis of left hip  6/3/24 Left hip replacement surgery scheduled    Obstructive sleep apnea    This client has a diagnosis of obstructive sleep apnea (SIMON)  Does not wear CPAP    Instructions were given to avoid the following: sleeping supine, weight gain ,alcoholic beverages , sedative medications, and CNS depressant use as these can all worsen SIMON.    Untreated sleep apnea has been shown to increase daytime sleepiness, hypertension, heart disease and stroke which were discussed with the patient at this time      Please use caution with medications that induce respiratory depression in the perioperative period    NSVT (nonsustained ventricular tachycardia)  NSVT noted on Holter monitoring  Followed per  EP    Hyponatremia      Presence of Watchman left atrial appendage closure device  Sr, no longer needing anticoagulation        Preventive perioperative care    Thromboembolic prophylaxis:  His risk factors for thrombosis include obesity, surgical procedure, age, and reduced mobility.I suggest  thromboembolic prophylaxis ( mechanical/pharmacological, weighing the risk benefits of pharmacological agent use considering nguyen procedural bleeding )  during the perioperative period.I suggested being active in the post operative period.      Postoperative pulmonary complication prophylaxis-Risk factors for post operative pulmonary complications include age over 65 years and ASA class >2- I suggest incentive spirometry use, early ambulation, and pain control so as to avoid diaphragmatic splinting  Brush teeth twice per day, oral rinses, sleep with the head of the bed up 30 degrees     Renal complication prophylaxis-Risk factors for renal complications include age and hypertension . I suggest keeping him well hydrated and avoidance/ minimizing the use of  NSAID's,SAMPSON 2 Inhibitors ,IV contrast if possible in the perioperative period.I suggested drinking 2 litre's of water a day      Surgical site Infection Prophylaxis-I  suggest appropriate antibiotic for Prophylaxis against Surgical site infections Shower with Hibiclens in the night before surgery and the morning of surgery     In view of urological procedure and Spine procedure the patient  is at risk of postoperative urinary retention.  I suggest avoidance / minimizing the of  Benzodiazepines,Anticholinergic medication,antihistamines ( Benadryl) , if possible in the perioperative period. I suggest using the minimum possible use of opioids for the minimum period of time in the perioperative period. Benadryl avoidance suggested      This visit was focused on Preoperative evaluation, Perioperative Medical management, complication reduction plans. I suggest that the  patient follows up with primary care or relevant sub specialists for ongoing health care.    I appreciate the opportunity to be involved in this patients care. Please feel free to contact me if there were any questions about this consultation.    Patient is optimized    I spent a total of 46 minutes on the day of the visit.This includes face to face time and non-face to face time preparing to see the patient (eg, review of tests), obtaining and/or reviewing separately obtained history, documenting clinical information in the electronic or other health record, independently interpreting results and communicating results to the patient/family/caregiver, or care coordinator.      Phani Garber NP  Perioperative Medicine  [facility]   Pager [phone]

## 2024-05-20 NOTE — H&P (VIEW-ONLY)
CC:  Left hip pain    Pavel Graff is a 74 y.o. male with Left hip pain.  Pain is worse with activity and weight bearing.  Patient has experienced interference of activities of daily living due to increased pain and decreased range of motion. Patient has failed non-operative treatment including NSAIDs, as well as greater than 3 months of activity modification. Pavel Graff ambulates independently with antalgic gait.    Relevant medical conditions of significance in perioperative period:  History of a-fib s/p ablation and watchman  HTN- on medication managed  HTN- no medication managed by pcp  History of TIA  BPH  GERD- on medication managed       Past Medical History:   Diagnosis Date    *Atrial fibrillation     Anticoagulant long-term use     Atrial fibrillation     Hyperlipidemia     Hypertension     Sleep apnea     Squamous cell carcinoma of skin     Stroke     TIA    TIA (transient ischemic attack)        Past Surgical History:   Procedure Laterality Date    ABLATION N/A 9/28/2018    Procedure: ABLATION;  Surgeon: Romaine Howard MD;  Location: [facility] CATH LAB;  Service: Cardiology;  Laterality: N/A;  AF, ERASMO, PVI, RFA, MAME, Gen, MB, 3 Prep    CATARACT EXTRACTION W/  INTRAOCULAR LENS IMPLANT Left 1/11/10    CATARACT EXTRACTION W/  INTRAOCULAR LENS IMPLANT Right 12/14/2017    NANCYK/ Dr. Jay    COLONOSCOPY N/A 3/26/2019    Procedure: COLONOSCOPY;  Surgeon: Mauro Dye MD;  [facility]  Service: Endoscopy;  Laterality: N/A;  ok to hold Eliquis x 2 days per Dr. Howard-MS    CORNEAL TRANSPLANT Left 1997    PKP OS    CORNEAL TRANSPLANT Left 03/23/2017    CORNEAL TRANSPLANT Right 12/14/2017    DMEK/ [facility]; Dr. Jay    HERNIA REPAIR      HIP ARTHROPLASTY      OCCLUSION OF LEFT ATRIAL APPENDAGE N/A 5/9/2022    Procedure: Left atrial appendage occlusion;  Surgeon: Romaine Howard MD;  Location: [facility] EP LAB;  Service: Cardiology;  Laterality: N/A;  afib, watchman, BSCI, erasmo, anes, MB/DM,  3prep    ROTATOR CUFF REPAIR      TRANSESOPHAGEAL ECHOCARDIOGRAPHY N/A 5/9/2022    Procedure: ECHOCARDIOGRAM, TRANSESOPHAGEAL;  Surgeon: Fanta Dacosta MD;  Location: [facility];  Service: Cardiology;  Laterality: N/A;       Family History   Problem Relation Name Age of Onset    Diabetes Mother      Heart disease Mother      Hyperlipidemia Mother      Heart disease Father      Colon cancer Father          colon    Cancer Father      Heart disease Brother      Crohn's disease Sister      Hyperlipidemia Sister      Hypertension Sister      Hyperlipidemia Sister      Sudden death Neg Hx      Amblyopia Neg Hx      Blindness Neg Hx      Cataracts Neg Hx      Glaucoma Neg Hx      Macular degeneration Neg Hx      Retinal detachment Neg Hx      Strabismus Neg Hx      Stroke Neg Hx      Thyroid disease Neg Hx      Melanoma Neg Hx         Review of patient's allergies indicates:  No Known Allergies      Current Outpatient Medications:     ascorbic acid (VITAMIN C ORAL), Take by mouth Daily., Disp: , Rfl:     aspirin 81 MG Chew, Take 81 mg by mouth once daily., Disp: , Rfl:     fish oil-omega-3 fatty acids 300-1,000 mg capsule, Take 1 g by mouth once daily., Disp: , Rfl:     fluorouraciL (EFUDEX) 5 % cream, AAA at nose bid x 2 weeks, Disp: 40 g, Rfl: 1    hydroCHLOROthiazide (HYDRODIURIL) 25 MG tablet, TAKE 1 TABLET BY MOUTH EVERY DAY, Disp: 90 tablet, Rfl: 3    ketoconazole (NIZORAL) 2 % shampoo, Wash hair with medicated shampoo at least 2x/week - let sit on scalp at least 5 minutes prior to rinsing, Disp: 120 mL, Rfl: 5    losartan (COZAAR) 50 MG tablet, TAKE 1 TABLET BY MOUTH EVERY DAY, Disp: 90 tablet, Rfl: 3    metoprolol succinate (TOPROL-XL) 25 MG 24 hr tablet, TAKE 1 TABLET BY MOUTH EVERY DAY, Disp: 90 tablet, Rfl: 3    pravastatin (PRAVACHOL) 20 MG tablet, TAKE 1 TABLET BY MOUTH EVERY DAY, Disp: 90 tablet, Rfl: 3    prednisoLONE acetate (PRED FORTE) 1 % DrpS, INSTILL 1 DROP INTO BOTH EYES TWICE DAILY, Disp: 5  mL, Rfl: 3    timolol maleate 0.5% (TIMOPTIC) 0.5 % Drop, INSTILL 1 DROP INTO LEFT EYE TWICE DAILY, Disp: 15 mL, Rfl: 2    acetaZOLAMIDE (DIAMOX) 125 MG Tab, Take 1 tablet (125 mg total) by mouth 2 (two) times daily. for 5 days, Disp: 10 tablet, Rfl: 0    cetirizine (ZYRTEC) 10 MG tablet, Take 1 tablet (10 mg total) by mouth once daily. (Patient not taking: Reported on 5/7/2024), Disp: 30 tablet, Rfl: 0    omeprazole (PRILOSEC) 20 MG capsule, Take 1 capsule (20 mg total) by mouth once daily. for 14 days, Disp: 14 capsule, Rfl: 0  No current facility-administered medications for this visit.    Facility-Administered Medications Ordered in Other Visits:     0.9%  NaCl infusion, , Intravenous, Continuous, Zoey Newman, NP, Stopped at 09/28/18 1204    Review of Systems:   Constitutional: no fever or chills  Eyes: no visual changes  ENT: no nasal congestion or sore throat  Respiratory: no cough or shortness of breath  Cardiovascular: no chest pain or palpitations  Gastrointestinal: no nausea or vomiting, tolerating diet  Genitourinary: no hematuria or dysuria  Integument/Breast: no rash or pruritis  Hematologic/Lymphatic: no easy bruising or lymphadenopathy  Musculoskeletal: positive for hip pain  Neurological: no seizures or tremors  Behavioral/Psych: no auditory or visual hallucinations  Endocrine: no heat or cold intolerance    PE:  Ht 6' (1.829 m)   Wt 109 kg (240 lb 4.8 oz)   BMI 32.59 kg/m²   General: Pleasant, cooperative, NAD   Gait: antalgic  HEENT: NCAT, sclera nonicteric   Lungs: Respirations are clear, equal and unlabored.   CV: S1S2; 2+ bilateral upper and lower extremity pulses.   Skin: Intact throughout LE with no rashes, erythema, or lesions  Extremities: No LE edema, NVI lower extremities    Left Hip Exam:  100 degrees flexion  10 degree hip flexion contracture   0 degrees internal rotation  20 degrees external rotation  20 degrees abduction  20 degrees adduction  There is pain with passive  range of motion.     Radiographs: Radiographs reveal advanced degenerative changes including subchondral cyst formation, subchondral sclerosis, osteophyte formation, joint space narrowing.     Diagnosis: Primary osteoarthritis Left hip    Plan: Left total hip arthroplasty     Due to the serious nature of total joint infection and the high prevalence of community acquired MRSA, vancomycin will be used perioperatively.

## 2024-05-20 NOTE — H&P
CC:  Left hip pain    Pavel Graff is a 74 y.o. male with Left hip pain.  Pain is worse with activity and weight bearing.  Patient has experienced interference of activities of daily living due to increased pain and decreased range of motion. Patient has failed non-operative treatment including NSAIDs, as well as greater than 3 months of activity modification. Pavel Graff ambulates independently with antalgic gait.    Relevant medical conditions of significance in perioperative period:  History of a-fib s/p ablation and watchman  HTN- on medication managed  HTN- no medication managed by pcp  History of TIA  BPH  GERD- on medication managed       Past Medical History:   Diagnosis Date    *Atrial fibrillation     Anticoagulant long-term use     Atrial fibrillation     Hyperlipidemia     Hypertension     Sleep apnea     Squamous cell carcinoma of skin     Stroke     TIA    TIA (transient ischemic attack)        Past Surgical History:   Procedure Laterality Date    ABLATION N/A 9/28/2018    Procedure: ABLATION;  Surgeon: Romaine Howard MD;  Location: [facility] CATH LAB;  Service: Cardiology;  Laterality: N/A;  AF, ERASMO, PVI, RFA, MAME, Gen, MB, 3 Prep    CATARACT EXTRACTION W/  INTRAOCULAR LENS IMPLANT Left 1/11/10    CATARACT EXTRACTION W/  INTRAOCULAR LENS IMPLANT Right 12/14/2017    NANCYK/ Dr. Jay    COLONOSCOPY N/A 3/26/2019    Procedure: COLONOSCOPY;  Surgeon: Mauro Dye MD;  [facility]: [facility]O ([facility]);  Service: Endoscopy;  Laterality: N/A;  ok to hold Eliquis x 2 days per Dr. Howard-MS    CORNEAL TRANSPLANT Left 1997    PKP OS    CORNEAL TRANSPLANT Left 03/23/2017    CORNEAL TRANSPLANT Right 12/14/2017    DMEK/ [facility]; Dr. Jay    HERNIA REPAIR      HIP ARTHROPLASTY      OCCLUSION OF LEFT ATRIAL APPENDAGE N/A 5/9/2022    Procedure: Left atrial appendage occlusion;  Surgeon: Romaine Howard MD;  Location: [facility] EP LAB;  Service: Cardiology;  Laterality: N/A;  afib, watchman, BSCI, erasmo, anes, MB/DM,  3prep    ROTATOR CUFF REPAIR      TRANSESOPHAGEAL ECHOCARDIOGRAPHY N/A 5/9/2022    Procedure: ECHOCARDIOGRAM, TRANSESOPHAGEAL;  Surgeon: Fanta Dacosta MD;  Location: [facility];  Service: Cardiology;  Laterality: N/A;       Family History   Problem Relation Name Age of Onset    Diabetes Mother      Heart disease Mother      Hyperlipidemia Mother      Heart disease Father      Colon cancer Father          colon    Cancer Father      Heart disease Brother      Crohn's disease Sister      Hyperlipidemia Sister      Hypertension Sister      Hyperlipidemia Sister      Sudden death Neg Hx      Amblyopia Neg Hx      Blindness Neg Hx      Cataracts Neg Hx      Glaucoma Neg Hx      Macular degeneration Neg Hx      Retinal detachment Neg Hx      Strabismus Neg Hx      Stroke Neg Hx      Thyroid disease Neg Hx      Melanoma Neg Hx         Review of patient's allergies indicates:  No Known Allergies      Current Outpatient Medications:     ascorbic acid (VITAMIN C ORAL), Take by mouth Daily., Disp: , Rfl:     aspirin 81 MG Chew, Take 81 mg by mouth once daily., Disp: , Rfl:     fish oil-omega-3 fatty acids 300-1,000 mg capsule, Take 1 g by mouth once daily., Disp: , Rfl:     fluorouraciL (EFUDEX) 5 % cream, AAA at nose bid x 2 weeks, Disp: 40 g, Rfl: 1    hydroCHLOROthiazide (HYDRODIURIL) 25 MG tablet, TAKE 1 TABLET BY MOUTH EVERY DAY, Disp: 90 tablet, Rfl: 3    ketoconazole (NIZORAL) 2 % shampoo, Wash hair with medicated shampoo at least 2x/week - let sit on scalp at least 5 minutes prior to rinsing, Disp: 120 mL, Rfl: 5    losartan (COZAAR) 50 MG tablet, TAKE 1 TABLET BY MOUTH EVERY DAY, Disp: 90 tablet, Rfl: 3    metoprolol succinate (TOPROL-XL) 25 MG 24 hr tablet, TAKE 1 TABLET BY MOUTH EVERY DAY, Disp: 90 tablet, Rfl: 3    pravastatin (PRAVACHOL) 20 MG tablet, TAKE 1 TABLET BY MOUTH EVERY DAY, Disp: 90 tablet, Rfl: 3    prednisoLONE acetate (PRED FORTE) 1 % DrpS, INSTILL 1 DROP INTO BOTH EYES TWICE DAILY, Disp: 5  mL, Rfl: 3    timolol maleate 0.5% (TIMOPTIC) 0.5 % Drop, INSTILL 1 DROP INTO LEFT EYE TWICE DAILY, Disp: 15 mL, Rfl: 2    acetaZOLAMIDE (DIAMOX) 125 MG Tab, Take 1 tablet (125 mg total) by mouth 2 (two) times daily. for 5 days, Disp: 10 tablet, Rfl: 0    cetirizine (ZYRTEC) 10 MG tablet, Take 1 tablet (10 mg total) by mouth once daily. (Patient not taking: Reported on 5/7/2024), Disp: 30 tablet, Rfl: 0    omeprazole (PRILOSEC) 20 MG capsule, Take 1 capsule (20 mg total) by mouth once daily. for 14 days, Disp: 14 capsule, Rfl: 0  No current facility-administered medications for this visit.    Facility-Administered Medications Ordered in Other Visits:     0.9%  NaCl infusion, , Intravenous, Continuous, Zoey Newman, NP, Stopped at 09/28/18 1204    Review of Systems:   Constitutional: no fever or chills  Eyes: no visual changes  ENT: no nasal congestion or sore throat  Respiratory: no cough or shortness of breath  Cardiovascular: no chest pain or palpitations  Gastrointestinal: no nausea or vomiting, tolerating diet  Genitourinary: no hematuria or dysuria  Integument/Breast: no rash or pruritis  Hematologic/Lymphatic: no easy bruising or lymphadenopathy  Musculoskeletal: positive for hip pain  Neurological: no seizures or tremors  Behavioral/Psych: no auditory or visual hallucinations  Endocrine: no heat or cold intolerance    PE:  Ht 6' (1.829 m)   Wt 109 kg (240 lb 4.8 oz)   BMI 32.59 kg/m²   General: Pleasant, cooperative, NAD   Gait: antalgic  HEENT: NCAT, sclera nonicteric   Lungs: Respirations are clear, equal and unlabored.   CV: S1S2; 2+ bilateral upper and lower extremity pulses.   Skin: Intact throughout LE with no rashes, erythema, or lesions  Extremities: No LE edema, NVI lower extremities    Left Hip Exam:  100 degrees flexion  10 degree hip flexion contracture   0 degrees internal rotation  20 degrees external rotation  20 degrees abduction  20 degrees adduction  There is pain with passive  range of motion.     Radiographs: Radiographs reveal advanced degenerative changes including subchondral cyst formation, subchondral sclerosis, osteophyte formation, joint space narrowing.     Diagnosis: Primary osteoarthritis Left hip    Plan: Left total hip arthroplasty     Due to the serious nature of total joint infection and the high prevalence of community acquired MRSA, vancomycin will be used perioperatively.

## 2024-05-20 NOTE — ASSESSMENT & PLAN NOTE
Current BP  157/76 today.    Taking: HCTZ Metoprolol   No home BP readings   Clinic readings for past 3 months 118-130/70-88    Lifestyle changes to reduce systolic BP: ; exercise 30 minutes per day,  5 days per week or 150 minutes weekly; sodium reduction and avoidance of high salt foods such as processed meats, frozen meals and  fast foods.   Keeping a healthy weight/BMI can help with better BP control    BP acceptable for surgery. I recommend monitoring BP during perioperative period as uncontrolled pain can elevate blood pressure.

## 2024-05-20 NOTE — ASSESSMENT & PLAN NOTE
Awakens 3 time per night  Please monitor for urinary retention during the perioperative period  Monitor strict I&O and bladder scan as needed

## 2024-05-20 NOTE — PROGRESS NOTES
Pavel Graff is a 74 y.o. year old here today for pre surgery optimization visit  in preparation for a Left total hip arthroplasty to be performed by Dr. Gallo  on 6/3/24.  he was last seen and treated in the clinic on 4/24/2024. he will be medically optimized by the pre op center. There has been no significant change in medical status since last visit. No fever, chills, malaise, or unexplained weight change.      Allergies, Medications, past medical and surgical history reviewed.    Focused examination performed.    Patient declined to see surgeon today. All questions answered. Patient encouraged to call with questions. Contact information given.     Pre, nguyen, and post operative procedures and expectations discussed. Goals of successful surgery reviewed and include manageable pain levels, surgical site free of infection, medication management, and ambulation with PT/OT assistance. Healthy weight management discussed with patient and caregiver who were receptive to eduction of healthy diet and activity. No other necessary lifestyle changes identified. Educated patient about signs and symptoms of infection, medication management, anticoagulation therapy, risk of tobacco and alcohol use, and self-care to promote healing. Surgical guide given for future reference. Hibiclens given to patient with instructions. All questions were answered.     Pavel Graff verbalized an understanding to the education and goals. Patient has displayed readiness to engage in care and is ready to proceed with surgery.  Patient reports his wife is able and ready to provide assistance at home after discharge.    Surgical and blood consents signed.    DME will be arranged. The mobility limitation cannot be sufficiently resolved by the use of a cane. Patient's functional mobility deficit can be sufficiently resolved with the use of a (Rolling Walker or Walker). Patient's mobility limitation significantly impairs their ability to  "participate in one of more activities of daily living. The use of a (Rolling Walker or Walker) will significantly improve the patient's ability to participate in MRADLS and the patient will use it on regular basis in the home."     Pavel Graff will contact us if there are any questions, concerns, or changes in medical status prior to surgery.       Joint class: private in clinic. Pt had the right hip several years ago by Dr. [facility]    Patient has discussed discharge planning with surgeon. Patient will be discharged to home following surgery.   patient will be scheduled with Home Health during hospitalization.      30 minutes of time was spent on patient education, review of records, templating, H&P, , appointment scheduling and optimizing patient for surgery.      "

## 2024-05-20 NOTE — OUTPATIENT SUBJECTIVE & OBJECTIVE
Outpatient Subjective & Objective      Chief Complaint: Preoperative evaulation, perioperative medical management, and complication reduction plan.     Functional Capacity:  Able to climb a flight of stairs without CP SOB or Syncope.  Able to meet 4 METs        Anesthesia issues: None     Difficulty mouth opening: None    Steroid use in the last 12 months: eye drops     Dental Issues: missing teeth- partial plate    Family anesthesia difficulty: None     Family Hx of Thrombosis FAther had blood clot- unknown reason    Past Medical History:   Diagnosis Date    *Atrial fibrillation     Anticoagulant long-term use     Atrial fibrillation     Colon cancer screening 03/26/2019    BLANCO (dyspnea on exertion) 05/20/2016    Fatigue 05/20/2016    GERD (gastroesophageal reflux disease)     Hyperlipidemia     Hypertension     Inguinal hernia recurrent unilateral 06/25/2013    On amiodarone therapy 09/21/2016    Sleep apnea     Squamous cell carcinoma of skin     Status post catheter ablation of atrial fibrillation 09/21/2016    Stroke     TIA    TIA (transient ischemic attack)      Past Surgical History:   Procedure Laterality Date    ABLATION N/A 9/28/2018    Procedure: ABLATION;  Surgeon: Romaine Howard MD;  Location: [facility] CATH LAB;  Service: Cardiology;  Laterality: N/A;  AF, KAM, PVI, RFA, MAME, Gen, MB, 3 Prep    CATARACT EXTRACTION W/  INTRAOCULAR LENS IMPLANT Left 1/11/10    CATARACT EXTRACTION W/  INTRAOCULAR LENS IMPLANT Right 12/14/2017    DMEK/ Dr. Jay    COLONOSCOPY N/A 3/26/2019    Procedure: COLONOSCOPY;  Surgeon: Mauro Dye MD;  Location: [facility] ENDO ([address] [place] [address]);  Service: Endoscopy;  Laterality: N/A;  ok to hold Eliquis x 2 days per Dr. Howard-MS    CORNEAL TRANSPLANT Left 1997    PKP OS    CORNEAL TRANSPLANT Left 03/23/2017    CORNEAL TRANSPLANT Right 12/14/2017    DMEK/ [facility]; Dr. Jay    HERNIA REPAIR      HIP ARTHROPLASTY      OCCLUSION OF LEFT ATRIAL APPENDAGE N/A 5/9/2022    Procedure: Left atrial  appendage occlusion;  Surgeon: Romaine Howard MD;  Location: [facility] EP LAB;  Service: Cardiology;  Laterality: N/A;  afib, watchman, BSCI, erasmo, anes, MB/DM, 3prep    ROTATOR CUFF REPAIR      TRANSESOPHAGEAL ECHOCARDIOGRAPHY N/A 5/9/2022    Procedure: ECHOCARDIOGRAM, TRANSESOPHAGEAL;  Surgeon: Fanta Dacosta MD;  Location: [facility] EP LAB;  Service: Cardiology;  Laterality: N/A;       Review of Systems   Constitutional:  Negative for chills, fatigue, fever and unexpected weight change.   HENT:  Negative for trouble swallowing and voice change.    Eyes:  Negative for photophobia and visual disturbance.        No acute visual changes   Respiratory:  Negative for cough, shortness of breath and wheezing.         Tested positive for SIMON   Cardiovascular:  Negative for chest pain, palpitations and leg swelling.   Gastrointestinal:  Negative for abdominal pain, blood in stool, constipation, diarrhea, nausea and vomiting.        No FLD, Hepatitis, Cirrhosis  No BRB or black tarry stool   Genitourinary:  Negative for difficulty urinating, dysuria, frequency, hematuria and urgency.        Nocturia 3   Musculoskeletal:  Positive for arthralgias, back pain, gait problem and neck stiffness. Negative for myalgias and neck pain.   Neurological:  Negative for dizziness, tremors, seizures, syncope, weakness, light-headedness, numbness and headaches.   Psychiatric/Behavioral:  Negative for agitation, behavioral problems, confusion, decreased concentration, dysphoric mood, hallucinations, self-injury, sleep disturbance and suicidal ideas. The patient is not nervous/anxious and is not hyperactive.       VITALS  Visit Vitals  BP (!) 157/76 (BP Location: Left arm, Patient Position: Sitting)   Pulse 67   Temp 98.1 °F (36.7 °C) (Oral)   Ht 6' (1.829 m)   Wt 110 kg (242 lb 8.1 oz)   SpO2 96%   BMI 32.89 kg/m²          Physical Exam  Constitutional:       General: He is not in acute distress.     Appearance: He is well-developed. He is  not diaphoretic.   HENT:      Head: Normocephalic.      Right Ear: Hearing normal.      Left Ear: Hearing normal.      Nose: Nose normal.      Mouth/Throat:      Lips: Pink.      Mouth: Mucous membranes are moist.   Eyes:      General: Lids are normal.      Conjunctiva/sclera: Conjunctivae normal.      Pupils: Pupils are equal, round, and reactive to light.   Neck:      Vascular: No carotid bruit.      Trachea: Trachea and phonation normal.   Cardiovascular:      Rate and Rhythm: Normal rate and regular rhythm.      Pulses:           Carotid pulses are 2+ on the right side and 2+ on the left side.       Radial pulses are 2+ on the right side and 2+ on the left side.        Posterior tibial pulses are 2+ on the right side and 2+ on the left side.      Heart sounds: Normal heart sounds. No murmur heard.     No friction rub. No gallop.   Pulmonary:      Effort: Pulmonary effort is normal.      Breath sounds: Normal breath sounds.      Comments: Clear and equal  Anterior and Posterior BBS  Abdominal:      General: Abdomen is protuberant. Bowel sounds are normal. There is no distension.      Palpations: Abdomen is soft.      Tenderness: There is no abdominal tenderness.   Musculoskeletal:         General: No tenderness or deformity. Normal range of motion.      Cervical back: Normal range of motion.      Right lower leg: No edema.      Left lower leg: No edema.   Lymphadenopathy:      Head:      Right side of head: No submental, submandibular, tonsillar, preauricular, posterior auricular or occipital adenopathy.      Left side of head: No submental, submandibular, tonsillar, preauricular, posterior auricular or occipital adenopathy.      Cervical:      Right cervical: No superficial cervical adenopathy.     Left cervical: No superficial cervical adenopathy.   Skin:     General: Skin is warm and dry.      Capillary Refill: Capillary refill takes 2 to 3 seconds.      Coloration: Skin is not pale.      Findings: No erythema  or rash.   Neurological:      Mental Status: He is alert and oriented to person, place, and time.      GCS: GCS eye subscore is 4. GCS verbal subscore is 5. GCS motor subscore is 6.      Motor: No abnormal muscle tone.      Coordination: Coordination normal.   Psychiatric:         Attention and Perception: Attention and perception normal.         Mood and Affect: Mood and affect normal.         Speech: Speech normal.         Behavior: Behavior normal. Behavior is cooperative.         Thought Content: Thought content normal.         Cognition and Memory: Cognition and memory normal.         Judgment: Judgment normal.          Significant Labs:  Lab Results   Component Value Date    WBC 7.14 05/20/2024    HGB 15.3 05/20/2024    HCT 42.1 05/20/2024     05/20/2024    CHOL 179 03/05/2024    TRIG 159 (H) 03/05/2024    HDL 38 (L) 03/05/2024    ALT 23 05/20/2024    AST 25 05/20/2024     (L) 05/20/2024    K 4.2 05/20/2024     05/20/2024    CREATININE 0.9 05/20/2024    BUN 14 05/20/2024    CO2 26 05/20/2024    TSH 1.354 03/05/2024    PSA 1.6 03/05/2024    INR 0.9 05/20/2024    HGBA1C 5.1 03/16/2021       Diagnostic Studies: No relevant studies.    EKG:   Results for orders placed or performed during the hospital encounter of 05/20/24   EKG 12-lead    Collection Time: 05/20/24  7:52 AM   Result Value Ref Range    QRS Duration 92 ms    OHS QTC Calculation 430 ms    Narrative    Test Reason : Z01.818,    Vent. Rate : 071 BPM     Atrial Rate : 071 BPM     P-R Int : 190 ms          QRS Dur : 092 ms      QT Int : 396 ms       P-R-T Axes : 071 -25 022 degrees     QTc Int : 430 ms    Normal sinus rhythm  Normal ECG  When compared with ECG of 07-MAY-2024 08:47,  No significant change was found  Confirmed by Juan Jose Muhammad MD ([id number]) on 5/20/2024 12:15:45 PM    Referred By: MAX DARDEN           Confirmed By:Juan Jose Muhammad MD       2D ECHO:  TTE:  No results found for this or any previous visit.    KAM:  Results for orders  placed or performed during the hospital encounter of 06/20/22   Transesophageal echo (KAM)   Result Value Ref Range    BSA 2.35 m2    EF 60 %    Ao root annulus 2.40 cm    Sinus 3.80 cm    STJ 3.50 cm    Proximal aorta 3.80 cm    Narrative    · KAM done for 6 week evaluation s/p 27mm Watchman FLX implantation.  · The Watchman device is well-seated with no device-related thrombus.   There is a very small (2mm) nguyen-device leak.  · The left ventricle is normal in size with normal systolic function. The   estimated ejection fraction is 60%.  · Mild aortic regurgitation.  · Normal right ventricular size with normal right ventricular systolic   function.  · Mild tricuspid regurgitation.  · Grade 3 plaque present in the descending aorta.  · The sinuses of Valsalva is mildly dilated.           Imaging     Active Cardiac Conditions: None      Revised Cardiac Risk Index   High -Risk Surgery  Intraperitoneal; Intrathoracic; suprainguinal vascular Yes- + 1 No- 0   History of Ischemic Heart Disease   (Hx of MI/positive exercise test/current chest pain due to ischemia/use of nitrate therapy/EKG with pathological Q waves) Yes- + 1 No- 0   History of CHF  (Pulmonary edema/bilateral rales or S3 gallop/PND/CXR showing pulmonary vascular redistribution) Yes- + 1 No- 0   History of CVA   (Prior stroke or TIA) Yes- + 1 No- 0   Pre-operative treatment with insulin Yes- + 1 No- 0   Pre-operative creatinine > 2mg/dl Yes- + 1 No- 0   Total:      Risk Status:  Estimated risk of cardiac complications after non-cardiac surgery using the Revised Cardiac Risk Index for Preoperative risk is 6.0 %      ARISCAT (Canet) risk index: Low: 1.6% risk of post-op pulmonary complications.    American Society of Anesthesiologists Physical Status classification (ASA): 3           No further cardiac workup needed prior to surgery.    Outpatient Subjective & Objective

## 2024-05-27 ENCOUNTER — TELEPHONE (OUTPATIENT)
Dept: ORTHOPEDICS | Facility: CLINIC | Age: 74
End: 2024-05-27
Payer: MEDICARE

## 2024-05-27 NOTE — TELEPHONE ENCOUNTER
I called the patient's wife today regarding her voice message. I reviewed Dr. Gallo's driving protocol with her. She asked about activity level for the first several weeks, I told her that he has to take it easy and not over do it. The patient verbalized understanding and has no further questions.               ----- Message from Laisha Merlos sent at 5/27/2024  7:48 AM CDT -----  Pavel Graff wife Estela calling regarding Patient Advice for having questions about the restrictions after the surgery for the do's and don't, and if the PT will need a chair cushion ordered, that helps the PT to lift up from the sitting position, call back to Estela to assist with these questions, [phone]

## 2024-05-29 ENCOUNTER — TELEPHONE (OUTPATIENT)
Dept: ORTHOPEDICS | Facility: CLINIC | Age: 74
End: 2024-05-29
Payer: MEDICARE

## 2024-05-29 ENCOUNTER — PATIENT MESSAGE (OUTPATIENT)
Dept: ORTHOPEDICS | Facility: CLINIC | Age: 74
End: 2024-05-29
Payer: MEDICARE

## 2024-05-29 DIAGNOSIS — Z96.642 S/P HIP REPLACEMENT, LEFT: Primary | ICD-10-CM

## 2024-05-29 RX ORDER — CEFADROXIL 500 MG/1
500 CAPSULE ORAL EVERY 12 HOURS
Qty: 14 CAPSULE | Refills: 0 | Status: SHIPPED | OUTPATIENT
Start: 2024-05-29

## 2024-05-29 RX ORDER — DEXTROMETHORPHAN HYDROBROMIDE, GUAIFENESIN 5; 100 MG/5ML; MG/5ML
650 LIQUID ORAL EVERY 8 HOURS
Qty: 120 TABLET | Refills: 0 | Status: SHIPPED | OUTPATIENT
Start: 2024-05-29

## 2024-05-29 RX ORDER — AMOXICILLIN 250 MG
1 CAPSULE ORAL DAILY
Qty: 30 TABLET | Refills: 0 | Status: SHIPPED | OUTPATIENT
Start: 2024-05-29

## 2024-05-29 RX ORDER — OXYCODONE HYDROCHLORIDE 5 MG/1
TABLET ORAL
Qty: 30 TABLET | Refills: 0 | Status: SHIPPED | OUTPATIENT
Start: 2024-05-29

## 2024-05-29 RX ORDER — ASPIRIN 81 MG/1
81 TABLET ORAL 2 TIMES DAILY
Qty: 60 TABLET | Refills: 0 | Status: SHIPPED | OUTPATIENT
Start: 2024-05-29 | End: 2024-07-04

## 2024-05-29 RX ORDER — CELECOXIB 200 MG/1
200 CAPSULE ORAL DAILY
Qty: 30 CAPSULE | Refills: 0 | Status: SHIPPED | OUTPATIENT
Start: 2024-05-29

## 2024-05-29 RX ORDER — PANTOPRAZOLE SODIUM 40 MG/1
40 TABLET, DELAYED RELEASE ORAL DAILY
Qty: 30 TABLET | Refills: 0 | Status: SHIPPED | OUTPATIENT
Start: 2024-05-29 | End: 2024-07-04

## 2024-05-29 RX ORDER — METHOCARBAMOL 750 MG/1
750 TABLET, FILM COATED ORAL 4 TIMES DAILY PRN
Qty: 40 TABLET | Refills: 0 | Status: SHIPPED | OUTPATIENT
Start: 2024-05-29 | End: 2024-06-14

## 2024-05-29 NOTE — TELEPHONE ENCOUNTER
I called the patient today regarding surgery on 6/3/2024 with Dr. Christiano Gallo. I informed the patient that his surgery will be at  [facility] ([facility] [place] [address]). I informed the patient they must arrive at 5:00am and their surgery will start at 7:00am.     Per the [facility] COVID-19 Pre-Procedural Testing Policy (administered 10/26/2022), patients do not need tested for COVID-19 regardless of vaccination status.    I reminded the patient that they cannot eat or drink after midnight, the night before surgery.      I reminded the patient to be careful of their skin over the next few days to make sure they do not get any cuts, scratches or scrapes.    The patient verbalized that they have received their walker, bedside commode and shower chair from [facility].    The patient verbalized understanding and has no further questions.       ----- Message from Chano Tian sent at 5/29/2024  9:10 AM CDT -----  Regarding: Appt  Contact: Margarita/wife [phone]  Missed Callback    Margarita/wife returning callback from missed call. Requesting to speak with Vasu in Dr. Gallo's office. Please call Margarita @[phone]

## 2024-06-03 ENCOUNTER — ANESTHESIA (OUTPATIENT)
Dept: SURGERY | Facility: HOSPITAL | Age: 74
End: 2024-06-03
Payer: MEDICARE

## 2024-06-03 ENCOUNTER — HOSPITAL ENCOUNTER (OUTPATIENT)
Facility: HOSPITAL | Age: 74
Discharge: HOME | End: 2024-06-04
Attending: ORTHOPAEDIC SURGERY | Admitting: ORTHOPAEDIC SURGERY
Payer: MEDICARE

## 2024-06-03 ENCOUNTER — ANESTHESIA EVENT (OUTPATIENT)
Dept: SURGERY | Facility: HOSPITAL | Age: 74
End: 2024-06-03
Payer: MEDICARE

## 2024-06-03 ENCOUNTER — PATIENT MESSAGE (OUTPATIENT)
Dept: ADMINISTRATIVE | Facility: OTHER | Age: 74
End: 2024-06-03
Payer: MEDICARE

## 2024-06-03 DIAGNOSIS — M16.12 PRIMARY OSTEOARTHRITIS OF LEFT HIP: ICD-10-CM

## 2024-06-03 DIAGNOSIS — Z01.818 PRE-OP TESTING: Primary | ICD-10-CM

## 2024-06-03 DIAGNOSIS — Z96.642 S/P HIP REPLACEMENT, LEFT: Primary | ICD-10-CM

## 2024-06-03 DIAGNOSIS — M79.609 PAIN IN EXTREMITY, UNSPECIFIED EXTREMITY: ICD-10-CM

## 2024-06-03 PROCEDURE — 25000003 PHARM REV CODE 250: Performed by: ORTHOPAEDIC SURGERY

## 2024-06-03 PROCEDURE — C1776 JOINT DEVICE (IMPLANTABLE): HCPCS | Performed by: ORTHOPAEDIC SURGERY

## 2024-06-03 PROCEDURE — 63600175 PHARM REV CODE 636 W HCPCS: Performed by: ORTHOPAEDIC SURGERY

## 2024-06-03 PROCEDURE — 63600175 PHARM REV CODE 636 W HCPCS: Performed by: NURSE PRACTITIONER

## 2024-06-03 PROCEDURE — C1713 ANCHOR/SCREW BN/BN,TIS/BN: HCPCS | Performed by: ORTHOPAEDIC SURGERY

## 2024-06-03 PROCEDURE — 25000003 PHARM REV CODE 250: Performed by: NURSE ANESTHETIST, CERTIFIED REGISTERED

## 2024-06-03 PROCEDURE — 27201423 OPTIME MED/SURG SUP & DEVICES STERILE SUPPLY: Performed by: ORTHOPAEDIC SURGERY

## 2024-06-03 PROCEDURE — 99900035 HC TECH TIME PER 15 MIN (STAT)

## 2024-06-03 PROCEDURE — 36000711: Performed by: ORTHOPAEDIC SURGERY

## 2024-06-03 PROCEDURE — 97530 THERAPEUTIC ACTIVITIES: CPT

## 2024-06-03 PROCEDURE — 63600175 PHARM REV CODE 636 W HCPCS: Performed by: NURSE ANESTHETIST, CERTIFIED REGISTERED

## 2024-06-03 PROCEDURE — D9220A PRA ANESTHESIA: Mod: ANES,,, | Performed by: STUDENT IN AN ORGANIZED HEALTH CARE EDUCATION/TRAINING PROGRAM

## 2024-06-03 PROCEDURE — 25000003 PHARM REV CODE 250: Performed by: NURSE PRACTITIONER

## 2024-06-03 PROCEDURE — 25000003 PHARM REV CODE 250: Performed by: STUDENT IN AN ORGANIZED HEALTH CARE EDUCATION/TRAINING PROGRAM

## 2024-06-03 PROCEDURE — 97161 PT EVAL LOW COMPLEX 20 MIN: CPT

## 2024-06-03 PROCEDURE — 97535 SELF CARE MNGMENT TRAINING: CPT

## 2024-06-03 PROCEDURE — 36000710: Performed by: ORTHOPAEDIC SURGERY

## 2024-06-03 PROCEDURE — 71000033 HC RECOVERY, INTIAL HOUR: Performed by: ORTHOPAEDIC SURGERY

## 2024-06-03 PROCEDURE — D9220A PRA ANESTHESIA: Mod: CRNA,,, | Performed by: NURSE ANESTHETIST, CERTIFIED REGISTERED

## 2024-06-03 PROCEDURE — 25000003 PHARM REV CODE 250

## 2024-06-03 PROCEDURE — 37000009 HC ANESTHESIA EA ADD 15 MINS: Performed by: ORTHOPAEDIC SURGERY

## 2024-06-03 PROCEDURE — 97165 OT EVAL LOW COMPLEX 30 MIN: CPT

## 2024-06-03 PROCEDURE — 37000008 HC ANESTHESIA 1ST 15 MINUTES: Performed by: ORTHOPAEDIC SURGERY

## 2024-06-03 PROCEDURE — 71000039 HC RECOVERY, EACH ADD'L HOUR: Performed by: ORTHOPAEDIC SURGERY

## 2024-06-03 PROCEDURE — 63600175 PHARM REV CODE 636 W HCPCS: Performed by: STUDENT IN AN ORGANIZED HEALTH CARE EDUCATION/TRAINING PROGRAM

## 2024-06-03 PROCEDURE — 27130 TOTAL HIP ARTHROPLASTY: CPT | Mod: HCNC,LT,, | Performed by: ORTHOPAEDIC SURGERY

## 2024-06-03 PROCEDURE — 27100025 HC TUBING, SET FLUID WARMER: Performed by: STUDENT IN AN ORGANIZED HEALTH CARE EDUCATION/TRAINING PROGRAM

## 2024-06-03 PROCEDURE — 94761 N-INVAS EAR/PLS OXIMETRY MLT: CPT

## 2024-06-03 DEVICE — C-STEM AMT CEMENTED STEM HIGH OFFSET SIZE 3 12/14 TAPER
Type: IMPLANTABLE DEVICE | Site: HIP | Status: FUNCTIONAL
Brand: C-STEM

## 2024-06-03 DEVICE — C-STEM VOID CENTRALISER 12MM
Type: IMPLANTABLE DEVICE | Site: HIP | Status: FUNCTIONAL
Brand: C-STEM

## 2024-06-03 DEVICE — PINNACLE CANCELLOUS BONE SCREW 6.5MM X 30MM
Type: IMPLANTABLE DEVICE | Site: HIP | Status: FUNCTIONAL
Brand: PINNACLE

## 2024-06-03 DEVICE — FULL DOSE BONE CEMENT, 10 PACK CATALOG NUMBER IS 6191-1-010
Type: IMPLANTABLE DEVICE | Site: HIP | Status: FUNCTIONAL
Brand: SIMPLEX

## 2024-06-03 DEVICE — BIOLOX DELTA CERAMIC FEMORAL HEAD +5.0 36MM DIA 12/14 TAPER
Type: IMPLANTABLE DEVICE | Site: HIP | Status: FUNCTIONAL
Brand: BIOLOX DELTA

## 2024-06-03 DEVICE — PINNACLE CANCELLOUS BONE SCREW 6.5MM X 20MM
Type: IMPLANTABLE DEVICE | Site: HIP | Status: FUNCTIONAL
Brand: PINNACLE

## 2024-06-03 RX ORDER — HYDROCHLOROTHIAZIDE 25 MG/1
25 TABLET ORAL DAILY
Status: DISCONTINUED | OUTPATIENT
Start: 2024-06-03 | End: 2024-06-04 | Stop reason: HOSPADM

## 2024-06-03 RX ORDER — LOSARTAN POTASSIUM 25 MG/1
50 TABLET ORAL DAILY
Status: DISCONTINUED | OUTPATIENT
Start: 2024-06-03 | End: 2024-06-04 | Stop reason: HOSPADM

## 2024-06-03 RX ORDER — MIDAZOLAM HYDROCHLORIDE 1 MG/ML
4 INJECTION, SOLUTION INTRAMUSCULAR; INTRAVENOUS
Status: DISCONTINUED | OUTPATIENT
Start: 2024-06-03 | End: 2024-06-03 | Stop reason: HOSPADM

## 2024-06-03 RX ORDER — OXYCODONE HYDROCHLORIDE 5 MG/1
5 TABLET ORAL
Status: DISCONTINUED | OUTPATIENT
Start: 2024-06-03 | End: 2024-06-04 | Stop reason: HOSPADM

## 2024-06-03 RX ORDER — ONDANSETRON HYDROCHLORIDE 2 MG/ML
4 INJECTION, SOLUTION INTRAVENOUS EVERY 8 HOURS PRN
Status: DISCONTINUED | OUTPATIENT
Start: 2024-06-03 | End: 2024-06-04 | Stop reason: HOSPADM

## 2024-06-03 RX ORDER — PROCHLORPERAZINE EDISYLATE 5 MG/ML
5 INJECTION INTRAMUSCULAR; INTRAVENOUS EVERY 6 HOURS PRN
Status: DISCONTINUED | OUTPATIENT
Start: 2024-06-03 | End: 2024-06-04 | Stop reason: HOSPADM

## 2024-06-03 RX ORDER — SODIUM CHLORIDE 9 MG/ML
INJECTION, SOLUTION INTRAVENOUS
Status: COMPLETED | OUTPATIENT
Start: 2024-06-03 | End: 2024-06-03

## 2024-06-03 RX ORDER — ASPIRIN 81 MG/1
81 TABLET ORAL 2 TIMES DAILY
Status: DISCONTINUED | OUTPATIENT
Start: 2024-06-03 | End: 2024-06-04 | Stop reason: HOSPADM

## 2024-06-03 RX ORDER — LIDOCAINE HYDROCHLORIDE 10 MG/ML
1 INJECTION, SOLUTION EPIDURAL; INFILTRATION; INTRACAUDAL; PERINEURAL
Status: DISCONTINUED | OUTPATIENT
Start: 2024-06-03 | End: 2024-06-03 | Stop reason: HOSPADM

## 2024-06-03 RX ORDER — METOPROLOL SUCCINATE 25 MG/1
25 TABLET, EXTENDED RELEASE ORAL DAILY
Status: DISCONTINUED | OUTPATIENT
Start: 2024-06-03 | End: 2024-06-04 | Stop reason: HOSPADM

## 2024-06-03 RX ORDER — PROPOFOL 10 MG/ML
VIAL (ML) INTRAVENOUS CONTINUOUS PRN
Status: DISCONTINUED | OUTPATIENT
Start: 2024-06-03 | End: 2024-06-03

## 2024-06-03 RX ORDER — POLYETHYLENE GLYCOL 3350 17 G/17G
17 POWDER, FOR SOLUTION ORAL DAILY PRN
Status: DISCONTINUED | OUTPATIENT
Start: 2024-06-03 | End: 2024-06-04 | Stop reason: HOSPADM

## 2024-06-03 RX ORDER — CELECOXIB 200 MG/1
400 CAPSULE ORAL ONCE
Status: COMPLETED | OUTPATIENT
Start: 2024-06-03 | End: 2024-06-03

## 2024-06-03 RX ORDER — OXYCODONE HYDROCHLORIDE 5 MG/1
5 TABLET ORAL
Status: DISCONTINUED | OUTPATIENT
Start: 2024-06-03 | End: 2024-06-03 | Stop reason: HOSPADM

## 2024-06-03 RX ORDER — FENTANYL CITRATE 50 UG/ML
INJECTION, SOLUTION INTRAMUSCULAR; INTRAVENOUS
Status: DISCONTINUED | OUTPATIENT
Start: 2024-06-03 | End: 2024-06-03

## 2024-06-03 RX ORDER — MIDAZOLAM HYDROCHLORIDE 1 MG/ML
INJECTION INTRAMUSCULAR; INTRAVENOUS
Status: DISCONTINUED | OUTPATIENT
Start: 2024-06-03 | End: 2024-06-03

## 2024-06-03 RX ORDER — PHENYLEPHRINE HYDROCHLORIDE 10 MG/ML
INJECTION INTRAVENOUS
Status: DISCONTINUED | OUTPATIENT
Start: 2024-06-03 | End: 2024-06-03

## 2024-06-03 RX ORDER — METHOCARBAMOL 750 MG/1
750 TABLET, FILM COATED ORAL 3 TIMES DAILY
Status: DISCONTINUED | OUTPATIENT
Start: 2024-06-03 | End: 2024-06-04 | Stop reason: HOSPADM

## 2024-06-03 RX ORDER — ACETAMINOPHEN 500 MG
1000 TABLET ORAL EVERY 6 HOURS
Status: DISCONTINUED | OUTPATIENT
Start: 2024-06-03 | End: 2024-06-04 | Stop reason: HOSPADM

## 2024-06-03 RX ORDER — FENTANYL CITRATE 50 UG/ML
100 INJECTION, SOLUTION INTRAMUSCULAR; INTRAVENOUS
Status: DISCONTINUED | OUTPATIENT
Start: 2024-06-03 | End: 2024-06-03 | Stop reason: HOSPADM

## 2024-06-03 RX ORDER — FENTANYL CITRATE 50 UG/ML
25 INJECTION, SOLUTION INTRAMUSCULAR; INTRAVENOUS EVERY 5 MIN PRN
Status: DISCONTINUED | OUTPATIENT
Start: 2024-06-03 | End: 2024-06-03 | Stop reason: HOSPADM

## 2024-06-03 RX ORDER — DEXAMETHASONE SODIUM PHOSPHATE 4 MG/ML
INJECTION, SOLUTION INTRA-ARTICULAR; INTRALESIONAL; INTRAMUSCULAR; INTRAVENOUS; SOFT TISSUE
Status: DISCONTINUED | OUTPATIENT
Start: 2024-06-03 | End: 2024-06-03

## 2024-06-03 RX ORDER — ACETAMINOPHEN 500 MG
1000 TABLET ORAL
Status: COMPLETED | OUTPATIENT
Start: 2024-06-03 | End: 2024-06-03

## 2024-06-03 RX ORDER — HYDROMORPHONE HYDROCHLORIDE 1 MG/ML
0.2 INJECTION, SOLUTION INTRAMUSCULAR; INTRAVENOUS; SUBCUTANEOUS EVERY 5 MIN PRN
Status: DISCONTINUED | OUTPATIENT
Start: 2024-06-03 | End: 2024-06-03 | Stop reason: HOSPADM

## 2024-06-03 RX ORDER — POLYETHYLENE GLYCOL 3350 17 G/17G
17 POWDER, FOR SOLUTION ORAL DAILY
Status: DISCONTINUED | OUTPATIENT
Start: 2024-06-03 | End: 2024-06-04 | Stop reason: HOSPADM

## 2024-06-03 RX ORDER — PRAVASTATIN SODIUM 20 MG/1
20 TABLET ORAL DAILY
Status: DISCONTINUED | OUTPATIENT
Start: 2024-06-03 | End: 2024-06-04 | Stop reason: HOSPADM

## 2024-06-03 RX ORDER — PREGABALIN 75 MG/1
75 CAPSULE ORAL
Status: COMPLETED | OUTPATIENT
Start: 2024-06-03 | End: 2024-06-03

## 2024-06-03 RX ORDER — TRANEXAMIC ACID 100 MG/ML
INJECTION, SOLUTION INTRAVENOUS
Status: DISCONTINUED | OUTPATIENT
Start: 2024-06-03 | End: 2024-06-03 | Stop reason: HOSPADM

## 2024-06-03 RX ORDER — ROPIVACAINE/EPI/CLONIDINE/KET 2.46-0.005
SYRINGE (ML) INJECTION
Status: DISCONTINUED | OUTPATIENT
Start: 2024-06-03 | End: 2024-06-03 | Stop reason: HOSPADM

## 2024-06-03 RX ORDER — NALOXONE HCL 0.4 MG/ML
0.02 VIAL (ML) INJECTION
Status: DISCONTINUED | OUTPATIENT
Start: 2024-06-03 | End: 2024-06-04 | Stop reason: HOSPADM

## 2024-06-03 RX ORDER — FAMOTIDINE 10 MG/ML
INJECTION INTRAVENOUS
Status: DISCONTINUED | OUTPATIENT
Start: 2024-06-03 | End: 2024-06-03

## 2024-06-03 RX ORDER — KETAMINE HCL IN 0.9 % NACL 50 MG/5 ML
SYRINGE (ML) INTRAVENOUS
Status: DISCONTINUED | OUTPATIENT
Start: 2024-06-03 | End: 2024-06-03

## 2024-06-03 RX ORDER — OXYCODONE HYDROCHLORIDE 10 MG/1
10 TABLET ORAL
Status: DISCONTINUED | OUTPATIENT
Start: 2024-06-03 | End: 2024-06-04 | Stop reason: HOSPADM

## 2024-06-03 RX ORDER — ONDANSETRON HYDROCHLORIDE 2 MG/ML
4 INJECTION, SOLUTION INTRAVENOUS DAILY PRN
Status: DISCONTINUED | OUTPATIENT
Start: 2024-06-03 | End: 2024-06-03 | Stop reason: HOSPADM

## 2024-06-03 RX ORDER — ONDANSETRON HYDROCHLORIDE 2 MG/ML
INJECTION, SOLUTION INTRAVENOUS
Status: DISCONTINUED | OUTPATIENT
Start: 2024-06-03 | End: 2024-06-03

## 2024-06-03 RX ORDER — HALOPERIDOL 5 MG/ML
0.5 INJECTION INTRAMUSCULAR EVERY 10 MIN PRN
Status: DISCONTINUED | OUTPATIENT
Start: 2024-06-03 | End: 2024-06-03 | Stop reason: HOSPADM

## 2024-06-03 RX ORDER — VANCOMYCIN HYDROCHLORIDE 1 G/20ML
INJECTION, POWDER, LYOPHILIZED, FOR SOLUTION INTRAVENOUS
Status: DISCONTINUED | OUTPATIENT
Start: 2024-06-03 | End: 2024-06-03 | Stop reason: HOSPADM

## 2024-06-03 RX ORDER — AMOXICILLIN 250 MG
1 CAPSULE ORAL 2 TIMES DAILY
Status: DISCONTINUED | OUTPATIENT
Start: 2024-06-03 | End: 2024-06-04 | Stop reason: HOSPADM

## 2024-06-03 RX ORDER — BETHANECHOL CHLORIDE 10 MG/1
10 TABLET ORAL
Status: DISPENSED | OUTPATIENT
Start: 2024-06-03 | End: 2024-06-03

## 2024-06-03 RX ORDER — LIDOCAINE HYDROCHLORIDE 20 MG/ML
INJECTION INTRAVENOUS
Status: DISCONTINUED | OUTPATIENT
Start: 2024-06-03 | End: 2024-06-03

## 2024-06-03 RX ORDER — MUPIROCIN 20 MG/G
1 OINTMENT TOPICAL
Status: COMPLETED | OUTPATIENT
Start: 2024-06-03 | End: 2024-06-03

## 2024-06-03 RX ORDER — SODIUM CHLORIDE 9 MG/ML
INJECTION, SOLUTION INTRAVENOUS CONTINUOUS
Status: ACTIVE | OUTPATIENT
Start: 2024-06-03 | End: 2024-06-04

## 2024-06-03 RX ORDER — MORPHINE SULFATE 2 MG/ML
2 INJECTION, SOLUTION INTRAMUSCULAR; INTRAVENOUS
Status: DISCONTINUED | OUTPATIENT
Start: 2024-06-03 | End: 2024-06-04 | Stop reason: HOSPADM

## 2024-06-03 RX ORDER — EPHEDRINE SULFATE 50 MG/ML
INJECTION, SOLUTION INTRAVENOUS
Status: DISCONTINUED | OUTPATIENT
Start: 2024-06-03 | End: 2024-06-03

## 2024-06-03 RX ORDER — PREGABALIN 75 MG/1
75 CAPSULE ORAL NIGHTLY
Status: DISCONTINUED | OUTPATIENT
Start: 2024-06-03 | End: 2024-06-04 | Stop reason: HOSPADM

## 2024-06-03 RX ORDER — FAMOTIDINE 20 MG/1
20 TABLET, FILM COATED ORAL 2 TIMES DAILY
Status: DISCONTINUED | OUTPATIENT
Start: 2024-06-03 | End: 2024-06-04 | Stop reason: HOSPADM

## 2024-06-03 RX ORDER — MUPIROCIN 20 MG/G
1 OINTMENT TOPICAL 2 TIMES DAILY
Status: DISCONTINUED | OUTPATIENT
Start: 2024-06-03 | End: 2024-06-04 | Stop reason: HOSPADM

## 2024-06-03 RX ADMIN — LIDOCAINE HYDROCHLORIDE 100 MG: 20 INJECTION INTRAVENOUS at 07:06

## 2024-06-03 RX ADMIN — SENNOSIDES AND DOCUSATE SODIUM 1 TABLET: 50; 8.6 TABLET ORAL at 09:06

## 2024-06-03 RX ADMIN — LOSARTAN POTASSIUM 50 MG: 25 TABLET, FILM COATED ORAL at 10:06

## 2024-06-03 RX ADMIN — ONDANSETRON 4 MG: 2 INJECTION INTRAMUSCULAR; INTRAVENOUS at 08:06

## 2024-06-03 RX ADMIN — ACETAMINOPHEN 1000 MG: 500 TABLET ORAL at 06:06

## 2024-06-03 RX ADMIN — OXYCODONE 5 MG: 5 TABLET ORAL at 11:06

## 2024-06-03 RX ADMIN — EPHEDRINE SULFATE 5 MG: 50 INJECTION INTRAVENOUS at 07:06

## 2024-06-03 RX ADMIN — FAMOTIDINE 20 MG: 10 INJECTION, SOLUTION INTRAVENOUS at 07:06

## 2024-06-03 RX ADMIN — CEFAZOLIN 2 G: 2 INJECTION, POWDER, FOR SOLUTION INTRAMUSCULAR; INTRAVENOUS at 07:06

## 2024-06-03 RX ADMIN — TRANEXAMIC ACID 2000 MG: 100 INJECTION INTRAVENOUS at 07:06

## 2024-06-03 RX ADMIN — PREGABALIN 75 MG: 75 CAPSULE ORAL at 09:06

## 2024-06-03 RX ADMIN — MUPIROCIN 1 G: 20 OINTMENT TOPICAL at 09:06

## 2024-06-03 RX ADMIN — ASPIRIN 81 MG: 81 TABLET, COATED ORAL at 09:06

## 2024-06-03 RX ADMIN — MEPIVACAINE HYDROCHLORIDE 3 ML: 20 INJECTION, SOLUTION EPIDURAL; INFILTRATION at 07:06

## 2024-06-03 RX ADMIN — MUPIROCIN 1 G: 20 OINTMENT TOPICAL at 05:06

## 2024-06-03 RX ADMIN — MIDAZOLAM HYDROCHLORIDE 2 MG: 2 INJECTION, SOLUTION INTRAMUSCULAR; INTRAVENOUS at 06:06

## 2024-06-03 RX ADMIN — DEXAMETHASONE SODIUM PHOSPHATE 8 MG: 4 INJECTION, SOLUTION INTRAMUSCULAR; INTRAVENOUS at 07:06

## 2024-06-03 RX ADMIN — METHOCARBAMOL 750 MG: 750 TABLET ORAL at 09:06

## 2024-06-03 RX ADMIN — METHOCARBAMOL 750 MG: 750 TABLET ORAL at 03:06

## 2024-06-03 RX ADMIN — SODIUM CHLORIDE: 9 INJECTION, SOLUTION INTRAVENOUS at 05:06

## 2024-06-03 RX ADMIN — PREGABALIN 75 MG: 75 CAPSULE ORAL at 05:06

## 2024-06-03 RX ADMIN — PHENYLEPHRINE HYDROCHLORIDE 100 MCG: 10 INJECTION INTRAVENOUS at 08:06

## 2024-06-03 RX ADMIN — TRANEXAMIC ACID 1000 MG: 100 INJECTION INTRAVENOUS at 08:06

## 2024-06-03 RX ADMIN — ACETAMINOPHEN 1000 MG: 500 TABLET ORAL at 12:06

## 2024-06-03 RX ADMIN — SODIUM CHLORIDE, SODIUM GLUCONATE, SODIUM ACETATE, POTASSIUM CHLORIDE, MAGNESIUM CHLORIDE, SODIUM PHOSPHATE, DIBASIC, AND POTASSIUM PHOSPHATE: .53; .5; .37; .037; .03; .012; .00082 INJECTION, SOLUTION INTRAVENOUS at 08:06

## 2024-06-03 RX ADMIN — PROPOFOL 100 MCG/KG/MIN: 10 INJECTION, EMULSION INTRAVENOUS at 07:06

## 2024-06-03 RX ADMIN — PHENYLEPHRINE HYDROCHLORIDE 50 MCG: 10 INJECTION INTRAVENOUS at 07:06

## 2024-06-03 RX ADMIN — SODIUM CHLORIDE, SODIUM GLUCONATE, SODIUM ACETATE, POTASSIUM CHLORIDE, MAGNESIUM CHLORIDE, SODIUM PHOSPHATE, DIBASIC, AND POTASSIUM PHOSPHATE: .53; .5; .37; .037; .03; .012; .00082 INJECTION, SOLUTION INTRAVENOUS at 07:06

## 2024-06-03 RX ADMIN — FAMOTIDINE 20 MG: 20 TABLET, FILM COATED ORAL at 09:06

## 2024-06-03 RX ADMIN — Medication 30 MG: at 07:06

## 2024-06-03 RX ADMIN — FENTANYL CITRATE 50 MCG: 50 INJECTION, SOLUTION INTRAMUSCULAR; INTRAVENOUS at 07:06

## 2024-06-03 RX ADMIN — VANCOMYCIN HYDROCHLORIDE 1500 MG: 1.5 INJECTION, POWDER, LYOPHILIZED, FOR SOLUTION INTRAVENOUS at 05:06

## 2024-06-03 RX ADMIN — POLYETHYLENE GLYCOL 3350 17 G: 17 POWDER, FOR SOLUTION ORAL at 10:06

## 2024-06-03 RX ADMIN — SODIUM CHLORIDE: 0.9 INJECTION, SOLUTION INTRAVENOUS at 06:06

## 2024-06-03 RX ADMIN — SODIUM CHLORIDE: 9 INJECTION, SOLUTION INTRAVENOUS at 09:06

## 2024-06-03 RX ADMIN — ACETAMINOPHEN 1000 MG: 500 TABLET ORAL at 05:06

## 2024-06-03 RX ADMIN — BETHANECHOL CHLORIDE 10 MG: 10 TABLET ORAL at 09:06

## 2024-06-03 RX ADMIN — CEFAZOLIN 2 G: 2 INJECTION, POWDER, FOR SOLUTION INTRAMUSCULAR; INTRAVENOUS at 03:06

## 2024-06-03 RX ADMIN — BETHANECHOL CHLORIDE 10 MG: 10 TABLET ORAL at 10:06

## 2024-06-03 RX ADMIN — SODIUM CHLORIDE: 9 INJECTION, SOLUTION INTRAVENOUS at 04:06

## 2024-06-03 RX ADMIN — CELECOXIB 400 MG: 200 CAPSULE ORAL at 05:06

## 2024-06-03 NOTE — SUBJECTIVE & OBJECTIVE
Principal Problem:Primary osteoarthritis of left hip    Principal Orthopedic Problem: same, s/p L KLAUS 6/3/24    Interval History: Pt seen and examined at bedside. NAEON, VSS, AF. Pain controlled. Denies fevers, chills, chest pain, SOB, N/V. Reports one episode of loose stools, no issues otherwise. 140 feet with PT.      Review of patient's allergies indicates:  No Known Allergies    Current Facility-Administered Medications   Medication    0.9%  NaCl infusion    acetaminophen tablet 1,000 mg    aspirin EC tablet 81 mg    bethanechol tablet 10 mg    ceFAZolin 2 g in sodium chloride 0.9 % 50 mL IVPB (MB+)    famotidine tablet 20 mg    fentaNYL 50 mcg/mL injection 100 mcg    fentaNYL 50 mcg/mL injection 25 mcg    fentaNYL 50 mcg/mL injection 25 mcg    haloperidol lactate injection 0.5 mg    hydroCHLOROthiazide tablet 25 mg    HYDROmorphone injection 0.2 mg    LIDOcaine (PF) 10 mg/ml (1%) injection 10 mg    losartan tablet 50 mg    metoprolol succinate (TOPROL-XL) 24 hr tablet 25 mg    midazolam injection 4 mg    morphine injection 2 mg    naloxone 0.4 mg/mL injection 0.02 mg    ondansetron injection 4 mg    ondansetron injection 4 mg    oxyCODONE immediate release tablet 10 mg    oxyCODONE immediate release tablet 5 mg    oxyCODONE immediate release tablet 5 mg    polyethylene glycol packet 17 g    pravastatin tablet 20 mg    pregabalin capsule 75 mg    prochlorperazine injection Soln 5 mg    senna-docusate 8.6-50 mg per tablet 1 tablet    tranexamic acid (CYKLOKAPRON) 1,000 mg in sodium chloride 0.9 % 100 mL IVPB (MB+)    tranexamic acid (CYKLOKAPRON) 3,000 mg in sodium chloride 0.9% SolP 100 mL     Facility-Administered Medications Ordered in Other Encounters   Medication    0.9%  NaCl infusion     Objective:     Vital Signs (Most Recent):  Temp: 98 °F (36.7 °C) (06/03/24 0907)  Pulse: 67 (06/03/24 0915)  Resp: 16 (06/03/24 0915)  BP: 110/62 (06/03/24 0915)  SpO2: 100 % (06/03/24 0915) Vital Signs (24h Range):  Temp:   [98 °F (36.7 °C)-98.1 °F (36.7 °C)] 98 °F (36.7 °C)  Pulse:  [67-80] 67  Resp:  [16-17] 16  SpO2:  [98 %-100 %] 100 %  BP: (107-136)/(57-76) 110/62     Weight: 109.8 kg (242 lb)  Height: 6' (182.9 cm)  Body mass index is 32.82 kg/m².      Intake/Output Summary (Last 24 hours) at 6/3/2024 0925  Last data filed at 6/3/2024 0848  Gross per 24 hour   Intake 2000 ml   Output --   Net 2000 ml        Ortho/SPM Exam       AAOx4  NAD  Reg rate  No increased WOB    lLE:  Dressing c/d/i  SILT T/SP/DP/Miles/Sa  Motor intact T/SP/DP  WWP extremities  FCDs in place and functioning    Significant Labs: All pertinent labs within the past 24 hours have been reviewed.    Significant Imaging: I have reviewed all pertinent imaging results/findings.

## 2024-06-03 NOTE — ANESTHESIA PROCEDURE NOTES
CSE    Patient location during procedure: OR  Start time: 6/3/2024 7:04 AM  Timeout: 6/3/2024 7:02 AM  End time: 6/3/2024 7:07 AM      Staffing  Authorizing Provider: Arcenio Bailey MD  Performing Provider: Arcenio Bailey MD    Staffing  Performed by: Arcenio Bailey MD  Authorized by: Arcenio Bailey MD    Preanesthetic Checklist  Completed: patient identified, IV checked, site marked, risks and benefits discussed, surgical consent, monitors and equipment checked, pre-op evaluation and timeout performed  CSE  Patient position: sitting  Prep: ChloraPrep  Patient monitoring: heart rate, cardiac monitor and continuous pulse ox  Approach: midline  Epidural Needle  Injection technique: BENJY saline  Needle type: Tuohy   Needle insertion depth: 6 cm  Location: L3-4  Needle localization: anatomical landmarks   Catheter  Catheter at skin depth: 11 cm  Assessment  Intrathecal Medications:   administered: primary anesthetic mcg of    Medications:    Medications: Intrathecal - mepivacaine 20 mg/mL (2 %) injection - Intrathecal   3 mL - 6/3/2024 7:06:00 AM

## 2024-06-03 NOTE — ANESTHESIA POSTPROCEDURE EVALUATION
Anesthesia Post Evaluation    Patient: Pavel Graff    Procedure(s) Performed: Procedure(s) (LRB):  ARTHROPLASTY, HIP, TOTAL, ANTERIOR APPROACH: LEFT: DEPUY - ACTIS + PINNACLE (Left)    Final Anesthesia Type: CSE      Patient location during evaluation: PACU  Patient participation: Yes- Able to Participate  Level of consciousness: awake and alert  Post-procedure vital signs: reviewed and stable  Pain management: adequate  Airway patency: patent  SIMON mitigation strategies: Multimodal analgesia  PONV status at discharge: No PONV  Anesthetic complications: no      Cardiovascular status: blood pressure returned to baseline and hemodynamically stable  Respiratory status: unassisted  Hydration status: euvolemic  Follow-up not needed.              Vitals Value Taken Time   /71 06/03/24 1013   Temp 36.6 °C (97.8 °F) 06/03/24 1013   Pulse 66 06/03/24 1013   Resp 18 06/03/24 1013   SpO2 97 % 06/03/24 1013         Event Time   Out of Recovery 10:10:00         Pain/Mark Score: Pain Rating Prior to Med Admin: 1 (6/3/2024  5:48 AM)  Mark Score: 10 (6/3/2024  9:45 AM)

## 2024-06-03 NOTE — PT/OT/SLP EVAL
"Occupational Therapy Evaluation and Treatment    Name: Pavel Graff  MRN: [id number]  Admitting Diagnosis: Primary osteoarthritis of left hip Day of Surgery  Recent Surgery: Procedure(s) (LRB):  ARTHROPLASTY, HIP, TOTAL, ANTERIOR APPROACH: LEFT: DEPUY - ACTIS + PINNACLE (Left) Day of Surgery    Recommendations:     Discharge Recommendations: Low Intensity Therapy  Level of Assistance Recommended: 24 hours supervision  Discharge Equipment Recommendations: walker, rolling, hip kit  Barriers to discharge: None    Assessment:     Pavel Graff is a 74 y.o. male with a medical diagnosis of Primary osteoarthritis of left hip. He presents with performance deficits affecting function including impaired self care skills, impaired functional mobility, orthopedic precautions. Pt was able to perform supine/sit, sit/stand, and stand pivot to chair c CGA and RW.  Able to perform stand pivot to BSC and to chair c CGA and RW.  He was able to perform grooming and toilet hygiene c CGA.  Pt had LOB x2 but was easily correctable.  Educated pt on hip precautions.    Rehab Prognosis: Good; patient would benefit from acute OT services to address these deficits and reach maximum level of function.    Plan:     Patient to be seen daily to address the above listed problems via self-care/home management, therapeutic exercises, therapeutic groups  Plan of Care Expires: 06/04/24  Plan of Care Reviewed with: patient, spouse    Subjective     Chief Complaint: L KLAUS  Patient Comments/Goals: "I had my last hip 20 years ago."  Pain/Comfort:  Pain Rating 1: 2/10    Patients cultural, spiritual, Amish conflicts given the current situation: no    Social History:  Living Environment: Patient lives with their spouse in a 2 story home with number of outside stair(s): 1, number of inside stair(s): 13- 4 steps a landing and then 9 more c a rail on the R side of the steps, bed/bath on second floor, can live on 1st floor, and tub-shower combo  Prior " Level of Function: Prior to admission, patient was independent.  Roles and Routines: Patient was driving and working as   prior to admission.  Equipment Used at Home: bedside commode, shower chair  DME owned (not currently used): bedside commode and shower chair  Assistance Upon Discharge: significant other    Objective:     Communicated with RN prior to session. Patient found supine with cryotherapy, FCD, peripheral IV, telemetry upon OT entry to room.    General Precautions: Standard, fall   Orthopedic Precautions: LLE weight bearing as tolerated, LLE anterior precautions (0-90* of hip flexion and no extremes of motion)   Braces: N/A    Respiratory Status: Room air    Occupational Performance    Gait belt applied - Yes    Bed Mobility:   Supine to sit from right side of bed with supervision    Functional Mobility/Transfers:  Sit <> Stand Transfer with contact guard assistance with rolling walker  Bed <> Chair Transfer using Stand Pivot technique with contact guard assistance with rolling walker  Toilet Transfer Stand Pivot technique with contact guard assistance with rolling walker and bedside commode  Functional Mobility: Pt was able to walk to bathroom c CGA and RW.    Activities of Daily Living:  Grooming: contact guard assistance to wash hands while standing at sink c RW.  Upper Body Dressing: minimum assistance to don hospital gown.  Toileting: contact guard assistance for toilet hygiene.    Physical Exam:  Upper Extremity Range of Motion:     -       Right Upper Extremity: WFL  -       Left Upper Extremity: WFL  Upper Extremity Strength:    -       Right Upper Extremity: WFL  -       Left Upper Extremity: WFL    AMPAC 6 Click ADL:  AMPAC Total Score: 16    Treatment & Education:  Educated on the importance of mobility to maximize recovery  Educated on the importance of OOB mobility within safe range in order to decrease adverse effects of prolonged bedrest  Educated on Anterior hip precautions  - patient recalled 3 hip precautions  Educated on use of hip kit during LB dressing  Educated on safety with functional mobility; hand placement to ensure safe transfers to various surfaces in prep for ADLs  Educated on performing functional mobility and ADLs in adherence to orthopedic precautions  Educated on weight bearing status  Will continue to educate as needed      Patient clear to ambulate to/from bathroom with RN/PCT, assist x1 .    Patient left up in chair with all lines intact, call button in reach, and RN notified.    GOALS:   Multidisciplinary Problems       Occupational Therapy Goals          Problem: Occupational Therapy    Goal Priority Disciplines Outcome Interventions   Occupational Therapy Goal     OT, PT/OT Progressing    Description: Goals to be met by: 6/4/24     Patient will increase functional independence with ADLs by performing:    UE Dressing with Modified [place].  LE Dressing with Modified [place] and Assistive Devices as needed.  Grooming while standing at sink with Modified [place].  Toileting from bedside commode with Modified [place] for hygiene and clothing management.   Bathing from  shower chair/bench with Modified [place].  Toilet transfer to bedside commode with Modified [place].                         History:     Past Medical History:   Diagnosis Date    *Atrial fibrillation     Anticoagulant long-term use     Atrial fibrillation     Colon cancer screening 03/26/2019    BLANCO (dyspnea on exertion) 05/20/2016    Fatigue 05/20/2016    GERD (gastroesophageal reflux disease)     Hyperlipidemia     Hypertension     Inguinal hernia recurrent unilateral 06/25/2013    On amiodarone therapy 09/21/2016    Sleep apnea     Squamous cell carcinoma of skin     Status post catheter ablation of atrial fibrillation 09/21/2016    Stroke     TIA    TIA (transient ischemic attack)          Past Surgical History:   Procedure Laterality Date    ABLATION N/A 9/28/2018     Procedure: ABLATION;  Surgeon: Romaine Howard MD;  Location: [facility] CATH LAB;  Service: Cardiology;  Laterality: N/A;  AF, ERASMO, PVI, RFA, MAME, Gen, MB, 3 Prep    CATARACT EXTRACTION W/  INTRAOCULAR LENS IMPLANT Left 1/11/10    CATARACT EXTRACTION W/  INTRAOCULAR LENS IMPLANT Right 12/14/2017    DMEK/ Dr. Jay    COLONOSCOPY N/A 3/26/2019    Procedure: COLONOSCOPY;  Surgeon: Mauro Dye MD;  [facility]  Service: Endoscopy;  Laterality: N/A;  ok to hold Eliquis x 2 days per Dr. Howard-MS    CORNEAL TRANSPLANT Left 1997    PKP OS    CORNEAL TRANSPLANT Left 03/23/2017    CORNEAL TRANSPLANT Right 12/14/2017    DMEK/ [facility]; Dr. Jay    HERNIA REPAIR      HIP ARTHROPLASTY      OCCLUSION OF LEFT ATRIAL APPENDAGE N/A 5/9/2022    Procedure: Left atrial appendage occlusion;  Surgeon: Romaine Howard MD;  Location: [facility] EP LAB;  Service: Cardiology;  Laterality: N/A;  afib, watchman, BSCI, erasmo, anes, MB/DM, 3prep    ROTATOR CUFF REPAIR      TRANSESOPHAGEAL ECHOCARDIOGRAPHY N/A 5/9/2022    Procedure: ECHOCARDIOGRAM, TRANSESOPHAGEAL;  Surgeon: Fanta Dacosta MD;  Location: [facility] EP LAB;  Service: Cardiology;  Laterality: N/A;       Time Tracking:     OT Date of Treatment: 06/03/24  OT Start Time: 1115  OT Stop Time: 1152  OT Total Time (min): 37 min    Billable Minutes: Evaluation 10, Self Care/Home Management 14, and Therapeutic Activity 13    6/3/2024

## 2024-06-03 NOTE — HPI
Pavel Graff is a 74 y.o. male with Left hip pain.  Pain is worse with activity and weight bearing.  Patient has experienced interference of activities of daily living due to increased pain and decreased range of motion. Patient has failed non-operative treatment including NSAIDs, as well as greater than 3 months of activity modification. Pavel Graff ambulates independently with antalgic gait.     Relevant medical conditions of significance in perioperative period:  History of a-fib s/p ablation and watchman  HTN- on medication managed  HTN- no medication managed by pcp  History of TIA  BPH  GERD- on medication managed

## 2024-06-03 NOTE — NURSING
Notified Dr. Harrison that pt has one more dose of bethanechol due but has voided 550cc.  Order given to hold last dose of bethanechol.

## 2024-06-03 NOTE — ASSESSMENT & PLAN NOTE
Pavel Graff is a 74 y.o. male is s/p L KLAUS on 6/3/24    Surgical dressing C/D/I  Pain control: multimodal, Anesthesia Surgical Home following  PT/OT: WBAT LLE, no extremes of motion  DVT PPx: ASA 81 BID, FCDs at all times when not ambulating  Abx: postop Ancef, cefadroxil at discharge  Drain: none  [place]: none    Dispo: plan to dc to home today once cleared by PT/OT

## 2024-06-03 NOTE — TRANSFER OF CARE
Anesthesia Transfer of Care Note    Patient: Pavel Graff    Procedure(s) Performed: Procedure(s) (LRB):  ARTHROPLASTY, HIP, TOTAL, ANTERIOR APPROACH: LEFT: DEPUY - ACTIS + PINNACLE (Left)    Patient location: PACU    Anesthesia Type: CSE    Transport from OR: Transported from OR on room air with adequate spontaneous ventilation    Post pain: adequate analgesia    Post assessment: no apparent anesthetic complications and tolerated procedure well    Post vital signs: stable    Level of consciousness: responds to stimulation    Nausea/Vomiting: no nausea/vomiting    Complications: none    Transfer of care protocol was followed      Last vitals: Visit Vitals  /76 (BP Location: Right arm, Patient Position: Lying)   Pulse 79   Temp 36.7 °C (98.1 °F) (Oral)   Resp 16   Ht 6' (1.829 m)   Wt 109.8 kg (242 lb)   SpO2 100%   BMI 32.82 kg/m²

## 2024-06-03 NOTE — HOSPITAL COURSE
On 6/3/24, the patient arrived to the [facility] for proper pre-operative management.  Upon completion of pre-operative preparation, the patient was taken back to the operative theatre. L KLAUS was performed without complication and the patient was transported to the post anesthesia care unit in stable condition.  After appropriate recovery from the anaesthetic agents used during the surgery, the patient was then transported to the hospital inpatient floor.  The interim of the hospital stay from arrival on the floor up to discharge has been uncomplicated. The patient has tolerated regular diet.  The patient's pain has been controlled using a multimodal approach. Currently, the patient's pain is well controlled on an oral regimen.  The patient has been voiding without difficulty.  The patient began participation in physical therapy after surgery and has progressed throughout the entire hospital stay.  Currently, the patient's progress is sufficient to allow the them to be discharged to home safely.  The patient agrees with this assessment and desires a discharge today.

## 2024-06-03 NOTE — NURSING
Pt arrived to unit via hospital bed from PACU.  Pt aaox4, vss, no s/s of distress noted.  Dressing to left hip cdi.  Ice Pack in place.  IVF infusing.  FCDs placed.  Pt instructed to call for assistance when getting up and he verbalized understanding.  Call light placed within reach.  Spouse at bedside.

## 2024-06-03 NOTE — PLAN OF CARE
Patient tolerated PT session well. Patient ambulated 140ft with RW and contact guard assistance. No LOB or SOB noted. Patient educated in anterior hip precautions.     Problem: Physical Therapy  Goal: Physical Therapy Goal  Description: Goals to be met by: 2024     Patient will increase functional independence with mobility by performin. Supine to sit with supervision  2. Sit to stand transfer with Supervision  3. Gait x300 feet with Supervision using Rolling Walker  4. Ascend/Descend 1 curb step with Stand by assistance using Rolling Walker  5. Lower extremity exercise program x30 reps per handout, with supervision       Outcome: Progressing

## 2024-06-03 NOTE — ANESTHESIA PREPROCEDURE EVALUATION
06/03/2024  Pre-operative evaluation for Procedure(s) (LRB):  ARTHROPLASTY, HIP, TOTAL, ANTERIOR APPROACH: LEFT: DEPUY - ACTIS + PINNACLE (Left)    Pavel Graff is a 74 y.o. male     TTE 6/2022  KAM done for 6 week evaluation s/p 27mm Watchman FLX implantation.  The Watchman device is well-seated with no device-related thrombus. There is a very small (2mm) nguyen-device leak.  The left ventricle is normal in size with normal systolic function. The estimated ejection fraction is 60%.  Mild aortic regurgitation.  Normal right ventricular size with normal right ventricular systolic function.  Mild tricuspid regurgitation.  Grade 3 plaque present in the descending aorta.  The sinuses of Valsalva is mildly dilated.    Patient Active Problem List   Diagnosis    Hypertension    Hyperlipidemia    Transient cerebral ischemia    History of TIA (transient ischemic attack)    Benign prostatic hyperplasia    Personal history of colonic polyps    Fuchs' corneal dystrophy    Keratoconus, unspecified    Obstructive sleep apnea    NSVT (nonsustained ventricular tachycardia)    Persistent atrial fibrillation    Fuchs' endothelial dystrophy    Corneal transplant failure    Nuclear cataract    Presence of Watchman left atrial appendage closure device    Primary osteoarthritis of left hip    History of total hip arthroplasty, right    Hard of hearing    Hyponatremia       Review of patient's allergies indicates:  No Known Allergies    Current Facility-Administered Medications on File Prior to Encounter   Medication Dose Route Frequency Provider Last Rate Last Admin    0.9%  NaCl infusion   Intravenous Continuous Zoey Newman NP   Stopped at 09/28/18 1204     Current Outpatient Medications on File Prior to Encounter   Medication Sig Dispense Refill    ascorbic acid (VITAMIN C ORAL) Take by mouth Daily.      aspirin 81  MG Chew Take 81 mg by mouth once daily.      fish oil-omega-3 fatty acids 300-1,000 mg capsule Take 1 g by mouth once daily.      hydroCHLOROthiazide (HYDRODIURIL) 25 MG tablet TAKE 1 TABLET BY MOUTH EVERY DAY 90 tablet 3    ketoconazole (NIZORAL) 2 % shampoo Wash hair with medicated shampoo at least 2x/week - let sit on scalp at least 5 minutes prior to rinsing 120 mL 5    losartan (COZAAR) 50 MG tablet TAKE 1 TABLET BY MOUTH EVERY DAY 90 tablet 3    pravastatin (PRAVACHOL) 20 MG tablet TAKE 1 TABLET BY MOUTH EVERY DAY 90 tablet 3    prednisoLONE acetate (PRED FORTE) 1 % DrpS INSTILL 1 DROP INTO BOTH EYES TWICE DAILY 5 mL 3    timolol maleate 0.5% (TIMOPTIC) 0.5 % Drop INSTILL 1 DROP INTO LEFT EYE TWICE DAILY 15 mL 2    cetirizine (ZYRTEC) 10 MG tablet Take 1 tablet (10 mg total) by mouth once daily. (Patient not taking: Reported on 5/7/2024) 30 tablet 0       Past Surgical History:   Procedure Laterality Date    ABLATION N/A 9/28/2018    Procedure: ABLATION;  Surgeon: Romaine Howard MD;  Location: [facility] CATH LAB;  Service: Cardiology;  Laterality: N/A;  AF, ERASMO, PVI, RFA, MAME, Gen, MB, 3 Prep    CATARACT EXTRACTION W/  INTRAOCULAR LENS IMPLANT Left 1/11/10    CATARACT EXTRACTION W/  INTRAOCULAR LENS IMPLANT Right 12/14/2017    [facility]YK/ Dr. Jay    COLONOSCOPY N/A 3/26/2019    Procedure: COLONOSCOPY;  Surgeon: Mauro Dye MD;  Location: [facility] ENDO ([address] [place] [address]);  Service: Endoscopy;  Laterality: N/A;  ok to hold Eliquis x 2 days per Dr. Howard-MS    CORNEAL TRANSPLANT Left 1997    PKP OS    CORNEAL TRANSPLANT Left 03/23/2017    CORNEAL TRANSPLANT Right 12/14/2017    DMEK/ [facility]; Dr. Jay    HERNIA REPAIR      HIP ARTHROPLASTY      OCCLUSION OF LEFT ATRIAL APPENDAGE N/A 5/9/2022    Procedure: Left atrial appendage occlusion;  Surgeon: Romaine Howard MD;  Location: [facility] EP LAB;  Service: Cardiology;  Laterality: N/A;  afib, watchman, BSCI, erasmo, anes, MB/DM, 3prep    ROTATOR CUFF REPAIR      TRANSESOPHAGEAL  "ECHOCARDIOGRAPHY N/A 2022    Procedure: ECHOCARDIOGRAM, TRANSESOPHAGEAL;  Surgeon: Fanta Dacosta MD;  Location: [facility] LAB;  Service: Cardiology;  Laterality: N/A;       Social History     Socioeconomic History    Marital status:     Number of children: 3   Occupational History     Employer: [employer]   Tobacco Use    Smoking status: Never     Passive exposure: Never    Smokeless tobacco: Never   Substance and Sexual Activity    Alcohol use: No    Drug use: No    Sexual activity: Yes     Partners: Female         CBC: No results for input(s): "WBC", "RBC", "HGB", "HCT", "PLT", "MCV", "MCH", "MCHC" in the last 72 hours.    CMP: No results for input(s): "NA", "K", "CL", "CO2", "BUN", "CREATININE", "GLU", "MG", "PHOS", "CALCIUM", "ALBUMIN", "PROT", "ALKPHOS", "ALT", "AST", "BILITOT" in the last 72 hours.    INR  No results for input(s): "PT", "INR", "PROTIME", "APTT" in the last 72 hours.        Diagnostic Studies:      EKD Echo:  Results for orders placed or performed during the hospital encounter of 16   2D echo with color flow doppler   Result Value Ref Range    EF + QEF 60 55 - 65    Mitral Valve Regurgitation MILD     Diastolic Dysfunction No     Aortic Valve Regurgitation MILD     Est. PA Systolic Pressure 33     Mitral Valve Mobility NORMAL     Tricuspid Valve Regurgitation MILD         Pre-op Assessment    I have reviewed the Patient Summary Reports.     I have reviewed the Nursing Notes. I have reviewed the NPO Status.   I have reviewed the Medications.     Review of Systems  Cardiovascular:     Hypertension                                        Pulmonary:        Sleep Apnea                Hepatic/GI:     GERD             Musculoskeletal:  Arthritis               Neurological:   CVA                                        Physical Exam  General: Well nourished and Cooperative    Airway:  Mallampati: II   Mouth Opening: Normal  TM Distance: Normal  Tongue: " Normal  Neck ROM: Normal ROM    Chest/Lungs:  Clear to auscultation, Normal Respiratory Rate    Heart:  Rate: Normal  Rhythm: Regular Rhythm  Sounds: Normal        Anesthesia Plan  Type of Anesthesia, risks & benefits discussed:    Anesthesia Type: Gen ETT, Gen Natural Airway, CSE, Regional  Intra-op Monitoring Plan: Standard ASA Monitors  Post Op Pain Control Plan: multimodal analgesia and IV/PO Opioids PRN  Induction:  IV  Airway Plan: Direct and Video, Post-Induction  Informed Consent: Informed consent signed with the Patient and all parties understand the risks and agree with anesthesia plan.  All questions answered.   ASA Score: 3    Ready For Surgery From Anesthesia Perspective.     .

## 2024-06-03 NOTE — PLAN OF CARE
Problem: Occupational Therapy  Goal: Occupational Therapy Goal  Description: Goals to be met by: 6/4/24     Patient will increase functional independence with ADLs by performing:    UE Dressing with Modified [place].  LE Dressing with Modified [place] and Assistive Devices as needed.  Grooming while standing at sink with Modified [place].  Toileting from bedside commode with Modified [place] for hygiene and clothing management.   Bathing from  shower chair/bench with Modified [place].  Toilet transfer to bedside commode with Modified [place].    Outcome: Progressing

## 2024-06-03 NOTE — OP NOTE
Sabino IVORY left hip  OPERATIVE NOTE      Date of Operation:   6/3/2024    Providers Performing:   Surgeon(s):  Christiano Gallo III, MD  Assistant: Tamir Dominguez MD    Preoperative Diagnosis:   Left hip osteoarthritis, M16.12    Postoperative Diagnosis:   Same    Operative Procedure:   Left Total Hip Arthroplasty, Anterior Approach, CPT 13726    Anesthesia:   Spinal+catheter  BAKARI cocktail: ANGEL    Estimated Blood Loss:   350 cc    Specimens:   None    Complications:   None, stable to PACU.    Implants Utilized:   Depuy  Emphasys three-hole acetabular shell; Size 56  Emphasys AOX polyethylene liner; 56 OD, 36 ID, neutral  C-stem AMT size 3 HO, 12mm centralizer  Biolox Delta Ceramic 12/14 taper 36mm + 5  Acetabular screw 30mm, 20mm    3 bags simplex P      Implant Name Type Inv. Item Serial No.  Lot No. LRB No. Used Action   emphasys polyethylene liner neutral aox    [employer] [id number] Left 1 Implanted   emphasys acetabular shell three hole    [employer] [id number] Left 1 Implanted   SCREW PINNACLE 6.5 X 20 - [id number]  SCREW PINNACLE 6.5 X 20  [employer] [id number] Left 1 Implanted   SCREW PINACLE 6.5MM X 30MM - [id number]  SCREW PINACLE 6.5MM X 30MM  [employer] [id number] Left 1 Implanted   CEMENT BONE SURG SMPLX P RADPQ - [id number]  CEMENT BONE SURG SMPLX P RADPQ  [employer] TER[employer] [id number] Left 2 Implanted   CEMENT BONE SURG SMPLX P RADPQ - [id number]  CEMENT BONE SURG SMPLX P RADPQ  [employer] TERRI. [id number] Left 1 Implanted   CENTRALIZIER C STEM VOID 12MM - [id number]  CENTRALIZIER C STEM VOID 12MM  [employer] [id number] Left 1 Implanted   cstem amt cemented stem high offset    [employer] [id number] Left 1 Implanted   HEAD BIOLOX CERAMC FEM +5 36MM - [id number]  HEAD BIOLOX CERAMC FEM +5 36MM  SYNTHES [id number] Left 1 Implanted       Indications:   The patient has longstanding left hip pain secondary to the above diagnosis. They have failed conservative management which includes anti-inflammatory  medications and activity modification. We reviewed the risks and benefits of the direct anterior hip replacement with the patient prior to surgery including risk of infection, lateral thigh numbness, blood clot, leg length inequality, dislocation, components loosening, components wearing out, need for revision surgery, continued pain, limping, and injury to nerves and vessels.  After discussing the risks and benefits of the procedure they would like to proceed with surgery.    Operative Notes:   The patient was met in the holding area. The operative site was marked. Anesthesia administered spinal anesthesia. The patient was placed supine on a Ilion table and the operative site was identified. The hip was prepped and draped in the usual fashion. IV antibiotics were administered and a timeout was performed.     I performed a direct anterior approach to the hip. Skin incision was made and the fascia of the tensor fascia mark was identified. I utilized the interval between the tensor fascia mark and sartorious for direct dissection to the hip joint. I identified and coagulated the ascending branch of the lateral circumflex vessel. Standard retractors over the anterior hip capsule were placed and the capsulotomy was performed. The capsule was tagged for retraction. The femoral head was identified and the femoral neck osteotomy was made in accordance with preoperative templating. The femoral head was then removed with a corkscrew.    The standard anterior, posterior, and superior retractors were placed around the acetabulum. The capsular labrum and foveal contents were removed. Sequential acetabular reamers were used to prepare the acetabulum. Once good good fit was achieved, the final acetabular cup was selected to be one millimeter larger in diameter than the last reamer used. The cup was checked for a good rim fit and a provisional impaction was performed to verify positioning on fluoroscopy. Once this was satisfactory,  the impaction was completed. I checked to make sure there was no overhanging osteophytes anteriorly as well as making sure that there was not excessive acetabular cup exposed. Screws were placed in the cup to augment initial fixation. The final liner was impacted into place and I confirmed that it was well seated.    The hydraulic hook was placed around the femur. The femur was externally rotated and the standard calcar and greater trochanter retractors were placed. A provisional posterior capsulotomy was performed. Then, gross traction was released and the leg was extended and adducted. The appropriate release was performed to allow elevation of the femur for good visualization of the femoral canal.     A box osteotome was used to open the femoral canal and a canal finder was used to sound the canal. The starter broach and sequential broaches were used until stability was appropriate. A trial reduction was performed and intraoperative fluoroscopy was used to confirm appropriate leg lengths and offset.  Once the trial femoral components appeared appropriately positioned, the hip was dislocated, the femur re-exposed, and the trial femoral components were removed. The canal was brushed, irrigated, and dried. Cement was mixed. A cement restrictor was placed at appropriate depth. Canal was filled and pressurized with cement. The final femoral stem was insterted to the level of the neck cut and cement was allowed to dry. The final ball was impacted onto a dry trunnion and the hip was reduced checking for appropriate soft tissue tension. Final intra-operative fluoroscopy was obtained to confirm implant positioning, leg length, and offset.       While checking xray, a 0.35% betadine solution was left to soak in the wound. The wound was copiously irrigated. I checked for any residual bleeders and made sure that there was appropriate hemostasis. The local anesthetic cocktail was administered. 1 gram of vancomycin powder was  placed in the wound. The wound was closed in layers using #1 vicryl at the fascia, then 2-0 vicryl, 3-0 monocryl, 4-0 monocryl and Prineo Mesh+Dermabond. A sterile dressing was placed.     There were no complications or evidence of intraoperative fracture. All counts were correct.    The first-assistant was critical to all steps of the operation, including retraction during exposure and bone preparation, as well as the deep and superficial wound closure.  Dr. Gallo performed and/or supervised the key portions of this surgical procedure, including evaluation of the bone cuts, reshaping of the bony elements, and insertion of the provisional and final components.    Post Op Plan:   The patient will be weightbearing as tolerated with a walker with no extremes of motion  ASA for DVT prophylaxis (81mg BID for one month)  24 hours of IV antibiotics.    Pour, flomax pre, bethanechol post    Due patient and or surgical factors the patient will receive an Rx for cefadroxil 500mg BID x7 days after discharge per [place] data:   Cristo MM, Sreekanth JE, Burak CASTILLO, Sirisha PIERRE. [facility] Clinical Research Award: Extended Oral Antibiotics Prevent Periprosthetic Joint Infection in High-Risk Cases: 3855 Patients With 1-Year Follow-Up. J Arthroplasty. 2021 Jul;36(7S):S18-S25. doi: 10.1016/j.arth.2021.01.051. Epub 2021 Jan 23. PMID: [id number].        Signed by: Christiano Gallo III, MD

## 2024-06-03 NOTE — PT/OT/SLP EVAL
Physical Therapy Evaluation and Treatment    Patient Name: Pavel Graff   MRN: [id number]  Recent Surgery: Procedure(s) (LRB):  ARTHROPLASTY, HIP, TOTAL, ANTERIOR APPROACH: LEFT: DEPUY - ACTIS + PINNACLE (Left) Day of Surgery    Recommendations:     Discharge Recommendations: Low Intensity Therapy   Discharge Equipment Recommendations: walker, rolling, hip kit   Barriers to discharge: None    Assessment:     Pavel Graff is a 74 y.o. male admitted with a medical diagnosis of Primary osteoarthritis of left hip. He presents with the following impairments/functional limitations: weakness, impaired functional mobility, gait instability, impaired balance, decreased lower extremity function, pain, orthopedic precautions. Patient tolerated PT session well. Patient ambulated 140ft with RW and contact guard assistance. No LOB or SOB noted. Patient educated in anterior hip precautions.     Rehab Prognosis: Good; patient would benefit from acute PT services to address these deficits and reach maximum level of function.    Plan:     During this hospitalization, patient to be seen daily to address the above listed problems via gait training, therapeutic activities, therapeutic exercises    Plan of Care Expires: 06/07/24    Subjective     Chief Complaint: none stated  Patient Comments/Goals: To get back to PLOF.   Pain/Comfort:  Pain Rating 1: 0/10    Social History:  Living Environment: Patient lives with his wife in a 2SH with 1 step to enter. He is going to be staying on the 1st floor. There are 13 steps with a right handrail to get to 2nd floor bedroom when he is ready.   Prior Level of Function: Prior to admission, patient was independent.  Equipment at Home: bedside commode, shower chair  Assistance Upon Discharge: family    Objective:     Communicated with RN prior to session. Patient found up in chair with cryotherapy, FCD, peripheral IV, telemetry upon PT entry to room. Wife present during PT session.     General  Precautions: Standard, fall   Orthopedic Precautions: LLE weight bearing as tolerated, LLE anterior precautions (Anterior total hip, WBAT with walker, no extremes of motion, hip flexion 0-90 degrees.)   Braces: N/A    Respiratory Status: Room air    Exams:  RLE ROM: WFL  RLE Strength: WFL  LLE ROM:  WFL within anterior hip precautions   LLE Strength:  appears WFL but did not formally assess due to recent surgery   Cognitive: Patient is oriented to Person, Place, Time, Situation    Functional Mobility:  Gait belt applied - Yes  Bed Mobility  Seated in chair at start of session and returned to chair  Transfers  Sit to Stand: contact guard assistance with rolling walker x1 from bedside chair   Gait  Patient ambulated 140ft with rolling walker and contact guard assistance. Patient required cues for position in walker, sequencing, and weight bearing status to increase independence and safety.       Therapeutic Activities and Exercises:  Patient and wife educated in:  -PT role and POC  -anterior hip precautions  -safety with transfers including hand placement  -gait sequencing and RW management  -OOB activity to maximize recovery including ambulating with nursing staff assistance and RW while in the hospital       AM-PAC 6 CLICK MOBILITY  Total Score:18    Patient left up in chair with all lines intact, call button in reach, RN notified, and wife present.    GOALS:   Multidisciplinary Problems       Physical Therapy Goals          Problem: Physical Therapy    Goal Priority Disciplines Outcome Goal Variances Interventions   Physical Therapy Goal     PT, PT/OT Progressing     Description: Goals to be met by: 2024     Patient will increase functional independence with mobility by performin. Supine to sit with supervision  2. Sit to stand transfer with Supervision  3. Gait x300 feet with Supervision using Rolling Walker  4. Ascend/Descend 1 curb step with Stand by assistance using Rolling Walker  5. Lower extremity  exercise program x30 reps per handout, with supervision                            History:     Past Medical History:   Diagnosis Date    *Atrial fibrillation     Anticoagulant long-term use     Atrial fibrillation     Colon cancer screening 03/26/2019    BLANCO (dyspnea on exertion) 05/20/2016    Fatigue 05/20/2016    GERD (gastroesophageal reflux disease)     Hyperlipidemia     Hypertension     Inguinal hernia recurrent unilateral 06/25/2013    On amiodarone therapy 09/21/2016    Sleep apnea     Squamous cell carcinoma of skin     Status post catheter ablation of atrial fibrillation 09/21/2016    Stroke     TIA    TIA (transient ischemic attack)        Past Surgical History:   Procedure Laterality Date    ABLATION N/A 9/28/2018    Procedure: ABLATION;  Surgeon: Romaine Howard MD;  Location: [facility] CATH LAB;  Service: Cardiology;  Laterality: N/A;  AF, ERASMO, PVI, RFA, MAME, Gen, MB, 3 Prep    CATARACT EXTRACTION W/  INTRAOCULAR LENS IMPLANT Left 1/11/10    CATARACT EXTRACTION W/  INTRAOCULAR LENS IMPLANT Right 12/14/2017    [facility]MEK/ Dr. Jay    COLONOSCOPY N/A 3/26/2019    Procedure: COLONOSCOPY;  Surgeon: Mauro Dye MD;  [facility]O [facility]);  Service: Endoscopy;  Laterality: N/A;  ok to hold Eliquis x 2 days per Dr. Howard-MS    CORNEAL TRANSPLANT Left 1997    PKP OS    CORNEAL TRANSPLANT Left 03/23/2017    CORNEAL TRANSPLANT Right 12/14/2017    DMEK/ [facility]; Dr. Jay    HERNIA REPAIR      HIP ARTHROPLASTY      OCCLUSION OF LEFT ATRIAL APPENDAGE N/A 5/9/2022    Procedure: Left atrial appendage occlusion;  Surgeon: Romaine Howard MD;  Location: [facility] EP LAB;  Service: Cardiology;  Laterality: N/A;  afib, watchman, BSCI, erasmo, anes, MB/DM, 3prep    ROTATOR CUFF REPAIR      TRANSESOPHAGEAL ECHOCARDIOGRAPHY N/A 5/9/2022    Procedure: ECHOCARDIOGRAM, TRANSESOPHAGEAL;  Surgeon: Fanta Dacosta MD;  Location: [facility] EP LAB;  Service: Cardiology;  Laterality: N/A;       Time Tracking:     PT Received On:  06/03/24  PT Start Time: 1349  PT Stop Time: 1410  PT Total Time (min): 21 min     Billable Minutes: Evaluation 10 and Therapeutic Activity 11    6/3/2024

## 2024-06-03 NOTE — NURSING
Nurses Note -- 4 Eyes      6/3/2024   11:19 AM      Skin assessed during: Admit      [x] No Altered Skin Integrity Present    []Prevention Measures Documented      [] Yes- Altered Skin Integrity Present or Discovered   [] LDA Added if Not in Epic (Describe Wound)   [] New Altered Skin Integrity was Present on Admit and Documented in LDA   [] Wound Image Taken    Wound Care Consulted? No    Attending Nurse:   Misty Brunner RN/Staff Member:   Sandra

## 2024-06-04 VITALS
DIASTOLIC BLOOD PRESSURE: 77 MMHG | HEART RATE: 78 BPM | RESPIRATION RATE: 16 BRPM | OXYGEN SATURATION: 98 % | HEIGHT: 72 IN | WEIGHT: 242 LBS | SYSTOLIC BLOOD PRESSURE: 155 MMHG | BODY MASS INDEX: 32.78 KG/M2 | TEMPERATURE: 98 F

## 2024-06-04 LAB
BUN SERPL-MCNC: 12 MG/DL (ref 6–30)
CHLORIDE SERPL-SCNC: 105 MMOL/L (ref 95–110)
CREAT SERPL-MCNC: 0.8 MG/DL (ref 0.5–1.4)
GLUCOSE SERPL-MCNC: 121 MG/DL (ref 70–110)
HCT VFR BLD CALC: 32 %PCV (ref 36–54)
POC IONIZED CALCIUM: 1.13 MMOL/L (ref 1.06–1.42)
POC TCO2 (MEASURED): 19 MMOL/L (ref 23–29)
POTASSIUM BLD-SCNC: 3.9 MMOL/L (ref 3.5–5.1)
SAMPLE: ABNORMAL
SODIUM BLD-SCNC: 136 MMOL/L (ref 136–145)

## 2024-06-04 PROCEDURE — 97530 THERAPEUTIC ACTIVITIES: CPT

## 2024-06-04 PROCEDURE — 25000003 PHARM REV CODE 250: Performed by: NURSE PRACTITIONER

## 2024-06-04 PROCEDURE — 25000003 PHARM REV CODE 250

## 2024-06-04 PROCEDURE — 84132 ASSAY OF SERUM POTASSIUM: CPT

## 2024-06-04 PROCEDURE — 85014 HEMATOCRIT: CPT

## 2024-06-04 PROCEDURE — 97110 THERAPEUTIC EXERCISES: CPT

## 2024-06-04 PROCEDURE — 97116 GAIT TRAINING THERAPY: CPT

## 2024-06-04 PROCEDURE — 97535 SELF CARE MNGMENT TRAINING: CPT

## 2024-06-04 PROCEDURE — 84295 ASSAY OF SERUM SODIUM: CPT

## 2024-06-04 PROCEDURE — 82565 ASSAY OF CREATININE: CPT

## 2024-06-04 PROCEDURE — 99900035 HC TECH TIME PER 15 MIN (STAT)

## 2024-06-04 PROCEDURE — 82330 ASSAY OF CALCIUM: CPT

## 2024-06-04 PROCEDURE — 94761 N-INVAS EAR/PLS OXIMETRY MLT: CPT

## 2024-06-04 PROCEDURE — 63600175 PHARM REV CODE 636 W HCPCS: Performed by: NURSE PRACTITIONER

## 2024-06-04 PROCEDURE — 99499 UNLISTED E&M SERVICE: CPT | Mod: FS,,, | Performed by: SURGERY

## 2024-06-04 RX ORDER — CELECOXIB 200 MG/1
200 CAPSULE ORAL DAILY
Status: DISCONTINUED | OUTPATIENT
Start: 2024-06-04 | End: 2024-06-04 | Stop reason: HOSPADM

## 2024-06-04 RX ADMIN — METOPROLOL SUCCINATE 25 MG: 25 TABLET, EXTENDED RELEASE ORAL at 10:06

## 2024-06-04 RX ADMIN — CELECOXIB 200 MG: 200 CAPSULE ORAL at 10:06

## 2024-06-04 RX ADMIN — ASPIRIN 81 MG: 81 TABLET, COATED ORAL at 10:06

## 2024-06-04 RX ADMIN — MUPIROCIN 1 G: 20 OINTMENT TOPICAL at 10:06

## 2024-06-04 RX ADMIN — ACETAMINOPHEN 1000 MG: 500 TABLET ORAL at 05:06

## 2024-06-04 RX ADMIN — METHOCARBAMOL 750 MG: 750 TABLET ORAL at 10:06

## 2024-06-04 RX ADMIN — HYDROCHLOROTHIAZIDE 25 MG: 25 TABLET ORAL at 10:06

## 2024-06-04 RX ADMIN — FAMOTIDINE 20 MG: 20 TABLET, FILM COATED ORAL at 10:06

## 2024-06-04 RX ADMIN — ACETAMINOPHEN 1000 MG: 500 TABLET ORAL at 12:06

## 2024-06-04 RX ADMIN — CEFAZOLIN 2 G: 2 INJECTION, POWDER, FOR SOLUTION INTRAMUSCULAR; INTRAVENOUS at 12:06

## 2024-06-04 NOTE — DISCHARGE SUMMARY
[facility])  Orthopedics  Discharge Summary      Patient Name: Pavel Graff  MRN: [id number]  Admission Date: 6/3/2024  Hospital Length of Stay: 0 days  Discharge Date and Time:  06/04/2024 11:54 AM  Attending Physician: Christiano Gallo III, MD   Discharging Provider: Tamir Dominguez MD  Primary Care Provider: Joe Peterson MD    HPI:   Pavel Graff is a 74 y.o. male with Left hip pain.  Pain is worse with activity and weight bearing.  Patient has experienced interference of activities of daily living due to increased pain and decreased range of motion. Patient has failed non-operative treatment including NSAIDs, as well as greater than 3 months of activity modification. Pavel Graff ambulates independently with antalgic gait.     Relevant medical conditions of significance in perioperative period:  History of a-fib s/p ablation and watchman  HTN- on medication managed  HTN- no medication managed by pcp  History of TIA  BPH  GERD- on medication managed     Procedure(s) (LRB):  ARTHROPLASTY, HIP, TOTAL, ANTERIOR APPROACH: LEFT: DEPUY - ACTIS + PINNACLE (Left)      Hospital Course:  On 6/3/24, the patient arrived to the [facility] for proper pre-operative management.  Upon completion of pre-operative preparation, the patient was taken back to the operative theatre. L KLAUS was performed without complication and the patient was transported to the post anesthesia care unit in stable condition.  After appropriate recovery from the anaesthetic agents used during the surgery, the patient was then transported to the hospital inpatient floor.  The interim of the hospital stay from arrival on the floor up to discharge has been uncomplicated. The patient has tolerated regular diet.  The patient's pain has been controlled using a multimodal approach. Currently, the patient's pain is well controlled on an oral regimen.  The patient has been voiding without difficulty.  The  patient began participation in physical therapy after surgery and has progressed throughout the entire hospital stay.  Currently, the patient's progress is sufficient to allow the them to be discharged to home safely.  The patient agrees with this assessment and desires a discharge today.      Goals of Care Treatment Preferences:  Code Status: Full Code    Living Will: Yes              Consults (From admission, onward)          Status Ordering Provider     Inpatient consult to Respiratory Care  Once        Provider:  (Not yet assigned)    Acknowledged ROMEL RENEE     Inpatient consult to Respiratory Care  Once        Provider:  (Not yet assigned)    Acknowledged ROMEL RENEE     Inpatient consult to Social Work  Once        Provider:  (Not yet assigned)    Acknowledged ROMEL RENEE     Inpatient consult to Respiratory Care  Once        Provider:  (Not yet assigned)    Acknowledged ROMEL RENEE            Significant Diagnostic Studies: Labs: All labs within the past 24 hours have been reviewed    Pending Diagnostic Studies:       None          Final Active Diagnoses:    Diagnosis Date Noted POA    PRINCIPAL PROBLEM:  Primary osteoarthritis of left hip [M16.12] 04/24/2024 Yes    Benign prostatic hyperplasia [N40.0] 06/25/2013 Yes    Hypertension [I10] 07/03/2012 Yes    Hyperlipidemia [E78.5] 07/03/2012 Yes      Problems Resolved During this Admission:      Discharged Condition: good    Disposition: Home or Self Care    Follow Up:   Follow-up Information       Christiano Gallo III, MD Follow up in 2 week(s).    Specialty: Orthopedic Surgery  Why: For wound re-check  Contact information:  [address]  [facility] [id number] [phone]                           Patient Instructions:      CBC Auto Differential   Standing Status: Future Number of Occurrences: 1 Standing Exp. Date: 07/09/25     Comprehensive Metabolic Panel   Standing Status: Future Number of Occurrences: 1 Standing Exp. Date:  07/09/25     Protime-INR   Standing Status: Future Number of Occurrences: 1 Standing Exp. Date: 07/09/25     Activity as tolerated     Lifting restrictions   Order Comments: No strenuous exercise or lifting of > 10 lbs     Weight bearing as tolerated     No driving, operating heavy equipment or signing legal documents while taking pain medication     Leave dressing on - Keep it clean, dry, and intact until clinic visit   Order Comments: Do not remove surgical dressing for 2 weeks post-op. This will be done only by MD at initial post-op visit. If dressing is completely saturated, replace with identical dressing - silver-impregnated hydrocolloid dressing.    Do not get dressings wet. Do not shower.    If dressing continues to be saturated or there are signs of infection, please call [facility] [phone] for further instructions and to make appt to be seen.     Call MD for: fluid/liquid/drainage on dressing     Call MD for:  temperature >100.4     Call MD for:  persistent nausea and vomiting     Call MD for:  severe uncontrolled pain     Call MD for:  difficulty breathing, headache or visual disturbances     Call MD for:  redness, tenderness, or signs of infection (pain, swelling, redness, odor or green/yellow discharge around incision site)     Call MD for:  hives     Call MD for:  persistent dizziness or light-headedness     Call MD for:  extreme fatigue     EKG 12-lead   Standing Status: Future Number of Occurrences: 1 Standing Exp. Date: 05/10/25     Ok to shower at home, running water only, towel dry, use shower chair for safety, no soaking in a tub or pool for one month.     Medications:  Reconciled Home Medications:      Medication List        CONTINUE taking these medications      acetaminophen 650 MG Tbsr  Commonly known as: TYLENOL  Take 1 tablet (650 mg total) by mouth every 8 (eight) hours.     * aspirin 81 MG Chew  Take 81 mg by mouth once daily.     * aspirin 81 MG EC tablet  Commonly known as:  ECOTRIN  Take 1 tablet (81 mg total) by mouth 2 (two) times a day.     cefadroxil 500 MG Cap  Commonly known as: DURICEF  Take 1 capsule (500 mg total) by mouth every 12 (twelve) hours.     celecoxib 200 MG capsule  Commonly known as: CeleBREX  Take 1 capsule (200 mg total) by mouth once daily.     cetirizine 10 MG tablet  Commonly known as: ZYRTEC  Take 1 tablet (10 mg total) by mouth once daily.     fish oil-omega-3 fatty acids 300-1,000 mg capsule  Take 1 g by mouth once daily.     hydroCHLOROthiazide 25 MG tablet  Commonly known as: HYDRODIURIL  TAKE 1 TABLET BY MOUTH EVERY DAY     ketoconazole 2 % shampoo  Commonly known as: NIZORAL  Wash hair with medicated shampoo at least 2x/week - let sit on scalp at least 5 minutes prior to rinsing     losartan 50 MG tablet  Commonly known as: COZAAR  TAKE 1 TABLET BY MOUTH EVERY DAY     methocarbamoL 750 MG Tab  Commonly known as: ROBAXIN  Take 1 tablet (750 mg total) by mouth 4 (four) times daily as needed (muscle spasms).     metoprolol succinate 25 MG 24 hr tablet  Commonly known as: TOPROL-XL  TAKE 1 TABLET BY MOUTH EVERY DAY     oxyCODONE 5 MG immediate release tablet  Commonly known as: ROXICODONE  Take 1 tablet by mouth every 4-6 hours as needed for pain     pantoprazole 40 MG tablet  Commonly known as: PROTONIX  Take 1 tablet (40 mg total) by mouth once daily.     pravastatin 20 MG tablet  Commonly known as: PRAVACHOL  TAKE 1 TABLET BY MOUTH EVERY DAY     prednisoLONE acetate 1 % Drps  Commonly known as: PRED FORTE  INSTILL 1 DROP INTO BOTH EYES TWICE DAILY     STOOL SOFTENER-LAXATIVE 8.6-50 mg per tablet  Generic drug: senna-docusate 8.6-50 mg  Take 1 tablet by mouth once daily.     timolol maleate 0.5% 0.5 % Drop  Commonly known as: TIMOPTIC  INSTILL 1 DROP INTO LEFT EYE TWICE DAILY     VITAMIN C ORAL  Take by mouth Daily.     VOLTAREN ARTHRITIS PAIN 1 % Gel  Generic drug: diclofenac sodium  Apply 2 g topically once daily.           * This list has 2  medication(s) that are the same as other medications prescribed for you. Read the directions carefully, and ask your doctor or other care provider to review them with you.                  Tamir Dominguez MD  Orthopedics  [facility])

## 2024-06-04 NOTE — PLAN OF CARE
Problem: Pain Acute  Goal: Optimal Pain Control and Function  Intervention: Develop Pain Management Plan  Flowsheets (Taken 6/4/2024 0728)  Pain Management Interventions:   cold applied   breathing exercises utilized   diversional activity provided   medication offered   pillow support provided   relaxation techniques promoted   warm blanket provided   prescribed exercises encouraged   pain management plan reviewed with patient/caregiver   position adjusted     Problem: Adult Inpatient Plan of Care  Goal: Plan of Care Review  Outcome: Adequate for Care Transition  Flowsheets (Taken 6/4/2024 0728)  Plan of Care Reviewed With: patient     Problem: Adult Inpatient Plan of Care  Goal: Patient-Specific Goal (Individualized)  Outcome: Adequate for Care Transition  Flowsheets (Taken 6/4/2024 0728)  Individualized Care Needs: manage pain, work with PT and OT, discharge teaching.     Problem: Adult Inpatient Plan of Care  Goal: Optimal Comfort and Wellbeing  Intervention: Provide Person-Centered Care  Flowsheets (Taken 6/4/2024 0728)  Trust Relationship/Rapport:   care explained   reassurance provided   thoughts/feelings acknowledged     Problem: Comorbidity Management  Goal: Blood Pressure in Desired Range  Intervention: Maintain Blood Pressure Management  Flowsheets (Taken 6/4/2024 0728)  Medication Review/Management:   medications reviewed   provider consulted     Problem: Fall Injury Risk  Goal: Absence of Fall and Fall-Related Injury  Intervention: Promote Injury-Free Environment  Flowsheets (Taken 6/4/2024 0728)  Safety Promotion/Fall Prevention:   assistive device/personal item within reach   diversional activities provided   Fall Risk reviewed with patient/family   in recliner, wheels locked   lighting adjusted   medications reviewed   muscle strengthening facilitated   nonskid shoes/socks when out of bed   side rails raised x 2   toileting scheduled   instructed to call staff for mobility   Plan of care reviewed  with patient; verbalized understanding.   Medications reviewed and administered as ordered.  Rounding for safety and patient care per policy.   Safety precautions maintained. Patient working appropriately with PT/OT.  DME at bedside.  Call light within reach, bed wheels locked, bed in lowest position, side rails ^x2, safety maintained. NADN, Will continue monitor.

## 2024-06-04 NOTE — PROGRESS NOTES
Acute Pain Service and Perioperative Surgical Home Progress Note    Interval history  Pavel Graff is a 74 y.o., male, status post arthroplasty of the left hip with no acute events overnight.  Patient's pain has been well controlled throughout the evening.  She is tolerating p.o..  Patient worked with therapy yesterday.  Doing well overnight.  Good plantar and dorsiflexion.  Good sensation throughout the affected extremity.  Good quad strength, able to elevate the leg off the bed    Surgery:  Procedure(s) (LRB):  ARTHROPLASTY, HIP, TOTAL, ANTERIOR APPROACH: LEFT: DEPUY - ACTIS + PINNACLE (Left)    Post Op Day #: 1    Problem List:    Active Hospital Problems    Diagnosis  POA    *Primary osteoarthritis of left hip [M16.12]  Yes    Benign prostatic hyperplasia [N40.0]  Yes    Hypertension [I10]  Yes    Hyperlipidemia [E78.5]  Yes      Resolved Hospital Problems   No resolved problems to display.       Subjective:       General appearance of alert, oriented, no complaints   Pain with rest: 2    Numbers   Pain with movement: 3    Numbers   Side Effects    1. Pruritis No    2. Nausea No    3. Motor Blockade No, 0=Ability to raise lower extremities off bed    4. Sedation No, 1=awake and alert    Schedule Medications:    acetaminophen  1,000 mg Oral Q6H    aspirin  81 mg Oral BID    celecoxib  200 mg Oral Daily    famotidine  20 mg Oral BID    hydroCHLOROthiazide  25 mg Oral Daily    losartan  50 mg Oral Daily    methocarbamoL  750 mg Oral TID    metoprolol succinate  25 mg Oral Daily    mupirocin  1 g Nasal BID    polyethylene glycol  17 g Oral Daily    pravastatin  20 mg Oral Daily    pregabalin  75 mg Oral QHS    senna-docusate 8.6-50 mg  1 tablet Oral BID        Continuous Infusions:   sodium chloride 0.9%   Intravenous Continuous 150 mL/hr at 06/03/24 1616 New Bag at 06/03/24 1616        PRN Medications:    Current Facility-Administered Medications:     morphine, 2 mg, Intravenous, Q3H PRN    naloxone, 0.02 mg,  "Intravenous, PRN    ondansetron, 4 mg, Intravenous, Q8H PRN    oxyCODONE, 5 mg, Oral, Q3H PRN    oxyCODONE, 10 mg, Oral, Q3H PRN    polyethylene glycol, 17 g, Oral, Daily PRN    prochlorperazine, 5 mg, Intravenous, Q6H PRN       Antibiotics:  Antibiotics (From admission, onward)      Start     Stop Route Frequency Ordered    06/03/24 1030  mupirocin 2 % ointment 1 g         06/08/24 0859 Nasl 2 times daily 06/03/24 1029               Objective:         Vital Signs (Most Recent):  Temp: 97.7 °F (36.5 °C) (06/04/24 0429)  Pulse: 73 (06/04/24 0429)  Resp: 20 (06/04/24 0429)  BP: 136/65 (06/04/24 0429)  SpO2: 96 % (06/04/24 0429) Vital Signs Range (Last 24H):  Temp:  [97 °F (36.1 °C)-98.4 °F (36.9 °C)]   Pulse:  [66-87]   Resp:  [13-20]   BP: (107-165)/(57-80)   SpO2:  [96 %-100 %]          I & O (Last 24H):  Intake/Output Summary (Last 24 hours) at 6/4/2024 0539  Last data filed at 6/4/2024 0439  Gross per 24 hour   Intake 4990 ml   Output 2600 ml   Net 2390 ml       Physical Exam:    GA: Alert, comfortable, no acute distress.   Pulmonary: Clear to auscultation . Normal respiratory ratei.  Cardiac: regular rate and rhythm.          Laboratory: reviewed in chart  CBC:   Recent Labs     06/04/24  0440   HCT 32*       BMP: No results for input(s): "NA", "K", "CO2", "CL", "BUN", "CREATININE", "GLU", "MG", "PHOS", "CALCIUM" in the last 72 hours.    No results for input(s): "PT", "INR", "PROTIME", "APTT" in the last 72 hours.          Assessment:         Pain control adequate    Plan:  1) Pain: Multimodal pain regimen ordered which includes acetaminophen, celecoxib, pregabalin, and prn oxycodone.  Well controlled on current regimen.  Will continue to monitor.    2)POUR protocol.  Patient urinating well   3) GERD, no issues overnight   4) FEN/GI: Tolerating regular diet.     5) Dispo: Pt working well with PT/OT. Case management and SW following along for setting up home health and physical therapy. Plan to discharge home this " am.              Evaluator Ayad Branch PA-C

## 2024-06-04 NOTE — PROGRESS NOTES
[facility])  Orthopedics  Progress Note    Patient Name: Pavel Graff  MRN: [id number]  Admission Date: 6/3/2024  Hospital Length of Stay: 0 days  Attending Provider: Christiano Gallo III, MD  Primary Care Provider: Joe Peterson MD  Follow-up For: Procedure(s) (LRB):  ARTHROPLASTY, HIP, TOTAL, ANTERIOR APPROACH: LEFT: DEPUY - ACTIS + PINNACLE (Left)    Post-Operative Day: 1 Day Post-Op  Subjective:     Principal Problem:Primary osteoarthritis of left hip    Principal Orthopedic Problem: same, s/p L KLAUS 6/3/24    Interval History: Pt seen and examined at bedside. NAEON, VSS, AF. Pain controlled. Denies fevers, chills, chest pain, SOB, N/V. Reports one episode of loose stools, no issues otherwise. 140 feet with PT.      Review of patient's allergies indicates:  No Known Allergies    Current Facility-Administered Medications   Medication    0.9%  NaCl infusion    acetaminophen tablet 1,000 mg    aspirin EC tablet 81 mg    bethanechol tablet 10 mg    ceFAZolin 2 g in sodium chloride 0.9 % 50 mL IVPB (MB+)    famotidine tablet 20 mg    fentaNYL 50 mcg/mL injection 100 mcg    fentaNYL 50 mcg/mL injection 25 mcg    fentaNYL 50 mcg/mL injection 25 mcg    haloperidol lactate injection 0.5 mg    hydroCHLOROthiazide tablet 25 mg    HYDROmorphone injection 0.2 mg    LIDOcaine (PF) 10 mg/ml (1%) injection 10 mg    losartan tablet 50 mg    metoprolol succinate (TOPROL-XL) 24 hr tablet 25 mg    midazolam injection 4 mg    morphine injection 2 mg    naloxone 0.4 mg/mL injection 0.02 mg    ondansetron injection 4 mg    ondansetron injection 4 mg    oxyCODONE immediate release tablet 10 mg    oxyCODONE immediate release tablet 5 mg    oxyCODONE immediate release tablet 5 mg    polyethylene glycol packet 17 g    pravastatin tablet 20 mg    pregabalin capsule 75 mg    prochlorperazine injection Soln 5 mg    senna-docusate 8.6-50 mg per tablet 1 tablet    tranexamic acid (CYKLOKAPRON) 1,000 mg in sodium chloride  PHYSICAL THERAPY - DAILY TREATMENT NOTE    Patient Name: Malena Vasquez        Date: 2021  : 1950   YES Patient  Verified  Visit #:   +  Insurance: Payor: [employer] MEDICARE / Plan: [facility] / Product Type: Managed Care Medicare /      In time: 11:40 Out time: 12:40   Total Treatment Time: 60     Medicare/BCBS Time Tracking (below)   Total Timed Codes (min):  60 1:1 Treatment Time:  45     TREATMENT AREA = Left shoulder pain [M25.512]  Status post arthroscopy of left shoulder [Z98.890]    SUBJECTIVE    Pain Level (on 0 to 10 scale):  5  / 10   Medication Changes/New allergies or changes in medical history, any new surgeries or procedures? YES    If yes, update Summary List   Subjective Functional Status/Changes:  []  No changes reported     \"I screwed up the night before last. I asked my roommate to move my recliner and I ended up pushing it with her. I ended up letting go with my right arm and my left had a lot of the weight. It's been sore since then. \"   \"I was given a steroid pack and finished it yesterday but I'm still breaking out. I don't know what's going on. OBJECTIVE     Therapeutic Procedures:  Min Procedure Specifics + Rationale   n/a [x]  Patient Education (performed throughout session) [x] Review HEP    [] Progressed/Changed HEP based on:   [] proper performance and advancement of Therex/TA   [] reduction in pain level    [] increased functional capacity       [] change in directional preference   15 [x] Therapeutic Exercise   [x]  See Flowsheet   Rationale: increase ROM and increase strength to improve the patients ability to participate in ADL's    15 [x] Therapeutic Activity   [x]  See Flowsheet  Rationale:  To improve safety, proprioception, coordination, and efficiency with tasks   15 [x] Neuromuscular Re-ed   [x]  See Flowsheet  PNF PROM  Rationale: increase ROM, increase strength, improve coordination, improve balance and increase proprioception  to 0.9 % 100 mL IVPB (MB+)    tranexamic acid (CYKLOKAPRON) 3,000 mg in sodium chloride 0.9% SolP 100 mL     Facility-Administered Medications Ordered in Other Encounters   Medication    0.9%  NaCl infusion     Objective:     Vital Signs (Most Recent):  Temp: 98 °F (36.7 °C) (06/03/24 0907)  Pulse: 67 (06/03/24 0915)  Resp: 16 (06/03/24 0915)  BP: 110/62 (06/03/24 0915)  SpO2: 100 % (06/03/24 0915) Vital Signs (24h Range):  Temp:  [98 °F (36.7 °C)-98.1 °F (36.7 °C)] 98 °F (36.7 °C)  Pulse:  [67-80] 67  Resp:  [16-17] 16  SpO2:  [98 %-100 %] 100 %  BP: (107-136)/(57-76) 110/62     Weight: 109.8 kg (242 lb)  Height: 6' (182.9 cm)  Body mass index is 32.82 kg/m².      Intake/Output Summary (Last 24 hours) at 6/3/2024 0925  Last data filed at 6/3/2024 0848  Gross per 24 hour   Intake 2000 ml   Output --   Net 2000 ml        Ortho/SPM Exam       AAOx4  NAD  Reg rate  No increased WOB    lLE:  Dressing c/d/i  SILT T/SP/DP/Miles/Sa  Motor intact T/SP/DP  WWP extremities  FCDs in place and functioning    Significant Labs: All pertinent labs within the past 24 hours have been reviewed.    Significant Imaging: I have reviewed all pertinent imaging results/findings.  Assessment/Plan:     * Primary osteoarthritis of left hip  Pavel Graff is a 74 y.o. male is s/p L KLAUS on 6/3/24    Surgical dressing C/D/I  Pain control: multimodal, Anesthesia Surgical Home following  PT/OT: WBAT LLE, no extremes of motion  DVT PPx: ASA 81 BID, FCDs at all times when not ambulating  Abx: postop Ancef, cefadroxil at discharge  Drain: none  [place]: none    Dispo: plan to dc to home today once cleared by PT/OT      Benign prostatic hyperplasia  - POUR protocol, bethanechol 10 mg q1 hr x 3 doses post-op    Hyperlipidemia  - Continue home medications    Hypertension  - Continue home medications            Tamir Doimnguez MD  Orthopedics  [facility])     improve the patients ability to safely participate in ADL's     Modality rationale: decrease inflammation, decrease pain, increase tissue extensibility and increase muscle contraction/control to improve the patients ability to perform ADL's with greater ease     Min Type Additional Details   15 [x] E-Stim Type:IFC  Attended? NO Location: left shoulder  [x] supine              [] prone  [x] legs elevated   [] legs flat  [] sitting   [] sidelying - [] left [] right  [] with heat  [x] with ice  [] Vasopneumatic Device    [x] Skin assessment post-treatment:  [x]intact [x]redness- no adverse reaction       []redness - adverse reaction:     Other Objective/Functional Measures:    See PN     Post Treatment Pain Level (on 0 to 10) scale:   4  / 10     ASSESSMENT    Assessment/Changes in Function:       See PN         Patient will continue to benefit from skilled PT services to modify and progress therapeutic interventions, address functional mobility deficits, address ROM deficits, address strength deficits, analyze and address soft tissue restrictions, analyze and cue movement patterns, analyze and modify body mechanics/ergonomics and instruct in home and community integration  to attain remaining goals   Progress toward goals / Updated goals:    See PN     PLAN    [x]  Upgrade activities as tolerated  [x]  Update interventions per flow sheet YES Continue plan of care   []  Discharge due to :    []  Other:      Therapist: Rakan Urena \"BJ\" Lila Gonzalez, DPT, Cert. MDT, Cert. DN, Cert. SMT, Dip.  Osteopractic    Date: 9/1/2021 Time: 11:49 AM     Future Appointments   Date Time Provider Ada Aaliyah   9/1/2021  2:15 PM [facility]   9/2/2021 10:15 AM Rose Lipoma, PA [facility]   9/9/2021  8:30 AM Wilda Cole MD [facility]   9/30/2021  1:15 PM [facility]   11/4/2021 10:45 AM Julián Howell MD [facility]   11/11/2021  1:30 PM Lb James MD [facility] AMB

## 2024-06-04 NOTE — PLAN OF CARE
Problem: Pain Acute  Goal: Optimal Pain Control and Function  Outcome: Progressing     Problem: Hip Arthroplasty  Goal: Optimal Coping  Outcome: Progressing  Goal: Absence of Bleeding  Outcome: Progressing  Goal: Effective Bowel Elimination  Outcome: Progressing  Goal: Fluid and Electrolyte Balance  Outcome: Progressing  Goal: Optimal Functional Ability  Outcome: Progressing  Goal: Absence of Infection Signs and Symptoms  Outcome: Progressing  Goal: Intact Neurovascular Status  Outcome: Progressing  Goal: Anesthesia/Sedation Recovery  Outcome: Progressing  Goal: Acceptable Pain Control  Outcome: Progressing  Goal: Nausea and Vomiting Relief  Outcome: Progressing  Goal: Effective Urinary Elimination  Outcome: Progressing  Goal: Effective Oxygenation and Ventilation  Outcome: Progressing     Problem: Adult Inpatient Plan of Care  Goal: Plan of Care Review  Outcome: Progressing  Goal: Patient-Specific Goal (Individualized)  Outcome: Progressing  Goal: Absence of Hospital-Acquired Illness or Injury  Outcome: Progressing  Goal: Optimal Comfort and Wellbeing  Outcome: Progressing  Goal: Readiness for Transition of Care  Outcome: Progressing     Problem: Wound  Goal: Optimal Coping  Outcome: Progressing  Goal: Optimal Functional Ability  Outcome: Progressing  Goal: Absence of Infection Signs and Symptoms  Outcome: Progressing  Goal: Improved Oral Intake  Outcome: Progressing  Goal: Optimal Pain Control and Function  Outcome: Progressing  Goal: Skin Health and Integrity  Outcome: Progressing  Goal: Optimal Wound Healing  Outcome: Progressing     Problem: Infection  Goal: Absence of Infection Signs and Symptoms  Outcome: Progressing     Problem: Fall Injury Risk  Goal: Absence of Fall and Fall-Related Injury  Outcome: Progressing     Problem: Comorbidity Management  Goal: Blood Pressure in Desired Range  Outcome: Progressing

## 2024-06-04 NOTE — NURSING
Report received from EZEKIEL Jay , care assumed, pt is AAOx4, sitting up in chair , safety maintained,dressing to left hip is CDI with ice pack applied,  chair locked in position, call light and bedside table within reach, NAD noted or voiced concerns at this time, POC reviewed with pt and wife at bedside, all questions answered, pt reminded to use call light for all requests and assistance, pt verbalized understanding, will continue to monitor.

## 2024-06-04 NOTE — PT/OT/SLP PROGRESS
"Occupational Therapy   Treatment and Discharge Note    Name: Pavel Graff  MRN: [id number]  Admitting Diagnosis:  Primary osteoarthritis of left hip  1 Day Post-Op    Recommendations:     Discharge Recommendations: Low Intensity Therapy  Discharge Equipment Recommendations:  walker, rolling, hip kit  Barriers to discharge:  None    Assessment:     Pavel Graff is a 74 y.o. male with a medical diagnosis of Primary osteoarthritis of left hip.  He presents with L KLAUS. Performance deficits affecting function are impaired self care skills, impaired functional mobility, orthopedic precautions. Pt was able to perform supine/sit T/F c S and Sit <> Stand Transfer with modified independence with rolling walker Bed <> Chair Transfer using Stand Pivot technique with modified independence with rolling walker Toilet Transfer Stand Pivot technique with modified independence with rolling walker and bedside commode.  Able to perform UB/LB dressing c modified independence c AE.  Educated pt on bathing, car transfers, and cryotherapy.       Rehab Prognosis:  Good; patient would benefit from acute skilled OT services to address these deficits and reach maximum level of function.       Plan:     Patient to be seen daily to address the above listed problems via self-care/home management, therapeutic exercises, therapeutic activities  Plan of Care Expires: 06/04/24  Plan of Care Reviewed with: patient, spouse    Subjective     Chief Complaint: L KLAUS  Patient/Family Comments/goals: "I'm ready to go home."  Pain/Comfort:  Pain Rating 1: 0/10    Objective:     Communicated with: RN prior to session.  Patient found supine with cryotherapy, FCD, telemetry upon OT entry to room.    General Precautions: Standard, fall    Orthopedic Precautions:LLE weight bearing as tolerated, LLE anterior precautions  Braces: N/A  Respiratory Status: Room air     Occupational Performance:     Bed Mobility:    Patient completed Supine to Sit with supervision "     Functional Mobility/Transfers:  Patient completed Sit <> Stand Transfer with contact guard assistance  with  rolling walker   Patient completed Bed <> Chair Transfer using Stand Pivot technique with contact guard assistance with rolling walker  Patient completed Toilet Transfer Stand Pivot technique with contact guard assistance with  rolling walker and bedside commode  Functional Mobility: Pt was able to walk to bathroom c CGA and RW.    Activities of Daily Living:  Grooming: contact guard assistance to wash hands while standing at sink c RW.  Upper Body Dressing: modified independence to don shirt.  Lower Body Dressing: modified independence to don shorts, socks, and shoes c reacher, sock-aid, and long handled shoe horn.  Toileting: modified independence for toilet hygiene.      [facility] 6 Click ADL: 18    Patient left up in chair with all lines intact, call button in reach, RN notified, and wife present    GOALS:   Multidisciplinary Problems       Occupational Therapy Goals          Problem: Occupational Therapy    Goal Priority Disciplines Outcome Interventions   Occupational Therapy Goal     OT, PT/OT Progressing    Description: Goals to be met by: 6/4/24     Patient will increase functional independence with ADLs by performing:    UE Dressing with Modified [place].  LE Dressing with Modified [place] and Assistive Devices as needed.  Grooming while standing at sink with Modified [place].  Toileting from bedside commode with Modified [place] for hygiene and clothing management.   Bathing from  shower chair/bench with Modified [place].  Toilet transfer to bedside commode with Modified [place].                         Time Tracking:     OT Date of Treatment: 06/04/24  OT Start Time: 0815  OT Stop Time: 0853  OT Total Time (min): 38 min    Billable Minutes:Self Care/Home Management 30  Therapeutic Activity 8               6/4/2024

## 2024-06-04 NOTE — PT/OT/SLP PROGRESS
Physical Therapy Treatment and Discharge    Patient Name:  Pavel Graff   MRN:  [id number]    Recommendations:     Discharge Recommendations: Low Intensity Therapy  Discharge Equipment Recommendations: walker, rolling, hip kit  Barriers to discharge: None    Assessment:     Pavel Graff is a 74 y.o. male admitted with a medical diagnosis of Primary osteoarthritis of left hip.  He presents with the following impairments/functional limitations: weakness, impaired functional mobility, gait instability, impaired balance, decreased lower extremity function, pain, orthopedic precautions. Patient tolerated PT session well. Patient ambulated 180ft and 220ft with RW and supervision. No LOB or SOB noted. Patient ascended/descended 4 steps with right handrail and contact guard assistance. Patient performed L LE therex x30 reps. Patient educated in anterior hip precautions. Patient ready to discharge home from PT standpoint.      Rehab Prognosis: Good; patient would benefit from acute skilled PT services to address these deficits and reach maximum level of function.    Recent Surgery: Procedure(s) (LRB):  ARTHROPLASTY, HIP, TOTAL, ANTERIOR APPROACH: LEFT: DEPUY - ACTIS + PINNACLE (Left) 1 Day Post-Op    Plan:     During this hospitalization, patient to be seen daily to address the identified rehab impairments via gait training, therapeutic activities, therapeutic exercises and progress toward the following goals:    Plan of Care Expires:  06/07/24    Subjective     Chief Complaint: Left hip pain.   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   Pain/Comfort:  Pain Rating 1: 6/10  Location - Side 1: Left  Location 1: hip  Pain Addressed 1: Pre-medicate for activity, Reposition, Distraction      Objective:     Communicated with RN prior to session.  Patient found up in chair with cryotherapy, telemetry upon PT entry to room. Wife present during PT session.     General Precautions: Standard, fall  Orthopedic  Precautions: LLE weight bearing as tolerated, LLE anterior precautions (Anterior total hip, WBAT with walker, no extremes of motion, hip flexion 0-90 degrees.)  Braces: N/A  Respiratory Status: Room air     Functional Mobility:  Mat Mobility:     Supine to Sit: supervision  Sit to Supine: supervision  Transfers:     Sit to Stand:  supervision with rolling walker x1 from bedside chair and x1 from mat   Gait: Patient ambulated 180ft and 220ft with Rolling Walker and supervision using reciprocal gait. Patient demonstrated decreased braeden and decreased step length during gait due to pain and decreased strength. Patient ascended/descended ramp with contact guard assistance and rolling walker.   Stairs:  Patient ascended/descended 4 steps with right handrail and contact guard assistance.       AM-PAC 6 CLICK MOBILITY  Turning over in bed (including adjusting bedclothes, sheets and blankets)?: 4  Sitting down on and standing up from a chair with arms (e.g., wheelchair, bedside commode, etc.): 4  Moving from lying on back to sitting on the side of the bed?: 4  Moving to and from a bed to a chair (including a wheelchair)?: 4  Need to walk in hospital room?: 4  Climbing 3-5 steps with a railing?: 4  Basic Mobility Total Score: 24       Treatment & Education:  Patient performed L LE therex x30 reps for heel slides, SAQ over bolster, A/P, quad set, and glute set all within anterior hip precautions.      Patient and wife educated in:  -PT role and POC  -anterior hip precautions  -safety with transfers including hand placement  -gait sequencing and RW management  -OOB activity to maximize recovery including ambulating at home to prevent DVT   -SUV transfer  -stair training  -HEP for therex at home with handout provided       Patient left up in chair with all lines intact, call button in reach, RN notified, and wife present.    GOALS:   Multidisciplinary Problems       Physical Therapy Goals          Problem: Physical Therapy     Goal Priority Disciplines Outcome Goal Variances Interventions   Physical Therapy Goal     PT, PT/OT Progressing     Description: Goals to be met by: 2024     Patient will increase functional independence with mobility by performin. Supine to sit with supervision  2. Sit to stand transfer with Supervision  3. Gait x300 feet with Supervision using Rolling Walker  4. Ascend/Descend 1 curb step with Stand by assistance using Rolling Walker  5. Ascend/Descend 4 steps with right handrail and stand by assistance    6. Lower extremity exercise program x30 reps per handout, with supervision                            Time Tracking:     PT Received On: 24  PT Start Time: 920     PT Stop Time: 958  PT Total Time (min): 38 min     Billable Minutes: Gait Training 15, Therapeutic Activity 8, and Therapeutic Exercise 15    Treatment Type: Treatment  PT/PTA: PT     Number of PTA visits since last PT visit: 0     2024

## 2024-06-05 ENCOUNTER — CLINICAL SUPPORT (OUTPATIENT)
Dept: ORTHOPEDICS | Facility: CLINIC | Age: 74
End: 2024-06-05
Payer: MEDICARE

## 2024-06-05 DIAGNOSIS — Z96.642 S/P HIP REPLACEMENT, LEFT: Primary | ICD-10-CM

## 2024-06-05 PROCEDURE — 99499 UNLISTED E&M SERVICE: CPT | Mod: HCNC,95,, | Performed by: ORTHOPAEDIC SURGERY

## 2024-06-05 PROCEDURE — G0180 MD CERTIFICATION HHA PATIENT: HCPCS | Mod: ,,, | Performed by: ORTHOPAEDIC SURGERY

## 2024-06-05 NOTE — PROGRESS NOTES
I called the patient today regarding his surgery with Dr. Christiano Gallo III. The patient had a left anterior KLAUS on 6/3/24.     Pain Scale: 0 / 10 at rest, 5/10 with movement    Any issues with Fever: No.    Any issues with medications (specifically DVT prophylaxis): No. ASA 81 BID  Celebrex : yes  Protonix : yes  Resume home meds : Yes, no longer taking fish oil or Voltaren gel    Any issues with bowel movements:  Passing vicente: No.                                                                 Urination: No.                                                                 Constipation: No. Prunes    Completing at home exercises: Yes:     Any concerns regarding their dressing/bandage:  No.    Patient confirmed first HH-PT appointment:  HHPT on  6/5/24 at am.     Any other concerns: No.    Blue Bracelet : yes    The patient was informed that if they have any urgent issues with their bandage, medications or any other health concerns regarding their surgery to call the 24/7 Orthopedic Post-op Hot Line at [phone]. The patient was reminded that if they have any chest pain or shortness of breath to call 911 or go to the ER.    The patient verbalized understanding and does not have any other questions

## 2024-06-07 ENCOUNTER — OFFICE VISIT (OUTPATIENT)
Dept: ORTHOPEDICS | Facility: CLINIC | Age: 74
End: 2024-06-07
Payer: MEDICARE

## 2024-06-07 VITALS — TEMPERATURE: 98 F | HEART RATE: 89 BPM | SYSTOLIC BLOOD PRESSURE: 140 MMHG | DIASTOLIC BLOOD PRESSURE: 77 MMHG

## 2024-06-07 DIAGNOSIS — Z96.642 S/P HIP REPLACEMENT, LEFT: Primary | ICD-10-CM

## 2024-06-07 PROCEDURE — 3078F DIAST BP <80 MM HG: CPT | Mod: HCNC,CPTII,S$GLB, | Performed by: PHYSICIAN ASSISTANT

## 2024-06-07 PROCEDURE — 1126F AMNT PAIN NOTED NONE PRSNT: CPT | Mod: HCNC,CPTII,S$GLB, | Performed by: PHYSICIAN ASSISTANT

## 2024-06-07 PROCEDURE — 1101F PT FALLS ASSESS-DOCD LE1/YR: CPT | Mod: HCNC,CPTII,S$GLB, | Performed by: PHYSICIAN ASSISTANT

## 2024-06-07 PROCEDURE — 3288F FALL RISK ASSESSMENT DOCD: CPT | Mod: HCNC,CPTII,S$GLB, | Performed by: PHYSICIAN ASSISTANT

## 2024-06-07 PROCEDURE — 1160F RVW MEDS BY RX/DR IN RCRD: CPT | Mod: HCNC,CPTII,S$GLB, | Performed by: PHYSICIAN ASSISTANT

## 2024-06-07 PROCEDURE — 3077F SYST BP >= 140 MM HG: CPT | Mod: HCNC,CPTII,S$GLB, | Performed by: PHYSICIAN ASSISTANT

## 2024-06-07 PROCEDURE — 1157F ADVNC CARE PLAN IN RCRD: CPT | Mod: HCNC,CPTII,S$GLB, | Performed by: PHYSICIAN ASSISTANT

## 2024-06-07 PROCEDURE — 4010F ACE/ARB THERAPY RXD/TAKEN: CPT | Mod: HCNC,CPTII,S$GLB, | Performed by: PHYSICIAN ASSISTANT

## 2024-06-07 PROCEDURE — 99999 PR PBB SHADOW E&M-EST. PATIENT-LVL III: CPT | Mod: PBBFAC,HCNC,, | Performed by: PHYSICIAN ASSISTANT

## 2024-06-07 PROCEDURE — 1159F MED LIST DOCD IN RCRD: CPT | Mod: HCNC,CPTII,S$GLB, | Performed by: PHYSICIAN ASSISTANT

## 2024-06-07 PROCEDURE — 99024 POSTOP FOLLOW-UP VISIT: CPT | Mod: HCNC,S$GLB,, | Performed by: PHYSICIAN ASSISTANT

## 2024-06-07 NOTE — PROGRESS NOTES
Pavel Graff presents for unscheduled l post-operative visit following a left total hip arthroplasty performed by Dr. Gallo on 06/03/2024.  Patient developed some erythema around the edges of the dressing physical therapist requested it be evaluated.  Patient reports he has having no pain or tenderness in the areas not had any drainage does have some ecchymosis posterior to the bandage is in the buttock region but otherwise is doing very well    Exam:  Blood pressure (!) 140/77, pulse 89, temperature 98.1 °F (36.7 °C).   Patient is ambulating well with walker   Incision is clean and dry without drainage mild erythema around the periphery of the bandage   Mild swelling throughout the left lower extremity      Initial post-operative radiographs reviewed today revealing satisfactory position of the prosthesis.    A/P  s/p left total hip replacement    - Patient advised to increase ice and elevation to the lower extremity decrease ambulatory activities for the weekend allow area to rest and swelling to subside.  Monitor the dressing closely if he has any changes or increases in pain or redness contact the clinic immediately.  - Continue hip precautions x 6 weeks post op  - Antibiotic prophylaxis reviewed  - Continue ASA for 1 month post op  - Pain medication tolerating well no refills needed at this time  - Follow up i at scheduled postoperative visit. Pt will call clinic immediately with problems/concerns.

## 2024-06-10 ENCOUNTER — PATIENT MESSAGE (OUTPATIENT)
Dept: INTERNAL MEDICINE | Facility: CLINIC | Age: 74
End: 2024-06-10
Payer: MEDICARE

## 2024-06-11 NOTE — PLAN OF CARE
[facility]  Discharge Final Note    Primary Care Provider: Joe Peterson MD    Expected Discharge Date: 6/4/2024    Final Discharge Note (most recent)       Final Note - 06/04/24 1040          Final Note    Assessment Type Final Discharge Note     Anticipated Discharge Disposition Home-Health Care [facility] Resources/Appts/Education Provided Provided patient/caregiver with written discharge plan information;Provided education on problems/symptoms using teachback                   Future Appointments   Date Time Provider Department Center   6/18/2024 11:20 AM Elías Perez PA-C [facility]THO [address]   7/16/2024  3:15 PM [facility] OIC-XRAY [facility]   7/16/2024  4:00 PM Christiano Gallo III, MD [facility]THO [address]   8/27/2024  4:00 PM Christiano Gallo III, MD [facility] [address]     Contact Info       Christiano Gallo III, MD   Specialty: Orthopedic Surgery    [address] [facility] [address]   Phone: [phone]       Next Steps: Follow up in 2 week(s)    Instructions: For wound re-check

## 2024-06-11 NOTE — PLAN OF CARE
06/03/24 1628   BIGGS Message   Medicare Outpatient and Observation Notification regarding financial responsibility Given to patient/caregiver;Explained to patient/caregiver;Signed/date by patient/caregiver   Date BIGGS was signed 06/03/24   Time BIGGS was signed [id number]

## 2024-06-18 ENCOUNTER — OFFICE VISIT (OUTPATIENT)
Dept: ORTHOPEDICS | Facility: CLINIC | Age: 74
End: 2024-06-18
Payer: MEDICARE

## 2024-06-18 VITALS — HEIGHT: 72 IN | WEIGHT: 243.94 LBS | BODY MASS INDEX: 33.04 KG/M2

## 2024-06-18 DIAGNOSIS — Z96.642 S/P HIP REPLACEMENT, LEFT: Primary | ICD-10-CM

## 2024-06-18 PROCEDURE — 1101F PT FALLS ASSESS-DOCD LE1/YR: CPT | Mod: HCNC,CPTII,S$GLB,

## 2024-06-18 PROCEDURE — 1160F RVW MEDS BY RX/DR IN RCRD: CPT | Mod: HCNC,CPTII,S$GLB,

## 2024-06-18 PROCEDURE — 1126F AMNT PAIN NOTED NONE PRSNT: CPT | Mod: HCNC,CPTII,S$GLB,

## 2024-06-18 PROCEDURE — 1159F MED LIST DOCD IN RCRD: CPT | Mod: HCNC,CPTII,S$GLB,

## 2024-06-18 PROCEDURE — 99024 POSTOP FOLLOW-UP VISIT: CPT | Mod: HCNC,S$GLB,,

## 2024-06-18 PROCEDURE — 99999 PR PBB SHADOW E&M-EST. PATIENT-LVL III: CPT | Mod: PBBFAC,HCNC,,

## 2024-06-18 PROCEDURE — 3288F FALL RISK ASSESSMENT DOCD: CPT | Mod: HCNC,CPTII,S$GLB,

## 2024-06-18 PROCEDURE — 1157F ADVNC CARE PLAN IN RCRD: CPT | Mod: HCNC,CPTII,S$GLB,

## 2024-06-18 PROCEDURE — 4010F ACE/ARB THERAPY RXD/TAKEN: CPT | Mod: HCNC,CPTII,S$GLB,

## 2024-06-18 NOTE — PROGRESS NOTES
Pavel Graff presents for initial post-operative visit following a left total hip arthroplasty performed by Dr. Gallo on 6/3/2024.      Exam:  Height 6' (1.829 m), weight 110.6 kg (243 lb 15 oz).   Patient is ambulating well with cane   Incision is clean and dry without drainage or erythema.      Initial post-operative radiographs reviewed today revealing satisfactory position of the prosthesis.    A/P  2 weeks s/p left total hip arthroplasty    - Patient advised to keep the incision clean and dry for the next 24 hours after which he  may wash the area with antibacterial soap in the shower, but will not submerge incision until one month post op.  - Home health PT   - Antibiotic prophylaxis reviewed  - Continue aspirin for 1 month post op  - Pain medication:  No refill needed  - Follow up in 4 weeks with surgeon. Pt will call clinic immediately with problems/concerns.

## 2024-06-28 ENCOUNTER — EXTERNAL HOME HEALTH (OUTPATIENT)
Dept: HOME HEALTH SERVICES | Facility: HOSPITAL | Age: 74
End: 2024-06-28
Payer: MEDICARE

## 2024-07-04 ENCOUNTER — PATIENT MESSAGE (OUTPATIENT)
Dept: ADMINISTRATIVE | Facility: OTHER | Age: 74
End: 2024-07-04
Payer: MEDICARE

## 2024-07-15 ENCOUNTER — PATIENT MESSAGE (OUTPATIENT)
Dept: ADMINISTRATIVE | Facility: HOSPITAL | Age: 74
End: 2024-07-15
Payer: MEDICARE

## 2024-07-16 ENCOUNTER — OFFICE VISIT (OUTPATIENT)
Dept: ORTHOPEDICS | Facility: CLINIC | Age: 74
End: 2024-07-16
Payer: MEDICARE

## 2024-07-16 ENCOUNTER — HOSPITAL ENCOUNTER (OUTPATIENT)
Dept: RADIOLOGY | Facility: HOSPITAL | Age: 74
Discharge: HOME | End: 2024-07-16
Attending: ORTHOPAEDIC SURGERY
Payer: MEDICARE

## 2024-07-16 DIAGNOSIS — Z96.642 S/P HIP REPLACEMENT, LEFT: Primary | ICD-10-CM

## 2024-07-16 DIAGNOSIS — Z96.642 S/P HIP REPLACEMENT, LEFT: ICD-10-CM

## 2024-07-16 PROCEDURE — 1159F MED LIST DOCD IN RCRD: CPT | Mod: HCNC,CPTII,S$GLB, | Performed by: NURSE PRACTITIONER

## 2024-07-16 PROCEDURE — 99999 PR PBB SHADOW E&M-EST. PATIENT-LVL III: CPT | Mod: PBBFAC,HCNC,, | Performed by: NURSE PRACTITIONER

## 2024-07-16 PROCEDURE — 1157F ADVNC CARE PLAN IN RCRD: CPT | Mod: HCNC,CPTII,S$GLB, | Performed by: NURSE PRACTITIONER

## 2024-07-16 PROCEDURE — 1160F RVW MEDS BY RX/DR IN RCRD: CPT | Mod: HCNC,CPTII,S$GLB, | Performed by: NURSE PRACTITIONER

## 2024-07-16 PROCEDURE — 4010F ACE/ARB THERAPY RXD/TAKEN: CPT | Mod: HCNC,CPTII,S$GLB, | Performed by: NURSE PRACTITIONER

## 2024-07-16 PROCEDURE — 73502 X-RAY EXAM HIP UNI 2-3 VIEWS: CPT | Mod: TC,HCNC,LT

## 2024-07-16 PROCEDURE — 73502 X-RAY EXAM HIP UNI 2-3 VIEWS: CPT | Mod: 26,HCNC,LT, | Performed by: RADIOLOGY

## 2024-07-16 PROCEDURE — 99024 POSTOP FOLLOW-UP VISIT: CPT | Mod: HCNC,S$GLB,, | Performed by: NURSE PRACTITIONER

## 2024-07-24 ENCOUNTER — PATIENT OUTREACH (OUTPATIENT)
Dept: ADMINISTRATIVE | Facility: HOSPITAL | Age: 74
End: 2024-07-24
Payer: MEDICARE

## 2024-07-24 DIAGNOSIS — Z86.010 PERSONAL HISTORY OF COLONIC POLYPS: Primary | ICD-10-CM

## 2024-07-24 NOTE — PROGRESS NOTES
Health Maintenance Due   Topic Date Due    High Dose Statin  Never done    RSV Vaccine (Age 60+ and Pregnant patients) (1 - 1-dose 60+ series) Never done    Shingles Vaccine (2 of 2) 05/11/2021    Pneumococcal Vaccines (Age 65+) (2 of 2 - PPSV23 or PCV20) 06/30/2021    COVID-19 Vaccine (3 - 2023-24 season) 09/01/2023    Colorectal Cancer Screening  03/26/2024       Chart reviewed and updated. Reconciled immunizations. Placed referral for colonoscopy.    Glenys Marie LPN   Clinical Care Coordinator  Primary Care and Wellness

## 2024-08-27 ENCOUNTER — OFFICE VISIT (OUTPATIENT)
Dept: ORTHOPEDICS | Facility: CLINIC | Age: 74
End: 2024-08-27
Payer: MEDICARE

## 2024-08-27 VITALS — BODY MASS INDEX: 33.84 KG/M2 | WEIGHT: 249.88 LBS | HEIGHT: 72 IN

## 2024-08-27 DIAGNOSIS — Z96.642 S/P HIP REPLACEMENT, LEFT: Primary | ICD-10-CM

## 2024-08-27 DIAGNOSIS — M17.12 PRIMARY OSTEOARTHRITIS OF LEFT KNEE: ICD-10-CM

## 2024-08-27 PROCEDURE — 99024 POSTOP FOLLOW-UP VISIT: CPT | Mod: HCNC,S$GLB,, | Performed by: ORTHOPAEDIC SURGERY

## 2024-08-27 PROCEDURE — 1159F MED LIST DOCD IN RCRD: CPT | Mod: HCNC,CPTII,S$GLB, | Performed by: ORTHOPAEDIC SURGERY

## 2024-08-27 PROCEDURE — 1101F PT FALLS ASSESS-DOCD LE1/YR: CPT | Mod: HCNC,CPTII,S$GLB, | Performed by: ORTHOPAEDIC SURGERY

## 2024-08-27 PROCEDURE — 3288F FALL RISK ASSESSMENT DOCD: CPT | Mod: HCNC,CPTII,S$GLB, | Performed by: ORTHOPAEDIC SURGERY

## 2024-08-27 PROCEDURE — 4010F ACE/ARB THERAPY RXD/TAKEN: CPT | Mod: HCNC,CPTII,S$GLB, | Performed by: ORTHOPAEDIC SURGERY

## 2024-08-27 PROCEDURE — 1126F AMNT PAIN NOTED NONE PRSNT: CPT | Mod: HCNC,CPTII,S$GLB, | Performed by: ORTHOPAEDIC SURGERY

## 2024-08-27 PROCEDURE — 1157F ADVNC CARE PLAN IN RCRD: CPT | Mod: HCNC,CPTII,S$GLB, | Performed by: ORTHOPAEDIC SURGERY

## 2024-08-27 PROCEDURE — 99999 PR PBB SHADOW E&M-EST. PATIENT-LVL III: CPT | Mod: PBBFAC,HCNC,, | Performed by: ORTHOPAEDIC SURGERY

## 2024-08-27 NOTE — PROGRESS NOTES
Subjective:     HPI:   Pavel Graff is a 74 y.o. male who presents 12 weeks out from left KLAUS     Date of surgery: L ant KLAUS hybrid 6/3/24    History of Present Illness  The patient presents for evaluation of left hip status post-surgery.    Three months have passed since his left hip surgery, which took place on 06/03/2024. He reports a good recovery with no complications. However, he experiences fatigue and mild soreness in his leg by the end of the day. His knee tends to buckle slightly when he walks while fatigued, but overall, it performs well. He is able to climb stairs and carry out all necessary activities.    He has a history of tibial plateau fractures in both knees. He is not currently taking any pain medications for his hip and does not use any mobility aids such as a cane, walker, or crutches. His hip condition does not limit his activities. He also reports that his back is pain-free.    Medications: none for hip    Assistive Devices: none    Limitations: none    Doing well, no issues, some tired/sore end of the day - some knee valgus     Objective:   Body mass index is 33.89 kg/m².  Exam:    Gait: limp/antalgic/trendelenburg none    Incision: healed    Active straight leg raise: good strength, no discomfort    Extension: 0    Flexion: 100    Abduction: 30    Adduction: 20    External rotation: 30    Internal rotation: 20    LLD: 0 cm supine     Physical Exam      Imaging:  None today    Results  Imaging  Knee x-rays show severe knock knee arthritis. Post-operative hip x-rays show a well-placed titanium socket and cemented metal stem.      Assessment:       ICD-10-CM ICD-9-CM   1. S/P hip replacement, left  Z96.642 V43.64   2. Primary osteoarthritis of left knee  M17.12 715.16        Doing well     Plan:       Patient is doing very well with the hip replacement at this time.  They will continue routine care of their hip and see me for their routine follow-up.  If there are problems in the interim  they will see me back sooner. Prophylactic antibiotic protocol given and explained to patient.     Assessment & Plan  1. Post-surgical status of left hip.  His recovery from left hip surgery on Izzy 3 is progressing well, with no complications reported. The surgical incision is healing nicely. He has been advised to avoid any activities that could potentially cause a fall or overexertion, which could irritate the surrounding muscles, tendons, soft tissues, and nerves. He has also been instructed to avoid extreme ranges of motion and not to force the hip in any particular direction. A set of stretches and strengthening exercises have been provided for him to perform independently. He has been informed about the potential need for antibiotics prior to any future surgeries or dental procedures to prevent bacterial infection in the joint replacements. No further x-rays are required for the next 9 months, after which they will be taken annually for the first year and then every 5 years thereafter. This schedule will allow for close monitoring of his condition.    2. Knee pain.  He reports that his knee gives in a little when he walks and gets tired at the end of the day. He does not currently use a compressive knee sleeve but is open to getting one. A compressive knee sleeve will be provided to give additional support when he is up and about. If the knee pain flares up, options such as injections, therapy, or eventually a knee replacement will be considered.      Phase 2 exercises    L knee severe valgus, s/p tibial plateau fx - give comp sleeve    9 month follow up for annual xray B KLAUS    This note was generated with the assistance of ambient listening technology. Verbal consent was obtained by the patient and accompanying visitor(s) for the recording of patient appointment to facilitate this note. I attest to having reviewed and edited the generated note for accuracy, though some syntax or spelling errors may persist.  Please contact the author of this note for any clarification.      No orders of the defined types were placed in this encounter.            Past Medical History:   Diagnosis Date    *Atrial fibrillation     Anticoagulant long-term use     Atrial fibrillation     Colon cancer screening 03/26/2019    BLANCO (dyspnea on exertion) 05/20/2016    Fatigue 05/20/2016    GERD (gastroesophageal reflux disease)     Hyperlipidemia     Hypertension     Inguinal hernia recurrent unilateral 06/25/2013    On amiodarone therapy 09/21/2016    Sleep apnea     Squamous cell carcinoma of skin     Status post catheter ablation of atrial fibrillation 09/21/2016    Stroke     TIA    TIA (transient ischemic attack)        Past Surgical History:   Procedure Laterality Date    ABLATION N/A 9/28/2018    Procedure: ABLATION;  Surgeon: Romaine Howard MD;  Location: [facility] CATH LAB;  Service: Cardiology;  Laterality: N/A;  AF, KAM, PVI, RFA, MAME, Gen, MB, 3 Prep    ARTHROPLASTY OF HIP BY ANTERIOR APPROACH Left 6/3/2024    Procedure: ARTHROPLASTY, HIP, TOTAL, ANTERIOR APPROACH: LEFT: DEPUY - ACTIS + PINNACLE;  Surgeon: Christiano Gallo III, MD;  Location: [facility] OR;  Service: Orthopedics;  Laterality: Left;    CATARACT EXTRACTION W/  INTRAOCULAR LENS IMPLANT Left 1/11/10    CATARACT EXTRACTION W/  INTRAOCULAR LENS IMPLANT Right 12/14/2017    DMEK/ Dr. Jay    COLONOSCOPY N/A 3/26/2019    Procedure: COLONOSCOPY;  Surgeon: Mauro Dye MD;  [facility]  Service: Endoscopy;  Laterality: N/A;  ok to hold Eliquis x 2 days per Dr. Howard-MS    CORNEAL TRANSPLANT Left 1997    PKP OS    CORNEAL TRANSPLANT Left 03/23/2017    CORNEAL TRANSPLANT Right 12/14/2017    DMEK/ [facility]; Dr. Jay    HERNIA REPAIR      HIP ARTHROPLASTY      OCCLUSION OF LEFT ATRIAL APPENDAGE N/A 5/9/2022    Procedure: Left atrial appendage occlusion;  Surgeon: Romaine Howard MD;  Location: [facility] EP LAB;  Service: Cardiology;  Laterality: N/A;  afib, watchman, BSCI,  erasmo, anes, MB/DM, 3prep    ROTATOR CUFF REPAIR      TRANSESOPHAGEAL ECHOCARDIOGRAPHY N/A 5/9/2022    Procedure: ECHOCARDIOGRAM, TRANSESOPHAGEAL;  Surgeon: Fanta Dacosta MD;  Location: [facility] EP LAB;  Service: Cardiology;  Laterality: N/A;       Family History   Problem Relation Name Age of Onset    Diabetes Mother      Heart disease Mother      Hyperlipidemia Mother      Heart disease Father      Colon cancer Father          colon    Cancer Father      Crohn's disease Sister      Hyperlipidemia Sister      Hypertension Sister      Hyperlipidemia Sister      Heart disease Brother      Sudden death Neg Hx      Amblyopia Neg Hx      Blindness Neg Hx      Cataracts Neg Hx      Glaucoma Neg Hx      Macular degeneration Neg Hx      Retinal detachment Neg Hx      Strabismus Neg Hx      Stroke Neg Hx      Thyroid disease Neg Hx      Melanoma Neg Hx         Social History     Socioeconomic History    Marital status:     Number of children: 3   Occupational History     Employer: [employer]   Tobacco Use    Smoking status: Never     Passive exposure: Never    Smokeless tobacco: Never   Substance and Sexual Activity    Alcohol use: No    Drug use: No    Sexual activity: Yes     Partners: Female

## 2024-10-16 ENCOUNTER — PATIENT MESSAGE (OUTPATIENT)
Dept: ADMINISTRATIVE | Facility: HOSPITAL | Age: 74
End: 2024-10-16
Payer: MEDICARE

## 2024-11-19 RX ORDER — TIMOLOL MALEATE 5 MG/ML
1 SOLUTION/ DROPS OPHTHALMIC 2 TIMES DAILY
Qty: 15 ML | Refills: 2 | Status: SHIPPED | OUTPATIENT
Start: 2024-11-19

## 2025-02-22 DIAGNOSIS — Z00.00 ENCOUNTER FOR MEDICARE ANNUAL WELLNESS EXAM: ICD-10-CM

## 2025-02-28 ENCOUNTER — PATIENT OUTREACH (OUTPATIENT)
Dept: ADMINISTRATIVE | Facility: HOSPITAL | Age: 75
End: 2025-02-28
Payer: MEDICARE

## 2025-02-28 NOTE — PROGRESS NOTES
Chart reviewed and updated. Reconciled immunizations, health maintenance and care everywhere.  Sent portal msg for remote BP or to schedule in office appt.      Fanta Anderson [facility]  Panel Care Coordinator  [facility]  [phone]  For Joe Peterson MD

## 2025-03-13 NOTE — TELEPHONE ENCOUNTER
Refill Routing Note   Medication(s) are not appropriate for processing by [facility] for the following reason(s):        Required vitals abnormal    [facility] action(s):  Defer   Requires appointment : Yes     Requires labs : Yes             Appointments  past 12m or future 3m with PCP    Date Provider   Last Visit   3/5/2024 Joe Peterson MD   Next Visit   Visit date not found Joe Peterson MD   ED visits in past 90 days: 0        Note composed:8:10 AM 03/13/2025

## 2025-03-13 NOTE — TELEPHONE ENCOUNTER
Care Due:                  Date            Visit Type   Department     Provider  --------------------------------------------------------------------------------                                EP -                              PRIMARY      [facility] INTERNAL  Last Visit: 03-      CARE ([facility])   MEDICINE       Joe Peterson  Next Visit: None Scheduled  None         None Found                                                            Last  Test          Frequency    Reason                     Performed    Due Date  --------------------------------------------------------------------------------    Office Visit  15 months..  hydroCHLOROthiazide,       03- 05-                             losartan, pravastatin....    CMP.........  12 months..  hydroCHLOROthiazide,       05-   05-                             losartan, pravastatin....    Lipid Panel.  12 months..  pravastatin..............  03- 03-    Health [facility] Embedded Care Due Messages. Reference number: [id number].   3/13/2025 1:39:13 AM CDT

## 2025-03-14 RX ORDER — LOSARTAN POTASSIUM 50 MG/1
50 TABLET ORAL
Qty: 90 TABLET | Refills: 0 | Status: SHIPPED | OUTPATIENT
Start: 2025-03-14

## 2025-03-29 NOTE — TELEPHONE ENCOUNTER
Refill Routing Note   Medication(s) are not appropriate for processing by [facility] for the following reason(s):        Required vitals abnormal    [facility] action(s):  Defer               Appointments  past 12m or future 3m with PCP    Date Provider   Last Visit   3/5/2024 Joe Peterson MD   Next Visit   Visit date not found Joe Peterson MD   ED visits in past 90 days: 0        Note composed:5:19 PM 03/29/2025

## 2025-03-29 NOTE — TELEPHONE ENCOUNTER
No care due was identified.  [facility] Embedded Care Due Messages. Reference number: [id number].   3/29/2025 12:16:48 AM CDT

## 2025-03-30 RX ORDER — HYDROCHLOROTHIAZIDE 25 MG/1
25 TABLET ORAL
Qty: 90 TABLET | Refills: 3 | Status: SHIPPED | OUTPATIENT
Start: 2025-03-30

## 2025-03-31 ENCOUNTER — PATIENT MESSAGE (OUTPATIENT)
Dept: ELECTROPHYSIOLOGY | Facility: CLINIC | Age: 75
End: 2025-03-31
Payer: MEDICARE

## 2025-03-31 DIAGNOSIS — I48.19 PERSISTENT ATRIAL FIBRILLATION: Primary | ICD-10-CM

## 2025-04-01 ENCOUNTER — PATIENT MESSAGE (OUTPATIENT)
Dept: OPHTHALMOLOGY | Facility: CLINIC | Age: 75
End: 2025-04-01
Payer: MEDICARE

## 2025-04-04 ENCOUNTER — OFFICE VISIT (OUTPATIENT)
Dept: OPHTHALMOLOGY | Facility: CLINIC | Age: 75
End: 2025-04-04
Payer: MEDICARE

## 2025-04-04 DIAGNOSIS — H18.513 FUCHS' CORNEAL DYSTROPHY OF BOTH EYES: Primary | ICD-10-CM

## 2025-04-04 DIAGNOSIS — Z94.7 STATUS POST CORNEAL TRANSPLANT: ICD-10-CM

## 2025-04-04 PROCEDURE — 99999 PR PBB SHADOW E&M-EST. PATIENT-LVL III: CPT | Mod: PBBFAC,,,

## 2025-04-04 RX ORDER — PREDNISOLONE ACETATE 10 MG/ML
1 SUSPENSION/ DROPS OPHTHALMIC 2 TIMES DAILY
Qty: 5 ML | Refills: 3 | Status: SHIPPED | OUTPATIENT
Start: 2025-04-04

## 2025-04-04 RX ORDER — TIMOLOL MALEATE 5 MG/ML
1 SOLUTION/ DROPS OPHTHALMIC 2 TIMES DAILY
Qty: 15 ML | Refills: 2 | Status: SHIPPED | OUTPATIENT
Start: 2025-04-04

## 2025-04-04 NOTE — PROGRESS NOTES
HPI    PT is here for IOP check. He is using PF BID OU and Timolol BID OU.  He   has been out of PF for about 10 days.  Denies pain today and vision is   good.    Prednisolone Acetate 2x a day OU  Timolol 2x a day OU  OTC Ats OU  Last edited by Monse Nunez DNP on 4/4/2025  9:03 AM.            Assessment /Plan     For exam results, see Encounter Report.    Fuchs' corneal dystrophy of both eyes    Status post corneal transplant      DSEK OD clear and stable  PKP OS clear and stable; running sutures intact    Continue PF and Timolol OU bid; OTC Ats prn OU    Patient to keep visit with Dr. Jay in ~2 weeks  Pressure check every 4-6 months while on PF

## 2025-04-09 ENCOUNTER — OFFICE VISIT (OUTPATIENT)
Dept: PRIMARY CARE CLINIC | Facility: CLINIC | Age: 75
End: 2025-04-09
Payer: MEDICARE

## 2025-04-09 ENCOUNTER — PATIENT OUTREACH (OUTPATIENT)
Dept: ADMINISTRATIVE | Facility: OTHER | Age: 75
End: 2025-04-09
Payer: MEDICARE

## 2025-04-09 VITALS
SYSTOLIC BLOOD PRESSURE: 140 MMHG | BODY MASS INDEX: 33.61 KG/M2 | DIASTOLIC BLOOD PRESSURE: 82 MMHG | OXYGEN SATURATION: 95 % | WEIGHT: 247.81 LBS | HEART RATE: 76 BPM | TEMPERATURE: 98 F

## 2025-04-09 DIAGNOSIS — G45.9 TRANSIENT CEREBRAL ISCHEMIA, UNSPECIFIED TYPE: ICD-10-CM

## 2025-04-09 DIAGNOSIS — G47.33 OBSTRUCTIVE SLEEP APNEA: ICD-10-CM

## 2025-04-09 DIAGNOSIS — M16.12 PRIMARY OSTEOARTHRITIS OF LEFT HIP: ICD-10-CM

## 2025-04-09 DIAGNOSIS — E87.1 HYPONATREMIA: ICD-10-CM

## 2025-04-09 DIAGNOSIS — I10 PRIMARY HYPERTENSION: ICD-10-CM

## 2025-04-09 DIAGNOSIS — H18.603 KERATOCONUS OF BOTH EYES: ICD-10-CM

## 2025-04-09 DIAGNOSIS — Z96.642 HISTORY OF TOTAL HIP ARTHROPLASTY, LEFT: ICD-10-CM

## 2025-04-09 DIAGNOSIS — N40.0 BENIGN PROSTATIC HYPERPLASIA WITHOUT LOWER URINARY TRACT SYMPTOMS: ICD-10-CM

## 2025-04-09 DIAGNOSIS — I48.19 PERSISTENT ATRIAL FIBRILLATION: ICD-10-CM

## 2025-04-09 DIAGNOSIS — Z00.00 ENCOUNTER FOR PREVENTIVE HEALTH EXAMINATION: Primary | ICD-10-CM

## 2025-04-09 DIAGNOSIS — I47.29 NSVT (NONSUSTAINED VENTRICULAR TACHYCARDIA): ICD-10-CM

## 2025-04-09 DIAGNOSIS — Z12.5 ENCOUNTER FOR SCREENING FOR MALIGNANT NEOPLASM OF PROSTATE: ICD-10-CM

## 2025-04-09 DIAGNOSIS — Z86.0100 HISTORY OF COLONIC POLYPS: ICD-10-CM

## 2025-04-09 DIAGNOSIS — Z00.00 ENCOUNTER FOR MEDICARE ANNUAL WELLNESS EXAM: ICD-10-CM

## 2025-04-09 DIAGNOSIS — H18.513 FUCHS' CORNEAL DYSTROPHY OF BOTH EYES: ICD-10-CM

## 2025-04-09 DIAGNOSIS — Z74.09 OTHER REDUCED MOBILITY: ICD-10-CM

## 2025-04-09 DIAGNOSIS — Z95.818 PRESENCE OF WATCHMAN LEFT ATRIAL APPENDAGE CLOSURE DEVICE: ICD-10-CM

## 2025-04-09 DIAGNOSIS — E78.5 HYPERLIPIDEMIA, UNSPECIFIED HYPERLIPIDEMIA TYPE: ICD-10-CM

## 2025-04-09 PROBLEM — T86.8419 CORNEAL TRANSPLANT FAILURE: Status: RESOLVED | Noted: 2017-03-23 | Resolved: 2025-04-09

## 2025-04-09 PROBLEM — H91.90 HARD OF HEARING: Status: RESOLVED | Noted: 2024-05-20 | Resolved: 2025-04-09

## 2025-04-09 PROCEDURE — 99999 PR PBB SHADOW E&M-EST. PATIENT-LVL V: CPT | Mod: PBBFAC,HCNC,, | Performed by: NURSE PRACTITIONER

## 2025-04-09 RX ORDER — PNEUMOCOCCAL 20-VALENT CONJUGATE VACCINE 2.2; 2.2; 2.2; 2.2; 2.2; 2.2; 2.2; 2.2; 2.2; 2.2; 2.2; 2.2; 2.2; 2.2; 2.2; 2.2; 4.4; 2.2; 2.2; 2.2 UG/.5ML; UG/.5ML; UG/.5ML; UG/.5ML; UG/.5ML; UG/.5ML; UG/.5ML; UG/.5ML; UG/.5ML; UG/.5ML; UG/.5ML; UG/.5ML; UG/.5ML; UG/.5ML; UG/.5ML; UG/.5ML; UG/.5ML; UG/.5ML; UG/.5ML; UG/.5ML
0.5 INJECTION, SUSPENSION INTRAMUSCULAR ONCE
Qty: 0.5 ML | Refills: 0 | Status: SHIPPED | OUTPATIENT
Start: 2025-04-09 | End: 2025-04-09

## 2025-04-09 RX ORDER — CELECOXIB 200 MG/1
200 CAPSULE ORAL DAILY
Qty: 30 CAPSULE | Refills: 0 | Status: CANCELLED | OUTPATIENT
Start: 2025-04-09

## 2025-04-09 NOTE — PROGRESS NOTES
I offered to discuss advanced care planning, including how to pick a person who would make decisions for you if you were unable to make them for yourself, called a health care power of , and what kind of decisions you might make such as use of life sustaining treatments such as ventilators and tube feeding when faced with a life limiting illness recorded on a living will that they will need to know. (How you want to be cared for as you near the end of your natural life)     X Patient is interested in learning more about how to make advanced directives.  I provided them paperwork and offered to discuss this with them.

## 2025-04-09 NOTE — PROGRESS NOTES
CHW - Initial Contact    This Community Health Worker completed  the Social Determinant of Health questionnaire with patient during clinic visit today.    Pt identified barriers of most importance are: NONE   Referrals to community agencies completed with patient/caregiver consent outside of [facility] include: NONE  Referrals were put through [facility] - : NO  Support and Services: No support & services have been documented.  Other information discussed the patient needs / wants help with: NONE   Follow up required: NO  No future outreach task assigned

## 2025-04-09 NOTE — PROGRESS NOTES
Pavel Graff presented for a follow-up Medicare AWV and Health Risk Assessment today. The following components were reviewed and updated:    Medical history  Family History  Social history  Allergies and Current Medications  Health Risk Assessment  Health Maintenance  Care Team    See Completed Assessments for Annual Wellness visit with in the encounter summary    The following assessments were completed:  Depression Screening  Cognitive function Screening  Timed Get Up Test  Whisper Test  Nutrition  Activities of Daily Living   PAQ     Opioid documentation:  Patient does not have a current opioid prescription.           Vitals:    04/09/25 1100 04/09/25 1127   BP: (!) 152/81 (!) 140/82   BP Location: Left arm    Patient Position: Sitting    Pulse: 76    Temp: 97.7 °F (36.5 °C)    TempSrc: Oral    SpO2: 95%    Weight: 112.4 kg (247 lb 12.8 oz)      Body mass index is 33.61 kg/m².       Physical Exam  Constitutional:       Appearance: Normal appearance.   HENT:      Right Ear: External ear normal.      Left Ear: External ear normal.   Eyes:      Conjunctiva/sclera: Conjunctivae normal.   Pulmonary:      Effort: Pulmonary effort is normal. No respiratory distress.   Abdominal:      General: Bowel sounds are normal. There is no distension.      Tenderness: There is no abdominal tenderness. There is no guarding.   Musculoskeletal:      Right lower leg: No edema.      Left lower leg: No edema.   Neurological:      Mental Status: He is alert and oriented to person, place, and time.       Diagnoses and health risks identified today and associated recommendations/orders:  1. Encounter for preventive health examination  Screenings performed,  as noted above. Personal preventive testing needs reviewed.   - Ambulatory referral/consult to Endo Procedure ; Future  - PSA, Screening; Future    2. Encounter for Medicare annual wellness exam  Screenings performed,  as noted above. Personal preventive testing needs  reviewed.   - Referral to Enhanced Annual Wellness Visit (eAWV) W+1    3. Primary hypertension  Chronic, continue with hydrochlorothiazide and losartan, managed by cardiology.     4. Hyperlipidemia, unspecified hyperlipidemia type  Chronic, continue with pravastatin, managed by PCP.  - Lipid Panel; Future    5. Persistent atrial fibrillation  Chronic, continue with metoprolol as prescribed, managed by cardiology.    6. NSVT (nonsustained ventricular tachycardia)  Chronic, stable, managed by cardiology.    7. Transient cerebral ischemia, unspecified type  Chronic, managed by cardiology.    8. Presence of Watchman left atrial appendage closure device  05/09/2022 Watchman FLX implantation, managed by cardiology.    9. Obstructive sleep apnea  Chronic, stable, does not wear CPAP.    10. Hyponatremia  05/20/2024 sodium-134  Stable, managed by PCP, encouraged to maintain hydration as tolerated.    11. Fuchs' corneal dystrophy of both eyes  Chronic, stable, continue with prednisolone actetate and timolol eye drops as instructed,  managed by ophthalmology.  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 Keratoconus of both eyes  Chronic, stable, managed by ophthalmology.    13. Benign prostatic hyperplasia without lower urinary tract symptoms  Chronic, stable, last seen by urology 2012, managed by PCP.    14. Primary osteoarthritis of left hip  Chronic, stable, continue with current regimen as managed by orthopedics.    15. Other reduced mobility  Stable, managed by PCP.    16. History of total hip arthroplasty, left   Stable, 06/03/2024, total right hip arthoplasty 01/2000, managed by orthopedics.    17. History of colonic polyps  Last colonoscopy 03/26/2019, patient understands that repeat colonoscopy is overdue, referral sent to endoscopy department for scheduling.    18. Encounter for screening for malignant neoplasm of prostate  - PSA, Screening;  Future    Reports advance care planning has been completed and is on file with his PCP.    Plans to get labs ordered today done with other labs at annual examination with PCP.    Provided Pavel with a 5-10 year written screening schedule and personal prevention plan. Recommendations were developed using the USPSTF age appropriate recommendations. Education, counseling, and referrals were provided as needed.  After Visit Summary printed and given to patient which includes a list of additional screenings\tests needed.    Follow up in about 1 year (around 4/9/2026), or Annual wellness examination.      Molly Mclaughlin NP

## 2025-04-09 NOTE — PATIENT INSTRUCTIONS
Counseling and Referral of Other Preventative  (Italic type indicates deductible and co-insurance are waived)    Patient Name: Pavel Graff  Today's Date: 4/9/2025    Health Maintenance       Date Due Completion Date    High Dose Statin Never done ---    Shingles Vaccine (2 of 2) 05/11/2021 3/16/2021    Pneumococcal Vaccines (Age 50+) (2 of 2 - PPSV23) 06/30/2021 6/30/2020    Colorectal Cancer Screening 03/26/2024 3/26/2019    PROSTATE-SPECIFIC ANTIGEN 03/05/2025 3/5/2024    Lipid Panel 03/05/2025 3/5/2024    RSV Vaccine (Age 60+ and Pregnant patients) (1 - 1-dose 75+ series) 04/09/2025 (Originally 1/13/2025) ---    Influenza Vaccine (1) 06/30/2025 (Originally 9/1/2024) 10/7/2021    COVID-19 Vaccine (3 - 2024-25 season) 04/09/2026 (Originally 9/1/2024) 1/31/2021    TETANUS VACCINE 09/03/2031 9/3/2021        Orders Placed This Encounter   Procedures    PSA, Screening    Lipid Panel    Ambulatory referral/consult to Endo Procedure        The following information is provided to all patients.  This information is to help you find resources for any of the problems found today that may be affecting your health:                  Living healthy guide: www.[facility].[place].gov      Understanding Diabetes: www.diabetes.org      Eating healthy: www.cdc.gov/healthyweight      CDC home safety checklist: www.cdc.gov/steadi/patient.html      Agency on Aging: www.goea.[place].gov      Alcoholics anonymous (AA): www.aa.org      Physical Activity: www.ja.nih.gov/ka1bbir      Tobacco use: www.quitwithusla.org         Counseling and Referral of Other Preventative  (Italic type indicates deductible and co-insurance are waived)    Patient Name: Pavel Graff  Today's Date: 4/9/2025    Health Maintenance       Date Due Completion Date    High Dose Statin Never done ---    Shingles Vaccine (2 of 2) 05/11/2021 3/16/2021    Pneumococcal Vaccines (Age 50+) (2 of 2 - PPSV23) 06/30/2021 6/30/2020    Colorectal Cancer Screening  03/26/2024 3/26/2019    PROSTATE-SPECIFIC ANTIGEN 03/05/2025 3/5/2024    Lipid Panel 03/05/2025 3/5/2024    RSV Vaccine (Age 60+ and Pregnant patients) (1 - 1-dose 75+ series) 04/09/2025 (Originally 1/13/2025) ---    Influenza Vaccine (1) 06/30/2025 (Originally 9/1/2024) 10/7/2021    COVID-19 Vaccine (3 - 2024-25 season) 04/09/2026 (Originally 9/1/2024) 1/31/2021    TETANUS VACCINE 09/03/2031 9/3/2021        Orders Placed This Encounter   Procedures    PSA, Screening    Lipid Panel    Ambulatory referral/consult to Endo Procedure        The following information is provided to all patients.  This information is to help you find resources for any of the problems found today that may be affecting your health:                  Living healthy guide: www.[facility].[place].[facility]      Understanding Diabetes: www.diabetes.org      Eating healthy: www.cdc.gov/healthyweight      CDC home safety checklist: www.cdc.gov/steadi/patient.html      Agency on Aging: www.goea.[place].[facility]      Alcoholics anonymous (AA): www.aa.org      Physical Activity: www.ja.nih.gov/qd1wqqh      Tobacco use: www.quitwithusla.org

## 2025-04-11 ENCOUNTER — TELEPHONE (OUTPATIENT)
Dept: ENDOSCOPY | Facility: HOSPITAL | Age: 75
End: 2025-04-11
Payer: MEDICARE

## 2025-04-11 VITALS — HEIGHT: 72 IN | BODY MASS INDEX: 33.46 KG/M2 | WEIGHT: 247 LBS

## 2025-04-11 DIAGNOSIS — Z86.0100 ENCOUNTER FOR COLONOSCOPY DUE TO HISTORY OF COLONIC POLYP: Primary | ICD-10-CM

## 2025-04-11 DIAGNOSIS — Z12.11 SPECIAL SCREENING FOR MALIGNANT NEOPLASMS, COLON: ICD-10-CM

## 2025-04-11 DIAGNOSIS — Z12.11 ENCOUNTER FOR COLONOSCOPY DUE TO HISTORY OF COLONIC POLYP: Primary | ICD-10-CM

## 2025-04-11 RX ORDER — POLYETHYLENE GLYCOL 3350, SODIUM SULFATE ANHYDROUS, SODIUM BICARBONATE, SODIUM CHLORIDE, POTASSIUM CHLORIDE 236; 22.74; 6.74; 5.86; 2.97 G/4L; G/4L; G/4L; G/4L; G/4L
4 POWDER, FOR SOLUTION ORAL ONCE
Qty: 4000 ML | Refills: 0 | Status: SHIPPED | OUTPATIENT
Start: 2025-04-11 | End: 2025-04-11

## 2025-04-11 NOTE — TELEPHONE ENCOUNTER
Referral for procedure from  [facility] referral (see Appts tab)      Spoke to patient to schedule procedure(s) Colonoscopy       Physician to perform procedure(s) Dr. ELVI Dye  Date of Procedure (s) 5/1/25  Arrival Time 1:15 PM  Time of Procedure(s) 2:15 PM   Location of Procedure(s) [place] [address] Floor  Type of Rx Prep sent to patient: PEG  Instructions provided to patient via [facility]    Patient was informed on the following information and verbalized understanding. Screening questionnaire reviewed with patient and complete. If procedure requires anesthesia, a responsible adult needs to be present to accompany the patient home, patient cannot drive after receiving anesthesia. Appointment details are tentative, especially check-in time. Patient will receive a prep-op call 7 days prior to confirm check-in time for procedure. If applicable the patient should contact their pharmacy to verify Rx for procedure prep is ready for pick-up. Patient was advised to call the scheduling department at [phone] if pharmacy states no Rx is available. Patient was advised to call the endoscopy scheduling department if any questions or concerns arise.      [facility] Scheduling Department

## 2025-04-25 ENCOUNTER — OFFICE VISIT (OUTPATIENT)
Dept: OPHTHALMOLOGY | Facility: CLINIC | Age: 75
End: 2025-04-25
Payer: MEDICARE

## 2025-04-25 DIAGNOSIS — H18.513 FUCHS' CORNEAL DYSTROPHY OF BOTH EYES: ICD-10-CM

## 2025-04-25 DIAGNOSIS — Z94.7 STATUS POST CORNEAL TRANSPLANT: Primary | ICD-10-CM

## 2025-04-25 PROCEDURE — 99999 PR PBB SHADOW E&M-EST. PATIENT-LVL III: CPT | Mod: PBBFAC,,, | Performed by: OPHTHALMOLOGY

## 2025-04-25 RX ORDER — PREDNISOLONE ACETATE 10 MG/ML
1 SUSPENSION/ DROPS OPHTHALMIC 2 TIMES DAILY
Qty: 10 ML | Refills: 4 | Status: SHIPPED | OUTPATIENT
Start: 2025-04-25

## 2025-04-25 NOTE — PROGRESS NOTES
HPI     3 week   follow up   cornea               Comments: Follow up   Fuch s  corneal dystrophy  ou     Fuzzy va   ou   Cloudy   Hx  DESK  od    Dls04/04/25         Last edited by Fatemeh Alba on 4/25/2025  2:13 PM.            Assessment /Plan     For exam results, see Encounter Report.    Status post corneal transplant    Fuchs' corneal dystrophy of both eyes    Other orders  -     prednisoLONE acetate (PRED FORTE) 1 % DrpS; Place 1 drop into the left eye 2 (two) times daily.  Dispense: 10 mL; Refill: 4      DSEK OD (12/14/17) Stable and clear  PKP OS (03/23/17) Stable and clear    Ok to stop PF OD    PKP with hx of rejection so PF bid/TImolo

## 2025-04-30 ENCOUNTER — ANESTHESIA EVENT (OUTPATIENT)
Dept: ENDOSCOPY | Facility: HOSPITAL | Age: 75
End: 2025-04-30
Payer: MEDICARE

## 2025-04-30 DIAGNOSIS — I48.19 PERSISTENT ATRIAL FIBRILLATION: ICD-10-CM

## 2025-04-30 RX ORDER — METOPROLOL SUCCINATE 25 MG/1
25 TABLET, EXTENDED RELEASE ORAL DAILY
Qty: 90 TABLET | Refills: 1 | Status: SHIPPED | OUTPATIENT
Start: 2025-04-30

## 2025-04-30 NOTE — TELEPHONE ENCOUNTER
No care due was identified.  Health [facility] Embedded Care Due Messages. Reference number: [id number].   4/30/2025 12:14:59 AM CDT

## 2025-04-30 NOTE — TELEPHONE ENCOUNTER
Refill Routing Note   Medication(s) are not appropriate for processing by [facility] for the following reason(s):        Required vitals abnormal    [facility] action(s):  Defer               Appointments  past 12m or future 3m with PCP    Date Provider   Last Visit   3/5/2024 Joe Peterson MD   Next Visit   Visit date not found Joe Peterson MD   ED visits in past 90 days: 0        Note composed:4:55 AM 04/30/2025

## 2025-05-01 ENCOUNTER — HOSPITAL ENCOUNTER (OUTPATIENT)
Facility: HOSPITAL | Age: 75
Discharge: HOME | End: 2025-05-01
Attending: INTERNAL MEDICINE | Admitting: INTERNAL MEDICINE
Payer: MEDICARE

## 2025-05-01 ENCOUNTER — ANESTHESIA (OUTPATIENT)
Dept: ENDOSCOPY | Facility: HOSPITAL | Age: 75
End: 2025-05-01
Payer: MEDICARE

## 2025-05-01 VITALS
SYSTOLIC BLOOD PRESSURE: 136 MMHG | HEART RATE: 67 BPM | BODY MASS INDEX: 32.69 KG/M2 | OXYGEN SATURATION: 96 % | RESPIRATION RATE: 18 BRPM | DIASTOLIC BLOOD PRESSURE: 67 MMHG | TEMPERATURE: 98 F | WEIGHT: 241.38 LBS | HEIGHT: 72 IN

## 2025-05-01 DIAGNOSIS — Z12.11 ENCOUNTER FOR COLONOSCOPY DUE TO HISTORY OF COLONIC POLYP: ICD-10-CM

## 2025-05-01 DIAGNOSIS — Z86.0100 ENCOUNTER FOR COLONOSCOPY DUE TO HISTORY OF COLONIC POLYP: ICD-10-CM

## 2025-05-01 DIAGNOSIS — Z86.0100 HISTORY OF COLONIC POLYPS: Primary | ICD-10-CM

## 2025-05-01 DIAGNOSIS — Z86.0100 HISTORY OF COLON POLYPS: ICD-10-CM

## 2025-05-01 PROCEDURE — 27201012 HC FORCEPS, HOT/COLD, DISP: Performed by: INTERNAL MEDICINE

## 2025-05-01 PROCEDURE — 63600175 PHARM REV CODE 636 W HCPCS

## 2025-05-01 PROCEDURE — 37000008 HC ANESTHESIA 1ST 15 MINUTES: Performed by: INTERNAL MEDICINE

## 2025-05-01 PROCEDURE — 37000009 HC ANESTHESIA EA ADD 15 MINS: Performed by: INTERNAL MEDICINE

## 2025-05-01 PROCEDURE — 45380 COLONOSCOPY AND BIOPSY: CPT | Mod: PT | Performed by: INTERNAL MEDICINE

## 2025-05-01 PROCEDURE — 88305 TISSUE EXAM BY PATHOLOGIST: CPT | Mod: TC | Performed by: INTERNAL MEDICINE

## 2025-05-01 PROCEDURE — 88305 TISSUE EXAM BY PATHOLOGIST: CPT | Mod: 26,,, | Performed by: PATHOLOGY

## 2025-05-01 PROCEDURE — 45380 COLONOSCOPY AND BIOPSY: CPT | Mod: PT,,, | Performed by: INTERNAL MEDICINE

## 2025-05-01 PROCEDURE — 25000003 PHARM REV CODE 250

## 2025-05-01 RX ORDER — SODIUM CHLORIDE 9 MG/ML
INJECTION, SOLUTION INTRAVENOUS CONTINUOUS
Status: DISCONTINUED | OUTPATIENT
Start: 2025-05-01 | End: 2025-05-01 | Stop reason: HOSPADM

## 2025-05-01 RX ORDER — LIDOCAINE HYDROCHLORIDE 20 MG/ML
INJECTION, SOLUTION EPIDURAL; INFILTRATION; INTRACAUDAL; PERINEURAL
Status: DISCONTINUED | OUTPATIENT
Start: 2025-05-01 | End: 2025-05-01

## 2025-05-01 RX ORDER — PHENYLEPHRINE HYDROCHLORIDE 10 MG/ML
INJECTION INTRAVENOUS
Status: DISCONTINUED | OUTPATIENT
Start: 2025-05-01 | End: 2025-05-01

## 2025-05-01 RX ORDER — PROPOFOL 10 MG/ML
VIAL (ML) INTRAVENOUS
Status: DISCONTINUED | OUTPATIENT
Start: 2025-05-01 | End: 2025-05-01

## 2025-05-01 RX ADMIN — PHENYLEPHRINE HYDROCHLORIDE 100 MCG: 10 INJECTION INTRAVENOUS at 02:05

## 2025-05-01 RX ADMIN — PROPOFOL 150 MCG/KG/MIN: 10 INJECTION, EMULSION INTRAVENOUS at 01:05

## 2025-05-01 RX ADMIN — LIDOCAINE HYDROCHLORIDE 50 MG: 20 INJECTION, SOLUTION EPIDURAL; INFILTRATION; INTRACAUDAL; PERINEURAL at 01:05

## 2025-05-01 RX ADMIN — SODIUM CHLORIDE: 9 INJECTION, SOLUTION INTRAVENOUS at 01:05

## 2025-05-01 RX ADMIN — PROPOFOL 50 MG: 10 INJECTION, EMULSION INTRAVENOUS at 01:05

## 2025-05-01 NOTE — H&P
Short Stay Endoscopy History and Physical    PCP - Joe Peterson MD    Procedure - Colonoscopy  ASA - per anesthesia  Mallampati - per anesthesia  Plan of anesthesia - MAC    HPI:  This is a 75 y.o. male here for evaluation of : personal history of colon polyps    ROS:  Constitutional: No fevers, chills  CV: No chest pain  Pulm: No cough  Ophtho: No vision changes  GI: see HPI  Derm: No rash    Medical History:  has a past medical history of *Atrial fibrillation, Anticoagulant long-term use, Atrial fibrillation, Colon cancer screening (03/26/2019), Corneal transplant failure (03/23/2017), BLANCO (dyspnea on exertion) (05/20/2016), Fatigue (05/20/2016), GERD (gastroesophageal reflux disease), Hard of hearing (05/20/2024), History of TIA (transient ischemic attack), Hyperlipidemia, Hypertension, Inguinal hernia recurrent unilateral (06/25/2013), On amiodarone therapy (09/21/2016), Sleep apnea, Squamous cell carcinoma of skin, Status post catheter ablation of atrial fibrillation (09/21/2016), Stroke, TIA (transient ischemic attack), and Trouble in sleeping.    Surgical History:  has a past surgical history that includes Hip Arthroplasty; Rotator cuff repair; Hernia repair; Corneal transplant (Left, 1997); Corneal transplant (Left, 03/23/2017); Corneal transplant (Right, 12/14/2017); Cataract extraction w/  intraocular lens implant (Left, 1/11/10); Cataract extraction w/  intraocular lens implant (Right, 12/14/2017); Ablation (N/A, 9/28/2018); Colonoscopy (N/A, 3/26/2019); Occlusion of left atrial appendage (N/A, 5/9/2022); Transesophageal echocardiography (N/A, 5/9/2022); and Arthroplasty of hip by anterior approach (Left, 6/3/2024).    Family History: family history includes Cancer in his father; Colon cancer in his father; Crohn's disease in his sister; Diabetes in his mother; Heart disease in his brother, father, and mother; Hyperlipidemia in his mother, sister, and sister; Hypertension in his sister..  Otherwise no colon cancer, inflammatory bowel disease, or GI malignancies.    Social History:  reports that he has never smoked. He has never been exposed to tobacco smoke. He has never used smokeless tobacco. He reports that he does not drink alcohol and does not use drugs.    Review of patient's allergies indicates:  No Known Allergies    Medications:   Prescriptions Prior to Admission[1]      Vital Signs: There were no vitals filed for this visit.    General Appearance: Well appearing in no acute distress    Labs:  Lab Results   Component Value Date    WBC 7.14 05/20/2024    HGB 15.3 05/20/2024    HCT 32 (L) 06/04/2024     05/20/2024    CHOL 179 03/05/2024    TRIG 159 (H) 03/05/2024    HDL 38 (L) 03/05/2024    ALT 23 05/20/2024    AST 25 05/20/2024     (L) 05/20/2024    K 4.2 05/20/2024     05/20/2024    CREATININE 0.9 05/20/2024    BUN 14 05/20/2024    CO2 26 05/20/2024    TSH 1.354 03/05/2024    PSA 1.6 03/05/2024    INR 0.9 05/20/2024    HGBA1C 5.1 03/16/2021       I have explained the risks and benefits of endoscopy procedures to the patient/their POA including but not limited to bleeding, perforation, infection, and death.  The patient/their POA was asked if they understand and allowed to ask any further questions to their satisfaction.    Dick Nicole MD         [1]   Medications Prior to Admission   Medication Sig Dispense Refill Last Dose/Taking    aspirin 81 MG Chew Take 81 mg by mouth once daily.   5/1/2025    hydroCHLOROthiazide (HYDRODIURIL) 25 MG tablet TAKE 1 TABLET BY MOUTH EVERY DAY 90 tablet 3 5/1/2025    losartan (COZAAR) 50 MG tablet TAKE 1 TABLET BY MOUTH EVERY DAY 90 tablet 0 4/30/2025    metoprolol succinate (TOPROL-XL) 25 MG 24 hr tablet Take 1 tablet (25 mg total) by mouth once daily. 90 tablet 1 5/1/2025    pravastatin (PRAVACHOL) 20 MG tablet TAKE 1 TABLET BY MOUTH EVERY DAY 90 tablet 3 Past Week    acetaminophen (TYLENOL) 650 MG TbSR Take 1 tablet (650 mg  total) by mouth every 8 (eight) hours. 120 tablet 0     ascorbic acid (VITAMIN C ORAL) Take by mouth Daily.       cefadroxil (DURICEF) 500 MG Cap Take 1 capsule (500 mg total) by mouth every 12 (twelve) hours. 14 capsule 0     celecoxib (CELEBREX) 200 MG capsule Take 1 capsule (200 mg total) by mouth once daily. 30 capsule 0     diclofenac sodium (VOLTAREN ARTHRITIS PAIN) 1 % Gel Apply 2 g topically once daily.       fish oil-omega-3 fatty acids 300-1,000 mg capsule Take 1 g by mouth once daily.       ketoconazole (NIZORAL) 2 % shampoo Wash hair with medicated shampoo at least 2x/week - let sit on scalp at least 5 minutes prior to rinsing 120 mL 5     oxyCODONE (ROXICODONE) 5 MG immediate release tablet Take 1 tablet by mouth every 4-6 hours as needed for pain 30 tablet 0     pantoprazole (PROTONIX) 40 MG tablet Take 1 tablet (40 mg total) by mouth once daily. 30 tablet 0     prednisoLONE acetate (PRED FORTE) 1 % DrpS Place 1 drop into both eyes 2 (two) times daily. 5 mL 3     prednisoLONE acetate (PRED FORTE) 1 % DrpS Place 1 drop into the left eye 2 (two) times daily. 10 mL 4     senna-docusate 8.6-50 mg (SENNA WITH DOCUSATE SODIUM) 8.6-50 mg per tablet Take 1 tablet by mouth once daily. 30 tablet 0     timolol maleate 0.5% (TIMOPTIC) 0.5 % Drop Place 1 drop into both eyes 2 (two) times daily. 15 mL 2

## 2025-05-01 NOTE — ANESTHESIA PREPROCEDURE EVALUATION
05/01/2025  Pavel Graff is a 75 y.o., male.  Pre-operative evaluation for COLONOSCOPY, SCREENING, HIGH RISK PATIENT (N/A)    Chief Complaint:    PMH:  HTN, TIA, SIMON, HLD and persistent AF since July 2012   PVI (07/21/16) with successful pulmonary vein antral isolation.   left atrial appendage closure was successful with a DEVICE WATCHMAN FLX CLSR 27MM device 2022  Past Surgical History:   Procedure Laterality Date    ABLATION N/A 9/28/2018    Procedure: ABLATION;  Surgeon: Romaine Howard MD;  Location: [facility] CATH LAB;  Service: Cardiology;  Laterality: N/A;  AF, ERASMO, PVI, RFA, MAME, Gen, MB, 3 Prep    ARTHROPLASTY OF HIP BY ANTERIOR APPROACH Left 6/3/2024    Procedure: ARTHROPLASTY, HIP, TOTAL, ANTERIOR APPROACH: LEFT: DEPUY - ACTIS + PINNACLE;  Surgeon: Christiano Gallo III, MD;  Location: [facility] OR;  Service: Orthopedics;  Laterality: Left;    CATARACT EXTRACTION W/  INTRAOCULAR LENS IMPLANT Left 1/11/10    CATARACT EXTRACTION W/  INTRAOCULAR LENS IMPLANT Right 12/14/2017    [facility]ME[facility]/ Dr. Jay    COLONOSCOPY N/A 3/26/2019    Procedure: COLONOSCOPY;  Surgeon: Mauro Dye MD;  Location: [facility] ENDO (4TH FLR);  Service: Endoscopy;  Laterality: N/A;  ok to hold Eliquis x 2 days per Dr. Howard-MS    CORNEAL TRANSPLANT Left 1997    PKP OS    CORNEAL TRANSPLANT Left 03/23/2017    CORNEAL TRANSPLANT Right 12/14/2017    [facility]MEK/ [facility]; Dr. Jay    HERNIA REPAIR      HIP ARTHROPLASTY      OCCLUSION OF LEFT ATRIAL APPENDAGE N/A 5/9/2022    Procedure: Left atrial appendage occlusion;  Surgeon: Romaine Howard MD;  Location: [facility] EP LAB;  Service: Cardiology;  Laterality: N/A;  afib, watchman, BSCI, erasmo, anes, MB/DM, 3prep    ROTATOR CUFF REPAIR      TRANSESOPHAGEAL ECHOCARDIOGRAPHY N/A 5/9/2022    Procedure: ECHOCARDIOGRAM, TRANSESOPHAGEAL;  Surgeon: Fanta Dacosta MD;  Location: [facility] EP LAB;  Service:  "Cardiology;  Laterality: N/A;         Vital Signs Range (Last 24H):         CBC:   No results for input(s): "WBC", "RBC", "HGB", "HCT", "PLT", "MCV", "MCH", "MCHC" in the last 720 hours.    CMP: No results for input(s): "NA", "K", "CL", "CO2", "BUN", "CREATININE", "GLU", "MG", "PHOS", "CALCIUM", "ALBUMIN", "PROT", "ALKPHOS", "ALT", "AST", "BILITOT" in the last 720 hours.    INR:  No results for input(s): "PT", "INR", "PROTIME", "APTT" in the last 720 hours.      Diagnostic Studies:      EKD Echo:     Pre-op Assessment    I have reviewed the Patient Summary Reports.     I have reviewed the Nursing Notes.    I have reviewed the Medications.     Review of Systems  Anesthesia Hx:  No problems with previous Anesthesia                Social:  Non-Smoker, No Alcohol Use           Physical Exam  General: Well nourished, Cooperative, Alert and Oriented    Airway:  Mallampati: I   Mouth Opening: Normal  TM Distance: Normal  Tongue: Normal  Neck ROM: Normal ROM    Dental:  Intact    Chest/Lungs:  Normal Respiratory Rate        Anesthesia Plan  Type of Anesthesia, risks & benefits discussed:    Anesthesia Type: Gen Natural Airway  Intra-op Monitoring Plan: Standard ASA Monitors  Post Op Pain Control Plan: multimodal analgesia  Induction:  IV  Informed Consent: Informed consent signed with the Patient and all parties understand the risks and agree with anesthesia plan.  All questions answered.   ASA Score: 3  Day of Surgery Review of History & Physical: H&P Update referred to the surgeon/provider.    Ready For Surgery From Anesthesia Perspective.     .      "

## 2025-05-01 NOTE — TRANSFER OF CARE
Anesthesia Transfer of Care Note    Patient: Pavel Graff    Procedure(s) Performed: Procedure(s) (LRB):  COLONOSCOPY, SCREENING, HIGH RISK PATIENT (N/A)    Patient location: PACU    Anesthesia Type: general    Transport from OR: Transported from OR on 6-10 L/min O2 by face mask with adequate spontaneous ventilation    Post pain: adequate analgesia    Post assessment: no apparent anesthetic complications and tolerated procedure well    Post vital signs: stable    Level of consciousness: awake    Nausea/Vomiting: no nausea/vomiting    Complications: none    Transfer of care protocol was followed    Last vitals: Visit Vitals  /56   Pulse 67   Temp 36.5 °C (97.7 °F) (Skin)   Resp 14   Ht 6' (1.829 m)   Wt 109.5 kg (241 lb 6.5 oz)   SpO2 97%   BMI 32.74 kg/m²

## 2025-05-01 NOTE — PROVATION PATIENT INSTRUCTIONS
Discharge Summary/Instructions after an Endoscopic Procedure  Patient Name: Pavel Graff  Patient MRN: [id number]  Patient YOB: 1950  Thursday, May 1, 2025  Mauro Dye MD  Dear patient,  As a result of recent federal legislation (The Federal Cures Act), you may   receive lab or pathology results from your procedure in your [facility]   account before your physician is able to contact you. Your physician or   their representative will relay the results to you with their   recommendations at their soonest availability.  Thank you,  RESTRICTIONS:  During your procedure today, you received medications for sedation.  These   medications may affect your judgment, balance and coordination.  Therefore,   for 24 hours, you have the following restrictions:   - DO NOT drive a car, operate machinery, make legal/financial decisions,   sign important papers or drink alcohol.    ACTIVITY:  Today: no heavy lifting, straining or running due to procedural   sedation/anesthesia.  The following day: return to full activity including work.  DIET:  Eat and drink normally unless instructed otherwise.     TREATMENT FOR COMMON SIDE EFFECTS:  - Mild abdominal pain, nausea, belching, bloating or excessive gas:  rest,   eat lightly and use a heating pad.  - Sore Throat: treat with throat lozenges and/or gargle with warm salt   water.  - Because air was used during the procedure, expelling large amounts of air   from your rectum or belching is normal.  - If a bowel prep was taken, you may not have a bowel movement for 1-3 days.    This is normal.  SYMPTOMS TO WATCH FOR AND REPORT TO YOUR PHYSICIAN:  1. Abdominal pain or bloating, other than gas cramps.  2. Chest pain.  3. Back pain.  4. Signs of infection such as: chills or fever occurring within 24 hours   after the procedure.  5. Rectal bleeding, which would show as bright red, maroon, or black stools.   (A tablespoon of blood from the rectum is not serious, especially if    hemorrhoids are present.)  6. Vomiting.  7. Weakness or dizziness.  GO DIRECTLY TO THE NEAREST EMERGENCY ROOM IF YOU HAVE ANY OF THE FOLLOWING:      Difficulty breathing              Chills and/or fever over 101 F   Persistent vomiting and/or vomiting blood   Severe abdominal pain   Severe chest pain   Black, tarry stools   Bleeding- more than one tablespoon   Any other symptom or condition that you feel may need urgent attention  Your doctor recommends these additional instructions:  If any biopsies were taken, your doctors clinic will contact you in 1 to 2   weeks with any results.  - Discharge patient to home.   - Resume previous diet.   - Continue present medications.   - Await pathology results.   - Repeat colonoscopy is not recommended due to current age (66 years or   older) for surveillance.   - Return to referring physician.  For questions, problems or results please call your physician - Mauro Dye MD at Work:  [phone].  [facility], EMERGENCY ROOM PHONE NUMBER: [phone]  IF A COMPLICATION OR EMERGENCY SITUATION ARISES AND YOU ARE UNABLE TO REACH   YOUR PHYSICIAN - GO DIRECTLY TO THE EMERGENCY ROOM.  Mauro Dye MD  5/1/2025 2:35:56 PM  This report has been verified and signed electronically.  Dear patient,  As a result of recent federal legislation (The Federal Cures Act), you may   receive lab or pathology results from your procedure in your [facility]   account before your physician is able to contact you. Your physician or   their representative will relay the results to you with their   recommendations at their soonest availability.  Thank you,  PROVATION

## 2025-05-02 ENCOUNTER — RESULTS FOLLOW-UP (OUTPATIENT)
Dept: PRIMARY CARE CLINIC | Facility: CLINIC | Age: 75
End: 2025-05-02

## 2025-05-02 ENCOUNTER — OFFICE VISIT (OUTPATIENT)
Dept: OPTOMETRY | Facility: CLINIC | Age: 75
End: 2025-05-02
Payer: MEDICARE

## 2025-05-02 DIAGNOSIS — S05.01XA CONJUNCTIVAL ABRASION, RIGHT, INITIAL ENCOUNTER: Primary | ICD-10-CM

## 2025-05-02 LAB
ESTROGEN SERPL-MCNC: NORMAL PG/ML
INSULIN SERPL-ACNC: NORMAL U[IU]/ML
LAB AP CLINICAL INFORMATION: NORMAL
LAB AP GROSS DESCRIPTION: NORMAL
LAB AP PERFORMING LOCATION(S): NORMAL
LAB AP REPORT FOOTNOTES: NORMAL

## 2025-05-02 PROCEDURE — 99999 PR PBB SHADOW E&M-EST. PATIENT-LVL III: CPT | Mod: PBBFAC,,, | Performed by: OPTOMETRIST

## 2025-05-02 RX ORDER — AMOXICILLIN 500 MG/1
CAPSULE ORAL
COMMUNITY
Start: 2025-04-17

## 2025-05-02 RX ORDER — POLYETHYLENE GLYCOL-3350 AND ELECTROLYTES 236; 6.74; 5.86; 2.97; 22.74 G/274.31G; G/274.31G; G/274.31G; G/274.31G; G/274.31G
POWDER, FOR SOLUTION ORAL
COMMUNITY
Start: 2025-04-11

## 2025-05-02 RX ORDER — ERYTHROMYCIN 5 MG/G
OINTMENT OPHTHALMIC 3 TIMES DAILY
Qty: 3.5 G | Refills: 0 | Status: SHIPPED | OUTPATIENT
Start: 2025-05-02 | End: 2025-05-09

## 2025-05-02 NOTE — PROGRESS NOTES
HPI    Patient is here today for concerns about ocular health. Pt reports that OD   lid is red, swollen, tearing and sensitive to touch. Pt was using Pred but   was informed to stop using it. Pt states that this has been going on for   about a week now. Pt states that he can see his cornea looking irritated   and he has had a transplant, so he would like to make sure that nothing is   wrong with implant. Pt rates pain 4/10 normally and 10/10 when touching   eye.     Gtts:  Timolol BID OU   Last edited by Angelica Maldonado, OD on 5/2/2025  1:18 PM.            Assessment /Plan     For exam results, see Encounter Report.    Conjunctival abrasion, right, initial encounter  -     erythromycin (ROMYCIN) ophthalmic ointment; Place into the right eye 3 (three) times daily. for 7 days  Dispense: 3.5 g; Refill: 0      Educated pt on findings. Conj abrasion temporally OD. Rx erythromycin vikki TID for 7 days then d/c use. Additional ATs prn. If symptoms worsen or dont improve, RTC.       RTC with any worsening.

## 2025-05-05 ENCOUNTER — TELEPHONE (OUTPATIENT)
Dept: OPHTHALMOLOGY | Facility: CLINIC | Age: 75
End: 2025-05-05
Payer: MEDICARE

## 2025-05-05 NOTE — TELEPHONE ENCOUNTER
Spoke w/patient in regards to eye; encourage him to continue using the ointment/artifical tears. Pt understood. ezequiel

## 2025-05-05 NOTE — TELEPHONE ENCOUNTER
----- Message from Evelyn sent at 5/5/2025 10:33 AM CDT -----  Regarding: Same Day [facility]  Contact: Montserrat (Sister)  Scheduling RequestAppt Type: TriageDate/Time Preference:TodayTreating Provider:Emma Name: Montserrat (Sister)Contact Preference:[phone] Comment: Pt sister sates having with right eye symptoms including lid swollen , crusting and oozing , pain level still the same. Previously seen on Friday in same day clinic Please call to advise and schedule

## 2025-05-07 ENCOUNTER — PATIENT OUTREACH (OUTPATIENT)
Dept: INTERNAL MEDICINE | Facility: CLINIC | Age: 75
End: 2025-05-07
Payer: MEDICARE

## 2025-05-07 VITALS — SYSTOLIC BLOOD PRESSURE: 136 MMHG | DIASTOLIC BLOOD PRESSURE: 67 MMHG

## 2025-05-12 NOTE — PROGRESS NOTES
Subjective   Patient ID:  Pavel Graff is a 75 y.o. male who presents for follow-up of Atrial Fibrillation      75 year old male with history of HTN, TIA, SIMON, HLD and persistent AF since July 2012.  DCCV was attempted with early recurrence of AF.  He was rate controlled and did not note significant symptoms at the time - so a rate control strategy was pursued.  He notes though, that ever since he has been in AF - he has been much more easily fatigued and tired, not himself.  He denies overt palpitation or SOB.  He denies chest pain.    Mr. Graff underwent a PVI (07/21/16) with successful pulmonary vein antral isolation. Since the procedure, Mr. Graff reports feeling well with only two 3-second episodes of palpitations; he denies further recurrence.  denies chest pain, SOB/BLANCO, dizziness, or syncope. Mr. Graff has noted improvement in his energy level since the procedure.     12/16: Continues improved symptoms. 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 Remains on pradaxa and metoprolol.     6/17: No recurrence of AF since PVI. Feels well. No active complaints.     2/18: AF recurrence without symptoms. Remains on apixaban. The patient denies interval chest pain, sob, palpitations, syncope, near syncope.     8/18: Stable symptoms with slightly more fatigue. He asks about repeat ablation at the end of the season.     11/18: Re-do PVI 9/28/18 with touch up PVI, posterior wall isolation and CTI line. Feels some what fatigued, no post procedure complications.     5/19: No recurrence since PVI 9/18. Symptoms improved, stable.     3/22:Onset of easy bleeding and bruising. He has had events with spontaneous sub-conjunctival bleeding that are increasing in frequency and associated with pain. Remains in NSR.    9/22: 5/9/22 LAAO with cessation of OAC 6/20/22. Now on DAPT. No recurrence    3/23: No recurrence, now off plavix.     5/24: No recurrence    Interval history: He had a colon prep last week  (early May 2025) and was in AF when he arrived for his scope. He underwent the scope and had polyps removed     CHADSVASC: 4  HASBLED: 4    2D echo 4/16    1 - Eccentric hypertrophy.     2 - Normal left ventricular systolic function (EF 60-65%).     3 - Right ventricular enlargement with normal systolic function.     4 - Biatrial enlargement.     5 - Mild aortic regurgitation.     6 - Mild mitral regurgitation.     7 - Mild tricuspid regurgitation.     8 - The estimated PA systolic pressure is 33 mmHg.     9 - Mildly elevated central venous pressure.     PET stress 5/16  1. There is no evidence of a discrete hemodynamically significant coronary stenosis.   2. Although no clinically significant stress defect is seen, there is resting flow heterogeneity that improves after Dipyridamole. These results suggest mild endothelial dysfunction due to elevated cholesterol, high blood pressure and diabetes without clinically significant,      localized perfusion defects.   3. Resting wall motion is physiologic. Stress wall motion is physiologic.   4. LV function is normal at rest and stress.  (normal is >= 51%)  5. The ventricular volumes are normal at rest and stress.   6. The extracardiac distribution of radioactivity is normal.   7. There was no previous study available to compare.    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  05/20/2016: BLANCO (dyspnea on exertion)  05/20/2016: Fatigue  No date: GERD (gastroesophageal reflux disease)  05/20/2024: Hard of hearing  No date: History of TIA (transient ischemic attack)  No date: Hyperlipidemia  No date: Hypertension  06/25/2013: Inguinal hernia recurrent unilateral  09/21/2016: On amiodarone therapy  No date: Sleep apnea  No date: Squamous cell carcinoma of skin  09/21/2016: Status post  catheter ablation of atrial fibrillation  No date: Stroke      Comment:  TIA  No date: TIA (transient ischemic attack)  No date: Trouble in sleeping    Past Surgical History:  9/28/2018: ABLATION; N/A      Comment:  Procedure: ABLATION;  Surgeon: Romaine Howard MD;                 Location: [facility] CATH LAB;  Service: Cardiology;                 Laterality: N/A;  AF, KAM, PVI, RFA, MAME, Gen, MB, 3 Prep  6/3/2024: ARTHROPLASTY OF HIP BY ANTERIOR APPROACH; Left      Comment:  Procedure: ARTHROPLASTY, HIP, TOTAL, ANTERIOR APPROACH:                LEFT: DEPUY - ACTIS + PINNACLE;  Surgeon: Christiano Gallo III, MD;  Location[facility] OR;  Service: Orthopedics;                 Laterality: Left;  1/11/10: CATARACT EXTRACTION W/  INTRAOCULAR LENS IMPLANT; Left  12/14/2017: CATARACT EXTRACTION W/  INTRAOCULAR LENS IMPLANT; Right      Comment:  DMEK/ Dr. Jay  3/26/2019: COLONOSCOPY; N/A      Comment:  Procedure: COLONOSCOPY;  Surgeon: Mauro Dye MD;                 [facility]  Service: Endoscopy;                 Laterality: N/A;  ok to hold Eliquis x 2 days per Dr. Howard-MS  5/1/2025: COLONOSCOPY, SCREENING, HIGH RISK PATIENT; N/A      Comment:  Procedure: COLONOSCOPY, SCREENING, HIGH RISK PATIENT;                 Surgeon: Mauro Dye MD;  Location: [facility] ENDO (4TH FLR);                Service: Endoscopy;  Laterality: N/A;  ref by Molly Mclaughlin, NP, PEG, portal -ml4/24 precall                attempted/LVM/mleone4/24 precall complete/mleone  1997: CORNEAL TRANSPLANT; Left      Comment:  PKP OS  03/23/2017: CORNEAL TRANSPLANT; Left  12/14/2017: CORNEAL TRANSPLANT; Right      Comment:  DMEK/ [facility]; Dr. Jay  No date: HERNIA REPAIR  No date: HIP ARTHROPLASTY  5/9/2022: OCCLUSION OF LEFT ATRIAL APPENDAGE; N/A      Comment:  Procedure: Left atrial appendage occlusion;  Surgeon:                Romaine Howard MD;  Location: [facility] EP LAB;  Service:                Cardiology;  Laterality: N/A;  afib, watchman, BSCI, erasmo,               anes, MB/DM, 3prep  No date: ROTATOR CUFF REPAIR  5/9/2022: TRANSESOPHAGEAL ECHOCARDIOGRAPHY; N/A      Comment:  Procedure: ECHOCARDIOGRAM, TRANSESOPHAGEAL;  Surgeon:                Fanta Dacosta MD;  Location: [facility] EP LAB;                 Service: Cardiology;  Laterality: N/A;    Social History    Socioeconomic History      Marital status:       Number of children: 3    Occupational History        Employer: [employer]    Tobacco Use      Smoking status: Never        Passive exposure: Never      Smokeless tobacco: Never    Substance and Sexual Activity      Alcohol use: No      Drug use: No      Sexual activity: Yes        Partners: Female    Social Drivers of Health  Financial Resource Strain: Low Risk  (4/9/2025)      Overall Financial Resource Strain (CARDIA)          Difficulty of Paying Living Expenses: Not hard at all  Food Insecurity: No Food Insecurity (4/9/2025)      Hunger Vital Sign          Worried About Running Out of Food in the Last Year: Never true          Ran Out of Food in the Last Year: Never true  Transportation Needs: No Transportation Needs (4/9/2025)      PRAPARE - Transportation          Lack of Transportation (Medical): No          Lack of Transportation (Non-Medical): No  Physical Activity: Inactive (4/9/2025)      Exercise Vital Sign          Days of Exercise per Week: 0 days          Minutes of Exercise per Session: 0 min  Stress: No Stress Concern Present (4/9/2025)      [employer] [place] [employer] - Occupational Stress Questionnaire          Feeling of Stress : Only a little  Housing Stability: Low Risk  (4/9/2025)      Housing Stability Vital Sign          Unable to Pay for Housing in the Last Year: No          Number of Times Moved in the Last Year: 1          Homeless in the Last Year: No    Review of patient's family history indicates:  Problem: Diabetes       Relation: Mother          Name:               Age of Onset: (Not Specified)  Problem: Heart disease      Relation: Mother          Name:               Age of Onset: (Not Specified)  Problem: Hyperlipidemia      Relation: Mother          Name:               Age of Onset: (Not Specified)  Problem: Heart disease      Relation: Father          Name:               Age of Onset: (Not Specified)  Problem: Colon cancer      Relation: Father          Name:               Age of Onset: (Not Specified)              Comment: colon  Problem: Cancer      Relation: Father          Name:               Age of Onset: (Not Specified)  Problem: Crohn's disease      Relation: Sister          Name:               Age of Onset: (Not Specified)  Problem: Hyperlipidemia      Relation: Sister          Name:               Age of Onset: (Not Specified)  Problem: Hypertension      Relation: Sister          Name:               Age of Onset: (Not Specified)  Problem: Hyperlipidemia      Relation: Sister          Name:               Age of Onset: (Not Specified)  Problem: Heart disease      Relation: Brother          Name:               Age of Onset: (Not Specified)  Problem: Sudden death      Relation: Neg Hx          Name:               Age of Onset: (Not Specified)  Problem: Amblyopia      Relation: Neg Hx          Name:               Age of Onset: (Not Specified)  Problem: Blindness      Relation: Neg Hx          Name:               Age of Onset: (Not Specified)  Problem: Cataracts      Relation: Neg Hx          Name:               Age of Onset: (Not Specified)  Problem: Glaucoma      Relation: Neg Hx          Name:               Age of Onset: (Not Specified)  Problem: Macular degeneration      Relation: Neg Hx          Name:               Age of Onset: (Not Specified)  Problem: Retinal detachment      Relation: Neg Hx          Name:               Age of Onset: (Not Specified)  Problem: Strabismus      Relation: Neg Hx          Name:                Age of Onset: (Not Specified)  Problem: Stroke      Relation: Neg Hx          Name:               Age of Onset: (Not Specified)  Problem: Thyroid disease      Relation: Neg Hx          Name:               Age of Onset: (Not Specified)  Problem: Melanoma      Relation: Neg Hx          Name:               Age of Onset: (Not Specified)    Current Outpatient Medications:  acetaminophen (TYLENOL) 650 MG TbSR, Take 1 tablet (650 mg total) by mouth every 8 (eight) hours., Disp: 120 tablet, Rfl: 0  amoxicillin (AMOXIL) 500 MG capsule, TAKE 4 CAPSULES BY MOUTH ONE HOUR BEFORE APPOINTMENT, Disp: , Rfl:   ascorbic acid (VITAMIN C ORAL), Take by mouth Daily., Disp: , Rfl:   aspirin 81 MG Chew, Take 81 mg by mouth once daily., Disp: , Rfl:   cefadroxil (DURICEF) 500 MG Cap, Take 1 capsule (500 mg total) by mouth every 12 (twelve) hours., Disp: 14 capsule, Rfl: 0  celecoxib (CELEBREX) 200 MG capsule, Take 1 capsule (200 mg total) by mouth once daily., Disp: 30 capsule, Rfl: 0  diclofenac sodium (VOLTAREN ARTHRITIS PAIN) 1 % Gel, Apply 2 g topically once daily., Disp: , Rfl:   fish oil-omega-3 fatty acids 300-1,000 mg capsule, Take 1 g by mouth once daily., Disp: , Rfl:   GAVILYTE-G 236-22.74-6.74 -5.86 gram suspension, TAKE 4,000 MLS (4 L TOTAL) BY MOUTH ONCE. TAKE AS INSTRUCTED BY ENDOSCOPY NURSE  FOR 1 DOSE, Disp: , Rfl:   hydroCHLOROthiazide (HYDRODIURIL) 25 MG tablet, TAKE 1 TABLET BY MOUTH EVERY DAY, Disp: 90 tablet, Rfl: 3  ketoconazole (NIZORAL) 2 % shampoo, Wash hair with medicated shampoo at least 2x/week - let sit on scalp at least 5 minutes prior to rinsing, Disp: 120 mL, Rfl: 5  losartan (COZAAR) 50 MG tablet, TAKE 1 TABLET BY MOUTH EVERY DAY, Disp: 90 tablet, Rfl: 0  metoprolol succinate (TOPROL-XL) 25 MG 24 hr tablet, Take 1 tablet (25 mg total) by mouth once daily., Disp: 90 tablet, Rfl: 1  pantoprazole (PROTONIX) 40 MG tablet, Take 1 tablet (40 mg total) by mouth once daily., Disp: 30 tablet, Rfl:  0  pravastatin (PRAVACHOL) 20 MG tablet, TAKE 1 TABLET BY MOUTH EVERY DAY, Disp: 90 tablet, Rfl: 3  prednisoLONE acetate (PRED FORTE) 1 % DrpS, Place 1 drop into both eyes 2 (two) times daily., Disp: 5 mL, Rfl: 3  prednisoLONE acetate (PRED FORTE) 1 % DrpS, Place 1 drop into the left eye 2 (two) times daily., Disp: 10 mL, Rfl: 4  senna-docusate 8.6-50 mg (SENNA WITH DOCUSATE SODIUM) 8.6-50 mg per tablet, Take 1 tablet by mouth once daily., Disp: 30 tablet, Rfl: 0  timolol maleate 0.5% (TIMOPTIC) 0.5 % Drop, Place 1 drop into both eyes 2 (two) times daily., Disp: 15 mL, Rfl: 2    No current facility-administered medications for this visit.  Facility-Administered Medications Ordered in Other Visits:  0.9%  NaCl infusion, , Intravenous, Continuous, Zeoy Newman, NP, Stopped at 09/28/18 1204        Review of Systems   Constitutional: Negative.   HENT: Negative.     Eyes: Negative.    Cardiovascular:  Negative for chest pain, dyspnea on exertion, leg swelling, near-syncope, palpitations and syncope.   Respiratory: Negative.  Negative for shortness of breath.    Endocrine: Negative.    Hematologic/Lymphatic: Negative.    Skin: Negative.    Musculoskeletal: Negative.    Gastrointestinal: Negative.    Genitourinary: Negative.    Neurological: Negative.  Negative for dizziness and light-headedness.   Psychiatric/Behavioral: Negative.     Allergic/Immunologic: Negative.    All other systems reviewed and are negative.         Objective     Physical Exam  Vitals reviewed.   Constitutional:       General: He is not in acute distress.     Appearance: He is well-developed.   HENT:      Head: Normocephalic and atraumatic.   Eyes:      Pupils: Pupils are equal, round, and reactive to light.   Neck:      Thyroid: No thyromegaly.      Vascular: No JVD.   Cardiovascular:      Rate and Rhythm: Normal rate and regular rhythm.      Chest Wall: PMI is not displaced.      Heart sounds: Normal heart sounds, S1 normal and S2 normal.  No murmur heard.     No friction rub. No gallop.   Pulmonary:      Effort: Pulmonary effort is normal. No respiratory distress.      Breath sounds: Normal breath sounds. No wheezing or rales.   Abdominal:      General: Bowel sounds are normal. There is no distension.      Palpations: Abdomen is soft.      Tenderness: There is no abdominal tenderness. There is no guarding or rebound.   Musculoskeletal:         General: No tenderness. Normal range of motion.      Cervical back: Normal range of motion.   Skin:     General: Skin is warm and dry.      Findings: No erythema or rash.   Neurological:      Mental Status: He is alert and oriented to person, place, and time.      Cranial Nerves: No cranial nerve deficit.   Psychiatric:         Behavior: Behavior normal.         Thought Content: Thought content normal.         Judgment: Judgment normal.            Assessment and Plan     1. Persistent atrial fibrillation    2. Presence of Watchman left atrial appendage closure device        Plan:  75 yoM here for AF management. First known recurrence in many years, possibly due to recent GI prep. I offered CV but he wishes to see if it resolves. Will check ECG in 1 month.

## 2025-05-13 ENCOUNTER — OFFICE VISIT (OUTPATIENT)
Dept: ELECTROPHYSIOLOGY | Facility: CLINIC | Age: 75
End: 2025-05-13
Payer: MEDICARE

## 2025-05-13 ENCOUNTER — HOSPITAL ENCOUNTER (OUTPATIENT)
Dept: CARDIOLOGY | Facility: CLINIC | Age: 75
Discharge: HOME | End: 2025-05-13
Payer: MEDICARE

## 2025-05-13 ENCOUNTER — PATIENT MESSAGE (OUTPATIENT)
Dept: ELECTROPHYSIOLOGY | Facility: CLINIC | Age: 75
End: 2025-05-13

## 2025-05-13 VITALS
HEIGHT: 72 IN | SYSTOLIC BLOOD PRESSURE: 132 MMHG | BODY MASS INDEX: 33.89 KG/M2 | DIASTOLIC BLOOD PRESSURE: 66 MMHG | WEIGHT: 250.25 LBS | HEART RATE: 72 BPM

## 2025-05-13 DIAGNOSIS — I48.19 PERSISTENT ATRIAL FIBRILLATION: Primary | ICD-10-CM

## 2025-05-13 DIAGNOSIS — I48.19 PERSISTENT ATRIAL FIBRILLATION: ICD-10-CM

## 2025-05-13 DIAGNOSIS — Z95.818 PRESENCE OF WATCHMAN LEFT ATRIAL APPENDAGE CLOSURE DEVICE: ICD-10-CM

## 2025-05-13 LAB
OHS QRS DURATION: 102 MS
OHS QTC CALCULATION: 429 MS

## 2025-05-13 PROCEDURE — 3288F FALL RISK ASSESSMENT DOCD: CPT | Mod: CPTII,HCNC,S$GLB, | Performed by: INTERNAL MEDICINE

## 2025-05-13 PROCEDURE — 93010 ELECTROCARDIOGRAM REPORT: CPT | Mod: S$GLB,,, | Performed by: STUDENT IN AN ORGANIZED HEALTH CARE EDUCATION/TRAINING PROGRAM

## 2025-05-13 PROCEDURE — 1126F AMNT PAIN NOTED NONE PRSNT: CPT | Mod: CPTII,HCNC,S$GLB, | Performed by: INTERNAL MEDICINE

## 2025-05-13 PROCEDURE — 93005 ELECTROCARDIOGRAM TRACING: CPT | Mod: S$GLB,,, | Performed by: INTERNAL MEDICINE

## 2025-05-13 PROCEDURE — 4010F ACE/ARB THERAPY RXD/TAKEN: CPT | Mod: CPTII,HCNC,S$GLB, | Performed by: INTERNAL MEDICINE

## 2025-05-13 PROCEDURE — 1157F ADVNC CARE PLAN IN RCRD: CPT | Mod: CPTII,HCNC,S$GLB, | Performed by: INTERNAL MEDICINE

## 2025-05-13 PROCEDURE — 1159F MED LIST DOCD IN RCRD: CPT | Mod: CPTII,HCNC,S$GLB, | Performed by: INTERNAL MEDICINE

## 2025-05-13 PROCEDURE — 99999 PR PBB SHADOW E&M-EST. PATIENT-LVL III: CPT | Mod: PBBFAC,,, | Performed by: INTERNAL MEDICINE

## 2025-05-13 PROCEDURE — 1101F PT FALLS ASSESS-DOCD LE1/YR: CPT | Mod: CPTII,HCNC,S$GLB, | Performed by: INTERNAL MEDICINE

## 2025-05-13 PROCEDURE — 99214 OFFICE O/P EST MOD 30 MIN: CPT | Mod: HCNC,S$GLB,, | Performed by: INTERNAL MEDICINE

## 2025-05-13 PROCEDURE — 3075F SYST BP GE 130 - 139MM HG: CPT | Mod: CPTII,HCNC,S$GLB, | Performed by: INTERNAL MEDICINE

## 2025-05-13 PROCEDURE — 3078F DIAST BP <80 MM HG: CPT | Mod: CPTII,HCNC,S$GLB, | Performed by: INTERNAL MEDICINE

## 2025-05-29 DIAGNOSIS — I10 HYPERTENSION: ICD-10-CM

## 2025-06-02 ENCOUNTER — TELEPHONE (OUTPATIENT)
Dept: ELECTROPHYSIOLOGY | Facility: CLINIC | Age: 75
End: 2025-06-02
Payer: MEDICARE

## 2025-06-08 DIAGNOSIS — I10 PRIMARY HYPERTENSION: Primary | ICD-10-CM

## 2025-06-08 NOTE — TELEPHONE ENCOUNTER
Care Due:                  Date            Visit Type   Department     Provider  --------------------------------------------------------------------------------                                EP -                              PRIMARY      [facility]  Last Visit: 03-      CARE ([facility])   MEDICINE       Joe Peterson  Next Visit: None Scheduled  None         None Found                                                            Last  Test          Frequency    Reason                     Performed    Due Date  --------------------------------------------------------------------------------    Office Visit  15 months..  hydroCHLOROthiazide,       03- 05-                             losartan, metoprolol.....    CMP.........  12 months..  hydroCHLOROthiazide,       05- 05-                             losartan.................    [employer] Embedded Care Due Messages. Reference number: [id number].   6/08/2025 12:19:47 AM CDT

## 2025-06-09 RX ORDER — LOSARTAN POTASSIUM 50 MG/1
50 TABLET ORAL DAILY
Qty: 90 TABLET | Refills: 0 | Status: SHIPPED | OUTPATIENT
Start: 2025-06-09

## 2025-06-09 NOTE — TELEPHONE ENCOUNTER
Refill Routing Note   Medication(s) are not appropriate for processing by [facility] for the following reason(s):        Patient not seen by provider within 15 months  Required labs outdated    [facility] action(s):  Route   Requires appointment : Yes   Requires labs : Yes             Appointments  past 12m or future 3m with PCP    Date Provider   Last Visit   3/5/2024 Joe Peterson MD   Next Visit   Visit date not found Joe Peterson MD   ED visits in past 90 days: 0        Note composed:11:32 AM 06/09/2025

## 2025-06-10 ENCOUNTER — TELEPHONE (OUTPATIENT)
Dept: ELECTROPHYSIOLOGY | Facility: CLINIC | Age: 75
End: 2025-06-10
Payer: MEDICARE

## 2025-06-10 ENCOUNTER — PATIENT MESSAGE (OUTPATIENT)
Dept: ELECTROPHYSIOLOGY | Facility: CLINIC | Age: 75
End: 2025-06-10
Payer: MEDICARE

## 2025-06-10 ENCOUNTER — HOSPITAL ENCOUNTER (OUTPATIENT)
Dept: CARDIOLOGY | Facility: CLINIC | Age: 75
Discharge: HOME | End: 2025-06-10
Payer: MEDICARE

## 2025-06-10 DIAGNOSIS — I48.19 PERSISTENT ATRIAL FIBRILLATION: Primary | ICD-10-CM

## 2025-06-10 DIAGNOSIS — Z95.818 PRESENCE OF WATCHMAN LEFT ATRIAL APPENDAGE CLOSURE DEVICE: ICD-10-CM

## 2025-06-10 DIAGNOSIS — I48.19 PERSISTENT ATRIAL FIBRILLATION: ICD-10-CM

## 2025-06-10 LAB
OHS QRS DURATION: 94 MS
OHS QTC CALCULATION: 422 MS

## 2025-06-10 PROCEDURE — 93010 ELECTROCARDIOGRAM REPORT: CPT | Mod: HCNC,S$GLB,, | Performed by: INTERNAL MEDICINE

## 2025-06-10 NOTE — TELEPHONE ENCOUNTER
Spoke to patient's wife (Kiana). Patient's wife schedules patients procedure. Patient agrees with Dr. Howard's recommendation of Cardioversion only. Patient has been scheduled for 6/17/2025 arrival time 7:00 Am. Instructions will be sent via portal. Patient's wife verbalized understanding and appreciated the call.       ----- Message from EZEKIEL Royal sent at 6/10/2025 10:47 AM CDT -----  Pt had ECG today still in aflutter, see below, can you schedule CV (no KAM)thanks  ----- Message -----  From: Romaine Howard MD  Sent: 5/13/2025   9:53 AM CDT  To: Genny Ochoa RN    Can we get an ECG in one month. If he is in AF lets set up a CV (no KAM) thanks

## 2025-06-11 ENCOUNTER — LAB VISIT (OUTPATIENT)
Dept: LAB | Facility: HOSPITAL | Age: 75
End: 2025-06-11
Attending: INTERNAL MEDICINE
Payer: MEDICARE

## 2025-06-11 DIAGNOSIS — I48.19 PERSISTENT ATRIAL FIBRILLATION: ICD-10-CM

## 2025-06-11 DIAGNOSIS — Z95.818 PRESENCE OF WATCHMAN LEFT ATRIAL APPENDAGE CLOSURE DEVICE: ICD-10-CM

## 2025-06-11 LAB
ABSOLUTE EOSINOPHIL (OHS): 0.19 K/UL
ABSOLUTE MONOCYTE (OHS): 0.54 K/UL (ref 0.3–1)
ABSOLUTE NEUTROPHIL COUNT (OHS): 4.8 K/UL (ref 1.8–7.7)
ANION GAP (OHS): 8 MMOL/L (ref 8–16)
APTT PPP: 22 SECONDS (ref 21–32)
BASOPHILS # BLD AUTO: 0.06 K/UL
BASOPHILS NFR BLD AUTO: 0.9 %
BUN SERPL-MCNC: 13 MG/DL (ref 8–23)
CALCIUM SERPL-MCNC: 9.5 MG/DL (ref 8.7–10.5)
CHLORIDE SERPL-SCNC: 102 MMOL/L (ref 95–110)
CO2 SERPL-SCNC: 26 MMOL/L (ref 23–29)
CREAT SERPL-MCNC: 0.9 MG/DL (ref 0.5–1.4)
ERYTHROCYTE [DISTWIDTH] IN BLOOD BY AUTOMATED COUNT: 12.1 % (ref 11.5–14.5)
GFR SERPLBLD CREATININE-BSD FMLA CKD-EPI: >60 ML/MIN/1.73/M2
GLUCOSE SERPL-MCNC: 121 MG/DL (ref 70–110)
HCT VFR BLD AUTO: 44.6 % (ref 40–54)
HGB BLD-MCNC: 15.3 GM/DL (ref 14–18)
IMM GRANULOCYTES # BLD AUTO: 0.05 K/UL (ref 0–0.04)
IMM GRANULOCYTES NFR BLD AUTO: 0.8 % (ref 0–0.5)
INR PPP: 1 (ref 0.8–1.2)
LYMPHOCYTES # BLD AUTO: 0.91 K/UL (ref 1–4.8)
MCH RBC QN AUTO: 31.9 PG (ref 27–31)
MCHC RBC AUTO-ENTMCNC: 34.3 G/DL (ref 32–36)
MCV RBC AUTO: 93 FL (ref 82–98)
NUCLEATED RBC (/100WBC) (OHS): 0 /100 WBC
PLATELET # BLD AUTO: 263 K/UL (ref 150–450)
PMV BLD AUTO: 10.2 FL (ref 9.2–12.9)
POTASSIUM SERPL-SCNC: 3.9 MMOL/L (ref 3.5–5.1)
PROTHROMBIN TIME: 11.2 SECONDS (ref 9–12.5)
RBC # BLD AUTO: 4.8 M/UL (ref 4.6–6.2)
RELATIVE EOSINOPHIL (OHS): 2.9 %
RELATIVE LYMPHOCYTE (OHS): 13.9 % (ref 18–48)
RELATIVE MONOCYTE (OHS): 8.2 % (ref 4–15)
RELATIVE NEUTROPHIL (OHS): 73.3 % (ref 38–73)
SODIUM SERPL-SCNC: 136 MMOL/L (ref 136–145)
WBC # BLD AUTO: 6.55 K/UL (ref 3.9–12.7)

## 2025-06-11 PROCEDURE — 82310 ASSAY OF CALCIUM: CPT | Mod: HCNC

## 2025-06-11 PROCEDURE — 85730 THROMBOPLASTIN TIME PARTIAL: CPT | Mod: HCNC

## 2025-06-11 PROCEDURE — 85610 PROTHROMBIN TIME: CPT | Mod: HCNC

## 2025-06-11 PROCEDURE — 36415 COLL VENOUS BLD VENIPUNCTURE: CPT

## 2025-06-11 PROCEDURE — 85025 COMPLETE CBC W/AUTO DIFF WBC: CPT | Mod: HCNC

## 2025-06-16 ENCOUNTER — TELEPHONE (OUTPATIENT)
Dept: ELECTROPHYSIOLOGY | Facility: CLINIC | Age: 75
End: 2025-06-16
Payer: MEDICARE

## 2025-06-16 NOTE — TELEPHONE ENCOUNTER
Spoke to patient's wife    CONFIRMED procedure arrival time of 7:00 AM on 6/17/2025    Reiterated instructions including:  -Directions to check in desk  -NPO after midnight night prior to procedure  -Patient allowed to drink water up until 2 hours prior to arrival due to IV fluid shortage.   -Pre-procedure LABS Reviewed.   -Confirmed presence of implanted device/stimulator Watchman   -Confirmed no fever, cough. Patient has shortness of breath in the past week due to AFL/ afib.   -Confirmed no redness, rash, irritation, or yeast infection to chest area.   -Reviewed current visitor policy    Patient verbalized understanding of above and appreciated the call.

## 2025-06-17 ENCOUNTER — HOSPITAL ENCOUNTER (OUTPATIENT)
Facility: HOSPITAL | Age: 75
Discharge: HOME | End: 2025-06-17
Attending: INTERNAL MEDICINE | Admitting: INTERNAL MEDICINE
Payer: MEDICARE

## 2025-06-17 ENCOUNTER — ANESTHESIA EVENT (OUTPATIENT)
Dept: MEDSURG UNIT | Facility: HOSPITAL | Age: 75
End: 2025-06-17
Payer: MEDICARE

## 2025-06-17 ENCOUNTER — ANESTHESIA (OUTPATIENT)
Dept: MEDSURG UNIT | Facility: HOSPITAL | Age: 75
End: 2025-06-17
Payer: MEDICARE

## 2025-06-17 VITALS
BODY MASS INDEX: 33.18 KG/M2 | RESPIRATION RATE: 16 BRPM | WEIGHT: 245 LBS | HEIGHT: 72 IN | OXYGEN SATURATION: 99 % | SYSTOLIC BLOOD PRESSURE: 149 MMHG | TEMPERATURE: 98 F | DIASTOLIC BLOOD PRESSURE: 85 MMHG | HEART RATE: 81 BPM

## 2025-06-17 DIAGNOSIS — I48.19 PERSISTENT ATRIAL FIBRILLATION: ICD-10-CM

## 2025-06-17 DIAGNOSIS — I48.91 ATRIAL FIBRILLATION: ICD-10-CM

## 2025-06-17 DIAGNOSIS — I48.92 ATRIAL FLUTTER: ICD-10-CM

## 2025-06-17 LAB
OHS QRS DURATION: 102 MS
OHS QRS DURATION: 92 MS
OHS QTC CALCULATION: 434 MS
OHS QTC CALCULATION: 453 MS

## 2025-06-17 PROCEDURE — 37000009 HC ANESTHESIA EA ADD 15 MINS: Mod: HCNC | Performed by: INTERNAL MEDICINE

## 2025-06-17 PROCEDURE — 37000008 HC ANESTHESIA 1ST 15 MINUTES: Mod: HCNC | Performed by: INTERNAL MEDICINE

## 2025-06-17 PROCEDURE — 93010 ELECTROCARDIOGRAM REPORT: CPT | Mod: HCNC,,, | Performed by: INTERNAL MEDICINE

## 2025-06-17 PROCEDURE — 93005 ELECTROCARDIOGRAM TRACING: CPT | Mod: HCNC

## 2025-06-17 PROCEDURE — 63600175 PHARM REV CODE 636 W HCPCS: Mod: HCNC | Performed by: NURSE ANESTHETIST, CERTIFIED REGISTERED

## 2025-06-17 PROCEDURE — 25000003 PHARM REV CODE 250: Mod: HCNC | Performed by: NURSE ANESTHETIST, CERTIFIED REGISTERED

## 2025-06-17 PROCEDURE — 93010 ELECTROCARDIOGRAM REPORT: CPT | Mod: HCNC,76,, | Performed by: INTERNAL MEDICINE

## 2025-06-17 PROCEDURE — 92960 CARDIOVERSION ELECTRIC EXT: CPT | Mod: HCNC,,, | Performed by: INTERNAL MEDICINE

## 2025-06-17 PROCEDURE — 92960 CARDIOVERSION ELECTRIC EXT: CPT | Mod: HCNC | Performed by: INTERNAL MEDICINE

## 2025-06-17 RX ORDER — PROPOFOL 10 MG/ML
VIAL (ML) INTRAVENOUS
Status: DISCONTINUED | OUTPATIENT
Start: 2025-06-17 | End: 2025-06-17

## 2025-06-17 RX ORDER — SILVER SULFADIAZINE 10 G/1000G
CREAM TOPICAL DAILY
Status: DISCONTINUED | OUTPATIENT
Start: 2025-06-17 | End: 2025-06-17 | Stop reason: HOSPADM

## 2025-06-17 RX ORDER — LIDOCAINE HYDROCHLORIDE 10 MG/ML
INJECTION, SOLUTION INTRAVENOUS
Status: DISCONTINUED | OUTPATIENT
Start: 2025-06-17 | End: 2025-06-17

## 2025-06-17 RX ADMIN — PROPOFOL 50 MG: 10 INJECTION, EMULSION INTRAVENOUS at 09:06

## 2025-06-17 RX ADMIN — SODIUM CHLORIDE: 9 INJECTION, SOLUTION INTRAVENOUS at 09:06

## 2025-06-17 RX ADMIN — LIDOCAINE HYDROCHLORIDE 80 MG: 10 INJECTION, SOLUTION INTRAVENOUS at 09:06

## 2025-06-17 RX ADMIN — PROPOFOL 20 MG: 10 INJECTION, EMULSION INTRAVENOUS at 09:06

## 2025-06-17 NOTE — H&P
[facility]  Cardiology  DCCV History & Physical      Pavel Graff  YOB: 1950  Medical Record Number:  [id number]  Attending Physician:  Romaine Howard MD   Date of Admission: 6/17/2025       Hospital Day:  0  Current Principal Problem:  <principal problem not specified>    Patient information was obtained from patient and past medical records.  History     Cc: Atrial flutter    HPI  Last OV with Dr. Howard on 05/13/25.   75 year old male with history of HTN, TIA, SIMON, HLD and persistent AF since July 2012.  DCCV was attempted with early recurrence of AF.  He was rate controlled and did not note significant symptoms at the time - so a rate control strategy was pursued.  He notes though, that ever since he has been in AF - he has been much more easily fatigued and tired, not himself.  He denies overt palpitation or SOB.  He denies chest pain.     Mr. Graff underwent a PVI (07/21/16) with successful pulmonary vein antral isolation. Since the procedure, Mr. Graff reports feeling well with only two 3-second episodes of palpitations; he denies further recurrence.  denies chest pain, SOB/BLANCO, dizziness, or syncope. Mr. Graff has noted improvement in his energy level since the procedure.      12/16: Continues improved symptoms. 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 Remains on pradaxa and metoprolol.      6/17: No recurrence of AF since PVI. Feels well. No active complaints.      2/18: AF recurrence without symptoms. Remains on apixaban. The patient denies interval chest pain, sob, palpitations, syncope, near syncope.      8/18: Stable symptoms with slightly more fatigue. He asks about repeat ablation at the end of the season.      11/18: Re-do PVI 9/28/18 with touch up PVI, posterior wall isolation and CTI line. Feels some what fatigued, no post procedure complications.      5/19: No recurrence since PVI 9/18. Symptoms improved, stable.       3/22:Onset of easy bleeding and bruising. He has had events with spontaneous sub-conjunctival bleeding that are increasing in frequency and associated with pain. Remains in NSR.     9/22: 5/9/22 LAAO with cessation of OAC 6/20/22. Now on DAPT. No recurrence     3/23: No recurrence, now off plavix.      5/24: No recurrence     Interval history: He had a colon prep last week (early May 2025) and was in AF when he arrived for his scope. He underwent the scope and had polyps removed       Today, in good spirits. He denies any active cardiac complaints. Accompanied by his wife.     Rate/rhythm control: metoprolol 25 mg qd   Anticoagulant/antiplatelets: ASA 81 mg qd   ECG: Atrial flutter at 70 bpm   Platelet count: 263  INR: 1.0    History of stroke:  no  Dysphagia or odynophagia:  no  Liver Disease, esophageal disease, or known varices:  no  Upper GI Bleeding:  no  Snoring:  no   Sleep Apnea:  no  Prior neck surgery or radiation:  no  History of anesthetic difficulties:  no  Family history of anesthetic difficulties:  no  Last oral intake: last pm   Able to move neck in all directions:  yes  GLP-1 Use: no      Medications - Outpatient  Prior to Admission medications    Medication Sig Start Date End Date Taking? Authorizing Provider   ascorbic acid (VITAMIN C ORAL) Take by mouth Daily.   Yes Provider, Historical   aspirin 81 MG Chew Take 81 mg by mouth once daily.   Yes Provider, Historical   fish oil-omega-3 fatty acids 300-1,000 mg capsule Take 1 g by mouth once daily.   Yes Provider, Historical   hydroCHLOROthiazide (HYDRODIURIL) 25 MG tablet TAKE 1 TABLET BY MOUTH EVERY DAY 3/30/25  Yes Joe Peterson MD   losartan (COZAAR) 50 MG tablet Take 1 tablet (50 mg total) by mouth once daily. 6/9/25  Yes Joe Peterson MD   metoprolol succinate (TOPROL-XL) 25 MG 24 hr tablet Take 1 tablet (25 mg total) by mouth once daily. 4/30/25  Yes Joe Peterson MD   prednisoLONE acetate (PRED FORTE) 1 % DrpS Place 1 drop  into both eyes 2 (two) times daily. 4/4/25  Yes Monse Nunez DNP   timolol maleate 0.5% (TIMOPTIC) 0.5 % Drop Place 1 drop into both eyes 2 (two) times daily. 4/4/25  Yes Monse Nunez DNP   acetaminophen (TYLENOL) 650 MG TbSR Take 1 tablet (650 mg total) by mouth every 8 (eight) hours. 5/29/24   Thais Wilder NP   amoxicillin (AMOXIL) 500 MG capsule TAKE 4 CAPSULES BY MOUTH ONE HOUR BEFORE APPOINTMENT  Patient not taking: Reported on 5/13/2025 4/17/25   Provider, Historical   cefadroxil (DURICEF) 500 MG Cap Take 1 capsule (500 mg total) by mouth every 12 (twelve) hours.  Patient not taking: Reported on 5/13/2025 5/29/24   Thais Wilder NP   celecoxib (CELEBREX) 200 MG capsule Take 1 capsule (200 mg total) by mouth once daily.  Patient not taking: Reported on 5/13/2025 5/29/24   Thais Wilder NP   diclofenac sodium (VOLTAREN ARTHRITIS PAIN) 1 % Gel Apply 2 g topically once daily.  Patient not taking: Reported on 5/13/2025    Provider, Historical   GAVILYTE-G 236-22.74-6.74 -5.86 gram suspension TAKE 4,000 MLS (4 L TOTAL) BY MOUTH ONCE. TAKE AS INSTRUCTED BY ENDOSCOPY NURSE  FOR 1 DOSE  Patient not taking: Reported on 5/13/2025 4/11/25   Provider, Historical   ketoconazole (NIZORAL) 2 % shampoo Wash hair with medicated shampoo at least 2x/week - let sit on scalp at least 5 minutes prior to rinsing 6/6/23   Nicolette Segura MD   pantoprazole (PROTONIX) 40 MG tablet Take 1 tablet (40 mg total) by mouth once daily.  Patient not taking: Reported on 5/13/2025 5/29/24 8/27/24  Thais Wilder NP   pravastatin (PRAVACHOL) 20 MG tablet TAKE 1 TABLET BY MOUTH EVERY DAY  Patient not taking: Reported on 5/13/2025 5/20/23   Joe Peterson MD   prednisoLONE acetate (PRED FORTE) 1 % DrpS Place 1 drop into the left eye 2 (two) times daily. 4/25/25   Chantell Jay MD   senna-docusate 8.6-50 mg (SENNA WITH DOCUSATE SODIUM) 8.6-50 mg per tablet Take 1 tablet by mouth once daily.  Patient not  taking: Reported on 5/13/2025 5/29/24   Thais Wilder NP       Medications - Current  Scheduled Meds:  Continuous Infusions:  PRN Meds:.      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    BLANCO (dyspnea on exertion) 05/20/2016    Fatigue 05/20/2016    GERD (gastroesophageal reflux disease)     Hard of hearing 05/20/2024    History of TIA (transient ischemic attack)     Hyperlipidemia     Hypertension     Inguinal hernia recurrent unilateral 06/25/2013    On amiodarone therapy 09/21/2016    Sleep apnea     Squamous cell carcinoma of skin     Status post catheter ablation of atrial fibrillation 09/21/2016    Stroke     TIA    TIA (transient ischemic attack)     Trouble in sleeping        Past Surgical History  Past Surgical History:   Procedure Laterality Date    ABLATION N/A 9/28/2018    Procedure: ABLATION;  Surgeon: Romaine Howard MD;  Location: [facility] CATH LAB;  Service: Cardiology;  Laterality: N/A;  AF, KAM, PVI, RFA, MAME, Gen, MB, 3 Prep    ARTHROPLASTY OF HIP BY ANTERIOR APPROACH Left 6/3/2024    Procedure: ARTHROPLASTY, HIP, TOTAL, ANTERIOR APPROACH: LEFT: DEPUY - ACTIS + PINNACLE;  Surgeon: Christiano Gallo III, MD;  Location: [facility] OR;  Service: Orthopedics;  Laterality: Left;    CATARACT EXTRACTION W/  INTRAOCULAR LENS IMPLANT Left 1/11/10    CATARACT EXTRACTION W/  INTRAOCULAR LENS IMPLANT Right 12/14/2017    DMEK/ Dr. Jay    COLONOSCOPY N/A 3/26/2019    Procedure: COLONOSCOPY;  Surgeon: Mauro Dye MD;  Location: [facility] ENDO [address]);  Service: Endoscopy;  Laterality: N/A;  ok to hold Eliquis x 2 days per Dr. Howard-MS    COLONOSCOPY, SCREENING, HIGH RISK PATIENT N/A 5/1/2025    Procedure: COLONOSCOPY, SCREENING, HIGH RISK PATIENT;  Surgeon: Hardik  MD Mauro;  Location: [facility] ENDO (4TH FLR);  Service: Endoscopy;  Laterality: N/A;  ref by Molly Mclaughlin NP, PEG, portal -ml  4/24 precall attempted/LVM/mleone  4/24 precall complete/mleone    CORNEAL TRANSPLANT Left 1997    PKP OS    CORNEAL TRANSPLANT Left 03/23/2017    CORNEAL TRANSPLANT Right 12/14/2017    DMEK/ [facility]; Dr. Jay    HERNIA REPAIR      HIP ARTHROPLASTY      OCCLUSION OF LEFT ATRIAL APPENDAGE N/A 5/9/2022    Procedure: Left atrial appendage occlusion;  Surgeon: Romaine Howard MD;  Location: [facility] EP LAB;  Service: Cardiology;  Laterality: N/A;  afib, watchman, BSCI, erasmo, anes, MB/DM, 3prep    ROTATOR CUFF REPAIR      TRANSESOPHAGEAL ECHOCARDIOGRAPHY N/A 5/9/2022    Procedure: ECHOCARDIOGRAM, TRANSESOPHAGEAL;  Surgeon: Fanta Dacosta MD;  Location: [facility] EP LAB;  Service: Cardiology;  Laterality: N/A;       Social History  Social History     Socioeconomic History    Marital status:     Number of children: 3   Occupational History     Employer: [employer]   Tobacco Use    Smoking status: Never     Passive exposure: Never    Smokeless tobacco: Never   Substance and Sexual Activity    Alcohol use: No    Drug use: No    Sexual activity: Yes     Partners: Female     Social Drivers of Health     Financial Resource Strain: Low Risk  (4/9/2025)    Overall Financial Resource Strain (CARDIA)     Difficulty of Paying Living Expenses: Not hard at all   Food Insecurity: No Food Insecurity (4/9/2025)    Hunger Vital Sign     Worried About Running Out of Food in the Last Year: Never true     Ran Out of Food in the Last Year: Never true   Transportation Needs: No Transportation Needs (4/9/2025)    PRAPARE - Transportation     Lack of Transportation (Medical): No     Lack of Transportation (Non-Medical): No   Physical Activity: Inactive (4/9/2025)    Exercise Vital Sign     Days of Exercise per Week: 0 days     Minutes of Exercise per Session: 0 min   Stress: No Stress Concern  Present (4/9/2025)    [facility] of Occupational [facility] - Occupational Stress Questionnaire     Feeling of Stress : Only a little   Housing Stability: Low Risk  (4/9/2025)    Housing Stability Vital Sign     Unable to Pay for Housing in the Last Year: No     Number of Times Moved in the Last Year: 1     Homeless in the Last Year: No       ROS  Review of Systems   Constitutional: Negative for chills.   HENT: Negative.     Eyes: Negative.    Cardiovascular:  Negative for chest pain, dyspnea on exertion and palpitations.   Respiratory: Negative.  Negative for shortness of breath and sleep disturbances due to breathing.    Endocrine: Negative.    Musculoskeletal: Negative.    Gastrointestinal:  Negative for hematemesis, melena, nausea and vomiting.   Genitourinary: Negative.    Neurological: Negative.    Psychiatric/Behavioral: Negative.  Negative for altered mental status.    Allergic/Immunologic: Negative.    Physical Examination     Vital Signs  24 Hour VS Range    Temp:  [98.1 °F (36.7 °C)]   Pulse:  [69]   Resp:  [16]   BP: (144-156)/(76-82)   SpO2:  [96 %]   No intake or output data in the 24 hours ending 06/17/25 0830      Physical Exam:   Physical Exam  Constitutional:       General: He is not in acute distress.     Appearance: Normal appearance. He is obese. He is not ill-appearing or toxic-appearing.   HENT:      Head: Normocephalic and atraumatic.      Nose: Nose normal. No congestion or rhinorrhea.      Mouth/Throat:      Mouth: Mucous membranes are moist.      Pharynx: Oropharynx is clear. No oropharyngeal exudate or posterior oropharyngeal erythema.   Eyes:      General:         Right eye: No discharge.         Left eye: No discharge.      Extraocular Movements: Extraocular movements intact.      Conjunctiva/sclera: Conjunctivae normal.   Cardiovascular:      Rate and Rhythm: Normal rate. Rhythm irregularly irregular.   Pulmonary:      Effort: Pulmonary effort is normal. No respiratory distress.      " Breath sounds: Normal breath sounds. No wheezing or rales.   Musculoskeletal:         General: No swelling. Normal range of motion.      Cervical back: Normal range of motion. No rigidity or tenderness.      Right lower leg: No edema.      Left lower leg: No edema.   Skin:     General: Skin is warm and dry.      Coloration: Skin is not jaundiced.      Findings: No bruising or lesion.   Neurological:      General: No focal deficit present.      Mental Status: He is alert and oriented to person, place, and time.   Psychiatric:         Mood and Affect: Mood normal.         Behavior: Behavior normal.         Thought Content: Thought content normal.         Data       Recent Labs   Lab 06/11/25  0906   WBC 6.55   HGB 15.3   HCT 44.6           Recent Labs   Lab 06/11/25  0906   PROTIME 11.2   INR 1.0        Recent Labs   Lab 06/11/25  0906      K 3.9      CO2 26   BUN 13   CREATININE 0.9   ANIONGAP 8   CALCIUM 9.5        No results for input(s): "PROT", "ALBUMIN", "BILITOT", "ALKPHOS", "AST", "ALT" in the last 168 hours.     No results for input(s): "TROPONINI" in the last 168 hours.     BNP (pg/mL)   Date Value   03/22/2016 198 (H)   07/03/2012 196 (H)       No results for input(s): "LABBLOO" in the last 168 hours.     Assessment & Plan     #Atrial flutter   - patient presents for DCCV, no KAM due to history LAAO device placement       Last KAM 06/20/22  KAM done for 6 week evaluation s/p 27mm Watchman FLX implantation.  The Watchman device is well-seated with no device-related thrombus. There is a very small (2mm) nguyen-device leak.  The left ventricle is normal in size with normal systolic function. The estimated ejection fraction is 60%.  Mild aortic regurgitation.  Normal right ventricular size with normal right ventricular systolic function.  Mild tricuspid regurgitation.  Grade 3 plaque present in the descending aorta.  The sinuses of Valsalva is mildly dilated.    KAM 05/09/22  The left " ventricle is normal in size with normal systolic function. The estimated ejection fraction is 65%.  Normal right ventricular size with normal right ventricular systolic function.  Normal appearing left atrial appendage. No thrombus is present in the appendage. Normal appendage velocities.  The sinuses of Valsalva is mildly dilated.  Grade 1 plaque present in the descending aorta.  Small anterolateral pericardial effusion noted prior to procedure, unchanged post procedure.  S/p successfull 27MM WATCHMAN FLX CLSR device without any peridevice leak and 26% compression.  Small iatrogenic post procedure ASD noted.  Mild aortic regurgitation.      -No absolute contraindications of esophageal stricture, tumor, perforation, laceration,or diverticulum and/or active GI bleed.  -The risks, benefits & alternatives of the procedure were explained to the patient.   -The risks of transesophageal echo include but are not limited to:  Dental trauma, esophageal trauma/perforation, bleeding, laryngospasm/brochospasm, aspiration, sore throat/hoarseness, & dislodgement of the endotracheal tube/nasogastric tube (where applicable).    -The risks of moderate sedation include hypotension, respiratory depression, arrhythmias, bronchospasm, & death.    -Prior to procedure, extensive discussion with patient regarding risks and benefits of DCCV at bedside today. The patient voices understanding, all questions have been answered, and patient would like to proceed.  -Informed consent was obtained. The patient is agreeable to proceed with the procedure and all questions and concerns addressed.    Case was discussed with an attending physician prior to procedure.    Kiana Trujillo PA-C  [facility]

## 2025-06-17 NOTE — DISCHARGE SUMMARY
[facility]  Cardiology  Discharge Summary      Patient Name: Pavel Graff  MRN: [id number]  Admission Date: 6/17/2025  Hospital Length of Stay: 0 days  Discharge Date and Time: 06/17/2025 10:17 AM  Attending Physician: Romaine Howard MD    Discharging Provider: Kiana Trujillo PA-C  Primary Care Physician: Joe Peterson MD    HPI:   Last OV with Dr. Howard on 05/13/25.   75 year old male with history of HTN, TIA, SIMON, HLD and persistent AF since July 2012.  DCCV was attempted with early recurrence of AF.  He was rate controlled and did not note significant symptoms at the time - so a rate control strategy was pursued.  He notes though, that ever since he has been in AF - he has been much more easily fatigued and tired, not himself.  He denies overt palpitation or SOB.  He denies chest pain.     Mr. Graff underwent a PVI (07/21/16) with successful pulmonary vein antral isolation. Since the procedure, Mr. Graff reports feeling well with only two 3-second episodes of palpitations; he denies further recurrence.  denies chest pain, SOB/BLANCO, dizziness, or syncope. Mr. Graff has noted improvement in his energy level since the procedure.      12/16: Continues improved symptoms. 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 Remains on pradaxa and metoprolol.      6/17: No recurrence of AF since PVI. Feels well. No active complaints.      2/18: AF recurrence without symptoms. Remains on apixaban. The patient denies interval chest pain, sob, palpitations, syncope, near syncope.      8/18: Stable symptoms with slightly more fatigue. He asks about repeat ablation at the end of the season.      11/18: Re-do PVI 9/28/18 with touch up PVI, posterior wall isolation and CTI line. Feels some what fatigued, no post procedure complications.      5/19: No recurrence since PVI 9/18. Symptoms improved, stable.      3/22:Onset of easy bleeding and bruising. He has had events with spontaneous  sub-conjunctival bleeding that are increasing in frequency and associated with pain. Remains in NSR.     9/22: 5/9/22 LAAO with cessation of OAC 6/20/22. Now on DAPT. No recurrence     3/23: No recurrence, now off plavix.      5/24: No recurrence     Interval history: He had a colon prep last week (early May 2025) and was in AF when he arrived for his scope. He underwent the scope and had polyps removed         Today, in good spirits. He denies any active cardiac complaints. Accompanied by his wife.     Procedure(s) (LRB):  Cardioversion or Defibrillation (N/A)     Indwelling Lines/Drains at time of discharge:  Lines/Drains/Airways       None     Hospital Course:  Patient underwent KAM without evidence of KIRSTEN thrombus. Proceeded with DCCV, converting from atrial flutter to sinus rhythm. Patient tolerated the procedure without any acute complications. Post-DCCV ECG revealed sinus rhythm at 64 bpm. Plan to continue all home medications including ASA 81 mg qd, metoprolol 25 mg qd; start Eliquis 5 mg BID x 1 month. Instructed to return in 1 week for follow up ECG and in 4 weeks for clinic follow up with Dr. Howard or Zoey Newman, L[facility].   Patient was assessed at bedside prior to discharge, they reported feeling well and denied chest discomfort, shortness of breath, palpitations, lightheadedness, or any other acute symptoms. Discharge instructions were discussed with patient and all questions were answered. Patient was discharged home in stable condition.       Goals of Care Treatment Preferences:  Code Status: Full Code    Living Will: Yes          Significant Diagnostic Studies: Cardiac Graphics: ECG: Sinus rhythm with 1st degree A-V block at 64 bpm     Pending Diagnostic Studies:       None            There are no hospital problems to display for this patient.    No new Assessment & Plan notes have been filed under this hospital service since the last note was generated.  Service: Cardiology      Discharged  Condition: stable    Disposition: Home or Self Care    Follow Up:   Follow-up Information       Romaine Howard MD. Schedule an appointment as soon as possible for a visit in 1 month(s).    Specialties: Electrophysiology, Cardiovascular Disease, Cardiology  Contact information:  [address]  [facility] [id number] [phone]               Zoey Newman NP. Schedule an appointment as soon as possible for a visit in 1 month(s).    Specialty: Cardiology  Contact information:  [address]  [facility] [id number] [phone]                           Patient Instructions:   No discharge procedures on file.  Medications:  Reconciled Home Medications:      Medication List        START taking these medications      apixaban 5 mg Tab  Commonly known as: ELIQUIS  Take 1 tablet (5 mg total) by mouth 2 (two) times daily.            CONTINUE taking these medications      acetaminophen 650 MG Tbsr  Commonly known as: TYLENOL  Take 1 tablet (650 mg total) by mouth every 8 (eight) hours.     aspirin 81 MG Chew  Take 81 mg by mouth once daily.     fish oil-omega-3 fatty acids 300-1,000 mg capsule  Take 1 g by mouth once daily.     hydroCHLOROthiazide 25 MG tablet  Commonly known as: HYDRODIURIL  TAKE 1 TABLET BY MOUTH EVERY DAY     ketoconazole 2 % shampoo  Commonly known as: NIZORAL  Wash hair with medicated shampoo at least 2x/week - let sit on scalp at least 5 minutes prior to rinsing     losartan 50 MG tablet  Commonly known as: COZAAR  Take 1 tablet (50 mg total) by mouth once daily.     metoprolol succinate 25 MG 24 hr tablet  Commonly known as: TOPROL-XL  Take 1 tablet (25 mg total) by mouth once daily.     pantoprazole 40 MG tablet  Commonly known as: PROTONIX  Take 1 tablet (40 mg total) by mouth once daily.     * prednisoLONE acetate 1 % Drps  Commonly known as: PRED FORTE  Place 1 drop into both eyes 2 (two) times daily.     * prednisoLONE acetate 1 % Drps  Commonly known as: PRED FORTE  Place  1 drop into the left eye 2 (two) times daily.     timolol maleate 0.5% 0.5 % Drop  Commonly known as: TIMOPTIC  Place 1 drop into both eyes 2 (two) times daily.     VITAMIN C ORAL  Take by mouth Daily.           * This list has 2 medication(s) that are the same as other medications prescribed for you. Read the directions carefully, and ask your doctor or other care provider to review them with you.                ASK your doctor about these medications      amoxicillin 500 MG capsule  Commonly known as: AMOXIL  TAKE 4 CAPSULES BY MOUTH ONE HOUR BEFORE APPOINTMENT     cefadroxil 500 MG Cap  Commonly known as: DURICEF  Take 1 capsule (500 mg total) by mouth every 12 (twelve) hours.     celecoxib 200 MG capsule  Commonly known as: CeleBREX  Take 1 capsule (200 mg total) by mouth once daily.     GAVILYTE-G 236-22.74-6.74 -5.86 gram suspension  Generic drug: polyethylene glycol  TAKE 4,000 MLS (4 L TOTAL) BY MOUTH ONCE. TAKE AS INSTRUCTED BY ENDOSCOPY NURSE  FOR 1 DOSE     pravastatin 20 MG tablet  Commonly known as: PRAVACHOL  TAKE 1 TABLET BY MOUTH EVERY DAY     STOOL SOFTENER-LAXATIVE 8.6-50 mg per tablet  Generic drug: senna-docusate  Take 1 tablet by mouth once daily.     VOLTAREN ARTHRITIS PAIN 1 % Gel  Generic drug: diclofenac sodium  Apply 2 g topically once daily.              Time spent on the discharge of patient: 35 minutes    Kiana Trujillo PA-C  Cardiology  [facility]

## 2025-06-17 NOTE — NURSING
ISI Trujillo came to bedside to review plan of care with pt and pt's wife. Pt and pt's wife given discharge paperwork and education reiterated. All questions and concerns addressed. Pt able to drink, walk, and use restroom without issues. Iv and tele removed. Transporter pushed pt out at time of d/c. Pt verbalized understanding to  new medication at outpatient pharmacy. Pt refused silvadene.

## 2025-06-17 NOTE — NURSING
Pt brought to bay [facility] in Cath Lab recovery post procedure to finish out recovery. Pt is AAOx4 and follows commands appropriately. Pt's vs wnl and pt is free from s/s of pain/distress. Pt's wife is called to the bedside and pt verbalized understanding of restrictions. Tele monitoring in place and bed is locked and in lowest position. Safety measures in place.

## 2025-06-17 NOTE — HOSPITAL COURSE
Patient underwent KAM without evidence of KIRSTEN thrombus. Proceeded with DCCV, converting from atrial flutter to sinus rhythm. Patient tolerated the procedure without any acute complications. Post-DCCV ECG revealed sinus rhythm at 64 bpm. Plan to continue all home medications including ASA 81 mg qd, metoprolol 25 mg qd; start Eliquis 5 mg BID x 1 month. Instructed to return in 1 week for follow up ECG and in 4 weeks for clinic follow up with Dr. Howard or Zoey Newman NP.   Patient was assessed at bedside prior to discharge, they reported feeling well and denied chest discomfort, shortness of breath, palpitations, lightheadedness, or any other acute symptoms. Discharge instructions were discussed with patient and all questions were answered. Patient was discharged home in stable condition.

## 2025-06-17 NOTE — ANESTHESIA PREPROCEDURE EVALUATION
06/17/2025  [facility]  Anesthesia Pre-Operative Evaluation         Patient Name: Pavel Graff  YOB: 1950  MRN: [id number]    SUBJECTIVE:     Pre-operative evaluation for Procedure(s) (LRB):  Cardioversion or Defibrillation (N/A)     06/17/2025    Pavel Graff is a 75 y.o. male w/ a pertinent PMHx of pAF s/p Watchman here for DCCV.      Full medical history listed below      LDA: None documented.    Prev airway: None documented.     GTTs: None documented.        Problem List[1]    Review of patient's allergies indicates:  No Known Allergies    Current Inpatient Medications:      Medications Ordered Prior to Encounter[2]    Past Surgical History:   Procedure Laterality Date    ABLATION N/A 9/28/2018    Procedure: ABLATION;  Surgeon: Romaine Howard MD;  Location: [facility] CATH LAB;  Service: Cardiology;  Laterality: N/A;  AF, KAM, PVI, RFA, MAME, Gen, MB, 3 Prep    ARTHROPLASTY OF HIP BY ANTERIOR APPROACH Left 6/3/2024    Procedure: ARTHROPLASTY, HIP, TOTAL, ANTERIOR APPROACH: LEFT: DEPUY - ACTIS + PINNACLE;  Surgeon: Christiano Gallo III, MD;  Location: [facility] OR;  Service: Orthopedics;  Laterality: Left;    CATARACT EXTRACTION W/  INTRAOCULAR LENS IMPLANT Left 1/11/10    CATARACT EXTRACTION W/  INTRAOCULAR LENS IMPLANT Right 12/14/2017    DMEK/ Dr. Jay    COLONOSCOPY N/A 3/26/2019    Procedure: COLONOSCOPY;  Surgeon: Mauro Dye MD;  Location: [address]);  Service: Endoscopy;  Laterality: N/A;  ok to hold Eliquis x 2 days per Dr. Howard-MS    COLONOSCOPY, SCREENING, HIGH RISK PATIENT N/A 5/1/2025    Procedure: COLONOSCOPY, SCREENING, HIGH RISK PATIENT;  Surgeon: Mauro Dye MD;  Location: [facility] (4TH FLR);  Service: Endoscopy;  Laterality: N/A;  ref by Molly Mclaughlin NP, PEG, portal -ml  4/24 precall attempted/LVM/mleone  4/24 precall complete/mleone     "CORNEAL TRANSPLANT Left 1997    PKP OS    CORNEAL TRANSPLANT Left 03/23/2017    CORNEAL TRANSPLANT Right 12/14/2017    DMEK/ [facility]; Dr. Jay    HERNIA REPAIR      HIP ARTHROPLASTY      OCCLUSION OF LEFT ATRIAL APPENDAGE N/A 5/9/2022    Procedure: Left atrial appendage occlusion;  Surgeon: Romaine Howard MD;  Location: [facility] EP LAB;  Service: Cardiology;  Laterality: N/A;  afib, watchman, BSCI, erasmo, anes, MB/DM, 3prep    ROTATOR CUFF REPAIR      TRANSESOPHAGEAL ECHOCARDIOGRAPHY N/A 5/9/2022    Procedure: ECHOCARDIOGRAM, TRANSESOPHAGEAL;  Surgeon: Fanta Dacosta MD;  Location: [facility] EP LAB;  Service: Cardiology;  Laterality: N/A;       Social History[3]    OBJECTIVE:     Vital Signs Range (Last 24H):  Temp:  [36.7 °C (98.1 °F)]   Pulse:  [69]   Resp:  [16]   BP: (144-156)/(76-82)   SpO2:  [96 %]       CBC:   No results for input(s): "WBC", "RBC", "HGB", "HCT", "PLT", "MCV", "MCH", "MCHC" in the last 72 hours.    CMP: No results for input(s): "NA", "K", "CL", "CO2", "BUN", "CREATININE", "GLU", "MG", "PHOS", "CALCIUM", "ALBUMIN", "PROT", "ALKPHOS", "ALT", "AST", "BILITOT" in the last 72 hours.    INR:  No results for input(s): "PT", "INR", "PROTIME", "APTT" in the last 72 hours.    Diagnostic Studies: No relevant studies.    EKG:   Results for orders placed or performed during the hospital encounter of 05/20/24   EKG 12-lead    Collection Time: 05/20/24  7:52 AM   Result Value Ref Range    QRS Duration 92 ms    [facility] QTC Calculation 430 ms    Narrative    Test Reason : Z01.818,    Vent. Rate : 071 BPM     Atrial Rate : 071 BPM     P-R Int : 190 ms          QRS Dur : 092 ms      QT Int : 396 ms       P-R-T Axes : 071 -25 022 degrees     QTc Int : 430 ms    Normal sinus rhythm  Normal ECG  When compared with ECG of 07-MAY-2024 08:47,  No significant change was found  Confirmed by Juan Jose Muhammad MD ([id number]) on 5/20/2024 12:15:45 PM    Referred By: MAX DARDEN           Confirmed By:Juan Jose Muhammad MD        2D ECHO:   No " results found for this or any previous visit.         ASSESSMENT/PLAN:           Pre-op Assessment    I have reviewed the Patient Summary Reports.     I have reviewed the Nursing Notes. I have reviewed the NPO Status.   I have reviewed the Medications.     Review of Systems  Anesthesia Hx:   History of prior surgery of interest to airway management or planning:          Denies Family Hx of Anesthesia complications.    Denies Personal Hx of Anesthesia complications.                        Physical Exam  General: Well nourished, Cooperative, Alert and Oriented    Airway:  Mallampati: II   Mouth Opening: Normal  TM Distance: Normal  Tongue: Normal  Neck ROM: Normal ROM    Dental:  Intact    Chest/Lungs:  Clear to auscultation, Normal Respiratory Rate    Heart:  Rate: Normal  Rhythm: Regular Rhythm        Anesthesia Plan  Type of Anesthesia, risks & benefits discussed:    Anesthesia Type: Gen Natural Airway  Intra-op Monitoring Plan: Standard ASA Monitors  Post Op Pain Control Plan: multimodal analgesia and IV/PO Opioids PRN  Induction:  IV  Airway Plan: Video  Informed Consent: Informed consent signed with the Patient and all parties understand the risks and agree with anesthesia plan.  All questions answered.   ASA Score: 3  Day of Surgery Review of History & Physical: H&P Update referred to the surgeon/provider.    Ready For Surgery From Anesthesia Perspective.     .           [1]   Patient Active Problem List  Diagnosis    Hypertension    Hyperlipidemia    Transient cerebral ischemia    Benign prostatic hyperplasia    History of colonic polyps    Fuchs' corneal dystrophy    Keratoconus, unspecified    Obstructive sleep apnea    NSVT (nonsustained ventricular tachycardia)    Persistent atrial fibrillation    Fuchs' endothelial dystrophy    Nuclear cataract    Presence of Watchman left atrial appendage closure device    Primary osteoarthritis of left hip    History of total hip arthroplasty, right    Hyponatremia    [2]   Current Facility-Administered Medications on File Prior to Encounter   Medication Dose Route Frequency Provider Last Rate Last Admin    0.9%  NaCl infusion   Intravenous Continuous Zoey Newman NP   Stopped at 09/28/18 1204     Current Outpatient Medications on File Prior to Encounter   Medication Sig Dispense Refill    ascorbic acid (VITAMIN C ORAL) Take by mouth Daily.      aspirin 81 MG Chew Take 81 mg by mouth once daily.      fish oil-omega-3 fatty acids 300-1,000 mg capsule Take 1 g by mouth once daily.      hydroCHLOROthiazide (HYDRODIURIL) 25 MG tablet TAKE 1 TABLET BY MOUTH EVERY DAY 90 tablet 3    losartan (COZAAR) 50 MG tablet Take 1 tablet (50 mg total) by mouth once daily. 90 tablet 0    metoprolol succinate (TOPROL-XL) 25 MG 24 hr tablet Take 1 tablet (25 mg total) by mouth once daily. 90 tablet 1    prednisoLONE acetate (PRED FORTE) 1 % DrpS Place 1 drop into both eyes 2 (two) times daily. 5 mL 3    timolol maleate 0.5% (TIMOPTIC) 0.5 % Drop Place 1 drop into both eyes 2 (two) times daily. 15 mL 2    acetaminophen (TYLENOL) 650 MG TbSR Take 1 tablet (650 mg total) by mouth every 8 (eight) hours. 120 tablet 0    amoxicillin (AMOXIL) 500 MG capsule TAKE 4 CAPSULES BY MOUTH ONE HOUR BEFORE APPOINTMENT (Patient not taking: Reported on 5/13/2025)      cefadroxil (DURICEF) 500 MG Cap Take 1 capsule (500 mg total) by mouth every 12 (twelve) hours. (Patient not taking: Reported on 5/13/2025) 14 capsule 0    celecoxib (CELEBREX) 200 MG capsule Take 1 capsule (200 mg total) by mouth once daily. (Patient not taking: Reported on 5/13/2025) 30 capsule 0    diclofenac sodium (VOLTAREN ARTHRITIS PAIN) 1 % Gel Apply 2 g topically once daily. (Patient not taking: Reported on 5/13/2025)      GAVILYTE-G 236-22.74-6.74 -5.86 gram suspension TAKE 4,000 MLS (4 L TOTAL) BY MOUTH ONCE. TAKE AS INSTRUCTED BY ENDOSCOPY NURSE  FOR 1 DOSE (Patient not taking: Reported on 5/13/2025)      ketoconazole  (NIZORAL) 2 % shampoo Wash hair with medicated shampoo at least 2x/week - let sit on scalp at least 5 minutes prior to rinsing 120 mL 5    pantoprazole (PROTONIX) 40 MG tablet Take 1 tablet (40 mg total) by mouth once daily. (Patient not taking: Reported on 5/13/2025) 30 tablet 0    pravastatin (PRAVACHOL) 20 MG tablet TAKE 1 TABLET BY MOUTH EVERY DAY (Patient not taking: Reported on 5/13/2025) 90 tablet 3    prednisoLONE acetate (PRED FORTE) 1 % DrpS Place 1 drop into the left eye 2 (two) times daily. 10 mL 4    senna-docusate 8.6-50 mg (SENNA WITH DOCUSATE SODIUM) 8.6-50 mg per tablet Take 1 tablet by mouth once daily. (Patient not taking: Reported on 5/13/2025) 30 tablet 0   [3]   Social History  Socioeconomic History    Marital status:     Number of children: 3   Occupational History     Employer: [employer]   Tobacco Use    Smoking status: Never     Passive exposure: Never    Smokeless tobacco: Never   Substance and Sexual Activity    Alcohol use: No    Drug use: No    Sexual activity: Yes     Partners: Female     Social Drivers of Health     Financial Resource Strain: Low Risk  (4/9/2025)    Overall Financial Resource Strain (CARDIA)     Difficulty of Paying Living Expenses: Not hard at all   Food Insecurity: No Food Insecurity (4/9/2025)    Hunger Vital Sign     Worried About Running Out of Food in the Last Year: Never true     Ran Out of Food in the Last Year: Never true   Transportation Needs: No Transportation Needs (4/9/2025)    PRAPARE - Transportation     Lack of Transportation (Medical): No     Lack of Transportation (Non-Medical): No   Physical Activity: Inactive (4/9/2025)    Exercise Vital Sign     Days of Exercise per Week: 0 days     Minutes of Exercise per Session: 0 min   Stress: No Stress Concern Present (4/9/2025)    [employer] [employer] - Occupational Stress Questionnaire     Feeling of Stress : Only a little   Housing Stability: Low Risk   (4/9/2025)    Housing Stability Vital Sign     Unable to Pay for Housing in the Last Year: No     Number of Times Moved in the Last Year: 1     Homeless in the Last Year: No

## 2025-06-17 NOTE — TRANSFER OF CARE
Anesthesia Transfer of Care Note    Patient: Pavel Graff    Procedure(s) Performed: Procedure(s) (LRB):  Cardioversion or Defibrillation (N/A)    Patient location: PACU    Anesthesia Type: general    Transport from OR: Transported from OR on 2-3 L/min O2 by NC with adequate spontaneous ventilation    Post pain: adequate analgesia    Post assessment: no apparent anesthetic complications and tolerated procedure well    Post vital signs: stable    Level of consciousness: awake, alert and oriented    Nausea/Vomiting: no nausea/vomiting    Complications: none    Transfer of care protocol was followed    Last vitals: Visit Vitals  BP (!) 144/82 (BP Location: Left arm, Patient Position: Sitting)   Pulse 69   Temp 36.7 °C (98.1 °F) (Temporal)   Resp 16   Ht 6' (1.829 m)   Wt 111.1 kg (245 lb)   SpO2 96%   BMI 33.23 kg/m²

## 2025-06-17 NOTE — DISCHARGE INSTRUCTIONS
Medications:  -Continue to take your home medications as listed on your medication list after you are discharged.    New Medications:  -Eliquis 5 mg twice daily for one month     Diet  -You may resume oral intake after you are discharged, as long you have no swallowing difficulties.    Because you have received sedation for this procedure:  -Limit activity for the remainder of the day.  -Do not smoke for at least 6 hours and until you are fully awake and alert.  -Do not drink alcoholic beverage for 24 hours.  -Do not drive for 24 hours.  -Defer important decision making until the following day.     Go to the Emergency Department if you develop:   -Bleeding  -Weakness or numbness  -Visual, gait or speech disturbance  -New chest pain, palpitations, shortness of breath, rapid heart beat, or fainting  -Fever    Follow up:  -EKG in 1 week.  -Dr. Howard or Zoey Newman NP in 1 month. Call or message the office to schedule.    Any need to reschedule or cancel procedures, or any questions regarding your procedures should be addressed directly with the Arrhythmia Department Nurses at the following phone number: [phone].

## 2025-06-18 NOTE — ANESTHESIA POSTPROCEDURE EVALUATION
Anesthesia Post Evaluation    Patient: Pavel Graff    Procedure(s) Performed: Procedure(s) (LRB):  Cardioversion or Defibrillation (N/A)    Final Anesthesia Type: general      Patient location during evaluation: PACU  Patient participation: Yes- Able to Participate  Level of consciousness: awake and alert and oriented  Post-procedure vital signs: reviewed and stable  Pain management: adequate  Airway patency: patent    PONV status at discharge: No PONV  Anesthetic complications: no      Cardiovascular status: blood pressure returned to baseline and hemodynamically stable  Respiratory status: unassisted  Hydration status: euvolemic  Follow-up not needed.              Vitals Value Taken Time   /85 06/17/25 10:17   Temp 36.4 °C (97.6 °F) 06/17/25 10:15   Pulse 86 06/17/25 10:19   Resp  06/18/25 13:52   SpO2 98 % 06/17/25 10:19   Vitals shown include unfiled device data.      No case tracking events are documented in the log.      Pain/Mark Score: Mark Score: 10 (6/17/2025 10:00 AM)

## 2025-06-24 ENCOUNTER — TELEPHONE (OUTPATIENT)
Dept: ELECTROPHYSIOLOGY | Facility: CLINIC | Age: 75
End: 2025-06-24
Payer: MEDICARE

## 2025-06-24 ENCOUNTER — HOSPITAL ENCOUNTER (OUTPATIENT)
Dept: CARDIOLOGY | Facility: CLINIC | Age: 75
Discharge: HOME | End: 2025-06-24
Payer: MEDICARE

## 2025-06-24 DIAGNOSIS — I48.19 PERSISTENT ATRIAL FIBRILLATION: ICD-10-CM

## 2025-06-24 LAB
OHS QRS DURATION: 94 MS
OHS QTC CALCULATION: 438 MS

## 2025-06-24 PROCEDURE — 93010 ELECTROCARDIOGRAM REPORT: CPT | Mod: HCNC,S$GLB,, | Performed by: INTERNAL MEDICINE

## 2025-06-24 NOTE — TELEPHONE ENCOUNTER
Reviewed post dccv ECG with Dr Howard, spoke with spouse over the phone to inform that Dr Howard reviewed the ECG and it showed NSR. Spouse reports that he does have some cornea redness that previously happened when he was on the blood thinner, has watchman now but was put on blood thinner for 30 days following the dccv, denies any active bleeding or pain, informed her that if it worsens or any active bleeding to notify us if he is unable to tolerate the eliquis for the 30 days

## 2025-07-11 DIAGNOSIS — Z96.641 HISTORY OF TOTAL HIP ARTHROPLASTY, RIGHT: ICD-10-CM

## 2025-07-11 DIAGNOSIS — Z96.642 S/P HIP REPLACEMENT, LEFT: Primary | ICD-10-CM

## 2025-07-24 ENCOUNTER — OFFICE VISIT (OUTPATIENT)
Dept: ORTHOPEDICS | Facility: CLINIC | Age: 75
End: 2025-07-24
Payer: MEDICARE

## 2025-07-24 ENCOUNTER — HOSPITAL ENCOUNTER (OUTPATIENT)
Dept: RADIOLOGY | Facility: HOSPITAL | Age: 75
Discharge: HOME | End: 2025-07-24
Attending: ORTHOPAEDIC SURGERY
Payer: MEDICARE

## 2025-07-24 VITALS — BODY MASS INDEX: 33.28 KG/M2 | HEIGHT: 72 IN | WEIGHT: 245.69 LBS

## 2025-07-24 DIAGNOSIS — Z96.642 S/P HIP REPLACEMENT, LEFT: Primary | ICD-10-CM

## 2025-07-24 DIAGNOSIS — Z96.642 S/P HIP REPLACEMENT, LEFT: ICD-10-CM

## 2025-07-24 DIAGNOSIS — Z96.641 HISTORY OF TOTAL HIP ARTHROPLASTY, RIGHT: ICD-10-CM

## 2025-07-24 PROCEDURE — 99213 OFFICE O/P EST LOW 20 MIN: CPT | Mod: HCNC,S$GLB,, | Performed by: ORTHOPAEDIC SURGERY

## 2025-07-24 PROCEDURE — 1157F ADVNC CARE PLAN IN RCRD: CPT | Mod: CPTII,HCNC,S$GLB, | Performed by: ORTHOPAEDIC SURGERY

## 2025-07-24 PROCEDURE — 73521 X-RAY EXAM HIPS BI 2 VIEWS: CPT | Mod: 26,HCNC,, | Performed by: RADIOLOGY

## 2025-07-24 PROCEDURE — 4010F ACE/ARB THERAPY RXD/TAKEN: CPT | Mod: CPTII,HCNC,S$GLB, | Performed by: ORTHOPAEDIC SURGERY

## 2025-07-24 PROCEDURE — 99999 PR PBB SHADOW E&M-EST. PATIENT-LVL III: CPT | Mod: PBBFAC,HCNC,, | Performed by: ORTHOPAEDIC SURGERY

## 2025-07-24 PROCEDURE — 73521 X-RAY EXAM HIPS BI 2 VIEWS: CPT | Mod: TC,HCNC

## 2025-07-24 PROCEDURE — 1159F MED LIST DOCD IN RCRD: CPT | Mod: CPTII,HCNC,S$GLB, | Performed by: ORTHOPAEDIC SURGERY

## 2025-07-24 NOTE — PROGRESS NOTES
Subjective:     HPI:   Pavel Graff is a 75 y.o. male who presents for annual exam s/p left KLAUS     Date of surgery:   L ant KLAUS hybrid 6/3/24  R KLAUS 2001     History of Present Illness    CHIEF COMPLAINT:  - Mr. Graff presents for follow-up s/p bilateral total hip arthroplasty.    HPI:  Mr. Graff presents for follow-up regarding bilateral hip replacements. His right hip replacement was performed 24 years ago, while his left hip replacement is more recent. Both hips are doing well with no pain or concerns. He denies any limitations due to the hip replacements during daily activities. He takes caution when ascending stairs. When lying down, his right side feels slightly different and is mildly more uncomfortable compared to the left side. He denies using any assistive devices such as a cane, walker, or crutches, and denies that the hip replacements are limiting his activities. He also denies taking any pain medication such as Advil, Aleve, or Tylenol.    PREVIOUS TREATMENTS:  - Bilateral hip replacements: Provided significant benefit, with the patient reporting no pain or concerns.    SURGICAL HISTORY:  - Right hip replacement: 24 years ago  - Left hip replacement: Date unspecified, more recent than right hip         Medications: none    Assistive Devices: none    Limitations: none    Both hips doing well, no pain/problems/concerns, forgotten hips  Knees ok     Objective:   Body mass index is 33.32 kg/m².  Exam:    Gait: limp/antalgic/trendelenburg none    Incision: healed    Active straight leg raise: good strength, no discomfort    Extension: 0    Flexion: 90    Abduction: 30    Adduction: 20    External rotation: 30    Internal rotation: 20    LLD: 0 cm supine     R KLAUS normal exam     Physical Exam    Musculoskeletal: Incisions well healed. Normal gait.  MSK: Spine - Hip: Normal straight leg raise on right. Normal range of motion on right. Normal range of motion on left.  IMAGING:  - XR Bilateral Hips:  "Right hip replacement shows no significant wear after 24 years. Left hip replacement appears to be in good condition.         Imaging:  Indication:  Exam status post left total hip arthroplasty  Exam Ordered: Radiographs taken today include an anteroposterior pelvis  Details of Examination: Today's exam show a well fixed, well positioned total hip arthroplasty with no evidence of wear, osteolysis, or loosening.  Impression:  Status post left total hip arthroplasty, implant in good position with no abnormality    R KLAUS no obvious wear       Assessment:       ICD-10-CM ICD-9-CM   1. S/P hip replacement, left  Z96.642 V43.64           Plan:       They will continue routine care of their hip and see me for their routine follow-up.  If there are problems in the interim they will see me back sooner.    Assessment & Plan    ARTIFICIAL HIP JOINTS:   Reviewed XRs of both hips.   Avoid extremes of motion with the hips. Do not force them in any one direction.   Rest if overdoing causes discomfort.   Take time when going up steps.    PAIN MANAGEMENT:   Take Advil, Aleve, or Tylenol as needed for pain.    FOLLOW-UP:   Ordered XR Both Hips in 5 years.   Follow up in 5 years for XRs of both hips.   Follow up sooner if pain develops or if patient feels something is "brewing" with the right hip.         5 year follow up for standard xray B KLAUS    This note was generated with the assistance of ambient listening technology. Verbal consent was obtained by the patient and accompanying visitor(s) for the recording of patient appointment to facilitate this note. I attest to having reviewed and edited the generated note for accuracy, though some syntax or spelling errors may persist. Please contact the author of this note for any clarification.      No orders of the defined types were placed in this encounter.            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    BLANCO (dyspnea on exertion) 05/20/2016    Fatigue 05/20/2016    GERD (gastroesophageal reflux disease)     Hard of hearing 05/20/2024    History of TIA (transient ischemic attack)     Hyperlipidemia     Hypertension     Inguinal hernia recurrent unilateral 06/25/2013    On amiodarone therapy 09/21/2016    Sleep apnea     Squamous cell carcinoma of skin     Status post catheter ablation of atrial fibrillation 09/21/2016    Stroke     TIA    TIA (transient ischemic attack)     Trouble in sleeping        Past Surgical History:   Procedure Laterality Date    ABLATION N/A 9/28/2018    Procedure: ABLATION;  Surgeon: Romaine Howard MD;  Location: [facility] CATH LAB;  Service: Cardiology;  Laterality: N/A;  AF, KAM, PVI, RFA, MAME, Gen, MB, 3 Prep    ARTHROPLASTY OF HIP BY ANTERIOR APPROACH Left 6/3/2024    Procedure: ARTHROPLASTY, HIP, TOTAL, ANTERIOR APPROACH: LEFT: DEPUY - ACTIS + PINNACLE;  Surgeon: Christiano Gallo III, MD;  Location: [facility] OR;  Service: Orthopedics;  Laterality: Left;    CATARACT EXTRACTION W/  INTRAOCULAR LENS IMPLANT Left 1/11/10    CATARACT EXTRACTION W/  INTRAOCULAR LENS IMPLANT Right 12/14/2017    DMEK/ Dr. Jay    COLONOSCOPY N/A 3/26/2019    Procedure: COLONOSCOPY;  Surgeon: Mauro Dye MD;  [facility]  Service: Endoscopy;  Laterality: N/A;  ok to hold Eliquis x 2 days per Dr. Howard-MS    COLONOSCOPY, SCREENING, HIGH RISK PATIENT N/A 5/1/2025    Procedure: COLONOSCOPY, SCREENING, HIGH RISK PATIENT;  Surgeon: Mauro Dye MD;  Location: [facility] ENDO (4TH FLR);  Service: Endoscopy;  Laterality: N/A;  ref by Molly Mclaughlin, NP, PEG, portal -ml  4/24 precall attempted/LVM/mleone  4/24 precall complete/mleone    CORNEAL TRANSPLANT Left 1997    PKP OS    CORNEAL TRANSPLANT Left 03/23/2017    CORNEAL TRANSPLANT Right 12/14/2017    DMEK/ [facility]; Dr. Jay    HERNIA REPAIR      HIP ARTHROPLASTY      OCCLUSION  OF LEFT ATRIAL APPENDAGE N/A 5/9/2022    Procedure: Left atrial appendage occlusion;  Surgeon: Romaine Howard MD;  Location: [facility] EP LAB;  Service: Cardiology;  Laterality: N/A;  afib, watchman, BSCI, erasmo, anes, MB/DM, 3prep    ROTATOR CUFF REPAIR      TRANSESOPHAGEAL ECHOCARDIOGRAPHY N/A 5/9/2022    Procedure: ECHOCARDIOGRAM, TRANSESOPHAGEAL;  Surgeon: Fanta Dacosta MD;  Location: [facility] EP LAB;  Service: Cardiology;  Laterality: N/A;    TREATMENT OF CARDIAC ARRHYTHMIA N/A 6/17/2025    Procedure: Cardioversion or Defibrillation;  Surgeon: Romaine Howard MD;  Location: [facility] EP LAB;  Service: Cardiology;  Laterality: N/A;  AF, AFL, DCCV, MAC, MB, 3 PREP, *WATCHMAN IN SITU       Family History   Problem Relation Name Age of Onset    Diabetes Mother      Heart disease Mother      Hyperlipidemia Mother      Heart disease Father      Colon cancer Father          colon    Cancer Father      Crohn's disease Sister      Hyperlipidemia Sister      Hypertension Sister      Hyperlipidemia Sister      Heart disease Brother      Sudden death Neg Hx      Amblyopia Neg Hx      Blindness Neg Hx      Cataracts Neg Hx      Glaucoma Neg Hx      Macular degeneration Neg Hx      Retinal detachment Neg Hx      Strabismus Neg Hx      Stroke Neg Hx      Thyroid disease Neg Hx      Melanoma Neg Hx         Social History     Socioeconomic History    Marital status:     Number of children: 3   Occupational History     Employer: [employer]   Tobacco Use    Smoking status: Never     Passive exposure: Never    Smokeless tobacco: Never   Substance and Sexual Activity    Alcohol use: No    Drug use: No    Sexual activity: Yes     Partners: Female     Social Drivers of Health     Financial Resource Strain: Low Risk  (4/9/2025)    Overall Financial Resource Strain (CARDIA)     Difficulty of Paying Living Expenses: Not hard at all   Food Insecurity: No Food Insecurity (4/9/2025)    Hunger Vital Sign      Worried About Running Out of Food in the Last Year: Never true     Ran Out of Food in the Last Year: Never true   Transportation Needs: No Transportation Needs (4/9/2025)    PRAPARE - Transportation     Lack of Transportation (Medical): No     Lack of Transportation (Non-Medical): No   Physical Activity: Inactive (4/9/2025)    Exercise Vital Sign     Days of Exercise per Week: 0 days     Minutes of Exercise per Session: 0 min   Stress: No Stress Concern Present (4/9/2025)    [employer] [place] [employer] - Occupational Stress Questionnaire     Feeling of Stress : Only a little   Housing Stability: Low Risk  (4/9/2025)    Housing Stability Vital Sign     Unable to Pay for Housing in the Last Year: No     Number of Times Moved in the Last Year: 1     Homeless in the Last Year: No

## 2025-07-30 NOTE — PROGRESS NOTES
Mr. Graff is a patient of Dr. Howard and was last seen in clinic 5/13/2025.      Subjective:   Patient ID:  Pavel Graff is a 75 y.o. male who presents for follow up of Atrial Fibrillation  .     HPI:    Mr. Graff is a 75 y.o. male with TIA, HTN, HLD, persistent AF (PVI 2016; redo PVI/PWI/CTI-line 9/2018), LAAO here for follow up after cardioversion.     Background:      75 year old male with history of HTN, TIA, SIMON, HLD and persistent AF since July 2012.  DCCV was attempted with early recurrence of AF.  He was rate controlled and did not note significant symptoms at the time - so a rate control strategy was pursued.  He notes though, that ever since he has been in AF - he has been much more easily fatigued and tired, not himself.  He denies overt palpitation or SOB.  He denies chest pain.    Mr. Graff underwent a PVI (07/21/16) with successful pulmonary vein antral isolation. Since the procedure, Mr. Graff reports feeling well with only two 3-second episodes of palpitations; he denies further recurrence. Mr. denies chest pain, SOB/BLANCO, dizziness, or syncope. Mr. Graff has noted improvement in his energy level since the procedure.     12/16: Continues improved symptoms. 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 Remains on pradaxa and metoprolol.     6/17: No recurrence of AF since PVI. Feels well. No active complaints.     2/18: AF recurrence without symptoms. Remains on apixaban. The patient denies interval chest pain, sob, palpitations, syncope, near syncope.     8/18: Stable symptoms with slightly more fatigue. He asks about repeat ablation at the end of the season.     11/18: Re-do PVI 9/28/18 with touch up PVI, posterior wall isolation and CTI line. Feels some what fatigued, no post procedure complications.     5/19: No recurrence since PVI 9/18. Symptoms improved, stable.     3/22:Onset of easy bleeding and bruising. He has had events with spontaneous sub-conjunctival bleeding that are  increasing in frequency and associated with pain. Remains in NSR.    9/22: 5/9/22 LAAO with cessation of OAC 6/20/22. Now on DAPT. No recurrence.    3/23: No recurrence, now off plavix.     5/24: No recurrence    5/2025: He had a colon prep last week (early May 2025) and was in AF when he arrived for his scope. He underwent the scope and had polyps removed   75 yoM here for AF management. First known recurrence in many years, possibly due to recent GI prep. I offered CV but he wishes to see if it resolves. Will check ECG in 1 month.     Update (08/07/2025):    6/17/2025: DCCV. Plan to continue all home medications including ASA 81 mg qd, metoprolol 25 mg qd; start Eliquis 5 mg BID x 1 month.   6/24/2025: ECG showed SR    Today he says he did not notice a change in symptoms after his cardioversion. He has chronic mild fatigue. Getting busy with school starting next week. No limitations in activities noted. No CP, palpitations, BLANCO, LH, syncope reported.    On ASA s/p LAAO. On toprol 25mg daily.    I have personally reviewed the patient's EKG today, which shows AFL at 73bpm. QRS is 100. QTc is 431.    Relevant Cardiac Test Results:    KAM (6/20/2022):  KAM done for 6 week evaluation s/p 27mm Watchman FLX implantation.  The Watchman device is well-seated with no device-related thrombus. There is a very small (2mm) nguyen-device leak.  The left ventricle is normal in size with normal systolic function. The estimated ejection fraction is 60%.  Mild aortic regurgitation.  Normal right ventricular size with normal right ventricular systolic function.  Mild tricuspid regurgitation.  Grade 3 plaque present in the descending aorta.  The sinuses of Valsalva is mildly dilated.    Current Outpatient Medications   Medication Sig    amoxicillin (AMOXIL) 500 MG capsule TAKE 4 CAPSULES BY MOUTH ONE HOUR BEFORE APPOINTMENT    apixaban (ELIQUIS) 5 mg Tab Take 1 tablet (5 mg total) by mouth 2 (two) times daily.    ascorbic acid (VITAMIN  C ORAL) Take by mouth Daily.    aspirin 81 MG Chew Take 81 mg by mouth once daily.    diclofenac sodium (VOLTAREN ARTHRITIS PAIN) 1 % Gel Apply 2 g topically once daily.    fish oil-omega-3 fatty acids 300-1,000 mg capsule Take 1 g by mouth once daily.    hydroCHLOROthiazide (HYDRODIURIL) 25 MG tablet TAKE 1 TABLET BY MOUTH EVERY DAYTTE      ketoconazole (NIZORAL) 2 % shampoo Wash hair with medicated shampoo at least 2x/week - let sit on scalp at least 5 minutes prior to rinsing    losartan (COZAAR) 50 MG tablet Take 1 tablet (50 mg total) by mouth once daily.    metoprolol succinate (TOPROL-XL) 25 MG 24 hr tablet Take 1 tablet (25 mg total) by mouth once daily.    pravastatin (PRAVACHOL) 20 MG tablet TAKE 1 TABLET BY MOUTH EVERY DAY    prednisoLONE acetate (PRED FORTE) 1 % DrpS Place 1 drop into both eyes 2 (two) times daily.    prednisoLONE acetate (PRED FORTE) 1 % DrpS Place 1 drop into the left eye 2 (two) times daily.    senna-docusate 8.6-50 mg (SENNA WITH DOCUSATE SODIUM) 8.6-50 mg per tablet Take 1 tablet by mouth once daily.    timolol maleate 0.5% (TIMOPTIC) 0.5 % Drop Place 1 drop into both eyes 2 (two) times daily.     No current facility-administered medications for this visit.     Facility-Administered Medications Ordered in Other Visits   Medication    0.9%  NaCl infusion       Review of Systems   Constitutional: Positive for malaise/fatigue (chronic).   Cardiovascular:  Negative for chest pain, dyspnea on exertion, irregular heartbeat, leg swelling and palpitations.   Respiratory:  Negative for shortness of breath.    Hematologic/Lymphatic: Negative for bleeding problem.   Skin:  Negative for rash.   Musculoskeletal:  Negative for myalgias.   Gastrointestinal:  Negative for hematemesis, hematochezia and nausea.   Genitourinary:  Negative for hematuria.   Neurological:  Negative for light-headedness.   Psychiatric/Behavioral:  Negative for altered mental status.    Allergic/Immunologic: Negative for  persistent infections.       Objective:          /75 (Patient Position: Sitting)   Pulse 73   Ht 6' (1.829 m)   Wt 111.9 kg (246 lb 11.1 oz)   BMI 33.46 kg/m²     Physical Exam  Vitals and nursing note reviewed.   Constitutional:       Appearance: Normal appearance. He is well-developed.   HENT:      Head: Normocephalic.      Nose: Nose normal.   Eyes:      Pupils: Pupils are equal, round, and reactive to light.   Cardiovascular:      Rate and Rhythm: Normal rate. Rhythm irregularly irregular.   Pulmonary:      Effort: No respiratory distress.   Musculoskeletal:         General: Normal range of motion.   Skin:     General: Skin is warm and dry.      Findings: No erythema.   Neurological:      Mental Status: He is alert and oriented to person, place, and time.   Psychiatric:         Speech: Speech normal.         Behavior: Behavior normal.           Lab Results   Component Value Date     06/11/2025     (L) 05/20/2024    K 3.9 06/11/2025    K 4.2 05/20/2024    MG 2.6 08/09/2012    BUN 13 06/11/2025    CREATININE 0.9 06/11/2025    ALT 23 05/20/2024    AST 25 05/20/2024    HGB 15.3 06/11/2025    HGB 15.3 05/20/2024    HCT 44.6 06/11/2025    HCT 32 (L) 06/04/2024    TSH 1.354 03/05/2024    LDLCALC 109.2 03/05/2024       Recent Labs   Lab 05/20/24  1047 06/11/25  0906   INR 0.9 1.0   PT  --  11.2       Assessment:     1. Persistent atrial fibrillation    2. NSVT (nonsustained ventricular tachycardia)    3. Primary hypertension    4. Obstructive sleep apnea    5. Presence of Watchman left atrial appendage closure device        Plan:     In summary, Mr. Graff is a 75 y.o. male with TIA, HTN, HLD, persistent AF (PVI 2016; redo PVI/PWI/CTI-line 9/2018), LAAO here for follow up after cardioversion.   He is 6 weeks s/p DCCV. Back in AFL today. Rate controlled. He says he did not note any change in symptoms after his cardioversion and he lasted at least one week in SR per ECG.  We discussed his options:  rate control, repeat DCCV+AAD, and ablation. He would like to think about it, but will proceed with rate control for now. Update echo to confirm stability of LV function.  He is on ASA s/p Watchman (took eliquis x 1 mo after DCCV).    TTE  Continue current meds  RTC 6 mo, sooner if needed    *A copy of this note has been sent to Dr. Howard*    Follow up in about 6 months (around 2/7/2026).      ------------------------------------------------------------------    ALYSHA Acharya, NP-C  Cardiac Electrophysiology

## 2025-08-07 ENCOUNTER — HOSPITAL ENCOUNTER (OUTPATIENT)
Dept: CARDIOLOGY | Facility: CLINIC | Age: 75
Discharge: HOME | End: 2025-08-07
Payer: MEDICARE

## 2025-08-07 ENCOUNTER — HOSPITAL ENCOUNTER (OUTPATIENT)
Dept: CARDIOLOGY | Facility: HOSPITAL | Age: 75
Discharge: HOME | End: 2025-08-07
Attending: NURSE PRACTITIONER
Payer: MEDICARE

## 2025-08-07 ENCOUNTER — OFFICE VISIT (OUTPATIENT)
Dept: ELECTROPHYSIOLOGY | Facility: CLINIC | Age: 75
End: 2025-08-07
Payer: MEDICARE

## 2025-08-07 VITALS
SYSTOLIC BLOOD PRESSURE: 130 MMHG | BODY MASS INDEX: 33.32 KG/M2 | WEIGHT: 246 LBS | HEART RATE: 64 BPM | HEIGHT: 72 IN | DIASTOLIC BLOOD PRESSURE: 75 MMHG

## 2025-08-07 VITALS
WEIGHT: 246.69 LBS | BODY MASS INDEX: 33.41 KG/M2 | HEIGHT: 72 IN | SYSTOLIC BLOOD PRESSURE: 132 MMHG | HEART RATE: 73 BPM | DIASTOLIC BLOOD PRESSURE: 75 MMHG

## 2025-08-07 DIAGNOSIS — Z95.818 PRESENCE OF WATCHMAN LEFT ATRIAL APPENDAGE CLOSURE DEVICE: ICD-10-CM

## 2025-08-07 DIAGNOSIS — I48.19 PERSISTENT ATRIAL FIBRILLATION: ICD-10-CM

## 2025-08-07 DIAGNOSIS — I10 PRIMARY HYPERTENSION: ICD-10-CM

## 2025-08-07 DIAGNOSIS — I47.29 NSVT (NONSUSTAINED VENTRICULAR TACHYCARDIA): ICD-10-CM

## 2025-08-07 DIAGNOSIS — I48.19 PERSISTENT ATRIAL FIBRILLATION: Primary | ICD-10-CM

## 2025-08-07 DIAGNOSIS — G47.33 OBSTRUCTIVE SLEEP APNEA: ICD-10-CM

## 2025-08-07 LAB
AORTIC SIZE INDEX (SOV): 1.5 CM/M2
AORTIC SIZE INDEX: 1.7 CM/M2
ASCENDING AORTA: 4 CM
AV AREA BY CONTINUOUS VTI: 2.9 CM2
AV INDEX (PROSTH): 0.87
AV LVOT MEAN GRADIENT: 3 MMHG
AV LVOT PEAK GRADIENT: 4 MMHG
AV MEAN GRADIENT: 4 MMHG
AV PEAK GRADIENT: 7 MMHG
AV VALVE AREA BY VELOCITY RATIO: 2.7 CM²
AV VALVE AREA: 3 CM2
AV VELOCITY RATIO: 0.77
BSA FOR ECHO PROCEDURE: 2.38 M2
CV ECHO LV RWT: 0.47 CM
DOP CALC AO PEAK VEL: 1.3 M/S
DOP CALC AO VTI: 21.8 CM
DOP CALC LVOT AREA: 3.5 CM2
DOP CALC LVOT DIAMETER: 2.1 CM
DOP CALC LVOT PEAK VEL: 1 M/S
DOP CALCLVOT PEAK VEL VTI: 18.9 CM
E/E' RATIO: 7 M/S
ECHO EF ESTIMATED: 56 %
ECHO LV POSTERIOR WALL: 1.2 CM (ref 0.6–1.1)
EJECTION FRACTION: 63 %
FRACTIONAL SHORTENING: 29.4 % (ref 28–44)
INTERVENTRICULAR SEPTUM: 1 CM (ref 0.6–1.1)
LA MAJOR: 5.8 CM
LA MINOR: 6.1 CM
LA WIDTH: 3.6 CM
LEFT ATRIUM SIZE: 4.8 CM
LEFT ATRIUM VOLUME INDEX MOD: 36 ML/M2
LEFT ATRIUM VOLUME INDEX: 37 ML/M2
LEFT ATRIUM VOLUME MOD: 83 ML
LEFT ATRIUM VOLUME: 87 CM3
LEFT INTERNAL DIMENSION IN SYSTOLE: 3.6 CM (ref 2.1–4)
LEFT VENTRICLE DIASTOLIC VOLUME INDEX: 53.22 ML/M2
LEFT VENTRICLE DIASTOLIC VOLUME: 124 ML
LEFT VENTRICLE MASS INDEX: 91.8 G/M2
LEFT VENTRICLE SYSTOLIC VOLUME INDEX: 23.2 ML/M2
LEFT VENTRICLE SYSTOLIC VOLUME: 54 ML
LEFT VENTRICULAR INTERNAL DIMENSION IN DIASTOLE: 5.1 CM (ref 3.5–6)
LEFT VENTRICULAR MASS: 213.9 G
LV LATERAL E/E' RATIO: 5.9 M/S
LV SEPTAL E/E' RATIO: 7.7 M/S
Lab: 1.9 CM/M
Lab: 2.2 CM/M
MV PEAK E VEL: 0.77 M/S
OHS CV CPX PATIENT HEIGHT IN: 72
OHS CV RV/LV RATIO: 1.24 CM
OHS QRS DURATION: 100 MS
OHS QTC CALCULATION: 431 MS
PISA TR MAX VEL: 3.4 M/S
RA MAJOR: 6.73 CM
RA PRESSURE ESTIMATED: 3 MMHG
RA WIDTH: 5.05 CM
RIGHT ATRIAL AREA: 32.8 CM2
RIGHT VENTRICLE DIASTOLIC BASEL DIMENSION: 6.3 CM
RV TB RVSP: 6 MMHG
RV TISSUE DOPPLER FREE WALL SYSTOLIC VELOCITY 1 (APICAL 4 CHAMBER VIEW): 14 CM/S
SINUS: 3.4 CM
STJ: 3.4 CM
TDI LATERAL: 0.13 M/S
TDI SEPTAL: 0.1 M/S
TDI: 0.12 M/S
TRICUSPID ANNULAR PLANE SYSTOLIC EXCURSION: 2.4 CM
TV PEAK GRADIENT: 45 MMHG
TV REST PULMONARY ARTERY PRESSURE: 49 MMHG
Z-SCORE OF LEFT VENTRICULAR DIMENSION IN END DIASTOLE: -6.16
Z-SCORE OF LEFT VENTRICULAR DIMENSION IN END SYSTOLE: -3.61

## 2025-08-07 PROCEDURE — 99214 OFFICE O/P EST MOD 30 MIN: CPT | Mod: HCNC,S$GLB,, | Performed by: NURSE PRACTITIONER

## 2025-08-07 PROCEDURE — 93306 TTE W/DOPPLER COMPLETE: CPT | Mod: 26,HCNC,, | Performed by: INTERNAL MEDICINE

## 2025-08-07 PROCEDURE — 99999 PR PBB SHADOW E&M-EST. PATIENT-LVL III: CPT | Mod: PBBFAC,HCNC,, | Performed by: NURSE PRACTITIONER

## 2025-08-07 PROCEDURE — 1126F AMNT PAIN NOTED NONE PRSNT: CPT | Mod: CPTII,HCNC,S$GLB, | Performed by: NURSE PRACTITIONER

## 2025-08-07 PROCEDURE — 4010F ACE/ARB THERAPY RXD/TAKEN: CPT | Mod: CPTII,HCNC,S$GLB, | Performed by: NURSE PRACTITIONER

## 2025-08-07 PROCEDURE — 1157F ADVNC CARE PLAN IN RCRD: CPT | Mod: CPTII,HCNC,S$GLB, | Performed by: NURSE PRACTITIONER

## 2025-08-07 PROCEDURE — 3075F SYST BP GE 130 - 139MM HG: CPT | Mod: CPTII,HCNC,S$GLB, | Performed by: NURSE PRACTITIONER

## 2025-08-07 PROCEDURE — 1160F RVW MEDS BY RX/DR IN RCRD: CPT | Mod: CPTII,HCNC,S$GLB, | Performed by: NURSE PRACTITIONER

## 2025-08-07 PROCEDURE — 3078F DIAST BP <80 MM HG: CPT | Mod: CPTII,HCNC,S$GLB, | Performed by: NURSE PRACTITIONER

## 2025-08-07 PROCEDURE — 3288F FALL RISK ASSESSMENT DOCD: CPT | Mod: CPTII,HCNC,S$GLB, | Performed by: NURSE PRACTITIONER

## 2025-08-07 PROCEDURE — 93306 TTE W/DOPPLER COMPLETE: CPT | Mod: HCNC

## 2025-08-07 PROCEDURE — 1159F MED LIST DOCD IN RCRD: CPT | Mod: CPTII,HCNC,S$GLB, | Performed by: NURSE PRACTITIONER

## 2025-08-07 PROCEDURE — 93010 ELECTROCARDIOGRAM REPORT: CPT | Mod: HCNC,S$GLB,, | Performed by: INTERNAL MEDICINE

## 2025-08-07 PROCEDURE — 1101F PT FALLS ASSESS-DOCD LE1/YR: CPT | Mod: CPTII,HCNC,S$GLB, | Performed by: NURSE PRACTITIONER

## 2025-09-04 DIAGNOSIS — I10 PRIMARY HYPERTENSION: ICD-10-CM

## 2025-09-04 RX ORDER — LOSARTAN POTASSIUM 50 MG/1
50 TABLET ORAL DAILY
Qty: 90 TABLET | Refills: 0 | Status: SHIPPED | OUTPATIENT
Start: 2025-09-04

## (undated) DEVICE — DIALATOR COONS TAPER 12F 20CM

## (undated) DEVICE — DILATOR COONS TAPER 14FR

## (undated) DEVICE — DRESSING AQUACEL AG RBBN 2X45

## (undated) DEVICE — SET EXTENSION 30 IN W/LL ROLLE

## (undated) DEVICE — SPIKE CONTRAST CONTROLLER

## (undated) DEVICE — NDL HYPO A BEVEL 30X1/2

## (undated) DEVICE — NDL RETROBULAR AKTKINSON 23G

## (undated) DEVICE — KIT PROBE COVER WITH GEL

## (undated) DEVICE — NDL TRNSSPTL BRK-1 18GA 71CM

## (undated) DEVICE — KIT BONE CEMENT PREPARATION

## (undated) DEVICE — GLOVE BIOGEL SKINSENSE PI 7.5

## (undated) DEVICE — SUT ETHILON 10/0 CS160-6

## (undated) DEVICE — SEALER AQUAMANTYS 3.48MM

## (undated) DEVICE — DISH PETRI MED 3.5IN

## (undated) DEVICE — SPONGE COTTON TRAY 4X4IN

## (undated) DEVICE — SEE MEDLINE ITEM 107746

## (undated) DEVICE — DRAPE C-ARM ELAS CLIP 42X120IN

## (undated) DEVICE — SOL BETADINE 5%

## (undated) DEVICE — DRAPE IOBAN 2 STERI

## (undated) DEVICE — CATH ANGIO DIAG PIG 6FX110

## (undated) DEVICE — PAD DEFIB CADENCE ADULT R2

## (undated) DEVICE — COVER DRAPE ACUSON STERILE

## (undated) DEVICE — SUTURE NYLON 10-0  12/BX

## (undated) DEVICE — KIT CUSTOM MANIFOLD

## (undated) DEVICE — STOPCOCK NDLS INJ MALE LL IV

## (undated) DEVICE — BNDG COFLEX FOAM LF2 ST 4X5YD

## (undated) DEVICE — HOOD T7 W/ PEEL AWAY LENS

## (undated) DEVICE — SYS CLSR DERMABOND PRINEO 22CM

## (undated) DEVICE — PROBE VITRECTOMY CENTURION 23G

## (undated) DEVICE — SOL WATER STRL IRR 1000ML

## (undated) DEVICE — UNDERGLOVES BIOGEL PI SIZE 7.5

## (undated) DEVICE — Device

## (undated) DEVICE — TOWEL OR XRAY WHITE 17X26IN

## (undated) DEVICE — TANK GAS ISPAN 125GR FOR SF6

## (undated) DEVICE — BLADE SAGITTAL 18 X 1.27 X 90M

## (undated) DEVICE — SUT MONOCRYL 3-0 PS-2 UND

## (undated) DEVICE — SYS REVOLUTION CEMENT MIXING

## (undated) DEVICE — PUNCH BARRON VACUUM 8.0MM

## (undated) DEVICE — SYR 3CC 21 X 1 LUER LOCK

## (undated) DEVICE — DRAPE STERI INSTRUMENT 1018

## (undated) DEVICE — GLOVE BIOGEL SKINSENSE PI 6.5

## (undated) DEVICE — TIP IRR FEM CANAL CO-AXIAL

## (undated) DEVICE — SHEATH CHECK FLO 14FR/30CM

## (undated) DEVICE — GLOVE BIOGEL PI MICRO SZ 6.5

## (undated) DEVICE — GOWN SMARTGOWN 3XL XLONG

## (undated) DEVICE — TAPE SILK 3IN

## (undated) DEVICE — UNDERGLOVES BIOGEL PI SIZE 8.5

## (undated) DEVICE — PACK EP DRAPE

## (undated) DEVICE — SYS WATCHMAN FXD CURVE DBL US

## (undated) DEVICE — SET INTRO PERFRMR SHTH 16FR

## (undated) DEVICE — SUT 2/0 36IN COATED VICRYL

## (undated) DEVICE — ALCOHOL 70% ISOP RUBBING 4OZ

## (undated) DEVICE — SYR 10CC LUER LOCK

## (undated) DEVICE — SUT MONOCYRL 4-0 PS2 UND

## (undated) DEVICE — GLOVE BIOGEL PI MICRO SZ 7

## (undated) DEVICE — CANNULA ANTERIOR CHAMBER 30G

## (undated) DEVICE — DRESSING TRANS 4X4 TEGADERM

## (undated) DEVICE — SKIN MARKER DEVON 160

## (undated) DEVICE — GLOVE BIOGEL SKINSENSE PI 8.0

## (undated) DEVICE — DRESSING EYE OVAL LF

## (undated) DEVICE — INTRO SWARTZ SL2 8.5FR 63CM

## (undated) DEVICE — DRESSING TELFA N ADH 3X8

## (undated) DEVICE — SUT 1 36IN COATED VICRYL UN

## (undated) DEVICE — PENCIL ROCKER SWITCH 10FT CORD

## (undated) DEVICE — UNDERGLOVES BIOGEL PI SZ 7 LF

## (undated) DEVICE — PUNCH VAC DONOR CORNEA SZ 8.5

## (undated) DEVICE — GUIDEWIRE AMPLATZ XSTIFF 260CM

## (undated) DEVICE — NDL HYPO STD REG BVL 18GX1.5IN

## (undated) DEVICE — KIT IRR SUCTION HND PIECE

## (undated) DEVICE — BRUSH SCRUB HIBICLENS 4%

## (undated) DEVICE — TREPHINE BARRON VAC RAD 8.5MM

## (undated) DEVICE — CASSETTE INFINITI

## (undated) DEVICE — ELECTRODE REM PLYHSV RETURN 9

## (undated) DEVICE — GUIDEWIRE EMERALD 150CM PTFE